# Patient Record
Sex: FEMALE | Race: WHITE | Employment: FULL TIME | URBAN - METROPOLITAN AREA
[De-identification: names, ages, dates, MRNs, and addresses within clinical notes are randomized per-mention and may not be internally consistent; named-entity substitution may affect disease eponyms.]

---

## 2017-01-13 ENCOUNTER — TELEPHONE (OUTPATIENT)
Dept: FAMILY MEDICINE CLINIC | Age: 52
End: 2017-01-13

## 2017-01-18 ENCOUNTER — TELEPHONE (OUTPATIENT)
Dept: FAMILY MEDICINE CLINIC | Age: 52
End: 2017-01-18

## 2017-01-18 DIAGNOSIS — R92.8 ABNORMAL MAMMOGRAM OF LEFT BREAST: Primary | ICD-10-CM

## 2017-02-10 DIAGNOSIS — E11.9 TYPE 2 DIABETES MELLITUS WITHOUT COMPLICATION, WITHOUT LONG-TERM CURRENT USE OF INSULIN (HCC): ICD-10-CM

## 2017-02-10 RX ORDER — LISINOPRIL 5 MG/1
TABLET ORAL
Qty: 90 TABLET | Refills: 0 | Status: SHIPPED | OUTPATIENT
Start: 2017-02-10 | End: 2017-03-13 | Stop reason: SDUPTHER

## 2017-02-13 ENCOUNTER — HOSPITAL ENCOUNTER (OUTPATIENT)
Dept: WOMENS IMAGING | Age: 52
Discharge: HOME | End: 2017-02-13
Attending: FAMILY MEDICINE | Admitting: FAMILY MEDICINE

## 2017-02-13 DIAGNOSIS — R92.8 ABNORMAL MAMMOGRAM OF LEFT BREAST: ICD-10-CM

## 2017-02-13 DIAGNOSIS — R92.8 OTHER ABNORMAL AND INCONCLUSIVE FINDINGS ON DIAGNOSTIC IMAGING OF BREAST: ICD-10-CM

## 2017-02-13 DIAGNOSIS — R92.8 ABNORMAL MAMMOGRAM: ICD-10-CM

## 2017-02-20 ENCOUNTER — OFFICE VISIT (OUTPATIENT)
Dept: FAMILY MEDICINE CLINIC | Age: 52
End: 2017-02-20

## 2017-02-20 VITALS
WEIGHT: 168 LBS | HEART RATE: 64 BPM | RESPIRATION RATE: 12 BRPM | HEIGHT: 65 IN | DIASTOLIC BLOOD PRESSURE: 64 MMHG | BODY MASS INDEX: 27.99 KG/M2 | SYSTOLIC BLOOD PRESSURE: 102 MMHG

## 2017-02-20 DIAGNOSIS — Z23 NEED FOR PNEUMOCOCCAL VACCINATION: ICD-10-CM

## 2017-02-20 DIAGNOSIS — M70.61 TROCHANTERIC BURSITIS OF RIGHT HIP: Primary | ICD-10-CM

## 2017-02-20 DIAGNOSIS — R25.2 CRAMP OF BOTH LOWER EXTREMITIES: ICD-10-CM

## 2017-02-20 DIAGNOSIS — E11.9 TYPE 2 DIABETES MELLITUS WITHOUT COMPLICATION, WITHOUT LONG-TERM CURRENT USE OF INSULIN (HCC): ICD-10-CM

## 2017-02-20 PROCEDURE — 90471 IMMUNIZATION ADMIN: CPT | Performed by: FAMILY MEDICINE

## 2017-02-20 PROCEDURE — 90732 PPSV23 VACC 2 YRS+ SUBQ/IM: CPT | Performed by: FAMILY MEDICINE

## 2017-02-20 PROCEDURE — 99214 OFFICE O/P EST MOD 30 MIN: CPT | Performed by: FAMILY MEDICINE

## 2017-02-20 RX ORDER — METFORMIN HYDROCHLORIDE 500 MG/1
1000 TABLET, FILM COATED, EXTENDED RELEASE ORAL
Qty: 60 TABLET | Refills: 2 | Status: SHIPPED | OUTPATIENT
Start: 2017-02-20 | End: 2017-03-14

## 2017-02-23 ENCOUNTER — TELEPHONE (OUTPATIENT)
Dept: FAMILY MEDICINE CLINIC | Age: 52
End: 2017-02-23

## 2017-02-23 DIAGNOSIS — Z20.828 EXPOSURE TO THE FLU: Primary | ICD-10-CM

## 2017-02-23 RX ORDER — OSELTAMIVIR PHOSPHATE 75 MG/1
CAPSULE ORAL
Qty: 7 CAPSULE | Refills: 0 | Status: SHIPPED | OUTPATIENT
Start: 2017-02-23 | End: 2017-03-08 | Stop reason: ALTCHOICE

## 2017-03-01 ENCOUNTER — HOSPITAL ENCOUNTER (OUTPATIENT)
Dept: OTHER | Age: 52
Discharge: HOME | End: 2017-03-31
Attending: FAMILY MEDICINE | Admitting: FAMILY MEDICINE

## 2017-03-01 ASSESSMENT — ACTIVITIES OF DAILY LIVING (ADL): EFFECT OF PAIN ON DAILY ACTIVITIES: LIMITS WB ACTIVITIES

## 2017-03-01 ASSESSMENT — PAIN DESCRIPTION - PAIN TYPE: TYPE: ACUTE PAIN

## 2017-03-01 ASSESSMENT — PAIN DESCRIPTION - PROGRESSION: CLINICAL_PROGRESSION: GRADUALLY WORSENING

## 2017-03-01 ASSESSMENT — PAIN SCALES - GENERAL: PAINLEVEL_OUTOF10: 5

## 2017-03-01 ASSESSMENT — PAIN DESCRIPTION - LOCATION: LOCATION: HIP;BACK

## 2017-03-01 ASSESSMENT — PAIN DESCRIPTION - ORIENTATION: ORIENTATION: RIGHT

## 2017-03-01 ASSESSMENT — PAIN DESCRIPTION - ONSET: ONSET: ON-GOING

## 2017-03-01 ASSESSMENT — PAIN DESCRIPTION - FREQUENCY: FREQUENCY: CONTINUOUS

## 2017-03-01 ASSESSMENT — PAIN DESCRIPTION - DESCRIPTORS: DESCRIPTORS: ACHING;SORE;CONSTANT

## 2017-03-06 ENCOUNTER — HOSPITAL ENCOUNTER (OUTPATIENT)
Dept: PHYSICAL THERAPY | Age: 52
Discharge: HOME | End: 2017-03-07
Admitting: FAMILY MEDICINE

## 2017-03-08 ENCOUNTER — OFFICE VISIT (OUTPATIENT)
Dept: FAMILY MEDICINE CLINIC | Age: 52
End: 2017-03-08

## 2017-03-08 VITALS
OXYGEN SATURATION: 98 % | WEIGHT: 164 LBS | HEIGHT: 65 IN | HEART RATE: 104 BPM | DIASTOLIC BLOOD PRESSURE: 68 MMHG | TEMPERATURE: 98 F | SYSTOLIC BLOOD PRESSURE: 112 MMHG | BODY MASS INDEX: 27.32 KG/M2

## 2017-03-08 DIAGNOSIS — E11.9 TYPE 2 DIABETES MELLITUS WITHOUT COMPLICATION, WITHOUT LONG-TERM CURRENT USE OF INSULIN (HCC): ICD-10-CM

## 2017-03-08 DIAGNOSIS — Z80.3 FH: BREAST CANCER: ICD-10-CM

## 2017-03-08 DIAGNOSIS — J01.80 ACUTE NON-RECURRENT SINUSITIS OF OTHER SINUS: Primary | ICD-10-CM

## 2017-03-08 PROCEDURE — 99213 OFFICE O/P EST LOW 20 MIN: CPT | Performed by: INTERNAL MEDICINE

## 2017-03-08 RX ORDER — FLUTICASONE PROPIONATE 50 MCG
1 SPRAY, SUSPENSION (ML) NASAL DAILY
Qty: 1 BOTTLE | Refills: 0 | Status: SHIPPED | OUTPATIENT
Start: 2017-03-08 | End: 2017-03-13 | Stop reason: ALTCHOICE

## 2017-03-13 ENCOUNTER — HOSPITAL ENCOUNTER (OUTPATIENT)
Dept: PHYSICAL THERAPY | Age: 52
Discharge: HOME | End: 2017-03-14
Admitting: FAMILY MEDICINE

## 2017-03-13 ENCOUNTER — OFFICE VISIT (OUTPATIENT)
Dept: FAMILY MEDICINE CLINIC | Age: 52
End: 2017-03-13

## 2017-03-13 VITALS
BODY MASS INDEX: 27.66 KG/M2 | HEIGHT: 65 IN | RESPIRATION RATE: 12 BRPM | SYSTOLIC BLOOD PRESSURE: 110 MMHG | WEIGHT: 166 LBS | HEART RATE: 96 BPM | DIASTOLIC BLOOD PRESSURE: 60 MMHG

## 2017-03-13 DIAGNOSIS — E11.9 TYPE 2 DIABETES MELLITUS WITHOUT COMPLICATION, WITHOUT LONG-TERM CURRENT USE OF INSULIN (HCC): Primary | ICD-10-CM

## 2017-03-13 DIAGNOSIS — E78.00 PURE HYPERCHOLESTEROLEMIA: ICD-10-CM

## 2017-03-13 LAB
ANION GAP SERPL CALCULATED.3IONS-SCNC: 15 MMOL/L (ref 3–16)
BUN BLDV-MCNC: 10 MG/DL (ref 7–20)
CALCIUM SERPL-MCNC: 9.4 MG/DL (ref 8.3–10.6)
CHLORIDE BLD-SCNC: 99 MMOL/L (ref 99–110)
CO2: 23 MMOL/L (ref 21–32)
CREAT SERPL-MCNC: 0.8 MG/DL (ref 0.6–1.1)
GFR AFRICAN AMERICAN: >60
GFR NON-AFRICAN AMERICAN: >60
GLUCOSE BLD-MCNC: 359 MG/DL (ref 70–99)
HBA1C MFR BLD: 7.9 %
POTASSIUM SERPL-SCNC: 4.6 MMOL/L (ref 3.5–5.1)
SODIUM BLD-SCNC: 137 MMOL/L (ref 136–145)

## 2017-03-13 PROCEDURE — 99213 OFFICE O/P EST LOW 20 MIN: CPT | Performed by: FAMILY MEDICINE

## 2017-03-13 PROCEDURE — 36415 COLL VENOUS BLD VENIPUNCTURE: CPT | Performed by: FAMILY MEDICINE

## 2017-03-13 PROCEDURE — 83036 HEMOGLOBIN GLYCOSYLATED A1C: CPT | Performed by: FAMILY MEDICINE

## 2017-03-13 RX ORDER — LISINOPRIL 5 MG/1
TABLET ORAL
Qty: 90 TABLET | Refills: 0 | Status: SHIPPED | OUTPATIENT
Start: 2017-03-13 | End: 2017-06-12 | Stop reason: SDUPTHER

## 2017-03-13 RX ORDER — ATORVASTATIN CALCIUM 20 MG/1
20 TABLET, FILM COATED ORAL DAILY
Qty: 90 TABLET | Refills: 0 | Status: SHIPPED | OUTPATIENT
Start: 2017-03-13 | End: 2017-06-04 | Stop reason: SDUPTHER

## 2017-03-14 ENCOUNTER — TELEPHONE (OUTPATIENT)
Dept: FAMILY MEDICINE CLINIC | Age: 52
End: 2017-03-14

## 2017-03-14 RX ORDER — METFORMIN HYDROCHLORIDE 500 MG/1
1000 TABLET, EXTENDED RELEASE ORAL
Qty: 180 TABLET | Refills: 0 | Status: SHIPPED | OUTPATIENT
Start: 2017-03-14 | End: 2017-06-12 | Stop reason: SDUPTHER

## 2017-03-14 RX ORDER — ALOGLIPTIN 25 MG/1
25 TABLET, FILM COATED ORAL DAILY
Qty: 90 TABLET | Refills: 0 | Status: SHIPPED | OUTPATIENT
Start: 2017-03-14 | End: 2017-06-12 | Stop reason: SDUPTHER

## 2017-03-15 ENCOUNTER — HOSPITAL ENCOUNTER (OUTPATIENT)
Dept: PHYSICAL THERAPY | Age: 52
Discharge: HOME | End: 2017-03-16
Admitting: FAMILY MEDICINE

## 2017-03-15 DIAGNOSIS — E11.9 TYPE 2 DIABETES MELLITUS WITHOUT COMPLICATION, WITHOUT LONG-TERM CURRENT USE OF INSULIN (HCC): Primary | ICD-10-CM

## 2017-03-15 RX ORDER — BLOOD-GLUCOSE METER
KIT MISCELLANEOUS
Qty: 1 KIT | Refills: 0 | Status: SHIPPED | OUTPATIENT
Start: 2017-03-15 | End: 2017-12-18 | Stop reason: SDUPTHER

## 2017-04-10 DIAGNOSIS — E11.9 TYPE 2 DIABETES MELLITUS WITHOUT COMPLICATION, WITHOUT LONG-TERM CURRENT USE OF INSULIN (HCC): ICD-10-CM

## 2017-04-11 RX ORDER — GLIPIZIDE 10 MG/1
TABLET ORAL
Qty: 360 TABLET | Refills: 0 | Status: SHIPPED | OUTPATIENT
Start: 2017-04-11 | End: 2017-06-12 | Stop reason: SDUPTHER

## 2017-05-01 ENCOUNTER — TELEPHONE (OUTPATIENT)
Dept: FAMILY MEDICINE CLINIC | Age: 52
End: 2017-05-01

## 2017-05-15 DIAGNOSIS — E78.00 PURE HYPERCHOLESTEROLEMIA: ICD-10-CM

## 2017-05-15 DIAGNOSIS — E11.9 TYPE 2 DIABETES MELLITUS WITHOUT COMPLICATION, WITHOUT LONG-TERM CURRENT USE OF INSULIN (HCC): Primary | ICD-10-CM

## 2017-05-16 LAB
A/G RATIO: 1.6 (ref 1.1–2.2)
ALBUMIN SERPL-MCNC: 3.9 G/DL (ref 3.4–5)
ALP BLD-CCNC: 79 U/L (ref 40–129)
ALT SERPL-CCNC: 14 U/L (ref 10–40)
ANION GAP SERPL CALCULATED.3IONS-SCNC: 14 MMOL/L (ref 3–16)
AST SERPL-CCNC: 13 U/L (ref 15–37)
BILIRUB SERPL-MCNC: 0.3 MG/DL (ref 0–1)
BUN BLDV-MCNC: 11 MG/DL (ref 7–20)
CALCIUM SERPL-MCNC: 8.9 MG/DL (ref 8.3–10.6)
CHLORIDE BLD-SCNC: 103 MMOL/L (ref 99–110)
CHOLESTEROL, TOTAL: 142 MG/DL (ref 0–199)
CO2: 22 MMOL/L (ref 21–32)
CREAT SERPL-MCNC: 0.6 MG/DL (ref 0.6–1.1)
GFR AFRICAN AMERICAN: >60
GFR NON-AFRICAN AMERICAN: >60
GLOBULIN: 2.5 G/DL
GLUCOSE BLD-MCNC: 205 MG/DL (ref 70–99)
HDLC SERPL-MCNC: 42 MG/DL (ref 40–60)
LDL CHOLESTEROL CALCULATED: 77 MG/DL
POTASSIUM SERPL-SCNC: 4.7 MMOL/L (ref 3.5–5.1)
SODIUM BLD-SCNC: 139 MMOL/L (ref 136–145)
TOTAL PROTEIN: 6.4 G/DL (ref 6.4–8.2)
TRIGL SERPL-MCNC: 117 MG/DL (ref 0–150)
VLDLC SERPL CALC-MCNC: 23 MG/DL

## 2017-06-04 DIAGNOSIS — E78.00 PURE HYPERCHOLESTEROLEMIA: ICD-10-CM

## 2017-06-05 RX ORDER — ATORVASTATIN CALCIUM 20 MG/1
TABLET, FILM COATED ORAL
Qty: 30 TABLET | Refills: 0 | Status: SHIPPED | OUTPATIENT
Start: 2017-06-05 | End: 2017-07-03 | Stop reason: SDUPTHER

## 2017-06-12 ENCOUNTER — OFFICE VISIT (OUTPATIENT)
Dept: FAMILY MEDICINE CLINIC | Age: 52
End: 2017-06-12

## 2017-06-12 VITALS
SYSTOLIC BLOOD PRESSURE: 106 MMHG | RESPIRATION RATE: 20 BRPM | WEIGHT: 164 LBS | HEART RATE: 80 BPM | BODY MASS INDEX: 28 KG/M2 | HEIGHT: 64 IN | DIASTOLIC BLOOD PRESSURE: 86 MMHG

## 2017-06-12 DIAGNOSIS — E11.9 TYPE 2 DIABETES MELLITUS WITHOUT COMPLICATION, WITHOUT LONG-TERM CURRENT USE OF INSULIN (HCC): Primary | ICD-10-CM

## 2017-06-12 DIAGNOSIS — E78.00 PURE HYPERCHOLESTEROLEMIA: ICD-10-CM

## 2017-06-12 DIAGNOSIS — Z12.11 COLON CANCER SCREENING: ICD-10-CM

## 2017-06-12 DIAGNOSIS — Z11.59 NEED FOR HEPATITIS C SCREENING TEST: ICD-10-CM

## 2017-06-12 LAB
HBA1C MFR BLD: 7.5 %
HEPATITIS C ANTIBODY INTERPRETATION: NORMAL

## 2017-06-12 PROCEDURE — 83036 HEMOGLOBIN GLYCOSYLATED A1C: CPT | Performed by: FAMILY MEDICINE

## 2017-06-12 PROCEDURE — 99214 OFFICE O/P EST MOD 30 MIN: CPT | Performed by: FAMILY MEDICINE

## 2017-06-12 RX ORDER — GLIPIZIDE 10 MG/1
TABLET ORAL
Qty: 360 TABLET | Refills: 0 | Status: SHIPPED | OUTPATIENT
Start: 2017-06-12 | End: 2017-09-27 | Stop reason: SDUPTHER

## 2017-06-12 RX ORDER — ALOGLIPTIN 25 MG/1
25 TABLET, FILM COATED ORAL DAILY
Qty: 90 TABLET | Refills: 0 | Status: SHIPPED | OUTPATIENT
Start: 2017-06-12 | End: 2017-09-01 | Stop reason: SDUPTHER

## 2017-06-12 RX ORDER — METFORMIN HYDROCHLORIDE 500 MG/1
1000 TABLET, EXTENDED RELEASE ORAL
Qty: 270 TABLET | Refills: 0 | Status: SHIPPED | OUTPATIENT
Start: 2017-06-12 | End: 2017-08-28 | Stop reason: SDUPTHER

## 2017-06-12 RX ORDER — LISINOPRIL 5 MG/1
TABLET ORAL
Qty: 90 TABLET | Refills: 0 | Status: SHIPPED | OUTPATIENT
Start: 2017-06-12 | End: 2017-11-08 | Stop reason: SDUPTHER

## 2017-06-13 ENCOUNTER — NURSE ONLY (OUTPATIENT)
Dept: FAMILY MEDICINE CLINIC | Age: 52
End: 2017-06-13

## 2017-06-13 ENCOUNTER — TELEPHONE (OUTPATIENT)
Dept: FAMILY MEDICINE CLINIC | Age: 52
End: 2017-06-13

## 2017-06-13 DIAGNOSIS — Z12.11 COLON CANCER SCREENING: ICD-10-CM

## 2017-06-13 LAB
CONTROL: NORMAL
HEMOCCULT STL QL: NEGATIVE

## 2017-06-13 PROCEDURE — 82274 ASSAY TEST FOR BLOOD FECAL: CPT | Performed by: FAMILY MEDICINE

## 2017-07-03 DIAGNOSIS — E78.00 PURE HYPERCHOLESTEROLEMIA: ICD-10-CM

## 2017-07-03 RX ORDER — ATORVASTATIN CALCIUM 20 MG/1
TABLET, FILM COATED ORAL
Qty: 90 TABLET | Refills: 0 | Status: SHIPPED | OUTPATIENT
Start: 2017-07-03 | End: 2017-08-28 | Stop reason: SDUPTHER

## 2017-07-05 ENCOUNTER — HOSPITAL ENCOUNTER (OUTPATIENT)
Dept: WOMENS IMAGING | Age: 52
Discharge: HOME | End: 2017-07-05
Attending: FAMILY MEDICINE | Admitting: FAMILY MEDICINE

## 2017-07-05 DIAGNOSIS — Z12.31 VISIT FOR SCREENING MAMMOGRAM: ICD-10-CM

## 2017-07-06 ENCOUNTER — TELEPHONE (OUTPATIENT)
Dept: FAMILY MEDICINE CLINIC | Age: 52
End: 2017-07-06

## 2017-07-06 DIAGNOSIS — R92.8 ABNORMAL MAMMOGRAM OF LEFT BREAST: Primary | ICD-10-CM

## 2017-07-10 ENCOUNTER — OFFICE VISIT (OUTPATIENT)
Dept: FAMILY MEDICINE CLINIC | Age: 52
End: 2017-07-10

## 2017-07-10 ENCOUNTER — TELEPHONE (OUTPATIENT)
Dept: FAMILY MEDICINE CLINIC | Age: 52
End: 2017-07-10

## 2017-07-10 VITALS
BODY MASS INDEX: 27.82 KG/M2 | DIASTOLIC BLOOD PRESSURE: 70 MMHG | HEIGHT: 65 IN | HEART RATE: 64 BPM | SYSTOLIC BLOOD PRESSURE: 116 MMHG | RESPIRATION RATE: 12 BRPM | WEIGHT: 167 LBS

## 2017-07-10 DIAGNOSIS — N20.0 KIDNEY STONE: Primary | ICD-10-CM

## 2017-07-10 PROCEDURE — 99214 OFFICE O/P EST MOD 30 MIN: CPT | Performed by: FAMILY MEDICINE

## 2017-07-10 RX ORDER — MECLIZINE HYDROCHLORIDE 25 MG/1
25 TABLET ORAL 3 TIMES DAILY PRN
Qty: 30 TABLET | Refills: 0 | Status: SHIPPED | OUTPATIENT
Start: 2017-07-10 | End: 2017-12-18 | Stop reason: SDUPTHER

## 2017-07-10 RX ORDER — MECLIZINE HYDROCHLORIDE 25 MG/1
25 TABLET ORAL 3 TIMES DAILY PRN
COMMUNITY
End: 2017-07-10 | Stop reason: SDUPTHER

## 2017-07-11 ENCOUNTER — HOSPITAL ENCOUNTER (OUTPATIENT)
Dept: ULTRASOUND IMAGING | Age: 52
Discharge: HOME | End: 2017-07-11
Attending: FAMILY MEDICINE | Admitting: FAMILY MEDICINE

## 2017-07-11 DIAGNOSIS — R92.8 ABNORMAL MAMMOGRAM OF LEFT BREAST: ICD-10-CM

## 2017-07-11 DIAGNOSIS — R92.8 OTHER ABNORMAL AND INCONCLUSIVE FINDINGS ON DIAGNOSTIC IMAGING OF BREAST: ICD-10-CM

## 2017-07-17 ENCOUNTER — OFFICE VISIT (OUTPATIENT)
Dept: FAMILY MEDICINE CLINIC | Age: 52
End: 2017-07-17

## 2017-07-17 ENCOUNTER — TELEPHONE (OUTPATIENT)
Dept: FAMILY MEDICINE CLINIC | Age: 52
End: 2017-07-17

## 2017-07-17 VITALS
BODY MASS INDEX: 27.82 KG/M2 | HEART RATE: 106 BPM | RESPIRATION RATE: 12 BRPM | HEIGHT: 65 IN | OXYGEN SATURATION: 97 % | DIASTOLIC BLOOD PRESSURE: 72 MMHG | WEIGHT: 167 LBS | SYSTOLIC BLOOD PRESSURE: 108 MMHG

## 2017-07-17 DIAGNOSIS — D64.9 ANEMIA, UNSPECIFIED TYPE: ICD-10-CM

## 2017-07-17 DIAGNOSIS — E11.9 TYPE 2 DIABETES MELLITUS WITHOUT COMPLICATION, WITHOUT LONG-TERM CURRENT USE OF INSULIN (HCC): ICD-10-CM

## 2017-07-17 DIAGNOSIS — M79.604 PAIN OF RIGHT LOWER EXTREMITY: Primary | ICD-10-CM

## 2017-07-17 LAB
CREATININE URINE: 34.4 MG/DL (ref 28–259)
MICROALBUMIN UR-MCNC: <1.2 MG/DL
MICROALBUMIN/CREAT UR-RTO: NORMAL MG/G (ref 0–30)

## 2017-07-17 PROCEDURE — 99214 OFFICE O/P EST MOD 30 MIN: CPT | Performed by: INTERNAL MEDICINE

## 2017-07-18 ENCOUNTER — HOSPITAL ENCOUNTER (OUTPATIENT)
Dept: VASCULAR LAB | Age: 52
Discharge: HOME | End: 2017-07-18
Attending: INTERNAL MEDICINE | Admitting: INTERNAL MEDICINE

## 2017-07-18 ENCOUNTER — TELEPHONE (OUTPATIENT)
Dept: FAMILY MEDICINE CLINIC | Age: 52
End: 2017-07-18

## 2017-07-18 DIAGNOSIS — D64.9 ANEMIA, UNSPECIFIED TYPE: ICD-10-CM

## 2017-07-18 PROBLEM — D50.9 IRON DEFICIENCY ANEMIA: Status: ACTIVE | Noted: 2017-07-18

## 2017-07-18 LAB
BASOPHILS ABSOLUTE: 0.1 K/UL (ref 0–0.2)
BASOPHILS RELATIVE PERCENT: 0.8 %
EOSINOPHILS ABSOLUTE: 0.1 K/UL (ref 0–0.6)
EOSINOPHILS RELATIVE PERCENT: 1.8 %
FERRITIN: 6.3 NG/ML (ref 15–150)
HCT VFR BLD CALC: 31.9 % (ref 36–48)
HEMOGLOBIN: 10.3 G/DL (ref 12–16)
IMMATURE RETIC FRACT: 0.51 (ref 0.21–0.37)
IRON SATURATION: 8 % (ref 15–50)
IRON: 31 UG/DL (ref 37–145)
LYMPHOCYTES ABSOLUTE: 0.9 K/UL (ref 1–5.1)
LYMPHOCYTES RELATIVE PERCENT: 14.5 %
MCH RBC QN AUTO: 24 PG (ref 26–34)
MCHC RBC AUTO-ENTMCNC: 32.2 G/DL (ref 31–36)
MCV RBC AUTO: 74.6 FL (ref 80–100)
MONOCYTES ABSOLUTE: 0.4 K/UL (ref 0–1.3)
MONOCYTES RELATIVE PERCENT: 5.8 %
NEUTROPHILS ABSOLUTE: 5 K/UL (ref 1.7–7.7)
NEUTROPHILS RELATIVE PERCENT: 77.1 %
PDW BLD-RTO: 17.3 % (ref 12.4–15.4)
PLATELET # BLD: 226 K/UL (ref 135–450)
PMV BLD AUTO: 9.5 FL (ref 5–10.5)
RBC # BLD: 4.27 M/UL (ref 4–5.2)
RETICULOCYTE ABSOLUTE COUNT: 0.08 M/UL (ref 0.02–0.1)
RETICULOCYTE COUNT PCT: 1.78 % (ref 0.5–2.18)
TOTAL IRON BINDING CAPACITY: 392 UG/DL (ref 260–445)
WBC # BLD: 6.5 K/UL (ref 4–11)

## 2017-07-19 ENCOUNTER — TELEPHONE (OUTPATIENT)
Dept: FAMILY MEDICINE CLINIC | Age: 52
End: 2017-07-19

## 2017-07-19 DIAGNOSIS — Z20.818 PERTUSSIS EXPOSURE: Primary | ICD-10-CM

## 2017-07-19 RX ORDER — LANOLIN ALCOHOL/MO/W.PET/CERES
325 CREAM (GRAM) TOPICAL 2 TIMES DAILY
Qty: 60 TABLET | Refills: 2 | Status: SHIPPED | OUTPATIENT
Start: 2017-07-19 | End: 2017-10-17 | Stop reason: SDUPTHER

## 2017-07-19 RX ORDER — AZITHROMYCIN 250 MG/1
TABLET, FILM COATED ORAL
Qty: 1 PACKET | Refills: 0 | Status: SHIPPED | OUTPATIENT
Start: 2017-07-19 | End: 2017-07-29

## 2017-07-25 ENCOUNTER — TELEPHONE (OUTPATIENT)
Dept: FAMILY MEDICINE CLINIC | Age: 52
End: 2017-07-25

## 2017-08-28 DIAGNOSIS — E78.00 PURE HYPERCHOLESTEROLEMIA: ICD-10-CM

## 2017-08-28 DIAGNOSIS — E11.9 TYPE 2 DIABETES MELLITUS WITHOUT COMPLICATION, WITHOUT LONG-TERM CURRENT USE OF INSULIN (HCC): ICD-10-CM

## 2017-08-28 RX ORDER — METFORMIN HYDROCHLORIDE 500 MG/1
TABLET, EXTENDED RELEASE ORAL
Qty: 90 TABLET | Refills: 0 | Status: SHIPPED | OUTPATIENT
Start: 2017-08-28 | End: 2017-09-27 | Stop reason: SDUPTHER

## 2017-08-28 RX ORDER — ATORVASTATIN CALCIUM 20 MG/1
TABLET, FILM COATED ORAL
Qty: 90 TABLET | Refills: 0 | Status: SHIPPED | OUTPATIENT
Start: 2017-08-28 | End: 2017-09-06 | Stop reason: SDUPTHER

## 2017-09-01 DIAGNOSIS — E11.9 TYPE 2 DIABETES MELLITUS WITHOUT COMPLICATION, WITHOUT LONG-TERM CURRENT USE OF INSULIN (HCC): ICD-10-CM

## 2017-09-01 RX ORDER — ALOGLIPTIN 25 MG/1
TABLET, FILM COATED ORAL
Qty: 90 TABLET | Refills: 0 | Status: SHIPPED | OUTPATIENT
Start: 2017-09-01 | End: 2017-11-25 | Stop reason: SDUPTHER

## 2017-09-06 DIAGNOSIS — E78.00 PURE HYPERCHOLESTEROLEMIA: ICD-10-CM

## 2017-09-06 RX ORDER — ATORVASTATIN CALCIUM 20 MG/1
TABLET, FILM COATED ORAL
Qty: 90 TABLET | Refills: 0 | Status: SHIPPED | OUTPATIENT
Start: 2017-09-06 | End: 2018-03-16 | Stop reason: SDUPTHER

## 2017-09-11 ENCOUNTER — OFFICE VISIT (OUTPATIENT)
Dept: FAMILY MEDICINE CLINIC | Age: 52
End: 2017-09-11

## 2017-09-11 VITALS
HEART RATE: 62 BPM | SYSTOLIC BLOOD PRESSURE: 100 MMHG | DIASTOLIC BLOOD PRESSURE: 72 MMHG | BODY MASS INDEX: 26.66 KG/M2 | WEIGHT: 160 LBS | HEIGHT: 65 IN | RESPIRATION RATE: 12 BRPM

## 2017-09-11 DIAGNOSIS — E11.9 TYPE 2 DIABETES MELLITUS WITHOUT COMPLICATION, WITHOUT LONG-TERM CURRENT USE OF INSULIN (HCC): Primary | ICD-10-CM

## 2017-09-11 DIAGNOSIS — D50.9 IRON DEFICIENCY ANEMIA, UNSPECIFIED IRON DEFICIENCY ANEMIA TYPE: ICD-10-CM

## 2017-09-11 DIAGNOSIS — E78.00 PURE HYPERCHOLESTEROLEMIA: ICD-10-CM

## 2017-09-11 LAB
BASOPHILS ABSOLUTE: 0 K/UL (ref 0–0.2)
BASOPHILS RELATIVE PERCENT: 0.4 %
EOSINOPHILS ABSOLUTE: 0.1 K/UL (ref 0–0.6)
EOSINOPHILS RELATIVE PERCENT: 1.4 %
HBA1C MFR BLD: 6 %
HCT VFR BLD CALC: 38.3 % (ref 36–48)
HEMOGLOBIN: 12.3 G/DL (ref 12–16)
LYMPHOCYTES ABSOLUTE: 1 K/UL (ref 1–5.1)
LYMPHOCYTES RELATIVE PERCENT: 14.7 %
MCH RBC QN AUTO: 26.5 PG (ref 26–34)
MCHC RBC AUTO-ENTMCNC: 32.2 G/DL (ref 31–36)
MCV RBC AUTO: 82.3 FL (ref 80–100)
MONOCYTES ABSOLUTE: 0.4 K/UL (ref 0–1.3)
MONOCYTES RELATIVE PERCENT: 5.6 %
NEUTROPHILS ABSOLUTE: 5.3 K/UL (ref 1.7–7.7)
NEUTROPHILS RELATIVE PERCENT: 77.9 %
PDW BLD-RTO: 21.5 % (ref 12.4–15.4)
PLATELET # BLD: 227 K/UL (ref 135–450)
PMV BLD AUTO: 10.6 FL (ref 5–10.5)
RBC # BLD: 4.66 M/UL (ref 4–5.2)
WBC # BLD: 6.8 K/UL (ref 4–11)

## 2017-09-11 PROCEDURE — 83036 HEMOGLOBIN GLYCOSYLATED A1C: CPT | Performed by: FAMILY MEDICINE

## 2017-09-11 PROCEDURE — 36415 COLL VENOUS BLD VENIPUNCTURE: CPT | Performed by: FAMILY MEDICINE

## 2017-09-11 PROCEDURE — 99214 OFFICE O/P EST MOD 30 MIN: CPT | Performed by: FAMILY MEDICINE

## 2017-09-25 ENCOUNTER — OFFICE VISIT (OUTPATIENT)
Dept: FAMILY MEDICINE CLINIC | Age: 52
End: 2017-09-25

## 2017-09-25 ENCOUNTER — TELEPHONE (OUTPATIENT)
Dept: FAMILY MEDICINE CLINIC | Age: 52
End: 2017-09-25

## 2017-09-25 VITALS
DIASTOLIC BLOOD PRESSURE: 62 MMHG | RESPIRATION RATE: 12 BRPM | TEMPERATURE: 98 F | WEIGHT: 157 LBS | OXYGEN SATURATION: 97 % | BODY MASS INDEX: 26.16 KG/M2 | HEART RATE: 91 BPM | HEIGHT: 65 IN | SYSTOLIC BLOOD PRESSURE: 96 MMHG

## 2017-09-25 DIAGNOSIS — B34.9 VIRAL SYNDROME: Primary | ICD-10-CM

## 2017-09-25 DIAGNOSIS — E11.9 TYPE 2 DIABETES MELLITUS WITHOUT COMPLICATION, WITHOUT LONG-TERM CURRENT USE OF INSULIN (HCC): ICD-10-CM

## 2017-09-25 DIAGNOSIS — R42 VERTIGO: ICD-10-CM

## 2017-09-25 PROCEDURE — 99213 OFFICE O/P EST LOW 20 MIN: CPT | Performed by: INTERNAL MEDICINE

## 2017-09-25 RX ORDER — PROMETHAZINE HYDROCHLORIDE 25 MG/1
TABLET ORAL
Qty: 15 TABLET | Refills: 0 | Status: SHIPPED | OUTPATIENT
Start: 2017-09-25 | End: 2017-12-11 | Stop reason: ALTCHOICE

## 2017-09-26 ENCOUNTER — TELEPHONE (OUTPATIENT)
Dept: FAMILY MEDICINE CLINIC | Age: 52
End: 2017-09-26

## 2017-09-27 DIAGNOSIS — E78.00 PURE HYPERCHOLESTEROLEMIA: ICD-10-CM

## 2017-09-27 DIAGNOSIS — E11.9 TYPE 2 DIABETES MELLITUS WITHOUT COMPLICATION, WITHOUT LONG-TERM CURRENT USE OF INSULIN (HCC): ICD-10-CM

## 2017-09-27 RX ORDER — ATORVASTATIN CALCIUM 20 MG/1
TABLET, FILM COATED ORAL
Qty: 90 TABLET | Refills: 0 | Status: SHIPPED | OUTPATIENT
Start: 2017-09-27 | End: 2017-12-18 | Stop reason: SDUPTHER

## 2017-09-27 RX ORDER — METFORMIN HYDROCHLORIDE 500 MG/1
TABLET, EXTENDED RELEASE ORAL
Qty: 90 TABLET | Refills: 0 | Status: SHIPPED | OUTPATIENT
Start: 2017-09-27 | End: 2017-10-28 | Stop reason: SDUPTHER

## 2017-09-27 RX ORDER — GLIPIZIDE 10 MG/1
TABLET ORAL
Qty: 120 TABLET | Refills: 0 | Status: SHIPPED | OUTPATIENT
Start: 2017-09-27 | End: 2017-10-28 | Stop reason: SDUPTHER

## 2017-09-28 ENCOUNTER — OFFICE VISIT (OUTPATIENT)
Dept: FAMILY MEDICINE CLINIC | Age: 52
End: 2017-09-28

## 2017-09-28 VITALS
HEIGHT: 65 IN | SYSTOLIC BLOOD PRESSURE: 100 MMHG | OXYGEN SATURATION: 98 % | DIASTOLIC BLOOD PRESSURE: 68 MMHG | RESPIRATION RATE: 12 BRPM | BODY MASS INDEX: 26.33 KG/M2 | WEIGHT: 158 LBS | TEMPERATURE: 97.6 F | HEART RATE: 74 BPM

## 2017-09-28 DIAGNOSIS — R11.0 NAUSEA: ICD-10-CM

## 2017-09-28 DIAGNOSIS — N26.1 ATROPHY OF RIGHT KIDNEY: ICD-10-CM

## 2017-09-28 DIAGNOSIS — E61.1 IRON DEFICIENCY: ICD-10-CM

## 2017-09-28 DIAGNOSIS — R50.9 FEVER, UNSPECIFIED FEVER CAUSE: Primary | ICD-10-CM

## 2017-09-28 PROBLEM — K57.90 DIVERTICULOSIS: Status: ACTIVE | Noted: 2017-09-28

## 2017-09-28 LAB
ALBUMIN SERPL-MCNC: 4.1 G/DL (ref 3.4–5)
ALP BLD-CCNC: 77 U/L (ref 40–129)
ALT SERPL-CCNC: 20 U/L (ref 10–40)
ANION GAP SERPL CALCULATED.3IONS-SCNC: 15 MMOL/L (ref 3–16)
AST SERPL-CCNC: 17 U/L (ref 15–37)
BASOPHILS ABSOLUTE: 0 K/UL (ref 0–0.2)
BASOPHILS RELATIVE PERCENT: 0.7 %
BILIRUB SERPL-MCNC: 0.3 MG/DL (ref 0–1)
BILIRUBIN DIRECT: <0.2 MG/DL (ref 0–0.3)
BILIRUBIN, INDIRECT: NORMAL MG/DL (ref 0–1)
BILIRUBIN, POC: NORMAL
BLOOD URINE, POC: NORMAL
BUN BLDV-MCNC: 7 MG/DL (ref 7–20)
CALCIUM SERPL-MCNC: 9.2 MG/DL (ref 8.3–10.6)
CHLORIDE BLD-SCNC: 101 MMOL/L (ref 99–110)
CLARITY, POC: CLEAR
CO2: 23 MMOL/L (ref 21–32)
COLOR, POC: YELLOW
CREAT SERPL-MCNC: 0.6 MG/DL (ref 0.6–1.1)
EOSINOPHILS ABSOLUTE: 0.1 K/UL (ref 0–0.6)
EOSINOPHILS RELATIVE PERCENT: 2.2 %
GFR AFRICAN AMERICAN: >60
GFR NON-AFRICAN AMERICAN: >60
GLUCOSE BLD-MCNC: 131 MG/DL (ref 70–99)
GLUCOSE URINE, POC: NORMAL
HCT VFR BLD CALC: 38.9 % (ref 36–48)
HEMOGLOBIN: 12.9 G/DL (ref 12–16)
IRON SATURATION: 27 % (ref 15–50)
IRON: 97 UG/DL (ref 37–145)
KETONES, POC: NORMAL
LEUKOCYTE EST, POC: NORMAL
LYMPHOCYTES ABSOLUTE: 1.1 K/UL (ref 1–5.1)
LYMPHOCYTES RELATIVE PERCENT: 18.9 %
MCH RBC QN AUTO: 27.6 PG (ref 26–34)
MCHC RBC AUTO-ENTMCNC: 33.1 G/DL (ref 31–36)
MCV RBC AUTO: 83.4 FL (ref 80–100)
MONOCYTES ABSOLUTE: 0.2 K/UL (ref 0–1.3)
MONOCYTES RELATIVE PERCENT: 4.2 %
NEUTROPHILS ABSOLUTE: 4.2 K/UL (ref 1.7–7.7)
NEUTROPHILS RELATIVE PERCENT: 74 %
NITRITE, POC: NORMAL
PDW BLD-RTO: 20.7 % (ref 12.4–15.4)
PH, POC: 5.5
PLATELET # BLD: 217 K/UL (ref 135–450)
PMV BLD AUTO: 10.4 FL (ref 5–10.5)
POTASSIUM SERPL-SCNC: 4 MMOL/L (ref 3.5–5.1)
PROTEIN, POC: NORMAL
RBC # BLD: 4.66 M/UL (ref 4–5.2)
SODIUM BLD-SCNC: 139 MMOL/L (ref 136–145)
SPECIFIC GRAVITY, POC: 1.01
TOTAL IRON BINDING CAPACITY: 364 UG/DL (ref 260–445)
TOTAL PROTEIN: 6.4 G/DL (ref 6.4–8.2)
UROBILINOGEN, POC: 0.2
WBC # BLD: 5.7 K/UL (ref 4–11)

## 2017-09-28 PROCEDURE — 81002 URINALYSIS NONAUTO W/O SCOPE: CPT | Performed by: INTERNAL MEDICINE

## 2017-09-28 PROCEDURE — 99214 OFFICE O/P EST MOD 30 MIN: CPT | Performed by: INTERNAL MEDICINE

## 2017-09-28 PROCEDURE — 36415 COLL VENOUS BLD VENIPUNCTURE: CPT | Performed by: INTERNAL MEDICINE

## 2017-09-28 RX ORDER — DOXYCYCLINE HYCLATE 100 MG
100 TABLET ORAL 2 TIMES DAILY
Qty: 20 TABLET | Refills: 0 | Status: SHIPPED | OUTPATIENT
Start: 2017-09-28 | End: 2017-10-08

## 2017-09-30 LAB
LYME, EIA: 0.8 LIV (ref 0–1.2)
WEST NILE VIRUS, IGG: 0.05 IV
WEST NILE VIRUS, IGM: 0.3 IV

## 2017-10-01 DIAGNOSIS — E11.9 TYPE 2 DIABETES MELLITUS WITHOUT COMPLICATION, WITHOUT LONG-TERM CURRENT USE OF INSULIN (HCC): ICD-10-CM

## 2017-10-02 ENCOUNTER — TELEPHONE (OUTPATIENT)
Dept: FAMILY MEDICINE CLINIC | Age: 52
End: 2017-10-02

## 2017-10-02 DIAGNOSIS — S91.209D NAIL AVULSION OF TOE, SUBSEQUENT ENCOUNTER: Primary | ICD-10-CM

## 2017-10-02 LAB
ROCKY MOUNTAIN SPOTTED FEVER AB IGM,: ABNORMAL
ROCKY MOUNTAIN SPOTTED FEVER ANTIBODY IGG: ABNORMAL

## 2017-10-02 RX ORDER — METFORMIN HYDROCHLORIDE 500 MG/1
TABLET, EXTENDED RELEASE ORAL
Qty: 90 TABLET | Refills: 0 | OUTPATIENT
Start: 2017-10-02

## 2017-10-02 RX ORDER — GLIPIZIDE 10 MG/1
TABLET ORAL
Qty: 120 TABLET | Refills: 0 | OUTPATIENT
Start: 2017-10-02

## 2017-10-02 NOTE — TELEPHONE ENCOUNTER
msg left to call with update. Borderline RMSF serology which is treated with the medication she is taking.

## 2017-10-17 ENCOUNTER — TELEPHONE (OUTPATIENT)
Dept: FAMILY MEDICINE CLINIC | Age: 52
End: 2017-10-17

## 2017-10-17 RX ORDER — LANOLIN ALCOHOL/MO/W.PET/CERES
325 CREAM (GRAM) TOPICAL 2 TIMES DAILY
Qty: 180 TABLET | Refills: 1 | Status: SHIPPED | OUTPATIENT
Start: 2017-10-17 | End: 2017-12-18 | Stop reason: SDUPTHER

## 2017-10-28 DIAGNOSIS — E11.9 TYPE 2 DIABETES MELLITUS WITHOUT COMPLICATION, WITHOUT LONG-TERM CURRENT USE OF INSULIN (HCC): ICD-10-CM

## 2017-10-30 RX ORDER — METFORMIN HYDROCHLORIDE 500 MG/1
TABLET, EXTENDED RELEASE ORAL
Qty: 90 TABLET | Refills: 0 | Status: SHIPPED | OUTPATIENT
Start: 2017-10-30 | End: 2017-11-25 | Stop reason: SDUPTHER

## 2017-10-30 RX ORDER — GLIPIZIDE 10 MG/1
TABLET ORAL
Qty: 120 TABLET | Refills: 0 | Status: SHIPPED | OUTPATIENT
Start: 2017-10-30 | End: 2017-11-25 | Stop reason: SDUPTHER

## 2017-11-25 DIAGNOSIS — E11.9 TYPE 2 DIABETES MELLITUS WITHOUT COMPLICATION, WITHOUT LONG-TERM CURRENT USE OF INSULIN (HCC): ICD-10-CM

## 2017-11-27 RX ORDER — GLIPIZIDE 10 MG/1
TABLET ORAL
Qty: 120 TABLET | Refills: 0 | Status: SHIPPED | OUTPATIENT
Start: 2017-11-27 | End: 2017-12-11 | Stop reason: ALTCHOICE

## 2017-11-27 RX ORDER — ALOGLIPTIN 25 MG/1
TABLET, FILM COATED ORAL
Qty: 30 TABLET | Refills: 0 | Status: SHIPPED | OUTPATIENT
Start: 2017-11-27 | End: 2017-12-18 | Stop reason: ALTCHOICE

## 2017-11-27 RX ORDER — METFORMIN HYDROCHLORIDE 500 MG/1
TABLET, EXTENDED RELEASE ORAL
Qty: 90 TABLET | Refills: 0 | Status: SHIPPED | OUTPATIENT
Start: 2017-11-27 | End: 2017-12-18 | Stop reason: SDUPTHER

## 2017-11-29 DIAGNOSIS — E11.9 TYPE 2 DIABETES MELLITUS WITHOUT COMPLICATION, WITHOUT LONG-TERM CURRENT USE OF INSULIN (HCC): ICD-10-CM

## 2017-11-30 RX ORDER — METFORMIN HYDROCHLORIDE 500 MG/1
TABLET, EXTENDED RELEASE ORAL
Qty: 90 TABLET | Refills: 0 | OUTPATIENT
Start: 2017-11-30

## 2017-11-30 RX ORDER — ALOGLIPTIN 25 MG/1
TABLET, FILM COATED ORAL
Qty: 30 TABLET | Refills: 0 | OUTPATIENT
Start: 2017-11-30

## 2017-11-30 RX ORDER — GLIPIZIDE 10 MG/1
TABLET ORAL
Qty: 120 TABLET | Refills: 0 | OUTPATIENT
Start: 2017-11-30

## 2017-12-11 ENCOUNTER — OFFICE VISIT (OUTPATIENT)
Dept: FAMILY MEDICINE CLINIC | Age: 52
End: 2017-12-11

## 2017-12-11 VITALS
OXYGEN SATURATION: 98 % | SYSTOLIC BLOOD PRESSURE: 118 MMHG | BODY MASS INDEX: 25.83 KG/M2 | HEART RATE: 80 BPM | WEIGHT: 155 LBS | DIASTOLIC BLOOD PRESSURE: 64 MMHG | HEIGHT: 65 IN | RESPIRATION RATE: 12 BRPM

## 2017-12-11 DIAGNOSIS — E78.00 PURE HYPERCHOLESTEROLEMIA: ICD-10-CM

## 2017-12-11 DIAGNOSIS — D50.9 IRON DEFICIENCY ANEMIA, UNSPECIFIED IRON DEFICIENCY ANEMIA TYPE: ICD-10-CM

## 2017-12-11 DIAGNOSIS — E11.9 TYPE 2 DIABETES MELLITUS WITHOUT COMPLICATION, WITHOUT LONG-TERM CURRENT USE OF INSULIN (HCC): Primary | ICD-10-CM

## 2017-12-11 LAB — HBA1C MFR BLD: 6.4 %

## 2017-12-11 PROCEDURE — G8427 DOCREV CUR MEDS BY ELIG CLIN: HCPCS | Performed by: FAMILY MEDICINE

## 2017-12-11 PROCEDURE — 3014F SCREEN MAMMO DOC REV: CPT | Performed by: FAMILY MEDICINE

## 2017-12-11 PROCEDURE — G8417 CALC BMI ABV UP PARAM F/U: HCPCS | Performed by: FAMILY MEDICINE

## 2017-12-11 PROCEDURE — 99214 OFFICE O/P EST MOD 30 MIN: CPT | Performed by: FAMILY MEDICINE

## 2017-12-11 PROCEDURE — G8484 FLU IMMUNIZE NO ADMIN: HCPCS | Performed by: FAMILY MEDICINE

## 2017-12-11 PROCEDURE — 3017F COLORECTAL CA SCREEN DOC REV: CPT | Performed by: FAMILY MEDICINE

## 2017-12-11 PROCEDURE — 3044F HG A1C LEVEL LT 7.0%: CPT | Performed by: FAMILY MEDICINE

## 2017-12-11 PROCEDURE — 1036F TOBACCO NON-USER: CPT | Performed by: FAMILY MEDICINE

## 2017-12-11 PROCEDURE — 83036 HEMOGLOBIN GLYCOSYLATED A1C: CPT | Performed by: FAMILY MEDICINE

## 2017-12-11 NOTE — PROGRESS NOTES
6/13/17. Lab Results   Component Value Date    WBC 5.7 09/28/2017    HGB 12.9 09/28/2017    HCT 38.9 09/28/2017    MCV 83.4 09/28/2017     09/28/2017     Lab Results   Component Value Date    IRON 97 09/28/2017    TIBC 364 09/28/2017    FERRITIN 6.3 (L) 07/18/2017       Vitals:    12/11/17 0905   BP: 118/64   Pulse: 80   Resp: 12   SpO2: 98%   Weight: 155 lb (70.3 kg)   Height: 5' 4.5\" (1.638 m)       Outpatient Prescriptions Marked as Taking for the 12/11/17 encounter (Office Visit) with Kena Beltran MD   Medication Sig Dispense Refill    metFORMIN (GLUCOPHAGE-XR) 500 MG extended release tablet TAKE 2 TABLETS BY MOUTH WITH BREAKFAST AND 1 TABLET AT BEDTIME 90 tablet 0    alogliptin (NESINA) 25 MG TABS tablet TAKE 1 TABLET BY MOUTH ONE TIME A DAY  30 tablet 0    glipiZIDE (GLUCOTROL) 10 MG tablet TAKE 2 TABLETS BY MOUTH TWO TIMES A DAY WITH MEALS 120 tablet 0    lisinopril (PRINIVIL;ZESTRIL) 5 MG tablet TAKE 1 TABLET BY MOUTH ONE TIME A DAY  90 tablet 0    ferrous sulfate (FE TABS) 325 (65 Fe) MG EC tablet Take 1 tablet by mouth 2 times daily 180 tablet 1    atorvastatin (LIPITOR) 20 MG tablet TAKE 1 TABLET BY MOUTH ONE TIME A DAY  90 tablet 0    meclizine (ANTIVERT) 25 MG tablet Take 1 tablet by mouth 3 times daily as needed for Nausea 30 tablet 0    Blood Glucose Monitoring Suppl (ONE TOUCH ULTRA MINI) W/DEVICE KIT Use to check sugars 1 kit 0    glucose blood VI test strips (ASCENSIA AUTODISC VI;ONE TOUCH ULTRA TEST VI) strip 1 each by In Vitro route daily As needed. 100 each 3    ONE TOUCH LANCETS MISC 1 each by Does not apply route daily 100 each 3    aspirin 81 MG chewable tablet Take 81 mg by mouth daily      Evening Primrose Oil 500 MG CAPS Take by mouth           Past Medical History:   Diagnosis Date    Atrophic kidney 9/28/2017    sstone    Atrophic scarred right kidney again noted.  Some nephrolithiasis is noted without  hydronephrosis.       Diabetes ([address]     Family history of early CAD    Ania Reddish FH: breast cancer 3/8/2017    23 yo mother reported    HLD (hyperlipidemia)     Iron deficiency anemia 2017       Past Surgical History:   Procedure Laterality Date     SECTION      SKIN BIOPSY         Social History   Substance Use Topics    Smoking status: Never Smoker    Smokeless tobacco: Never Used    Alcohol use 0.0 oz/week      Comment: rarely       Family History   Problem Relation Age of Onset   Ania Reddish Cancer Mother      voicebox and larynx, and lung cancer    Diabetes Father     Heart Disease Father     COPD Sister     Heart Disease Sister 50     MI,     Seizures Brother            Review of Systems  See hpi    Objective:   Physical Exam     Constitutional:   · Reviewed vitals above  · Well Nourished, well developed, no distress       Neck:  · Symmetric and without masses  · No thyromegaly  Resp:  · Normal effort  · Clear to auscultation bilaterally without rhonchi, wheezing or crackles  Cardiovascular:  · On auscultation, normal S1 and S2 without murmurs, rubs or gallops  · No bruits of bilateral carotids and no JVD  Gastrointestinal:  · Nontender, nondistended, and no masses  · No hepatosplenomegaly  Musculoskeletal:  · All extremities without clubbing, cyanosis or edema  Skin:  · No rashes on inspection  Psych:  · Normal mood and affect  · Normal insight and judgement        Assessment:      See below       Plan:     1. Type 2 diabetes mellitus without complication, without long-term current use of insulin (HCC)  Poc UmL4v=1.4. Controlled. .  Encouraged patient to keep up the good work. Patient has new insurance now. We'll discontinue glipizide and see if Januvia is covered on this new insurance. She couldn't afford the Januvia on the old insurance. Patient to let me know ASAP if it's not covered so we can switch back. Continue metforming 1000mg XR qam and 500mg qhs and nesina. Reviewed recent BMP and is within normal limits.      UTD on eye exam, 2/12/17: WNLs  Pt to continue aspirin, statin and ACEI        2. Pure hypercholesterolemia  Reviewed last FLP and LFTs. Well controlled. Continue lipitor. 3. Iron deficiency anemia, unspecified iron deficiency anemia type  Most recent CBC was WNLs. Unclear why she was anemic in the first place. Patient has a recommend colonoscopy for further eval, but she still declining.   She understands potential morbidity/mortality of this decision (she did have a negative fit test earlier in the year)        HM:  Pt declining flu shot and HIV screen

## 2017-12-14 ENCOUNTER — TELEPHONE (OUTPATIENT)
Dept: FAMILY MEDICINE CLINIC | Age: 52
End: 2017-12-14

## 2017-12-15 NOTE — TELEPHONE ENCOUNTER
Main People,    You scanned something into media section yesterday that said Karissa Marley is approved until next year    Please call pharmacy and make sure pt can get it and it is affordable    Then inform pt

## 2017-12-15 NOTE — TELEPHONE ENCOUNTER
On the fax form states that Alogliptin is covered. But when I went to put in the requested it is not a preferred drug. Received fax today stating the PA for Griselda Stanton has been approved. I will scan it into patient's chart.

## 2017-12-18 DIAGNOSIS — E11.9 TYPE 2 DIABETES MELLITUS WITHOUT COMPLICATION, WITHOUT LONG-TERM CURRENT USE OF INSULIN (HCC): ICD-10-CM

## 2017-12-18 DIAGNOSIS — E78.00 PURE HYPERCHOLESTEROLEMIA: ICD-10-CM

## 2017-12-18 RX ORDER — ALOGLIPTIN 25 MG/1
25 TABLET, FILM COATED ORAL DAILY
Qty: 30 TABLET | Refills: 0 | OUTPATIENT
Start: 2017-12-18

## 2017-12-18 RX ORDER — BLOOD-GLUCOSE METER
KIT MISCELLANEOUS
Qty: 1 KIT | Refills: 0 | Status: SHIPPED | OUTPATIENT
Start: 2017-12-18

## 2017-12-18 RX ORDER — METFORMIN HYDROCHLORIDE 500 MG/1
TABLET, EXTENDED RELEASE ORAL
Qty: 270 TABLET | Refills: 0 | Status: SHIPPED | OUTPATIENT
Start: 2017-12-18 | End: 2018-03-16 | Stop reason: SDUPTHER

## 2017-12-18 RX ORDER — LISINOPRIL 5 MG/1
5 TABLET ORAL DAILY
Qty: 90 TABLET | Refills: 0 | Status: SHIPPED | OUTPATIENT
Start: 2017-12-18 | End: 2018-03-16 | Stop reason: SDUPTHER

## 2017-12-18 RX ORDER — MECLIZINE HYDROCHLORIDE 25 MG/1
25 TABLET ORAL 3 TIMES DAILY PRN
Qty: 30 TABLET | Refills: 0 | Status: SHIPPED | OUTPATIENT
Start: 2017-12-18 | End: 2018-10-01 | Stop reason: SDUPTHER

## 2017-12-18 RX ORDER — LANOLIN ALCOHOL/MO/W.PET/CERES
325 CREAM (GRAM) TOPICAL 2 TIMES DAILY
Qty: 180 TABLET | Refills: 1 | Status: SHIPPED | OUTPATIENT
Start: 2017-12-18 | End: 2018-07-15 | Stop reason: SDUPTHER

## 2017-12-18 RX ORDER — ATORVASTATIN CALCIUM 20 MG/1
20 TABLET, FILM COATED ORAL DAILY
Qty: 90 TABLET | Refills: 0 | Status: SHIPPED | OUTPATIENT
Start: 2017-12-18 | End: 2018-01-03 | Stop reason: SDUPTHER

## 2017-12-18 NOTE — TELEPHONE ENCOUNTER
I am unable to correct Nesina and Metformin SIGS. There are two directions on  Refill requested.     MC, no future appts    On 11/8/17 pt received 90 qty for lisinopril and metformin    Last refill for Januvia was on 12/11/17

## 2017-12-26 DIAGNOSIS — E11.9 TYPE 2 DIABETES MELLITUS WITHOUT COMPLICATION, WITHOUT LONG-TERM CURRENT USE OF INSULIN (HCC): ICD-10-CM

## 2017-12-26 RX ORDER — GLIPIZIDE 10 MG/1
TABLET ORAL
Qty: 120 TABLET | Refills: 0 | OUTPATIENT
Start: 2017-12-26

## 2017-12-26 RX ORDER — ALOGLIPTIN 25 MG/1
TABLET, FILM COATED ORAL
Qty: 30 TABLET | Refills: 0 | OUTPATIENT
Start: 2017-12-26

## 2017-12-28 ENCOUNTER — TELEPHONE (OUTPATIENT)
Dept: FAMILY MEDICINE CLINIC | Age: 52
End: 2017-12-28

## 2017-12-28 RX ORDER — OXYMETAZOLINE HYDROCHLORIDE 0.05 G/100ML
SPRAY NASAL
Qty: 1 BOTTLE | Refills: 0 | Status: SHIPPED | OUTPATIENT
Start: 2017-12-28 | End: 2018-03-16 | Stop reason: ALTCHOICE

## 2017-12-28 RX ORDER — BENZONATATE 100 MG/1
100 CAPSULE ORAL 3 TIMES DAILY PRN
Qty: 30 CAPSULE | Refills: 0 | Status: SHIPPED | OUTPATIENT
Start: 2017-12-28 | End: 2018-01-03

## 2017-12-28 NOTE — TELEPHONE ENCOUNTER
Pt calling in with cough and congestion for several days. Thera re no available appts for today at our office or [facility].  Pt would like to know if she could be worked in or if something can be called in to her Yajaira Blankenship

## 2017-12-30 DIAGNOSIS — E11.9 TYPE 2 DIABETES MELLITUS WITHOUT COMPLICATION, WITHOUT LONG-TERM CURRENT USE OF INSULIN (HCC): ICD-10-CM

## 2018-01-02 RX ORDER — ALOGLIPTIN 25 MG/1
TABLET, FILM COATED ORAL
Qty: 30 TABLET | Refills: 0 | OUTPATIENT
Start: 2018-01-02

## 2018-01-02 RX ORDER — GLIPIZIDE 10 MG/1
TABLET ORAL
Qty: 120 TABLET | Refills: 0 | OUTPATIENT
Start: 2018-01-02

## 2018-01-02 NOTE — TELEPHONE ENCOUNTER
Medications is not on current list but, last OV note  states to contiue Nesina and Glipizide.  No future appts

## 2018-01-03 ENCOUNTER — OFFICE VISIT (OUTPATIENT)
Dept: FAMILY MEDICINE CLINIC | Age: 53
End: 2018-01-03

## 2018-01-03 VITALS
DIASTOLIC BLOOD PRESSURE: 68 MMHG | RESPIRATION RATE: 16 BRPM | HEIGHT: 65 IN | SYSTOLIC BLOOD PRESSURE: 110 MMHG | OXYGEN SATURATION: 97 % | BODY MASS INDEX: 24.99 KG/M2 | WEIGHT: 150 LBS | HEART RATE: 96 BPM | TEMPERATURE: 98.1 F

## 2018-01-03 DIAGNOSIS — E11.9 TYPE 2 DIABETES MELLITUS WITHOUT COMPLICATION, WITHOUT LONG-TERM CURRENT USE OF INSULIN (HCC): ICD-10-CM

## 2018-01-03 DIAGNOSIS — Z12.11 SCREENING FOR COLON CANCER: ICD-10-CM

## 2018-01-03 DIAGNOSIS — J06.9 VIRAL UPPER RESPIRATORY TRACT INFECTION: Primary | ICD-10-CM

## 2018-01-03 PROCEDURE — 1036F TOBACCO NON-USER: CPT | Performed by: INTERNAL MEDICINE

## 2018-01-03 PROCEDURE — G8427 DOCREV CUR MEDS BY ELIG CLIN: HCPCS | Performed by: INTERNAL MEDICINE

## 2018-01-03 PROCEDURE — 3046F HEMOGLOBIN A1C LEVEL >9.0%: CPT | Performed by: INTERNAL MEDICINE

## 2018-01-03 PROCEDURE — 3017F COLORECTAL CA SCREEN DOC REV: CPT | Performed by: INTERNAL MEDICINE

## 2018-01-03 PROCEDURE — 99213 OFFICE O/P EST LOW 20 MIN: CPT | Performed by: INTERNAL MEDICINE

## 2018-01-03 PROCEDURE — 3014F SCREEN MAMMO DOC REV: CPT | Performed by: INTERNAL MEDICINE

## 2018-01-03 PROCEDURE — G8484 FLU IMMUNIZE NO ADMIN: HCPCS | Performed by: INTERNAL MEDICINE

## 2018-01-03 PROCEDURE — G8417 CALC BMI ABV UP PARAM F/U: HCPCS | Performed by: INTERNAL MEDICINE

## 2018-01-03 RX ORDER — AZITHROMYCIN 250 MG/1
TABLET, FILM COATED ORAL
Qty: 1 PACKET | Refills: 0 | Status: SHIPPED | OUTPATIENT
Start: 2018-01-03 | End: 2018-01-13

## 2018-01-03 RX ORDER — ONDANSETRON 4 MG/1
4 TABLET, FILM COATED ORAL EVERY 8 HOURS PRN
Qty: 15 TABLET | Refills: 0 | Status: SHIPPED | OUTPATIENT
Start: 2018-01-03 | End: 2018-11-23 | Stop reason: ALTCHOICE

## 2018-01-03 NOTE — PATIENT INSTRUCTIONS
good.  · Breathe moist air from a hot shower or from a sink filled with hot water to help clear a stuffy nose. · Before you use cough and cold medicines, check the label. These medicines may not be safe for young children or for people with certain health problems. · If the skin around your nose and lips becomes sore, put some petroleum jelly on the area. · To ease coughing:  ¨ Drink fluids to soothe a scratchy throat. ¨ Suck on cough drops or plain hard candy. ¨ Take an over-the-counter cough medicine that contains dextromethorphan to help you get some sleep. Read and follow all instructions on the label. ¨ Raise your head at night with an extra pillow. This may help you rest if coughing keeps you awake. · Take any prescribed medicine exactly as directed. Call your doctor if you think you are having a problem with your medicine. To avoid spreading the flu  · Wash your hands regularly, and keep your hands away from your face. · Stay home from school, work, and other public places until you are feeling better and your fever has been gone for at least 24 hours. The fever needs to have gone away on its own without the help of medicine. · Ask people living with you to talk to their doctors about preventing the flu. They may get antiviral medicine to keep from getting the flu from you. · To prevent the flu in the future, get a flu vaccine every fall. Encourage people living with you to get the vaccine. · Cover your mouth when you cough or sneeze. When should you call for help? Call 911 anytime you think you may need emergency care. For example, call if:  ? · You have severe trouble breathing. ?Call your doctor now or seek immediate medical care if:  ? · You have new or worse trouble breathing. ? · You seem to be getting much sicker. ? · You feel very sleepy or confused. ? · You have a new or higher fever. ? · You get a new rash. ? Watch closely for changes in your health, and be sure to contact your doctor if:  ? · You begin to get better and then get worse. ? · You are not getting better after 1 week. Where can you learn more? Go to [email or web address] and sign in to your [employer] account. Enter U126 in the [facility] box to learn more about \"Influenza (Flu): Care Instructions. \"     If you do not have an account, please click on the \"Sign Up Now\" link. Current as of: May 12, 2017  Content Version: 11.4  © 7179-7751 [employer]wise, Incorporated. Care instructions adapted under license by [facility] ([facility]). If you have questions about a medical condition or this instruction, always ask your healthcare professional. [address] any warranty or liability for your use of this information.

## 2018-01-03 NOTE — PROGRESS NOTES
(GLUCOPHAGE-XR) 500 MG extended release tablet TAKE 2 TABLETS BY MOUTH WITH BREAKFAST AND 1 TABLET AT BEDTIME 270 tablet 0    SITagliptin (JANUVIA) 100 MG tablet Take 1 tablet by mouth daily 90 tablet 0    atorvastatin (LIPITOR) 20 MG tablet TAKE 1 TABLET BY MOUTH ONE TIME A DAY  90 tablet 0    aspirin 81 MG chewable tablet Take 81 mg by mouth daily      Evening Primrose Oil 500 MG CAPS Take by mouth               Past Medical History:   Diagnosis Date    Atrophic kidney 2017    sstone    Atrophic scarred right kidney again noted.  Some nephrolithiasis is noted without  hydronephrosis.  Diabetes ([address]     Family history of early CAD     FH: breast cancer 3/8/2017    25 yo mother reported    HLD (hyperlipidemia)     Iron deficiency anemia 2017       Past Surgical History:   Procedure Laterality Date     SECTION      SKIN BIOPSY           Social History     Social History    Marital status:      Spouse name: N/A    Number of children: N/A    Years of education: N/A     Occupational History    Not on file.      Social History Main Topics    Smoking status: Never Smoker    Smokeless tobacco: Never Used    Alcohol use 0.0 oz/week      Comment: rarely    Drug use: No    Sexual activity: Yes     Partners: Male      Comment: , 3 children      Other Topics Concern    Not on file     Social History Narrative    No narrative on file       Family History   Problem Relation Age of Onset    Cancer Mother      voicebox and larynx, and lung cancer    Diabetes Father     Heart Disease Father     COPD Sister     Heart Disease Sister 50     MI,     Seizures Brother            Review of Systems      Objective     /68   Pulse 96   Temp 98.1 °F (36.7 °C)   Resp 16   Ht 5' 4.5\" (1.638 m)   Wt 150 lb (68 kg)   SpO2 97% Comment: RA  BMI 25.35 kg/m²         Wt Readings from Last 3 Encounters:   18 150 lb (68 kg)   17 155 lb (70.3 kg)   17 158

## 2018-01-03 NOTE — LETTER
[address] [id number]  Phone: [phone]  Fax: [phone]    Kandace Posadas MD        January 3, 2018     Patient: Kiki Hill   YOB: 1965   Date of Visit: 1/3/2018       To Whom It May Concern:    Ms Dayla Dandy was seen today with illness . This began 12.25.17.   She may return to work 1/8/18        Sincerely,        Kandace Posadas MD

## 2018-01-12 DIAGNOSIS — E11.9 TYPE 2 DIABETES MELLITUS WITHOUT COMPLICATION, WITHOUT LONG-TERM CURRENT USE OF INSULIN (HCC): ICD-10-CM

## 2018-01-15 RX ORDER — ALOGLIPTIN 25 MG/1
TABLET, FILM COATED ORAL
Qty: 30 TABLET | Refills: 0 | OUTPATIENT
Start: 2018-01-15

## 2018-01-23 ENCOUNTER — OFFICE VISIT (OUTPATIENT)
Dept: FAMILY MEDICINE CLINIC | Age: 53
End: 2018-01-23

## 2018-01-23 VITALS
HEIGHT: 65 IN | RESPIRATION RATE: 12 BRPM | SYSTOLIC BLOOD PRESSURE: 106 MMHG | BODY MASS INDEX: 24.49 KG/M2 | WEIGHT: 147 LBS | DIASTOLIC BLOOD PRESSURE: 70 MMHG | HEART RATE: 66 BPM

## 2018-01-23 DIAGNOSIS — L20.84 INTRINSIC ECZEMA: Primary | ICD-10-CM

## 2018-01-23 PROCEDURE — 1036F TOBACCO NON-USER: CPT | Performed by: FAMILY MEDICINE

## 2018-01-23 PROCEDURE — 3014F SCREEN MAMMO DOC REV: CPT | Performed by: FAMILY MEDICINE

## 2018-01-23 PROCEDURE — 3017F COLORECTAL CA SCREEN DOC REV: CPT | Performed by: FAMILY MEDICINE

## 2018-01-23 PROCEDURE — G8484 FLU IMMUNIZE NO ADMIN: HCPCS | Performed by: FAMILY MEDICINE

## 2018-01-23 PROCEDURE — 99213 OFFICE O/P EST LOW 20 MIN: CPT | Performed by: FAMILY MEDICINE

## 2018-01-23 PROCEDURE — G8420 CALC BMI NORM PARAMETERS: HCPCS | Performed by: FAMILY MEDICINE

## 2018-01-23 PROCEDURE — G8427 DOCREV CUR MEDS BY ELIG CLIN: HCPCS | Performed by: FAMILY MEDICINE

## 2018-01-23 RX ORDER — TRIAMCINOLONE ACETONIDE 5 MG/G
OINTMENT TOPICAL
Qty: 30 G | Refills: 1 | Status: SHIPPED | OUTPATIENT
Start: 2018-01-23 | End: 2018-02-22 | Stop reason: SDUPTHER

## 2018-01-25 DIAGNOSIS — E11.9 TYPE 2 DIABETES MELLITUS WITHOUT COMPLICATION, WITHOUT LONG-TERM CURRENT USE OF INSULIN (HCC): ICD-10-CM

## 2018-01-25 RX ORDER — GLIPIZIDE 10 MG/1
TABLET ORAL
Qty: 120 TABLET | Refills: 0 | OUTPATIENT
Start: 2018-01-25

## 2018-02-22 DIAGNOSIS — L20.84 INTRINSIC ECZEMA: ICD-10-CM

## 2018-02-22 DIAGNOSIS — E11.9 TYPE 2 DIABETES MELLITUS WITHOUT COMPLICATION, WITHOUT LONG-TERM CURRENT USE OF INSULIN (HCC): ICD-10-CM

## 2018-02-22 RX ORDER — TRIAMCINOLONE ACETONIDE 5 MG/G
OINTMENT TOPICAL
Qty: 30 G | Refills: 1 | Status: SHIPPED | OUTPATIENT
Start: 2018-02-22 | End: 2018-03-01

## 2018-02-22 NOTE — TELEPHONE ENCOUNTER
Patient wants a refill of her steroid cream for her eczema, she is out and she is itching really bad, MC,  No future appointments

## 2018-02-26 DIAGNOSIS — E11.9 TYPE 2 DIABETES MELLITUS WITHOUT COMPLICATION, WITHOUT LONG-TERM CURRENT USE OF INSULIN (HCC): ICD-10-CM

## 2018-03-16 ENCOUNTER — OFFICE VISIT (OUTPATIENT)
Dept: FAMILY MEDICINE CLINIC | Age: 53
End: 2018-03-16

## 2018-03-16 ENCOUNTER — HOSPITAL ENCOUNTER (OUTPATIENT)
Dept: OTHER | Age: 53
Discharge: HOME | End: 2018-03-16
Attending: FAMILY MEDICINE | Admitting: FAMILY MEDICINE

## 2018-03-16 ENCOUNTER — TELEPHONE (OUTPATIENT)
Dept: FAMILY MEDICINE CLINIC | Age: 53
End: 2018-03-16

## 2018-03-16 VITALS
DIASTOLIC BLOOD PRESSURE: 88 MMHG | WEIGHT: 150 LBS | RESPIRATION RATE: 12 BRPM | SYSTOLIC BLOOD PRESSURE: 122 MMHG | BODY MASS INDEX: 24.99 KG/M2 | HEIGHT: 65 IN | HEART RATE: 81 BPM | OXYGEN SATURATION: 98 %

## 2018-03-16 DIAGNOSIS — M79.671 CHRONIC FOOT PAIN, RIGHT: Primary | ICD-10-CM

## 2018-03-16 DIAGNOSIS — G89.29 CHRONIC FOOT PAIN, RIGHT: Primary | ICD-10-CM

## 2018-03-16 DIAGNOSIS — G89.29 CHRONIC FOOT PAIN, RIGHT: ICD-10-CM

## 2018-03-16 DIAGNOSIS — E11.9 TYPE 2 DIABETES MELLITUS WITHOUT COMPLICATION, WITHOUT LONG-TERM CURRENT USE OF INSULIN (HCC): ICD-10-CM

## 2018-03-16 DIAGNOSIS — M79.671 CHRONIC FOOT PAIN, RIGHT: ICD-10-CM

## 2018-03-16 DIAGNOSIS — E78.00 PURE HYPERCHOLESTEROLEMIA: ICD-10-CM

## 2018-03-16 PROCEDURE — G8427 DOCREV CUR MEDS BY ELIG CLIN: HCPCS | Performed by: FAMILY MEDICINE

## 2018-03-16 PROCEDURE — 1036F TOBACCO NON-USER: CPT | Performed by: FAMILY MEDICINE

## 2018-03-16 PROCEDURE — G8484 FLU IMMUNIZE NO ADMIN: HCPCS | Performed by: FAMILY MEDICINE

## 2018-03-16 PROCEDURE — G8417 CALC BMI ABV UP PARAM F/U: HCPCS | Performed by: FAMILY MEDICINE

## 2018-03-16 PROCEDURE — 3017F COLORECTAL CA SCREEN DOC REV: CPT | Performed by: FAMILY MEDICINE

## 2018-03-16 PROCEDURE — 3046F HEMOGLOBIN A1C LEVEL >9.0%: CPT | Performed by: FAMILY MEDICINE

## 2018-03-16 PROCEDURE — 99213 OFFICE O/P EST LOW 20 MIN: CPT | Performed by: FAMILY MEDICINE

## 2018-03-16 PROCEDURE — 3014F SCREEN MAMMO DOC REV: CPT | Performed by: FAMILY MEDICINE

## 2018-03-16 RX ORDER — LANOLIN ALCOHOL/MO/W.PET/CERES
325 CREAM (GRAM) TOPICAL 2 TIMES DAILY
Qty: 180 TABLET | Refills: 1 | Status: CANCELLED | OUTPATIENT
Start: 2018-03-16

## 2018-03-16 RX ORDER — METFORMIN HYDROCHLORIDE 500 MG/1
TABLET, EXTENDED RELEASE ORAL
Qty: 270 TABLET | Refills: 0 | Status: SHIPPED | OUTPATIENT
Start: 2018-03-16 | End: 2018-07-02 | Stop reason: SDUPTHER

## 2018-03-16 RX ORDER — ATORVASTATIN CALCIUM 20 MG/1
TABLET, FILM COATED ORAL
Qty: 90 TABLET | Refills: 0 | Status: SHIPPED | OUTPATIENT
Start: 2018-03-16 | End: 2018-07-02 | Stop reason: SDUPTHER

## 2018-03-16 RX ORDER — LISINOPRIL 5 MG/1
5 TABLET ORAL DAILY
Qty: 90 TABLET | Refills: 0 | Status: SHIPPED | OUTPATIENT
Start: 2018-03-16 | End: 2018-07-02 | Stop reason: SDUPTHER

## 2018-03-16 RX ORDER — MECLIZINE HYDROCHLORIDE 25 MG/1
25 TABLET ORAL 3 TIMES DAILY PRN
Qty: 30 TABLET | Refills: 0 | Status: CANCELLED | OUTPATIENT
Start: 2018-03-16

## 2018-03-16 ASSESSMENT — PATIENT HEALTH QUESTIONNAIRE - PHQ9
SUM OF ALL RESPONSES TO PHQ9 QUESTIONS 1 & 2: 0
1. LITTLE INTEREST OR PLEASURE IN DOING THINGS: 0
2. FEELING DOWN, DEPRESSED OR HOPELESS: 0
SUM OF ALL RESPONSES TO PHQ QUESTIONS 1-9: 0

## 2018-03-16 NOTE — PROGRESS NOTES
81 mg by mouth daily      Evening Primrose Oil 500 MG CAPS Take by mouth         Past Medical History:   Diagnosis Date    Atrophic kidney 2017    sstone    Atrophic scarred right kidney again noted.  Some nephrolithiasis is noted without  hydronephrosis.  Diabetes ([address]     Family history of early CAD    [facility] FH: breast cancer 3/8/2017    23 yo mother reported    HLD (hyperlipidemia)     Iron deficiency anemia 2017       Past Surgical History:   Procedure Laterality Date     SECTION      SKIN BIOPSY         Social History   Substance Use Topics    Smoking status: Never Smoker    Smokeless tobacco: Never Used    Alcohol use 0.0 oz/week      Comment: rarely       Family History   Problem Relation Age of Onset   [facility] Cancer Mother      voicebox and larynx, and lung cancer    Diabetes Father     Heart Disease Father     COPD Sister     Heart Disease Sister 50     MI,     Seizures Brother              Review of Systems  See hpi    Objective:   Physical Exam   Musculoskeletal:        Feet:    · No swelling or skin changes of bilateral feet  · + Pain with palpation of third, fourth and fifth metatarsals, of right foot, areas marked and red  · No pain with palpation of rest of right foot, or entire left foot. · Normal range of motion of bilateral feet. · Normal strength and sensation of bilateral feet. · Nl gait  · No LE edema    Constitutional:   · Reviewed vitals above  · Well Nourished, well developed, no distress       Cardiovascular:  · +2 distal pulses  Skin:  · No rashes on inspection  · No areas of increased heat or induration on palpation  Psych:  · Normal mood and affect  · Normal insight and judgement    Assessment:      See below      Plan:      1. Chronic foot pain, right  Exam concerning for stress fractures. We'll start evaluation with x-ray of the foot. We'll follow-up on results and act accordingly.   If x-rays are negative, we'll send to orthopedic for further

## 2018-03-16 NOTE — TELEPHONE ENCOUNTER
Informed patient for some mild arthritis at the base of her first toe, but no abnormalities in the bones that she's having pain in. Please tell her to schedule with Dr. Wendy Aguilera, foot doctor, for further evaluation. Give her number. I placed referral.    Please document call and then close encounter.   thanks

## 2018-03-19 ENCOUNTER — OFFICE VISIT (OUTPATIENT)
Dept: FAMILY MEDICINE CLINIC | Age: 53
End: 2018-03-19

## 2018-03-19 VITALS
WEIGHT: 150 LBS | SYSTOLIC BLOOD PRESSURE: 104 MMHG | RESPIRATION RATE: 12 BRPM | HEIGHT: 65 IN | OXYGEN SATURATION: 98 % | DIASTOLIC BLOOD PRESSURE: 64 MMHG | HEART RATE: 78 BPM | BODY MASS INDEX: 24.99 KG/M2

## 2018-03-19 DIAGNOSIS — E11.9 TYPE 2 DIABETES MELLITUS WITHOUT COMPLICATION, WITHOUT LONG-TERM CURRENT USE OF INSULIN (HCC): Primary | ICD-10-CM

## 2018-03-19 DIAGNOSIS — D50.9 IRON DEFICIENCY ANEMIA, UNSPECIFIED IRON DEFICIENCY ANEMIA TYPE: ICD-10-CM

## 2018-03-19 DIAGNOSIS — E78.00 PURE HYPERCHOLESTEROLEMIA: ICD-10-CM

## 2018-03-19 LAB
A/G RATIO: 2.1 (ref 1.1–2.2)
ALBUMIN SERPL-MCNC: 4.5 G/DL (ref 3.4–5)
ALP BLD-CCNC: 87 U/L (ref 40–129)
ALT SERPL-CCNC: 23 U/L (ref 10–40)
ANION GAP SERPL CALCULATED.3IONS-SCNC: 16 MMOL/L (ref 3–16)
AST SERPL-CCNC: 16 U/L (ref 15–37)
BASOPHILS ABSOLUTE: 0.1 K/UL (ref 0–0.2)
BASOPHILS RELATIVE PERCENT: 1.3 %
BILIRUB SERPL-MCNC: 0.4 MG/DL (ref 0–1)
BUN BLDV-MCNC: 11 MG/DL (ref 7–20)
CALCIUM SERPL-MCNC: 9.4 MG/DL (ref 8.3–10.6)
CHLORIDE BLD-SCNC: 100 MMOL/L (ref 99–110)
CHOLESTEROL, TOTAL: 152 MG/DL (ref 0–199)
CO2: 27 MMOL/L (ref 21–32)
CREAT SERPL-MCNC: <0.5 MG/DL (ref 0.6–1.1)
EOSINOPHILS ABSOLUTE: 0.1 K/UL (ref 0–0.6)
EOSINOPHILS RELATIVE PERCENT: 1.4 %
GFR AFRICAN AMERICAN: >60
GFR NON-AFRICAN AMERICAN: >60
GLOBULIN: 2.1 G/DL
GLUCOSE BLD-MCNC: 162 MG/DL (ref 70–99)
HBA1C MFR BLD: 7.2 %
HCT VFR BLD CALC: 39.4 % (ref 36–48)
HDLC SERPL-MCNC: 43 MG/DL (ref 40–60)
HEMOGLOBIN: 13.5 G/DL (ref 12–16)
LDL CHOLESTEROL CALCULATED: 80 MG/DL
LYMPHOCYTES ABSOLUTE: 1 K/UL (ref 1–5.1)
LYMPHOCYTES RELATIVE PERCENT: 15.2 %
MCH RBC QN AUTO: 29.7 PG (ref 26–34)
MCHC RBC AUTO-ENTMCNC: 34.1 G/DL (ref 31–36)
MCV RBC AUTO: 87.1 FL (ref 80–100)
MONOCYTES ABSOLUTE: 0.4 K/UL (ref 0–1.3)
MONOCYTES RELATIVE PERCENT: 5.1 %
NEUTROPHILS ABSOLUTE: 5.3 K/UL (ref 1.7–7.7)
NEUTROPHILS RELATIVE PERCENT: 77 %
PDW BLD-RTO: 14.3 % (ref 12.4–15.4)
PLATELET # BLD: 235 K/UL (ref 135–450)
PMV BLD AUTO: 10.7 FL (ref 5–10.5)
POTASSIUM SERPL-SCNC: 4.5 MMOL/L (ref 3.5–5.1)
RBC # BLD: 4.53 M/UL (ref 4–5.2)
SODIUM BLD-SCNC: 143 MMOL/L (ref 136–145)
TOTAL PROTEIN: 6.6 G/DL (ref 6.4–8.2)
TRIGL SERPL-MCNC: 146 MG/DL (ref 0–150)
VLDLC SERPL CALC-MCNC: 29 MG/DL
WBC # BLD: 6.8 K/UL (ref 4–11)

## 2018-03-19 PROCEDURE — G8484 FLU IMMUNIZE NO ADMIN: HCPCS | Performed by: FAMILY MEDICINE

## 2018-03-19 PROCEDURE — 36415 COLL VENOUS BLD VENIPUNCTURE: CPT | Performed by: FAMILY MEDICINE

## 2018-03-19 PROCEDURE — 83036 HEMOGLOBIN GLYCOSYLATED A1C: CPT | Performed by: FAMILY MEDICINE

## 2018-03-19 PROCEDURE — 99214 OFFICE O/P EST MOD 30 MIN: CPT | Performed by: FAMILY MEDICINE

## 2018-03-19 PROCEDURE — 3017F COLORECTAL CA SCREEN DOC REV: CPT | Performed by: FAMILY MEDICINE

## 2018-03-19 PROCEDURE — 3014F SCREEN MAMMO DOC REV: CPT | Performed by: FAMILY MEDICINE

## 2018-03-19 PROCEDURE — G8427 DOCREV CUR MEDS BY ELIG CLIN: HCPCS | Performed by: FAMILY MEDICINE

## 2018-03-19 PROCEDURE — G8420 CALC BMI NORM PARAMETERS: HCPCS | Performed by: FAMILY MEDICINE

## 2018-03-19 PROCEDURE — 3045F PR MOST RECENT HEMOGLOBIN A1C LEVEL 7.0-9.0%: CPT | Performed by: FAMILY MEDICINE

## 2018-03-19 PROCEDURE — 1036F TOBACCO NON-USER: CPT | Performed by: FAMILY MEDICINE

## 2018-03-19 RX ORDER — GLIPIZIDE 5 MG/1
5 TABLET ORAL DAILY
Qty: 90 TABLET | Refills: 0 | Status: SHIPPED | OUTPATIENT
Start: 2018-03-19 | End: 2018-06-12 | Stop reason: SDUPTHER

## 2018-03-19 NOTE — PROGRESS NOTES
Subjective:      Patient ID: Bob Hirsch is a 47 y.o. female. HPI     CC:  DM2, HLD, anemia    Treatment Adherence:   Medication compliance:  Compliant most of the time  Diet compliance:  She is still drinking one can of soda daily. Admits to eating more carbohydrates than usual.   Weight trend: down 5 lbs in last 3 months  Current exercise: at work  Barriers:none    Diabetes Mellitus Type 2:   At last appt, [facility] and [place] was started in place of glipizide. Patient does notice some increased thirst since last visit. pt denies polyuria, blurry vision, foot ulcerations, neuropathy, polyphagia, nausea, vomiting and diarrhea. Home blood sugar records: patient does not test  Any episodes of hypoglycemia? no  Eye exam current (within one year): scheduled for 3/26/18  Tobacco history: She  reports that she has never smoked. She has never used smokeless tobacco.   Daily Aspirin? Yes      Hyperlipidemia:  No new myalgias or GI upset on atorvastatin (Lipitor). Medication compliance: compliant all of the time. Patient is  following a low fat, low cholesterol diet. She is not exercising regularly, but is active at work. Lab Results   Component Value Date    LABA1C 7.2 03/19/2018    LABA1C 6.4 12/11/2017    LABA1C 6.0 09/11/2017     Lab Results   Component Value Date    LABMICR <1.20 07/17/2017    CREATININE 0.6 09/28/2017     Lab Results   Component Value Date    ALT 20 09/28/2017    AST 17 09/28/2017     Lab Results   Component Value Date    CHOL 142 05/15/2017    TRIG 117 05/15/2017    HDL 42 05/15/2017    LDLCALC 77 05/15/2017          Anemia  Patient was started on iron by dr Nir Schafer in July. (325mg BID). Last CBC was WNLs except RDW was still elevated. Pt only having periods every 4-5 months. She does have very heavy bleeding when she has them. Her last period was may 2017. Pt denies BRBPR or melena. No abd pain. No heart burn. Pt had a neg FIT test on 6/13/17.     Lab Results   Component Value Date    WBC 5.7 09/28/2017    HGB 12.9 09/28/2017    HCT 38.9 09/28/2017    MCV 83.4 09/28/2017     09/28/2017     Lab Results   Component Value Date    IRON 97 09/28/2017    TIBC 364 09/28/2017    FERRITIN 6.3 (L) 07/18/2017       Vitals:    03/19/18 1003   BP: 104/64   Pulse: 78   Resp: 12   SpO2: 98%   Weight: 150 lb (68 kg)   Height: 5' 5\" (1.651 m)       Outpatient Prescriptions Marked as Taking for the 3/19/18 encounter (Office Visit) with Noel Pompa MD   Medication Sig Dispense Refill    atorvastatin (LIPITOR) 20 MG tablet TAKE 1 TABLET BY MOUTH ONE TIME A DAY 90 tablet 0    lisinopril (PRINIVIL;ZESTRIL) 5 MG tablet Take 1 tablet by mouth daily 90 tablet 0    metFORMIN (GLUCOPHAGE-XR) 500 MG extended release tablet TAKE 2 TABLETS BY MOUTH WITH BREAKFAST AND 1 TABLET AT BEDTIME 270 tablet 0    SITagliptin (JANUVIA) 100 MG tablet Take 1 tablet by mouth daily 90 tablet 0    ondansetron (ZOFRAN) 4 MG tablet Take 1 tablet by mouth every 8 hours as needed for Nausea or Vomiting 15 tablet 0    Blood Glucose Monitoring Suppl (ONE TOUCH ULTRA MINI) w/Device KIT Use to check sugars 1 kit 0    glucose blood VI test strips (ASCENSIA AUTODISC VI;ONE TOUCH ULTRA TEST VI) strip 1 each by In Vitro route daily As needed. 100 each 3    ONE TOUCH LANCETS MISC 1 each by Does not apply route daily 100 each 3    meclizine (ANTIVERT) 25 MG tablet Take 1 tablet by mouth 3 times daily as needed for Nausea 30 tablet 0    ferrous sulfate (FE TABS) 325 (65 Fe) MG EC tablet Take 1 tablet by mouth 2 times daily 180 tablet 1    aspirin 81 MG chewable tablet Take 81 mg by mouth daily      Evening Primrose Oil 500 MG CAPS Take by mouth           Past Medical History:   Diagnosis Date    Atrophic kidney 9/28/2017    sstone    Atrophic scarred right kidney again noted.  Some nephrolithiasis is noted without  hydronephrosis.       Diabetes ([address]     Family history of early CAD     FH: breast cancer

## 2018-03-26 DIAGNOSIS — E11.9 TYPE 2 DIABETES MELLITUS WITHOUT COMPLICATION, WITHOUT LONG-TERM CURRENT USE OF INSULIN (HCC): ICD-10-CM

## 2018-04-04 ENCOUNTER — OFFICE VISIT (OUTPATIENT)
Dept: ORTHOPEDIC SURGERY | Age: 53
End: 2018-04-04

## 2018-04-04 VITALS
BODY MASS INDEX: 24.99 KG/M2 | WEIGHT: 150 LBS | HEART RATE: 81 BPM | SYSTOLIC BLOOD PRESSURE: 109 MMHG | RESPIRATION RATE: 16 BRPM | DIASTOLIC BLOOD PRESSURE: 62 MMHG | HEIGHT: 65 IN

## 2018-04-04 DIAGNOSIS — M79.671 RIGHT FOOT PAIN: Primary | ICD-10-CM

## 2018-04-04 PROCEDURE — 3014F SCREEN MAMMO DOC REV: CPT | Performed by: ORTHOPAEDIC SURGERY

## 2018-04-04 PROCEDURE — 3017F COLORECTAL CA SCREEN DOC REV: CPT | Performed by: ORTHOPAEDIC SURGERY

## 2018-04-04 PROCEDURE — 99243 OFF/OP CNSLTJ NEW/EST LOW 30: CPT | Performed by: ORTHOPAEDIC SURGERY

## 2018-04-04 PROCEDURE — G8420 CALC BMI NORM PARAMETERS: HCPCS | Performed by: ORTHOPAEDIC SURGERY

## 2018-04-04 PROCEDURE — G8427 DOCREV CUR MEDS BY ELIG CLIN: HCPCS | Performed by: ORTHOPAEDIC SURGERY

## 2018-04-04 RX ORDER — NAPROXEN 500 MG/1
500 TABLET ORAL 2 TIMES DAILY WITH MEALS
Qty: 60 TABLET | Refills: 0 | Status: SHIPPED | OUTPATIENT
Start: 2018-04-04 | End: 2018-08-16 | Stop reason: SDUPTHER

## 2018-04-30 PROBLEM — M67.88 RIGHT PERONEAL TENDINOSIS: Status: ACTIVE | Noted: 2018-04-30

## 2018-05-01 ENCOUNTER — OFFICE VISIT (OUTPATIENT)
Dept: FAMILY MEDICINE CLINIC | Age: 53
End: 2018-05-01

## 2018-05-01 VITALS
HEIGHT: 65 IN | WEIGHT: 148 LBS | SYSTOLIC BLOOD PRESSURE: 113 MMHG | BODY MASS INDEX: 24.66 KG/M2 | DIASTOLIC BLOOD PRESSURE: 77 MMHG | HEART RATE: 84 BPM | RESPIRATION RATE: 16 BRPM | OXYGEN SATURATION: 97 %

## 2018-05-01 DIAGNOSIS — E11.9 TYPE 2 DIABETES MELLITUS WITHOUT COMPLICATION, WITHOUT LONG-TERM CURRENT USE OF INSULIN (HCC): ICD-10-CM

## 2018-05-01 DIAGNOSIS — J01.90 ACUTE SINUSITIS, RECURRENCE NOT SPECIFIED, UNSPECIFIED LOCATION: Primary | ICD-10-CM

## 2018-05-01 PROCEDURE — G8420 CALC BMI NORM PARAMETERS: HCPCS | Performed by: INTERNAL MEDICINE

## 2018-05-01 PROCEDURE — G8427 DOCREV CUR MEDS BY ELIG CLIN: HCPCS | Performed by: INTERNAL MEDICINE

## 2018-05-01 PROCEDURE — 3017F COLORECTAL CA SCREEN DOC REV: CPT | Performed by: INTERNAL MEDICINE

## 2018-05-01 PROCEDURE — 3045F PR MOST RECENT HEMOGLOBIN A1C LEVEL 7.0-9.0%: CPT | Performed by: INTERNAL MEDICINE

## 2018-05-01 PROCEDURE — 99213 OFFICE O/P EST LOW 20 MIN: CPT | Performed by: INTERNAL MEDICINE

## 2018-05-01 PROCEDURE — 1036F TOBACCO NON-USER: CPT | Performed by: INTERNAL MEDICINE

## 2018-05-01 PROCEDURE — 2022F DILAT RTA XM EVC RTNOPTHY: CPT | Performed by: INTERNAL MEDICINE

## 2018-05-01 RX ORDER — OXYMETAZOLINE HYDROCHLORIDE 0.05 G/100ML
SPRAY NASAL
Qty: 1 BOTTLE | Refills: 0 | Status: SHIPPED | OUTPATIENT
Start: 2018-05-01 | End: 2018-07-02 | Stop reason: ALTCHOICE

## 2018-05-01 RX ORDER — FLUTICASONE PROPIONATE 50 MCG
SPRAY, SUSPENSION (ML) NASAL
Qty: 1 BOTTLE | Refills: 0 | Status: SHIPPED | OUTPATIENT
Start: 2018-05-01 | End: 2018-07-02 | Stop reason: ALTCHOICE

## 2018-05-22 DIAGNOSIS — E11.9 TYPE 2 DIABETES MELLITUS WITHOUT COMPLICATION, WITHOUT LONG-TERM CURRENT USE OF INSULIN (HCC): ICD-10-CM

## 2018-05-23 RX ORDER — METFORMIN HYDROCHLORIDE 500 MG/1
TABLET, EXTENDED RELEASE ORAL
Qty: 90 TABLET | Refills: 0 | Status: SHIPPED | OUTPATIENT
Start: 2018-05-23 | End: 2018-07-02 | Stop reason: SDUPTHER

## 2018-06-12 DIAGNOSIS — E11.9 TYPE 2 DIABETES MELLITUS WITHOUT COMPLICATION, WITHOUT LONG-TERM CURRENT USE OF INSULIN (HCC): ICD-10-CM

## 2018-06-13 RX ORDER — GLIPIZIDE 5 MG/1
TABLET ORAL
Qty: 30 TABLET | Refills: 0 | Status: SHIPPED | OUTPATIENT
Start: 2018-06-13 | End: 2018-07-14 | Stop reason: SDUPTHER

## 2018-07-02 ENCOUNTER — OFFICE VISIT (OUTPATIENT)
Dept: FAMILY MEDICINE CLINIC | Age: 53
End: 2018-07-02

## 2018-07-02 VITALS
RESPIRATION RATE: 12 BRPM | SYSTOLIC BLOOD PRESSURE: 116 MMHG | HEART RATE: 86 BPM | OXYGEN SATURATION: 98 % | BODY MASS INDEX: 25.16 KG/M2 | WEIGHT: 151 LBS | DIASTOLIC BLOOD PRESSURE: 76 MMHG | HEIGHT: 65 IN

## 2018-07-02 DIAGNOSIS — E78.00 PURE HYPERCHOLESTEROLEMIA: ICD-10-CM

## 2018-07-02 DIAGNOSIS — Z12.39 BREAST CANCER SCREENING: ICD-10-CM

## 2018-07-02 DIAGNOSIS — E11.9 TYPE 2 DIABETES MELLITUS WITHOUT COMPLICATION, WITHOUT LONG-TERM CURRENT USE OF INSULIN (HCC): Primary | ICD-10-CM

## 2018-07-02 DIAGNOSIS — Z12.11 COLON CANCER SCREENING: ICD-10-CM

## 2018-07-02 LAB — HBA1C MFR BLD: 6 %

## 2018-07-02 PROCEDURE — 83036 HEMOGLOBIN GLYCOSYLATED A1C: CPT | Performed by: FAMILY MEDICINE

## 2018-07-02 PROCEDURE — 99213 OFFICE O/P EST LOW 20 MIN: CPT | Performed by: FAMILY MEDICINE

## 2018-07-02 RX ORDER — ATORVASTATIN CALCIUM 20 MG/1
TABLET, FILM COATED ORAL
Qty: 90 TABLET | Refills: 0 | Status: SHIPPED | OUTPATIENT
Start: 2018-07-02 | End: 2018-08-16 | Stop reason: SDUPTHER

## 2018-07-02 RX ORDER — METFORMIN HYDROCHLORIDE 500 MG/1
TABLET, EXTENDED RELEASE ORAL
Qty: 270 TABLET | Refills: 0 | Status: SHIPPED | OUTPATIENT
Start: 2018-07-02 | End: 2018-08-16 | Stop reason: SDUPTHER

## 2018-07-02 RX ORDER — LISINOPRIL 5 MG/1
5 TABLET ORAL DAILY
Qty: 90 TABLET | Refills: 0 | Status: SHIPPED | OUTPATIENT
Start: 2018-07-02 | End: 2018-08-16 | Stop reason: SDUPTHER

## 2018-07-02 NOTE — PROGRESS NOTES
outpatient prescriptions have been marked as taking for the 18 encounter (Appointment) with Esther Nuñez MD.         Past Medical History:   Diagnosis Date    Atrophic kidney 2017    sstone    Atrophic scarred right kidney again noted.  Some nephrolithiasis is noted without  hydronephrosis.  Diabetes ([address]     Family history of early CAD    [facility] FH: breast cancer 3/8/2017    23 yo mother reported    HLD (hyperlipidemia)     Iron deficiency anemia 2017    Right peroneal tendinosis 2018       Past Surgical History:   Procedure Laterality Date     SECTION      SKIN BIOPSY         Social History   Substance Use Topics    Smoking status: Never Smoker    Smokeless tobacco: Never Used    Alcohol use 0.0 oz/week      Comment: rarely       Family History   Problem Relation Age of Onset   [facility] Cancer Mother         voicebox and larynx, and lung cancer    Diabetes Father     Heart Disease Father     COPD Sister     Heart Disease Sister 50        MI,     Seizures Brother            Review of Systems  See hpi    Objective:   Physical Exam     Constitutional:   · Reviewed vitals above  · Well Nourished, well developed, no distress       Neck:  · Symmetric and without masses  · No thyromegaly  Resp:  · Normal effort  · Clear to auscultation bilaterally without rhonchi, wheezing or crackles  Cardiovascular:  · On auscultation, normal S1 and S2 without murmurs, rubs or gallops  · No bruits of bilateral carotids and no JVD  Gastrointestinal:  · Nontender, nondistended, and no masses  · No hepatosplenomegaly  Musculoskeletal:  · All extremities without clubbing, cyanosis or edema  Skin:  · No rashes on inspection  Psych:  · Normal mood and affect  · Normal insight and judgement    FOOT EXAM:    +1 distal tibialis anterior and dorsalis pedis pulses bilaterally  Normal sensation to monofilament testing bilaterally  No calluses  No ulcers  Non mycotic toe nails.       Assessment: See below       Plan:     1. Type 2 diabetes mellitus without complication, without long-term current use of insulin (HCC)  Poc YlU2o=1.0 and is much improved. Patient to continue glipizide 5mg daily, Januvia 100 mg, metformin XR 1000 mg in the morning, and 500 mg q.h.s.  patient to let me know if she received any blood sugars under 80, as would discontinue glipizide if this occurred. Reviewed recent BMP and is within normal limits. Reviewed DM eye exam: 4/2/2018: no dm retinopathy    Pt to continue aspirin, statin and ACEI        2. Pure hypercholesterolemia  Reviewed most recent fasting lipid panel and LFTs, both within normal limits, continue Lipitor 20 mg. HM:  Fit test ordered    Mammogram scheduled for later this month. Shingrix prescription given to get at pharmacy.

## 2018-07-06 ENCOUNTER — TELEPHONE (OUTPATIENT)
Dept: FAMILY MEDICINE CLINIC | Age: 53
End: 2018-07-06

## 2018-07-06 ENCOUNTER — NURSE ONLY (OUTPATIENT)
Dept: FAMILY MEDICINE CLINIC | Age: 53
End: 2018-07-06

## 2018-07-06 DIAGNOSIS — R19.5 FECAL OCCULT BLOOD TEST POSITIVE: Primary | ICD-10-CM

## 2018-07-06 DIAGNOSIS — Z12.11 COLON CANCER SCREENING: ICD-10-CM

## 2018-07-06 LAB
CONTROL: POSITIVE
HEMOCCULT STL QL: POSITIVE

## 2018-07-06 PROCEDURE — 82274 ASSAY TEST FOR BLOOD FECAL: CPT | Performed by: FAMILY MEDICINE

## 2018-07-06 NOTE — PROGRESS NOTES
Pt spouse dropped off poct fit. The result is positive, pt informed. Also I will call her back on Monday after the referral for Gastroenterology has been place.

## 2018-07-09 NOTE — TELEPHONE ENCOUNTER
Done.  Give patient information to schedule. Please document call and then close encounter.   thanks

## 2018-07-10 ENCOUNTER — TELEPHONE (OUTPATIENT)
Dept: FAMILY MEDICINE CLINIC | Age: 53
End: 2018-07-10

## 2018-07-10 NOTE — TELEPHONE ENCOUNTER
Was referred to GI specialist. States the office she was referred to is unable to get her in until Sept 18. Wanting to know if there should be more priority? Wanting to know if you can refer her to another female GI doc that may be able to get her in sooner. Please advise.  Please call Leann back at [phone]- leave message if no answser

## 2018-07-14 DIAGNOSIS — E11.9 TYPE 2 DIABETES MELLITUS WITHOUT COMPLICATION, WITHOUT LONG-TERM CURRENT USE OF INSULIN (HCC): ICD-10-CM

## 2018-07-15 DIAGNOSIS — D50.9 IRON DEFICIENCY ANEMIA, UNSPECIFIED IRON DEFICIENCY ANEMIA TYPE: Primary | ICD-10-CM

## 2018-07-16 RX ORDER — GLIPIZIDE 5 MG/1
TABLET ORAL
Qty: 90 TABLET | Refills: 0 | Status: SHIPPED | OUTPATIENT
Start: 2018-07-16 | End: 2018-08-16 | Stop reason: SDUPTHER

## 2018-07-16 RX ORDER — LANOLIN ALCOHOL/MO/W.PET/CERES
CREAM (GRAM) TOPICAL
Qty: 60 TABLET | Refills: 0 | Status: SHIPPED | OUTPATIENT
Start: 2018-07-16 | End: 2018-11-23 | Stop reason: ALTCHOICE

## 2018-07-20 ENCOUNTER — HOSPITAL ENCOUNTER (OUTPATIENT)
Dept: WOMENS IMAGING | Age: 53
Discharge: HOME | End: 2018-07-20
Attending: FAMILY MEDICINE | Admitting: FAMILY MEDICINE

## 2018-07-20 DIAGNOSIS — Z12.39 BREAST CANCER SCREENING: ICD-10-CM

## 2018-07-20 RX ORDER — LANOLIN ALCOHOL/MO/W.PET/CERES
CREAM (GRAM) TOPICAL
Qty: 60 TABLET | Refills: 0 | OUTPATIENT
Start: 2018-07-20

## 2018-07-26 DIAGNOSIS — E11.9 TYPE 2 DIABETES MELLITUS WITHOUT COMPLICATION, WITHOUT LONG-TERM CURRENT USE OF INSULIN (HCC): ICD-10-CM

## 2018-07-26 NOTE — TELEPHONE ENCOUNTER
Received fax from [facility] requesting for Sitagliptin 100 mg. Last refill was 7/2/18 qty 90. Fax states 30 tabs left on pt's rx but only 90ds covered. They are asking for a new Rx.

## 2018-08-16 DIAGNOSIS — M79.671 RIGHT FOOT PAIN: ICD-10-CM

## 2018-08-16 DIAGNOSIS — E78.00 PURE HYPERCHOLESTEROLEMIA: ICD-10-CM

## 2018-08-16 DIAGNOSIS — E11.9 TYPE 2 DIABETES MELLITUS WITHOUT COMPLICATION, WITHOUT LONG-TERM CURRENT USE OF INSULIN (HCC): ICD-10-CM

## 2018-08-16 RX ORDER — NAPROXEN 500 MG/1
TABLET ORAL
Qty: 60 TABLET | Refills: 0 | Status: SHIPPED | OUTPATIENT
Start: 2018-08-16 | End: 2018-09-22 | Stop reason: SDUPTHER

## 2018-08-16 RX ORDER — GLIPIZIDE 5 MG/1
TABLET ORAL
Qty: 90 TABLET | Refills: 0 | Status: SHIPPED | OUTPATIENT
Start: 2018-08-16 | End: 2019-01-02

## 2018-08-16 RX ORDER — LISINOPRIL 5 MG/1
TABLET ORAL
Qty: 90 TABLET | Refills: 0 | Status: SHIPPED | OUTPATIENT
Start: 2018-08-16 | End: 2019-01-02 | Stop reason: SDUPTHER

## 2018-08-16 RX ORDER — METFORMIN HYDROCHLORIDE 500 MG/1
TABLET, EXTENDED RELEASE ORAL
Qty: 270 TABLET | Refills: 0 | Status: SHIPPED | OUTPATIENT
Start: 2018-08-16 | End: 2019-01-02 | Stop reason: SDUPTHER

## 2018-08-16 RX ORDER — ATORVASTATIN CALCIUM 20 MG/1
TABLET, FILM COATED ORAL
Qty: 90 TABLET | Refills: 0 | Status: SHIPPED | OUTPATIENT
Start: 2018-08-16 | End: 2019-01-02 | Stop reason: SDUPTHER

## 2018-08-20 DIAGNOSIS — E11.9 TYPE 2 DIABETES MELLITUS WITHOUT COMPLICATION, WITHOUT LONG-TERM CURRENT USE OF INSULIN (HCC): ICD-10-CM

## 2018-08-31 ENCOUNTER — TELEPHONE (OUTPATIENT)
Dept: FAMILY MEDICINE CLINIC | Age: 53
End: 2018-08-31

## 2018-08-31 NOTE — TELEPHONE ENCOUNTER
She is up to date with her tetanus shot    Hep B would only be needed if she things she was contaminated by body fluids of another person. Is there a reason she is concerned? ?

## 2018-09-22 DIAGNOSIS — M79.671 RIGHT FOOT PAIN: ICD-10-CM

## 2018-09-22 RX ORDER — NAPROXEN 500 MG/1
TABLET ORAL
Qty: 60 TABLET | Refills: 0 | Status: SHIPPED | OUTPATIENT
Start: 2018-09-22 | End: 2019-06-20 | Stop reason: SDUPTHER

## 2018-10-01 ENCOUNTER — OFFICE VISIT (OUTPATIENT)
Dept: FAMILY MEDICINE CLINIC | Age: 53
End: 2018-10-01
Payer: COMMERCIAL

## 2018-10-01 VITALS
HEIGHT: 65 IN | SYSTOLIC BLOOD PRESSURE: 114 MMHG | WEIGHT: 149 LBS | RESPIRATION RATE: 12 BRPM | HEART RATE: 82 BPM | DIASTOLIC BLOOD PRESSURE: 80 MMHG | BODY MASS INDEX: 24.83 KG/M2

## 2018-10-01 DIAGNOSIS — Z11.59 NEED FOR HEPATITIS B SCREENING TEST: ICD-10-CM

## 2018-10-01 DIAGNOSIS — E11.9 TYPE 2 DIABETES MELLITUS WITHOUT COMPLICATION, WITHOUT LONG-TERM CURRENT USE OF INSULIN (HCC): Primary | ICD-10-CM

## 2018-10-01 DIAGNOSIS — E78.00 PURE HYPERCHOLESTEROLEMIA: ICD-10-CM

## 2018-10-01 LAB
ANION GAP SERPL CALCULATED.3IONS-SCNC: 13 MMOL/L (ref 3–16)
BUN BLDV-MCNC: 17 MG/DL (ref 7–20)
CALCIUM SERPL-MCNC: 9.6 MG/DL (ref 8.3–10.6)
CHLORIDE BLD-SCNC: 108 MMOL/L (ref 99–110)
CO2: 25 MMOL/L (ref 21–32)
CREAT SERPL-MCNC: 0.6 MG/DL (ref 0.6–1.1)
GFR AFRICAN AMERICAN: >60
GFR NON-AFRICAN AMERICAN: >60
GLUCOSE BLD-MCNC: 87 MG/DL (ref 70–99)
POTASSIUM SERPL-SCNC: 4 MMOL/L (ref 3.5–5.1)
REASON FOR REJECTION: NORMAL
REJECTED TEST: NORMAL
SODIUM BLD-SCNC: 146 MMOL/L (ref 136–145)

## 2018-10-01 PROCEDURE — 90746 HEPB VACCINE 3 DOSE ADULT IM: CPT | Performed by: FAMILY MEDICINE

## 2018-10-01 PROCEDURE — 99213 OFFICE O/P EST LOW 20 MIN: CPT | Performed by: FAMILY MEDICINE

## 2018-10-01 PROCEDURE — 90471 IMMUNIZATION ADMIN: CPT | Performed by: FAMILY MEDICINE

## 2018-10-01 RX ORDER — LANOLIN ALCOHOL/MO/W.PET/CERES
CREAM (GRAM) TOPICAL
Qty: 60 TABLET | Refills: 0 | Status: CANCELLED | OUTPATIENT
Start: 2018-10-01

## 2018-10-01 RX ORDER — MECLIZINE HYDROCHLORIDE 25 MG/1
25 TABLET ORAL 3 TIMES DAILY PRN
Qty: 30 TABLET | Refills: 0 | Status: SHIPPED | OUTPATIENT
Start: 2018-10-01 | End: 2021-08-11 | Stop reason: SDUPTHER

## 2018-10-01 RX ORDER — ONDANSETRON 4 MG/1
4 TABLET, FILM COATED ORAL EVERY 8 HOURS PRN
Qty: 15 TABLET | Refills: 0 | Status: CANCELLED | OUTPATIENT
Start: 2018-10-01

## 2018-10-01 RX ORDER — ONDANSETRON 4 MG/1
4 TABLET, ORALLY DISINTEGRATING ORAL EVERY 8 HOURS PRN
Qty: 30 TABLET | Refills: 0 | Status: SHIPPED | OUTPATIENT
Start: 2018-10-01 | End: 2018-11-23 | Stop reason: ALTCHOICE

## 2018-10-02 ENCOUNTER — TELEPHONE (OUTPATIENT)
Dept: FAMILY MEDICINE CLINIC | Age: 53
End: 2018-10-02

## 2018-10-02 LAB
ESTIMATED AVERAGE GLUCOSE: 122.6 MG/DL
HBA1C MFR BLD: 5.9 %

## 2018-11-23 ENCOUNTER — APPOINTMENT (OUTPATIENT)
Dept: GENERAL RADIOLOGY | Age: 53
End: 2018-11-23
Payer: COMMERCIAL

## 2018-11-23 ENCOUNTER — HOSPITAL ENCOUNTER (EMERGENCY)
Age: 53
Discharge: HOME | End: 2018-11-23
Attending: EMERGENCY MEDICINE
Payer: COMMERCIAL

## 2018-11-23 VITALS
HEIGHT: 65 IN | RESPIRATION RATE: 20 BRPM | SYSTOLIC BLOOD PRESSURE: 102 MMHG | HEART RATE: 70 BPM | OXYGEN SATURATION: 95 % | WEIGHT: 143.3 LBS | DIASTOLIC BLOOD PRESSURE: 81 MMHG | BODY MASS INDEX: 23.88 KG/M2 | TEMPERATURE: 98.1 F

## 2018-11-23 DIAGNOSIS — R07.89 ATYPICAL CHEST PAIN: Primary | ICD-10-CM

## 2018-11-23 DIAGNOSIS — M79.18 MUSCULOSKELETAL PAIN: ICD-10-CM

## 2018-11-23 DIAGNOSIS — E83.42 HYPOMAGNESEMIA: ICD-10-CM

## 2018-11-23 LAB
A/G RATIO: 1.5 (ref 1.1–2.2)
ALBUMIN SERPL-MCNC: 4.2 G/DL (ref 3.4–5)
ALP BLD-CCNC: 75 U/L (ref 40–129)
ALT SERPL-CCNC: 13 U/L (ref 10–40)
ANION GAP SERPL CALCULATED.3IONS-SCNC: 15 MMOL/L (ref 3–16)
AST SERPL-CCNC: 16 U/L (ref 15–37)
BASOPHILS ABSOLUTE: 0 K/UL (ref 0–0.2)
BASOPHILS RELATIVE PERCENT: 0.7 %
BILIRUB SERPL-MCNC: 0.3 MG/DL (ref 0–1)
BUN BLDV-MCNC: 19 MG/DL (ref 7–20)
CALCIUM SERPL-MCNC: 9.3 MG/DL (ref 8.3–10.6)
CHLORIDE BLD-SCNC: 102 MMOL/L (ref 99–110)
CO2: 26 MMOL/L (ref 21–32)
CREAT SERPL-MCNC: 0.6 MG/DL (ref 0.6–1.1)
EOSINOPHILS ABSOLUTE: 0.1 K/UL (ref 0–0.6)
EOSINOPHILS RELATIVE PERCENT: 2 %
GFR AFRICAN AMERICAN: >60
GFR NON-AFRICAN AMERICAN: >60
GLOBULIN: 2.8 G/DL
GLUCOSE BLD-MCNC: 121 MG/DL (ref 70–99)
HCT VFR BLD CALC: 36.8 % (ref 36–48)
HEMOGLOBIN: 12 G/DL (ref 12–16)
LYMPHOCYTES ABSOLUTE: 1 K/UL (ref 1–5.1)
LYMPHOCYTES RELATIVE PERCENT: 15.2 %
MAGNESIUM: 1.4 MG/DL (ref 1.8–2.4)
MCH RBC QN AUTO: 29 PG (ref 26–34)
MCHC RBC AUTO-ENTMCNC: 32.7 G/DL (ref 31–36)
MCV RBC AUTO: 88.6 FL (ref 80–100)
MONOCYTES ABSOLUTE: 0.5 K/UL (ref 0–1.3)
MONOCYTES RELATIVE PERCENT: 7.9 %
NEUTROPHILS ABSOLUTE: 4.7 K/UL (ref 1.7–7.7)
NEUTROPHILS RELATIVE PERCENT: 74.2 %
PDW BLD-RTO: 13.3 % (ref 12.4–15.4)
PLATELET # BLD: 224 K/UL (ref 135–450)
PMV BLD AUTO: 10.6 FL (ref 5–10.5)
POTASSIUM REFLEX MAGNESIUM: 3.5 MMOL/L (ref 3.5–5.1)
RBC # BLD: 4.16 M/UL (ref 4–5.2)
SODIUM BLD-SCNC: 143 MMOL/L (ref 136–145)
TOTAL PROTEIN: 7 G/DL (ref 6.4–8.2)
TROPONIN: <0.01 NG/ML
WBC # BLD: 6.3 K/UL (ref 4–11)

## 2018-11-23 PROCEDURE — 93010 ELECTROCARDIOGRAM REPORT: CPT | Performed by: INTERNAL MEDICINE

## 2018-11-23 PROCEDURE — 83735 ASSAY OF MAGNESIUM: CPT

## 2018-11-23 PROCEDURE — 85025 COMPLETE CBC W/AUTO DIFF WBC: CPT

## 2018-11-23 PROCEDURE — 71046 X-RAY EXAM CHEST 2 VIEWS: CPT

## 2018-11-23 PROCEDURE — 84484 ASSAY OF TROPONIN QUANT: CPT

## 2018-11-23 PROCEDURE — 80053 COMPREHEN METABOLIC PANEL: CPT

## 2018-11-23 PROCEDURE — 93005 ELECTROCARDIOGRAM TRACING: CPT | Performed by: EMERGENCY MEDICINE

## 2018-11-23 PROCEDURE — 99285 EMERGENCY DEPT VISIT HI MDM: CPT

## 2018-11-23 PROCEDURE — 36415 COLL VENOUS BLD VENIPUNCTURE: CPT

## 2018-11-23 RX ORDER — LANOLIN ALCOHOL/MO/W.PET/CERES
400 CREAM (GRAM) TOPICAL DAILY
Qty: 7 TABLET | Refills: 0 | Status: SHIPPED | OUTPATIENT
Start: 2018-11-23 | End: 2019-07-16

## 2018-11-23 ASSESSMENT — PAIN DESCRIPTION - ORIENTATION: ORIENTATION: LEFT

## 2018-11-23 ASSESSMENT — HEART SCORE: ECG: 0

## 2018-11-23 ASSESSMENT — PAIN SCALES - GENERAL
PAINLEVEL_OUTOF10: 0
PAINLEVEL_OUTOF10: 6

## 2018-11-23 ASSESSMENT — PAIN DESCRIPTION - LOCATION: LOCATION: CHEST;ARM

## 2018-11-23 NOTE — ED PROVIDER NOTES
status:      Spouse name: N/A    Number of children: N/A    Years of education: N/A     Social History Main Topics    Smoking status: Never Smoker    Smokeless tobacco: Never Used    Alcohol use 0.0 oz/week      Comment: rarely    Drug use: No    Sexual activity: Yes     Partners: Male      Comment: , 3 children      Other Topics Concern    None     Social History Narrative    None       SCREENINGS    Yennifer Coma Scale  Eye Opening: Spontaneous  Best Verbal Response: Oriented  Best Motor Response: Obeys commands  [place] Coma Scale Score: 15 Heart Score for chest pain patients  History: Slightly Suspicious  ECG: Normal  Patient Age: > 39 and < 65 years  *Risk factors for Atherosclerotic disease: Hypercholesterolemia  Risk Factors: 1 or 2 risk factors  Troponin: < 1X normal limit  Heart Score Total: 2      PHYSICAL EXAM    (up to 7 for level 4, 8 or more for level 5)     ED Triage Vitals [11/23/18 0051]   BP Temp Temp Source Pulse Resp SpO2 Height Weight   121/87 97.5 °F (36.4 °C) Oral 74 16 100 % 5' 5\" (1.651 m) 143 lb 4.8 oz (65 kg)       Physical Exam   Constitutional: She appears well-developed and well-nourished. HENT:   Head: Normocephalic and atraumatic. Eyes: Right eye exhibits no discharge. Left eye exhibits no discharge. No scleral icterus. Neck: Neck supple. No JVD present. Cardiovascular: Normal rate, regular rhythm, normal heart sounds and intact distal pulses. Exam reveals no gallop and no friction rub. No murmur heard. Pulmonary/Chest: Effort normal and breath sounds normal. No respiratory distress. She has no wheezes. She has no rales. She exhibits no tenderness. Abdominal: Soft. Bowel sounds are normal. She exhibits no distension. There is no tenderness. Musculoskeletal: Normal range of motion. Tenderness to palpation in the left paraspinal muscles in the cervical region.   She also has tenderness to palpation of the proximal lateral aspect of the left care physician is recommended for reevaluation    REASSESSMENT          CRITICAL CARE TIME   None      CONSULTS:  None    PROCEDURES:  Unless otherwise noted below, none     Procedures    FINAL IMPRESSION      1. Atypical chest pain    2. Musculoskeletal pain    3.  Hypomagnesemia          DISPOSITION/PLAN   DISPOSITION        PATIENT REFERREDTO:  Alden Meyer MD  [address] [place] [address]  [phone]    In 2 days  If symptoms worsen return to the emergency room      DISCHARGEMEDICATIONS:  New Prescriptions    MAGNESIUM OXIDE (MAG-OX) 400 (240 MG) MG TABLET    Take 1 tablet by mouth daily for 7 days          (Please note that portions of this note were completed with a voice recognition program.  Efforts were made to edit the dictations but occasionally words are mis-transcribed.)    Gisela Lam MD (electronically signed)  Attending Emergency Physician        Gisela Lam MD  11/23/18 1850 [facility] Center[facility]if[facility] Stewart MD  11/23/18 1060

## 2018-11-23 NOTE — ED NOTES
Patient was discharged home. Gave patient discharged instructions she states understanding. IV was already removed d/t patient was discharged, Dr. Maylin Doran wanted to give patient oral Magnesium. Called pharmacy talked with Beau Mendieta, he states we only have IV Magnesium. Dr. Maylin Doran aware and gave patient Rx Magnesium.  Geovanna Mclain RN  11/23/18 8264

## 2018-11-27 ENCOUNTER — OFFICE VISIT (OUTPATIENT)
Dept: FAMILY MEDICINE CLINIC | Age: 53
End: 2018-11-27
Payer: COMMERCIAL

## 2018-11-27 VITALS
HEIGHT: 65 IN | DIASTOLIC BLOOD PRESSURE: 80 MMHG | HEART RATE: 86 BPM | SYSTOLIC BLOOD PRESSURE: 126 MMHG | BODY MASS INDEX: 24.16 KG/M2 | WEIGHT: 145 LBS

## 2018-11-27 DIAGNOSIS — R07.9 CHEST PAIN, UNSPECIFIED TYPE: Primary | ICD-10-CM

## 2018-11-27 DIAGNOSIS — R79.0 LOW MAGNESIUM LEVEL: ICD-10-CM

## 2018-11-27 DIAGNOSIS — R10.31 RLQ ABDOMINAL PAIN: ICD-10-CM

## 2018-11-27 LAB
BASOPHILS ABSOLUTE: 0 K/UL (ref 0–0.2)
BASOPHILS RELATIVE PERCENT: 0.4 %
BILIRUBIN, POC: NEGATIVE
BLOOD URINE, POC: ABNORMAL
CLARITY, POC: ABNORMAL
COLOR, POC: ABNORMAL
EOSINOPHILS ABSOLUTE: 0.2 K/UL (ref 0–0.6)
EOSINOPHILS RELATIVE PERCENT: 3.8 %
GLUCOSE URINE, POC: NEGATIVE
HCT VFR BLD CALC: 40.7 % (ref 36–48)
HEMOGLOBIN: 13.2 G/DL (ref 12–16)
KETONES, POC: NEGATIVE
LEUKOCYTE EST, POC: ABNORMAL
LYMPHOCYTES ABSOLUTE: 0.8 K/UL (ref 1–5.1)
LYMPHOCYTES RELATIVE PERCENT: 12.2 %
MAGNESIUM: 1.9 MG/DL (ref 1.8–2.4)
MCH RBC QN AUTO: 30.2 PG (ref 26–34)
MCHC RBC AUTO-ENTMCNC: 32.4 G/DL (ref 31–36)
MCV RBC AUTO: 93.4 FL (ref 80–100)
MONOCYTES ABSOLUTE: 0.3 K/UL (ref 0–1.3)
MONOCYTES RELATIVE PERCENT: 4.8 %
NEUTROPHILS ABSOLUTE: 4.9 K/UL (ref 1.7–7.7)
NEUTROPHILS RELATIVE PERCENT: 78.8 %
NITRITE, POC: POSITIVE
PDW BLD-RTO: 14.5 % (ref 12.4–15.4)
PH, POC: 6.5
PLATELET # BLD: 247 K/UL (ref 135–450)
PMV BLD AUTO: 10.1 FL (ref 5–10.5)
PROTEIN, POC: NEGATIVE
RBC # BLD: 4.36 M/UL (ref 4–5.2)
SPECIFIC GRAVITY, POC: 1.01
UROBILINOGEN, POC: 0.2
WBC # BLD: 6.2 K/UL (ref 4–11)

## 2018-11-27 PROCEDURE — 99214 OFFICE O/P EST MOD 30 MIN: CPT | Performed by: FAMILY MEDICINE

## 2018-11-27 PROCEDURE — 81002 URINALYSIS NONAUTO W/O SCOPE: CPT | Performed by: FAMILY MEDICINE

## 2018-11-27 RX ORDER — CIPROFLOXACIN 500 MG/1
500 TABLET, FILM COATED ORAL 2 TIMES DAILY
Qty: 10 TABLET | Refills: 0 | Status: SHIPPED | OUTPATIENT
Start: 2018-11-27 | End: 2018-12-02

## 2018-11-27 NOTE — PROGRESS NOTES
History   Substance Use Topics    Smoking status: Never Smoker    Smokeless tobacco: Never Used    Alcohol use 0.0 oz/week      Comment: rarely       Family History   Problem Relation Age of Onset   [facility] Cancer Mother         voicebox and larynx, and lung cancer    Diabetes Father     Heart Disease Father     COPD Sister     Heart Disease Sister 50        MI,     Seizures Brother            Review of Systems  Constitutional:  Negative for activity or appetite change, fever or fatigue  HENT:  Negative for congestion,sinus pressure, or rhinorrhea  Eyes:  Negative for eye pain or visual changes  Resp:  Negative for SOB, chest tightness, cough  Cardiovascular: Negative for palpitations, LU, orthopnea, PND, LE edema  Gastrointestinal: Negative for melena, BRBPR, N/V/D  :  Negative for dysuria, flank pain   Musculoskeletal:  Negative for back pain or myalgias  Neuro:  Negative for dizziness or lightheadedness  Psych: negative for depression or anxiety      Objective:   Constitutional:   · Reviewed vitals above  · Well Nourished, well developed, no distress       HENT:  · Normal external nose without lesions  · Bilateral TMs translucent with normal light reflex and bony landmarks  · Normal oropharynx without erythema or exudate  · Normal nasal mucosa without swelling or erythema  Neck:  · Symmetric and without masses  · No thyromegaly  Resp:  · Normal effort  · Clear to auscultation bilaterally without rhonchi, wheezing or crackles  Cardiovascular:  · On auscultation, normal S1 and S2 without murmurs, rubs or gallops  · No bruits of bilateral carotids and no JVD  Gastrointestinal:  · +RLQ abd pain w/o rebound or guarding  ·  nondistended, and no masses  · No hepatosplenomegaly  · No CVA tenderness bilaterally  Musculoskeletal:  · All extremities without clubbing, cyanosis or edema  Skin:  · No rashes on inspection  Psych:  · Normal mood and affect  · Normal insight and judgement    Assessment / Plan:     1.

## 2018-11-28 ENCOUNTER — PATIENT MESSAGE (OUTPATIENT)
Dept: FAMILY MEDICINE CLINIC | Age: 53
End: 2018-11-28

## 2018-11-28 LAB
C. TRACHOMATIS DNA ,URINE: NEGATIVE
N. GONORRHOEAE DNA, URINE: NEGATIVE

## 2018-11-29 ENCOUNTER — OFFICE VISIT (OUTPATIENT)
Dept: PSYCHOLOGY | Age: 53
End: 2018-11-29
Payer: COMMERCIAL

## 2018-11-29 DIAGNOSIS — F43.22 ADJUSTMENT DISORDER WITH ANXIETY: Primary | ICD-10-CM

## 2018-11-29 PROCEDURE — 90847 FAMILY PSYTX W/PT 50 MIN: CPT | Performed by: PSYCHOLOGIST

## 2018-11-29 ASSESSMENT — PATIENT HEALTH QUESTIONNAIRE - PHQ9
2. FEELING DOWN, DEPRESSED OR HOPELESS: 0
SUM OF ALL RESPONSES TO PHQ9 QUESTIONS 1 & 2: 0
SUM OF ALL RESPONSES TO PHQ QUESTIONS 1-9: 0
1. LITTLE INTEREST OR PLEASURE IN DOING THINGS: 0
SUM OF ALL RESPONSES TO PHQ QUESTIONS 1-9: 0

## 2018-11-30 LAB
ORGANISM: ABNORMAL
ORGANISM: ABNORMAL
URINE CULTURE, ROUTINE: ABNORMAL

## 2018-12-03 ENCOUNTER — NURSE ONLY (OUTPATIENT)
Dept: FAMILY MEDICINE CLINIC | Age: 53
End: 2018-12-03
Payer: COMMERCIAL

## 2018-12-03 DIAGNOSIS — Z23 NEED FOR HEPATITIS B VACCINATION: Primary | ICD-10-CM

## 2018-12-03 PROCEDURE — 90471 IMMUNIZATION ADMIN: CPT | Performed by: FAMILY MEDICINE

## 2018-12-03 PROCEDURE — 90746 HEPB VACCINE 3 DOSE ADULT IM: CPT | Performed by: FAMILY MEDICINE

## 2018-12-07 LAB
EKG ATRIAL RATE: 78 BPM
EKG DIAGNOSIS: NORMAL
EKG P AXIS: 17 DEGREES
EKG P-R INTERVAL: 136 MS
EKG Q-T INTERVAL: 384 MS
EKG QRS DURATION: 76 MS
EKG QTC CALCULATION (BAZETT): 437 MS
EKG R AXIS: -1 DEGREES
EKG T AXIS: 19 DEGREES
EKG VENTRICULAR RATE: 78 BPM

## 2018-12-10 ENCOUNTER — TELEPHONE (OUTPATIENT)
Dept: FAMILY MEDICINE CLINIC | Age: 53
End: 2018-12-10

## 2018-12-14 ENCOUNTER — HOSPITAL ENCOUNTER (OUTPATIENT)
Dept: NON INVASIVE DIAGNOSTICS | Age: 53
Discharge: HOME | End: 2018-12-14
Payer: COMMERCIAL

## 2018-12-14 DIAGNOSIS — R07.9 CHEST PAIN, UNSPECIFIED TYPE: ICD-10-CM

## 2018-12-14 PROCEDURE — 93350 STRESS TTE ONLY: CPT

## 2018-12-14 PROCEDURE — 93017 CV STRESS TEST TRACING ONLY: CPT

## 2018-12-19 ENCOUNTER — OFFICE VISIT (OUTPATIENT)
Dept: PSYCHOLOGY | Age: 53
End: 2018-12-19
Payer: COMMERCIAL

## 2018-12-19 DIAGNOSIS — F43.22 ADJUSTMENT DISORDER WITH ANXIETY: Primary | ICD-10-CM

## 2018-12-19 PROCEDURE — 90847 FAMILY PSYTX W/PT 50 MIN: CPT | Performed by: PSYCHOLOGIST

## 2018-12-19 ASSESSMENT — PATIENT HEALTH QUESTIONNAIRE - PHQ9
1. LITTLE INTEREST OR PLEASURE IN DOING THINGS: 1
SUM OF ALL RESPONSES TO PHQ9 QUESTIONS 1 & 2: 1
SUM OF ALL RESPONSES TO PHQ QUESTIONS 1-9: 1
2. FEELING DOWN, DEPRESSED OR HOPELESS: 0
SUM OF ALL RESPONSES TO PHQ QUESTIONS 1-9: 1

## 2018-12-19 ASSESSMENT — ANXIETY QUESTIONNAIRES
5. BEING SO RESTLESS THAT IT IS HARD TO SIT STILL: 0-NOT AT ALL SURE
6. BECOMING EASILY ANNOYED OR IRRITABLE: 1-SEVERAL DAYS
1. FEELING NERVOUS, ANXIOUS, OR ON EDGE: 1-SEVERAL DAYS
4. TROUBLE RELAXING: 1-SEVERAL DAYS
GAD7 TOTAL SCORE: 4
2. NOT BEING ABLE TO STOP OR CONTROL WORRYING: 0-NOT AT ALL SURE
3. WORRYING TOO MUCH ABOUT DIFFERENT THINGS: 0-NOT AT ALL SURE
7. FEELING AFRAID AS IF SOMETHING AWFUL MIGHT HAPPEN: 1-SEVERAL DAYS

## 2019-01-02 ENCOUNTER — OFFICE VISIT (OUTPATIENT)
Dept: FAMILY MEDICINE CLINIC | Age: 54
End: 2019-01-02
Payer: COMMERCIAL

## 2019-01-02 VITALS
SYSTOLIC BLOOD PRESSURE: 106 MMHG | HEART RATE: 92 BPM | WEIGHT: 141 LBS | BODY MASS INDEX: 23.49 KG/M2 | HEIGHT: 65 IN | DIASTOLIC BLOOD PRESSURE: 70 MMHG

## 2019-01-02 DIAGNOSIS — E78.00 PURE HYPERCHOLESTEROLEMIA: ICD-10-CM

## 2019-01-02 DIAGNOSIS — E11.9 TYPE 2 DIABETES MELLITUS WITHOUT COMPLICATION, WITHOUT LONG-TERM CURRENT USE OF INSULIN (HCC): Primary | ICD-10-CM

## 2019-01-02 DIAGNOSIS — R23.2 HOT FLASHES: ICD-10-CM

## 2019-01-02 LAB
CHOLESTEROL, TOTAL: 144 MG/DL (ref 0–199)
FOLLICLE STIMULATING HORMONE: 74.5 MIU/ML
HBA1C MFR BLD: 5.8 %
HDLC SERPL-MCNC: 43 MG/DL (ref 40–60)
LDL CHOLESTEROL CALCULATED: 85 MG/DL
TRIGL SERPL-MCNC: 81 MG/DL (ref 0–150)
VLDLC SERPL CALC-MCNC: 16 MG/DL

## 2019-01-02 PROCEDURE — 99214 OFFICE O/P EST MOD 30 MIN: CPT | Performed by: FAMILY MEDICINE

## 2019-01-02 PROCEDURE — 83036 HEMOGLOBIN GLYCOSYLATED A1C: CPT | Performed by: FAMILY MEDICINE

## 2019-01-02 RX ORDER — GLIPIZIDE 5 MG/1
TABLET ORAL
Qty: 90 TABLET | Refills: 0 | Status: CANCELLED | OUTPATIENT
Start: 2019-01-02

## 2019-01-02 RX ORDER — ATORVASTATIN CALCIUM 20 MG/1
TABLET, FILM COATED ORAL
Qty: 90 TABLET | Refills: 0 | Status: SHIPPED | OUTPATIENT
Start: 2019-01-02 | End: 2019-04-14 | Stop reason: SDUPTHER

## 2019-01-02 RX ORDER — LISINOPRIL 5 MG/1
TABLET ORAL
Qty: 90 TABLET | Refills: 0 | Status: SHIPPED | OUTPATIENT
Start: 2019-01-02 | End: 2019-04-14 | Stop reason: SDUPTHER

## 2019-01-02 RX ORDER — METFORMIN HYDROCHLORIDE 500 MG/1
TABLET, EXTENDED RELEASE ORAL
Qty: 270 TABLET | Refills: 0 | Status: SHIPPED | OUTPATIENT
Start: 2019-01-02 | End: 2019-04-14 | Stop reason: SDUPTHER

## 2019-01-13 DIAGNOSIS — E11.9 TYPE 2 DIABETES MELLITUS WITHOUT COMPLICATION, WITHOUT LONG-TERM CURRENT USE OF INSULIN (HCC): ICD-10-CM

## 2019-01-14 RX ORDER — GLIPIZIDE 5 MG/1
TABLET ORAL
Qty: 90 TABLET | Refills: 0 | OUTPATIENT
Start: 2019-01-14

## 2019-01-17 DIAGNOSIS — E11.9 TYPE 2 DIABETES MELLITUS WITHOUT COMPLICATION, WITHOUT LONG-TERM CURRENT USE OF INSULIN (HCC): ICD-10-CM

## 2019-01-17 RX ORDER — GLIPIZIDE 5 MG/1
TABLET ORAL
Qty: 90 TABLET | Refills: 0 | OUTPATIENT
Start: 2019-01-17

## 2019-01-17 RX ORDER — SITAGLIPTIN 100 MG/1
TABLET, FILM COATED ORAL
Qty: 90 TABLET | Refills: 0 | Status: SHIPPED | OUTPATIENT
Start: 2019-01-17 | End: 2019-04-03

## 2019-01-18 ENCOUNTER — TELEPHONE (OUTPATIENT)
Dept: FAMILY MEDICINE CLINIC | Age: 54
End: 2019-01-18

## 2019-01-18 DIAGNOSIS — E11.9 TYPE 2 DIABETES MELLITUS WITHOUT COMPLICATION, WITHOUT LONG-TERM CURRENT USE OF INSULIN (HCC): ICD-10-CM

## 2019-01-18 RX ORDER — GLIPIZIDE 5 MG/1
2.5 TABLET ORAL EVERY MORNING
COMMUNITY
End: 2019-02-25 | Stop reason: SDUPTHER

## 2019-01-18 RX ORDER — GLIPIZIDE 5 MG/1
TABLET ORAL
Qty: 90 TABLET | Refills: 0 | Status: SHIPPED | OUTPATIENT
Start: 2019-01-18 | End: 2019-04-15

## 2019-02-01 ENCOUNTER — OFFICE VISIT (OUTPATIENT)
Dept: FAMILY MEDICINE CLINIC | Age: 54
End: 2019-02-01
Payer: COMMERCIAL

## 2019-02-01 VITALS
DIASTOLIC BLOOD PRESSURE: 80 MMHG | SYSTOLIC BLOOD PRESSURE: 119 MMHG | TEMPERATURE: 97.7 F | HEIGHT: 65 IN | HEART RATE: 88 BPM | BODY MASS INDEX: 24.49 KG/M2 | WEIGHT: 147 LBS

## 2019-02-01 DIAGNOSIS — R35.0 FREQUENCY OF URINATION: ICD-10-CM

## 2019-02-01 DIAGNOSIS — N30.00 ACUTE CYSTITIS WITHOUT HEMATURIA: Primary | ICD-10-CM

## 2019-02-01 LAB
BILIRUBIN, POC: NEGATIVE
BLOOD URINE, POC: NEGATIVE
CLARITY, POC: ABNORMAL
COLOR, POC: ABNORMAL
GLUCOSE URINE, POC: NEGATIVE
KETONES, POC: NEGATIVE
LEUKOCYTE EST, POC: ABNORMAL
NITRITE, POC: POSITIVE
PH, POC: 6.5
PROTEIN, POC: NEGATIVE
SPECIFIC GRAVITY, POC: 1.01
UROBILINOGEN, POC: 0.2

## 2019-02-01 PROCEDURE — 99213 OFFICE O/P EST LOW 20 MIN: CPT | Performed by: FAMILY MEDICINE

## 2019-02-01 PROCEDURE — 81002 URINALYSIS NONAUTO W/O SCOPE: CPT | Performed by: FAMILY MEDICINE

## 2019-02-01 RX ORDER — NITROFURANTOIN 25; 75 MG/1; MG/1
100 CAPSULE ORAL 2 TIMES DAILY
Qty: 14 CAPSULE | Refills: 0 | Status: SHIPPED | OUTPATIENT
Start: 2019-02-01 | End: 2019-02-08

## 2019-02-03 LAB
ORGANISM: ABNORMAL
URINE CULTURE, ROUTINE: ABNORMAL
URINE CULTURE, ROUTINE: ABNORMAL

## 2019-02-25 ENCOUNTER — OFFICE VISIT (OUTPATIENT)
Dept: FAMILY MEDICINE CLINIC | Age: 54
End: 2019-02-25
Payer: COMMERCIAL

## 2019-02-25 VITALS
SYSTOLIC BLOOD PRESSURE: 114 MMHG | HEIGHT: 65 IN | DIASTOLIC BLOOD PRESSURE: 75 MMHG | HEART RATE: 93 BPM | TEMPERATURE: 98.1 F | WEIGHT: 143 LBS | BODY MASS INDEX: 23.82 KG/M2

## 2019-02-25 DIAGNOSIS — K52.9 AGE (ACUTE GASTROENTERITIS): Primary | ICD-10-CM

## 2019-02-25 DIAGNOSIS — R82.90 ABNORMAL URINE ODOR: ICD-10-CM

## 2019-02-25 LAB
BILIRUBIN, POC: NEGATIVE
BLOOD URINE, POC: ABNORMAL
CLARITY, POC: ABNORMAL
COLOR, POC: ABNORMAL
GLUCOSE URINE, POC: NEGATIVE
KETONES, POC: NEGATIVE
LEUKOCYTE EST, POC: ABNORMAL
NITRITE, POC: NEGATIVE
PH, POC: 6
PROTEIN, POC: 30
SPECIFIC GRAVITY, POC: >=1.03
UROBILINOGEN, POC: 1

## 2019-02-25 PROCEDURE — 99214 OFFICE O/P EST MOD 30 MIN: CPT | Performed by: FAMILY MEDICINE

## 2019-02-25 PROCEDURE — 81002 URINALYSIS NONAUTO W/O SCOPE: CPT | Performed by: FAMILY MEDICINE

## 2019-02-27 ENCOUNTER — TELEPHONE (OUTPATIENT)
Dept: FAMILY MEDICINE CLINIC | Age: 54
End: 2019-02-27

## 2019-02-28 ENCOUNTER — TELEPHONE (OUTPATIENT)
Dept: FAMILY MEDICINE CLINIC | Age: 54
End: 2019-02-28

## 2019-02-28 DIAGNOSIS — N39.0 RECURRENT UTI: Primary | ICD-10-CM

## 2019-02-28 LAB
ORGANISM: ABNORMAL
URINE CULTURE, ROUTINE: ABNORMAL
URINE CULTURE, ROUTINE: ABNORMAL

## 2019-02-28 RX ORDER — CIPROFLOXACIN 500 MG/1
500 TABLET, FILM COATED ORAL 2 TIMES DAILY
Qty: 14 TABLET | Refills: 0 | Status: SHIPPED | OUTPATIENT
Start: 2019-02-28 | End: 2019-03-07

## 2019-03-07 ENCOUNTER — TELEPHONE (OUTPATIENT)
Dept: FAMILY MEDICINE CLINIC | Age: 54
End: 2019-03-07

## 2019-03-17 DIAGNOSIS — E11.9 TYPE 2 DIABETES MELLITUS WITHOUT COMPLICATION, WITHOUT LONG-TERM CURRENT USE OF INSULIN (HCC): ICD-10-CM

## 2019-03-18 RX ORDER — SITAGLIPTIN 100 MG/1
TABLET, FILM COATED ORAL
Qty: 90 TABLET | Refills: 0 | Status: SHIPPED | OUTPATIENT
Start: 2019-03-18 | End: 2019-06-15 | Stop reason: SDUPTHER

## 2019-04-03 ENCOUNTER — OFFICE VISIT (OUTPATIENT)
Dept: FAMILY MEDICINE CLINIC | Age: 54
End: 2019-04-03
Payer: COMMERCIAL

## 2019-04-03 VITALS
WEIGHT: 146 LBS | HEIGHT: 65 IN | BODY MASS INDEX: 24.32 KG/M2 | HEART RATE: 91 BPM | SYSTOLIC BLOOD PRESSURE: 108 MMHG | DIASTOLIC BLOOD PRESSURE: 79 MMHG

## 2019-04-03 DIAGNOSIS — E78.00 PURE HYPERCHOLESTEROLEMIA: ICD-10-CM

## 2019-04-03 DIAGNOSIS — E11.9 TYPE 2 DIABETES MELLITUS WITHOUT COMPLICATION, WITHOUT LONG-TERM CURRENT USE OF INSULIN (HCC): Primary | ICD-10-CM

## 2019-04-03 LAB
ANION GAP SERPL CALCULATED.3IONS-SCNC: 14 MMOL/L (ref 3–16)
BUN BLDV-MCNC: 19 MG/DL (ref 7–20)
CALCIUM SERPL-MCNC: 10.1 MG/DL (ref 8.3–10.6)
CHLORIDE BLD-SCNC: 103 MMOL/L (ref 99–110)
CO2: 27 MMOL/L (ref 21–32)
CREAT SERPL-MCNC: 0.6 MG/DL (ref 0.6–1.1)
CREATININE URINE: 196.3 MG/DL (ref 28–259)
GFR AFRICAN AMERICAN: >60
GFR NON-AFRICAN AMERICAN: >60
GLUCOSE BLD-MCNC: 88 MG/DL (ref 70–99)
HBA1C MFR BLD: 5.8 %
MICROALBUMIN UR-MCNC: <1.2 MG/DL
MICROALBUMIN/CREAT UR-RTO: NORMAL MG/G (ref 0–30)
POTASSIUM SERPL-SCNC: 4.2 MMOL/L (ref 3.5–5.1)
SODIUM BLD-SCNC: 144 MMOL/L (ref 136–145)

## 2019-04-03 PROCEDURE — 99213 OFFICE O/P EST LOW 20 MIN: CPT | Performed by: FAMILY MEDICINE

## 2019-04-03 PROCEDURE — 83036 HEMOGLOBIN GLYCOSYLATED A1C: CPT | Performed by: FAMILY MEDICINE

## 2019-04-03 ASSESSMENT — PATIENT HEALTH QUESTIONNAIRE - PHQ9
SUM OF ALL RESPONSES TO PHQ QUESTIONS 1-9: 0
SUM OF ALL RESPONSES TO PHQ9 QUESTIONS 1 & 2: 0
2. FEELING DOWN, DEPRESSED OR HOPELESS: 0
SUM OF ALL RESPONSES TO PHQ QUESTIONS 1-9: 0
1. LITTLE INTEREST OR PLEASURE IN DOING THINGS: 0

## 2019-04-03 NOTE — PROGRESS NOTES
Subjective:      Patient ID: Richa Perkins is a 49 y.o. female. HPI     CC:  DM2, HLD, hot flashes    Treatment Adherence:   Medication compliance:  Compliant most of the time  Diet compliance:  Complaint most of the time  Weight trend: up a couple lbs. Current exercise: at work  Barriers:none    Diabetes Mellitus Type 2:    Last visit, glipizide 5 mg daily was stopped as her A1c was 5.8. Patient called a couple weeks later stating her fasting sugars in the morning were up to 170. Glipizide 2.5 mg was restarted. Patient continued  metformin XL, 1000mg in the morning, and 500mg at night and Januvia 100 mg daily. pt denies polyuria, polydipsia, blurry vision, foot ulcerations, neuropathy, polyphagia, nausea, vomiting and diarrhea. Home blood sugar records: patient does not test  Any episodes of hypoglycemia? no  Eye exam current (within one year): 4/2/2018: no dm retinopathy, repeat scheduled 4/11/19  Tobacco history: She  reports that she has never smoked. She has never used smokeless tobacco.   Daily Aspirin? Yes      Hyperlipidemia:  No new myalgias or GI upset on atorvastatin (Lipitor). Medication compliance: compliant all of the time. Patient is  following a low fat, low cholesterol diet. She is not exercising regularly, but is active at work. She denies chest pain, shortness of breath, dyspnea on exertion, palpitations, lightheadedness, lower extremity edema, paroxysmal nocturnal dyspnea, and orthopnea.     Lab Results   Component Value Date    LABA1C 5.8 01/02/2019    LABA1C 5.9 10/01/2018    LABA1C 6.0 07/02/2018     Lab Results   Component Value Date    LABMICR <1.20 07/17/2017    CREATININE 0.6 11/23/2018     Lab Results   Component Value Date    ALT 13 11/23/2018    AST 16 11/23/2018     Lab Results   Component Value Date    CHOL 144 01/02/2019    TRIG 81 01/02/2019    HDL 43 01/02/2019    LDLCALC 85 01/02/2019            Vitals:    04/03/19 0854   BP: 108/79   Pulse: 91   Weight: 146 lb (66.2 kg)   Height: 5' 5\" (1.651 m)       Outpatient Medications Marked as Taking for the 4/3/19 encounter (Office Visit) with Ruth Thakur MD   Medication Sig Dispense Refill    JANUVIA 100 MG tablet TAKE 1 TABLET BY MOUTH ONE TIME A DAY  90 tablet 0    glipiZIDE (GLUCOTROL) 5 MG tablet TAKE 1 TABLET BY MOUTH ONE TIME A DAY  90 tablet 0    atorvastatin (LIPITOR) 20 MG tablet TAKE 1 TABLET BY MOUTH ONE TIME A DAY 90 tablet 0    metFORMIN (GLUCOPHAGE-XR) 500 MG extended release tablet TAKE 2 TABLETS BY MOUTH with breakfast and take1 tablet at bedtime daily 270 tablet 0    lisinopril (PRINIVIL;ZESTRIL) 5 MG tablet TAKE 1 TABLET BY MOUTH ONE TIME A DAY 90 tablet 0    Cranberry 1000 MG CAPS Take 1 tablet by mouth daily      meclizine (ANTIVERT) 25 MG tablet Take 1 tablet by mouth 3 times daily as needed for Nausea 30 tablet 0    naproxen (NAPROSYN) 500 MG tablet TAKE 1 TABLET BY MOUTH TWO TIMES A DAY WITH MEALS 60 tablet 0    Blood Glucose Monitoring Suppl (ONE TOUCH ULTRA MINI) w/Device KIT Use to check sugars 1 kit 0    glucose blood VI test strips (ASCENSIA AUTODISC VI;ONE TOUCH ULTRA TEST VI) strip 1 each by In Vitro route daily As needed. 100 each 3    ONE TOUCH LANCETS MISC 1 each by Does not apply route daily 100 each 3    aspirin 81 MG chewable tablet Take 81 mg by mouth daily      Evening Primrose Oil 500 MG CAPS Take by mouth             Past Medical History:   Diagnosis Date    Atrophic kidney 2017    sstone    Atrophic scarred right kidney again noted.  Some nephrolithiasis is noted without  hydronephrosis.       Diabetes ([address]     Family history of early CAD     FH: breast cancer 3/8/2017    25 yo mother reported    HLD (hyperlipidemia)     Iron deficiency anemia 2017    Right peroneal tendinosis 2018       Past Surgical History:   Procedure Laterality Date     SECTION      SKIN BIOPSY         Social History     Tobacco Use    Smoking status: Never Smoker    Smokeless tobacco: Never Used   Substance Use Topics    Alcohol use: Yes     Alcohol/week: 0.0 oz     Comment: rarely       Family History   Problem Relation Age of Onset   [de-identified] Cancer Mother         voicebox and larynx, and lung cancer    Diabetes Father     Heart Disease Father     COPD Sister     Heart Disease Sister 50        MI,     Seizures Brother            Review of Systems  See hpi    Objective:   Physical Exam     Constitutional:   · Reviewed vitals above  · Well Nourished, well developed, no distress       Neck:  · Symmetric and without masses  · No thyromegaly  Resp:  · Normal effort  · Clear to auscultation bilaterally without rhonchi, wheezing or crackles  Cardiovascular:  · On auscultation, normal S1 and S2 without murmurs, rubs or gallops  · No bruits of bilateral carotids and no JVD  Gastrointestinal:  · Nontender, nondistended, and no masses  · No hepatosplenomegaly  Musculoskeletal:  · All extremities without clubbing, cyanosis or edema  Skin:  · No rashes on inspection  Psych:  · Normal mood and affect  · Normal insight and judgement        Assessment:      See below       Plan:     1. Type 2 diabetes mellitus without complication, without long-term current use of insulin ([facility])  Poc VvB2s=4.8  and stable     A1c still very low despite lower dose of glipizide. Asked patient once again to stop glipizide completely. Asked patient to record blood sugars fasting, 2 hours after lunch, and 2 hours after dinner, and report these to me over the next 2 weeks. Pt to continue Januvia 100 mg and metformin XR 1000 mg in the morning, and 500 mg q.h.s. Checking BMP and Urine microalbumin. DM eye exam: 2018: no dm retinopathy:  Patient has repeat exam later this month. Pt to continue aspirin, statin and ACEI        2. Pure hypercholesterolemia  Reviewed most recent fasting lipid panel and LFTs. Both within normal limits. Continue Lipitor 20 mg.     HM:    Shingrix Encouraged to get at pharmacy:  But patient states she likely will NOT    F/u in 3 months

## 2019-04-14 DIAGNOSIS — E11.9 TYPE 2 DIABETES MELLITUS WITHOUT COMPLICATION, WITHOUT LONG-TERM CURRENT USE OF INSULIN (HCC): ICD-10-CM

## 2019-04-14 DIAGNOSIS — E78.00 PURE HYPERCHOLESTEROLEMIA: ICD-10-CM

## 2019-04-15 RX ORDER — METFORMIN HYDROCHLORIDE 500 MG/1
TABLET, EXTENDED RELEASE ORAL
Qty: 270 TABLET | Refills: 0 | Status: SHIPPED | OUTPATIENT
Start: 2019-04-15 | End: 2019-06-27 | Stop reason: SDUPTHER

## 2019-04-15 RX ORDER — LISINOPRIL 5 MG/1
TABLET ORAL
Qty: 90 TABLET | Refills: 0 | Status: SHIPPED | OUTPATIENT
Start: 2019-04-15 | End: 2019-06-27 | Stop reason: SDUPTHER

## 2019-04-15 RX ORDER — ATORVASTATIN CALCIUM 20 MG/1
TABLET, FILM COATED ORAL
Qty: 90 TABLET | Refills: 0 | Status: SHIPPED | OUTPATIENT
Start: 2019-04-15 | End: 2019-06-27 | Stop reason: SDUPTHER

## 2019-04-15 RX ORDER — GLIPIZIDE 5 MG/1
TABLET ORAL
Qty: 90 TABLET | Refills: 0 | OUTPATIENT
Start: 2019-04-15

## 2019-04-17 ENCOUNTER — TELEPHONE (OUTPATIENT)
Dept: FAMILY MEDICINE CLINIC | Age: 54
End: 2019-04-17

## 2019-06-15 DIAGNOSIS — E11.9 TYPE 2 DIABETES MELLITUS WITHOUT COMPLICATION, WITHOUT LONG-TERM CURRENT USE OF INSULIN (HCC): ICD-10-CM

## 2019-06-17 RX ORDER — SITAGLIPTIN 100 MG/1
TABLET, FILM COATED ORAL
Qty: 90 TABLET | Refills: 0 | Status: SHIPPED | OUTPATIENT
Start: 2019-06-17 | End: 2019-07-16 | Stop reason: SDUPTHER

## 2019-06-20 ENCOUNTER — OFFICE VISIT (OUTPATIENT)
Dept: FAMILY MEDICINE CLINIC | Age: 54
End: 2019-06-20
Payer: COMMERCIAL

## 2019-06-20 VITALS
RESPIRATION RATE: 12 BRPM | OXYGEN SATURATION: 96 % | HEART RATE: 90 BPM | SYSTOLIC BLOOD PRESSURE: 92 MMHG | DIASTOLIC BLOOD PRESSURE: 67 MMHG | HEIGHT: 65 IN | BODY MASS INDEX: 24.66 KG/M2 | WEIGHT: 148 LBS

## 2019-06-20 DIAGNOSIS — M25.531 RIGHT WRIST PAIN: Primary | ICD-10-CM

## 2019-06-20 PROCEDURE — 99213 OFFICE O/P EST LOW 20 MIN: CPT | Performed by: INTERNAL MEDICINE

## 2019-06-20 RX ORDER — NAPROXEN 500 MG/1
TABLET ORAL
Qty: 60 TABLET | Refills: 0 | Status: SHIPPED | OUTPATIENT
Start: 2019-06-20 | End: 2019-07-16 | Stop reason: SDUPTHER

## 2019-06-20 NOTE — LETTER
[address], [facility] [address]  Phone: [phone]  Fax: [phone]    Lucy Denver, MD        June 20, 2019     Patient: Marybel Weathers   YOB: 1965   Date of Visit: 6/20/2019       To Whom It May Concern:     Nayeli Aragon was seen today with injury. She should be on light duty June 21st. No lifting greater than 15 pounds.       Sincerely,        Lucy Denver, MD

## 2019-06-20 NOTE — LETTER
[address], [place] [address]  Phone: [phone]  Fax: [phone]    Alethea Maldonado MD        June 20, 2019     Patient: Greg Reason   YOB: 1965   Date of Visit: 6/20/2019       To Whom it May Concern:    Joanne Nielsen was seen in my clinic on 6/20/2019. She needs to be excused from work today 6/20/2019 and tomorrow 6/21/2019. If you have any questions or concerns, please don't hesitate to call.     Sincerely,         Alethea Maldonado MD

## 2019-06-20 NOTE — PROGRESS NOTES
HPI: Sonia Murray presents for right hand and wrist discomfort. Chronic health issues include tendinitis, iron deficiency anemia, diabetes, atrophic right kidney. Physical labor. Is a . Quite a bit of lifting and cleaning. States she has had problems with tendinitis. No new activities. This morning woke up with swollen right wrist and ecchymoses. Using topical medication. Excedrin. No numbness or tingling. Occasionally has some discomfort in the web between thumb and second finger. Well-controlled diabetes. Family history of coronary disease with normal cardiac testing in the past.    Social history . No tobacco rare alcohol. . Review of systems: Contusion, gastroenteritis cholecystitis, diabetes, adjustment disorders      Constitutional, ent, CV, respiratory, GI, , joint, skin, allergic and psychiatric ROS reviewed and negative except for above    Allergies   Allergen Reactions    Pcn [Penicillins] Other (See Comments)    Benadryl [Diphenhydramine] Rash    Benzonatate Rash    Morphine Rash    Sulfa Antibiotics Hives and Rash       Outpatient Medications Marked as Taking for the 6/20/19 encounter (Office Visit) with Yaneth Castro MD   Medication Sig Dispense Refill    JANUVIA 100 MG tablet TAKE 1 TABLET BY MOUTH ONE TIME A DAY  90 tablet 0    atorvastatin (LIPITOR) 20 MG tablet TAKE 1 TABLET BY MOUTH ONE TIME A DAY  90 tablet 0    lisinopril (PRINIVIL;ZESTRIL) 5 MG tablet TAKE 1 TABLET BY MOUTH ONE TIME A DAY  90 tablet 0    metFORMIN (GLUCOPHAGE-XR) 500 MG extended release tablet TAKE 2 TABLETS BY MOUTH WITH BREAKFAST AND 1 TABLET AT BEDTIME DAILY.  270 tablet 0    Cranberry 1000 MG CAPS Take 1 tablet by mouth daily      meclizine (ANTIVERT) 25 MG tablet Take 1 tablet by mouth 3 times daily as needed for Nausea 30 tablet 0    Blood Glucose Monitoring Suppl (ONE TOUCH ULTRA MINI) w/Device KIT Use to check sugars 1 kit 0    glucose blood VI Date/Time    CALCIUM 10.1 04/03/2019 0930    ALKPHOS 75 11/23/2018 0110    AST 16 11/23/2018 0110    ALT 13 11/23/2018 0110    BILITOT 0.3 11/23/2018 0110            Lab Results   Component Value Date    WBC 6.2 11/27/2018    HGB 13.2 11/27/2018    HCT 40.7 11/27/2018    MCV 93.4 11/27/2018     11/27/2018     Lab Results   Component Value Date    LABA1C 5.8 04/03/2019     Lab Results   Component Value Date    .6 10/01/2018     Lab Results   Component Value Date    LABA1C 5.8 04/03/2019     No components found for: CHLPL  Lab Results   Component Value Date    TRIG 81 01/02/2019    TRIG 146 03/19/2018    TRIG 117 05/15/2017     Lab Results   Component Value Date    HDL 43 01/02/2019    HDL 43 03/19/2018    HDL 42 05/15/2017     Lab Results   Component Value Date    LDLCALC 85 01/02/2019    LDLCALC 80 03/19/2018    LDLCALC 77 05/15/2017     Lab Results   Component Value Date    LABVLDL 16 01/02/2019    LABVLDL 29 03/19/2018    LABVLDL 23 05/15/2017       Old labs and records reviewed or requested  Discussed past lab and studies with patient        Diagnosis Orders   1. Right wrist pain           Contusion, tendinitis, pain. Atrophic kidney. Will use topical medication ice brace. Sparing use of her Naprosyn. Work note for today. Light duty tomorrow. Has a follow-up with PCP next week we will recheck again at that time          Diagnosis and treatment discussed.   Possible side effects of medication reviewed  Patients questions answered  Follow up understood  Pt aware if they are not contacted about any test results , this does not mean they are normal.  They should call

## 2019-06-27 ENCOUNTER — OFFICE VISIT (OUTPATIENT)
Dept: FAMILY MEDICINE CLINIC | Age: 54
End: 2019-06-27
Payer: COMMERCIAL

## 2019-06-27 VITALS
SYSTOLIC BLOOD PRESSURE: 99 MMHG | WEIGHT: 142 LBS | HEIGHT: 65 IN | BODY MASS INDEX: 23.66 KG/M2 | DIASTOLIC BLOOD PRESSURE: 66 MMHG | HEART RATE: 70 BPM

## 2019-06-27 DIAGNOSIS — E11.9 TYPE 2 DIABETES MELLITUS WITHOUT COMPLICATION, WITHOUT LONG-TERM CURRENT USE OF INSULIN (HCC): Primary | ICD-10-CM

## 2019-06-27 DIAGNOSIS — E78.00 PURE HYPERCHOLESTEROLEMIA: ICD-10-CM

## 2019-06-27 LAB — HBA1C MFR BLD: 7.1 %

## 2019-06-27 PROCEDURE — 99213 OFFICE O/P EST LOW 20 MIN: CPT | Performed by: NURSE PRACTITIONER

## 2019-06-27 PROCEDURE — 83036 HEMOGLOBIN GLYCOSYLATED A1C: CPT | Performed by: NURSE PRACTITIONER

## 2019-06-27 RX ORDER — METFORMIN HYDROCHLORIDE 500 MG/1
TABLET, EXTENDED RELEASE ORAL
Qty: 270 TABLET | Refills: 0 | Status: SHIPPED | OUTPATIENT
Start: 2019-06-27 | End: 2019-09-27 | Stop reason: SDUPTHER

## 2019-06-27 RX ORDER — ATORVASTATIN CALCIUM 20 MG/1
TABLET, FILM COATED ORAL
Qty: 90 TABLET | Refills: 0 | Status: SHIPPED | OUTPATIENT
Start: 2019-06-27 | End: 2019-09-27 | Stop reason: SDUPTHER

## 2019-06-27 RX ORDER — LISINOPRIL 5 MG/1
TABLET ORAL
Qty: 90 TABLET | Refills: 0 | Status: SHIPPED | OUTPATIENT
Start: 2019-06-27 | End: 2019-09-27 | Stop reason: SDUPTHER

## 2019-06-27 RX ORDER — GLIPIZIDE 5 MG/1
5 TABLET ORAL DAILY
Qty: 60 TABLET | Refills: 3 | Status: SHIPPED | OUTPATIENT
Start: 2019-06-27 | End: 2019-09-27 | Stop reason: SDUPTHER

## 2019-06-27 NOTE — PROGRESS NOTES
PROGRESS NOTE     [facility] ([facility]) Physicians  [address] [address]  [phone] office  [phone] fax    Date of Service:  6/27/2019    Subjective:      Patient ID: Eboni Vu is a 49 y.o. female      CC: Follow up diabetes    HPI  Diabetes Mellitus Type 2: Current symptoms/problems include polyuria and polydipsia. Patient is concerned that her A1C will be higher today. She has been under stress including buying a new home and work stress. She has noticed her morning fasting blood sugar readings have been elevated. She admits that she has not been watching her diet as closely as she should. Medication compliance:  compliant all of the time  Diabetic diet compliance:  compliant most of the time,  Weight trend: decreasing  Current exercise: yard work, and chasing 3year old grandchild  Barriers: none    Home blood sugar records: fasting range: 160-215  Any episodes of hypoglycemia? no  Eye exam current (within one year): yes   reports that she has never smoked. She has never used smokeless tobacco.   Daily Aspirin? Yes    Patient continues on Januvia 100 mg and metformin 1000 mg in the morning and 500 mg QHS. Glipizide was completely stopped at last visit as her A1C was 5.8.          Lab Results   Component Value Date    LABA1C 7.1 06/27/2019    LABA1C 5.8 04/03/2019    LABA1C 5.8 01/02/2019     Lab Results   Component Value Date    LABMICR <1.20 04/03/2019    CREATININE 0.6 04/03/2019     Lab Results   Component Value Date    ALT 13 11/23/2018    AST 16 11/23/2018     Lab Results   Component Value Date    CHOL 144 01/02/2019    TRIG 81 01/02/2019    HDL 43 01/02/2019    LDLCALC 85 01/02/2019            Lab Results   Component Value Date    CHOL 144 01/02/2019    TRIG 81 01/02/2019    HDL 43 01/02/2019    LDLCALC 85 01/02/2019     Lab Results   Component Value Date    ALT 13 11/23/2018    AST 16 11/23/2018              Vitals:    06/27/19 1042   BP: 99/66 negative    Sensory exam:  Monofilament sensation: normal  (minimum of 5 random plantar locations tested, avoiding callused areas - > 1 area with absence of sensation is + for neuropathy)    Plus at least one of the following:  Pulses: normal                Assessment / Plan:     1. Type 2 diabetes mellitus without complication, without long-term current use of insulin ([facility])  A1C today is 7.1. Will restart Glipizide at 5 mg daily. Patient to check blood sugar three times daily and notify office if blood sugars are less than 100. She is disappointed that her A1C has worsened. She is committed to eating better and improving her numbers. Follow up in 3 months. Continues on ACE, statin and aspirin.     -  DIABETES FOOT EXAM  - POCT glycosylated hemoglobin (Hb A1C)  - glipiZIDE (GLUCOTROL) 5 MG tablet; Take 1 tablet by mouth daily  Dispense: 60 tablet; Refill: 3  - lisinopril (PRINIVIL;ZESTRIL) 5 MG tablet; Take 5 mg daily  Dispense: 90 tablet; Refill: 0  - metFORMIN (GLUCOPHAGE-XR) 500 MG extended release tablet; TAKE 2 TABLETS BY MOUTH WITH BREAKFAST AND 1 TABLET AT BEDTIME DAILY. Dispense: 270 tablet; Refill: 0    2. Pure hypercholesterolemia  Med refilled. - atorvastatin (LIPITOR) 20 MG tablet; Take 20 mg daily. Dispense: 90 tablet;  Refill: 0

## 2019-07-16 ENCOUNTER — OFFICE VISIT (OUTPATIENT)
Dept: FAMILY MEDICINE CLINIC | Age: 54
End: 2019-07-16
Payer: COMMERCIAL

## 2019-07-16 VITALS
DIASTOLIC BLOOD PRESSURE: 82 MMHG | HEART RATE: 86 BPM | TEMPERATURE: 97.8 F | WEIGHT: 148.8 LBS | BODY MASS INDEX: 24.76 KG/M2 | SYSTOLIC BLOOD PRESSURE: 111 MMHG

## 2019-07-16 DIAGNOSIS — M67.88 RIGHT PERONEAL TENDINOSIS: Primary | ICD-10-CM

## 2019-07-16 DIAGNOSIS — J02.9 ACUTE VIRAL PHARYNGITIS: ICD-10-CM

## 2019-07-16 DIAGNOSIS — E11.9 TYPE 2 DIABETES MELLITUS WITHOUT COMPLICATION, WITHOUT LONG-TERM CURRENT USE OF INSULIN (HCC): ICD-10-CM

## 2019-07-16 DIAGNOSIS — N30.00 ACUTE CYSTITIS WITHOUT HEMATURIA: Primary | ICD-10-CM

## 2019-07-16 LAB
BILIRUBIN, POC: NEGATIVE
BLOOD URINE, POC: NEGATIVE
CLARITY, POC: ABNORMAL
COLOR, POC: ABNORMAL
GLUCOSE URINE, POC: NEGATIVE
KETONES, POC: NEGATIVE
LEUKOCYTE EST, POC: ABNORMAL
NITRITE, POC: NEGATIVE
PH, POC: 6.5
PROTEIN, POC: NEGATIVE
SPECIFIC GRAVITY, POC: 1.02
STREPTOCOCCUS A RNA: NEGATIVE
UROBILINOGEN, POC: 0.2

## 2019-07-16 PROCEDURE — 87651 STREP A DNA AMP PROBE: CPT | Performed by: NURSE PRACTITIONER

## 2019-07-16 PROCEDURE — 81002 URINALYSIS NONAUTO W/O SCOPE: CPT | Performed by: NURSE PRACTITIONER

## 2019-07-16 PROCEDURE — 99213 OFFICE O/P EST LOW 20 MIN: CPT | Performed by: NURSE PRACTITIONER

## 2019-07-16 RX ORDER — SITAGLIPTIN 100 MG/1
TABLET, FILM COATED ORAL
Qty: 90 TABLET | Refills: 0 | Status: SHIPPED | OUTPATIENT
Start: 2019-07-16 | End: 2019-11-25 | Stop reason: SDUPTHER

## 2019-07-16 RX ORDER — NAPROXEN 500 MG/1
TABLET ORAL
Qty: 60 TABLET | Refills: 0 | Status: SHIPPED | OUTPATIENT
Start: 2019-07-16 | End: 2021-10-05 | Stop reason: CLARIF

## 2019-07-16 RX ORDER — CIPROFLOXACIN 500 MG/1
500 TABLET, FILM COATED ORAL 2 TIMES DAILY
Qty: 14 TABLET | Refills: 0 | Status: SHIPPED | OUTPATIENT
Start: 2019-07-16 | End: 2019-09-04 | Stop reason: SDUPTHER

## 2019-07-16 NOTE — PROGRESS NOTES
MOUTH WITH BREAKFAST AND 1 TABLET AT BEDTIME DAILY. 270 tablet 0    naproxen (NAPROSYN) 500 MG tablet TAKE 1 TABLET BY MOUTH TWO TIMES A DAY WITH MEALS 60 tablet 0    JANUVIA 100 MG tablet TAKE 1 TABLET BY MOUTH ONE TIME A DAY  90 tablet 0    Cranberry 1000 MG CAPS Take 1 tablet by mouth daily      meclizine (ANTIVERT) 25 MG tablet Take 1 tablet by mouth 3 times daily as needed for Nausea 30 tablet 0    Blood Glucose Monitoring Suppl (ONE TOUCH ULTRA MINI) w/Device KIT Use to check sugars 1 kit 0    glucose blood VI test strips (ASCENSIA AUTODISC VI;ONE TOUCH ULTRA TEST VI) strip 1 each by In Vitro route daily As needed. 100 each 3    aspirin 81 MG chewable tablet Take 81 mg by mouth daily      Evening Primrose Oil 500 MG CAPS Take by mouth         Past Medical History:   Diagnosis Date    Atrophic kidney 2017    sstone    Atrophic scarred right kidney again noted.  Some nephrolithiasis is noted without  hydronephrosis.  Diabetes ([address]     Family history of early CAD     FH: breast cancer 3/8/2017    23 yo mother reported    HLD (hyperlipidemia)     Iron deficiency anemia 2017    Right peroneal tendinosis 2018       Past Surgical History:   Procedure Laterality Date     SECTION      SKIN BIOPSY         Social History     Tobacco Use    Smoking status: Never Smoker    Smokeless tobacco: Never Used   Substance Use Topics    Alcohol use: Yes     Alcohol/week: 0.0 standard drinks     Comment: rarely       Family History   Problem Relation Age of Onset   [facility] Cancer Mother         voicebox and larynx, and lung cancer    Diabetes Father     Heart Disease Father     COPD Sister     Heart Disease Sister 50        MI,     Seizures Brother            Review of Systems  Constitutional:  Negative for activity or appetite change.  + fever and fatigue  HENT:  Negative for congestion, sinus pressure, or rhinorrhea. + sore throat and headache  Eyes:  Negative for eye pain or visual changes  Resp:  Negative for SOB, chest tightness, cough  Cardiovascular: Negative for CP, palpitations, LU, orthopnea, PND, LE edema  Gastrointestinal: Negative for abd pain, melena, BRBPR, N/V/D  Endocrine:  Negative for polydipsia and polyuria  :  Negative for dysuria. + flank pain and urinary frequency  Musculoskeletal:  Negative for back pain or myalgias  Neuro:  Negative for dizziness or lightheadedness  Psych: negative for depression or anxiety      Objective:   Constitutional:   · Reviewed vitals above  · Well Nourished, well developed, no distress       HENT:  · Normal external nose without lesions  · Left TM translucent with normal light reflex and bony landmarks  · Right TM not visualized related to cerumen impaction  · Normal oropharynx without erythema or exudate  · Normal nasal mucosa without swelling or erythema  · + cervical lymphadenopathy  Neck:  · Symmetric and without masses  · No thyromegaly  Resp:  · Normal effort  · Clear to auscultation bilaterally without rhonchi, wheezing or crackles  Cardiovascular:  · On auscultation, normal S1 and S2 without murmurs, rubs or gallops  · No bruits of bilateral carotids and no JVD  Gastrointestinal:  · Nondistended, and no masses  · + suprapubic tenderness  · + right CVA tenderness  · No hepatosplenomegaly  Musculoskeletal:  · Normal Gait  · All extremities without clubbing, cyanosis or edema  Skin:  · No rashes on inspection  · No areas of increased heat or induration on palpation  Psych:  · Normal mood and affect  · Normal insight and judgement    Assessment / Plan:     1. Acute cystitis without hematuria  Urinalysis is negative except for trace leukocytes. Will await culture. Given patient's history of cystitis requiring hospitalization will initiate antibiotic therapy. Explained to patient that when the culture comes back she may be asked to stop or switch antibiotics. She states understanding. Notify office if symptoms do not improve.    -

## 2019-07-18 ENCOUNTER — TELEPHONE (OUTPATIENT)
Dept: FAMILY MEDICINE CLINIC | Age: 54
End: 2019-07-18

## 2019-07-18 DIAGNOSIS — N30.00 ACUTE CYSTITIS WITHOUT HEMATURIA: Primary | ICD-10-CM

## 2019-07-18 LAB
ORGANISM: ABNORMAL
THROAT CULTURE: NORMAL
URINE CULTURE, ROUTINE: ABNORMAL
URINE CULTURE, ROUTINE: ABNORMAL

## 2019-07-18 RX ORDER — NITROFURANTOIN 25; 75 MG/1; MG/1
100 CAPSULE ORAL 2 TIMES DAILY
Qty: 10 CAPSULE | Refills: 0 | Status: SHIPPED | OUTPATIENT
Start: 2019-07-18 | End: 2019-07-23

## 2019-08-12 RX ORDER — NAPROXEN 500 MG/1
TABLET ORAL
Qty: 60 TABLET | Refills: 0 | OUTPATIENT
Start: 2019-08-12

## 2019-09-04 ENCOUNTER — OFFICE VISIT (OUTPATIENT)
Dept: FAMILY MEDICINE CLINIC | Age: 54
End: 2019-09-04
Payer: COMMERCIAL

## 2019-09-04 VITALS
BODY MASS INDEX: 25.49 KG/M2 | OXYGEN SATURATION: 98 % | TEMPERATURE: 98.1 F | WEIGHT: 153 LBS | RESPIRATION RATE: 12 BRPM | DIASTOLIC BLOOD PRESSURE: 68 MMHG | HEART RATE: 98 BPM | HEIGHT: 65 IN | SYSTOLIC BLOOD PRESSURE: 101 MMHG

## 2019-09-04 DIAGNOSIS — R42 VERTIGO: ICD-10-CM

## 2019-09-04 DIAGNOSIS — R30.0 DYSURIA: Primary | ICD-10-CM

## 2019-09-04 DIAGNOSIS — H65.02 ACUTE SEROUS OTITIS MEDIA OF LEFT EAR, RECURRENCE NOT SPECIFIED: ICD-10-CM

## 2019-09-04 DIAGNOSIS — J01.00 ACUTE MAXILLARY SINUSITIS, RECURRENCE NOT SPECIFIED: ICD-10-CM

## 2019-09-04 DIAGNOSIS — N30.00 ACUTE CYSTITIS WITHOUT HEMATURIA: ICD-10-CM

## 2019-09-04 DIAGNOSIS — E11.9 TYPE 2 DIABETES MELLITUS WITHOUT COMPLICATION, WITHOUT LONG-TERM CURRENT USE OF INSULIN (HCC): ICD-10-CM

## 2019-09-04 DIAGNOSIS — N26.1 ATROPHIC KIDNEY: ICD-10-CM

## 2019-09-04 LAB
BILIRUBIN, POC: NORMAL
BLOOD URINE, POC: NORMAL
CLARITY, POC: CLEAR
COLOR, POC: YELLOW
GLUCOSE URINE, POC: NORMAL
KETONES, POC: NORMAL
LEUKOCYTE EST, POC: NORMAL
NITRITE, POC: NORMAL
PH, POC: 6
PROTEIN, POC: NORMAL
SPECIFIC GRAVITY, POC: 1.03
UROBILINOGEN, POC: 0.2

## 2019-09-04 PROCEDURE — 99213 OFFICE O/P EST LOW 20 MIN: CPT | Performed by: INTERNAL MEDICINE

## 2019-09-04 PROCEDURE — 81002 URINALYSIS NONAUTO W/O SCOPE: CPT | Performed by: INTERNAL MEDICINE

## 2019-09-04 RX ORDER — CIPROFLOXACIN 500 MG/1
TABLET, FILM COATED ORAL
Qty: 6 TABLET | Refills: 0 | Status: SHIPPED | OUTPATIENT
Start: 2019-09-04 | End: 2019-09-27

## 2019-09-04 NOTE — LETTER
[address] [address]  Phone: [phone]  Fax: [phone]    Edilberto Sin MD        September 4, 2019     Patient: Makayla Reynoso   YOB: 1965   Date of Visit: 9/4/2019       To Whom it May Concern:    Marialuisa Baker was seen in my clinic on 9/4/2019. She may return to work on 9/9/2019. If you have any questions or concerns, please don't hesitate to call.     Sincerely,         Edilberto Sin MD

## 2019-09-04 NOTE — PROGRESS NOTES
HPI: Earline Adkins presents for illness      Chronic health issues include tendinitis, iron deficiency anemia, diabetes, atrophic right kidney.       3 days of headache, postnasal drip and dizziness. Emesis yesterday. + fever. No chest tightness or cough. drinking and urinating. + Claritin, Excedrin. No diarrhea. Has history of recurrent vertigo. Has not used her meclizine that she has at home. Also has Zofran at home but does not use this as well. She is missed work. Had feverish episode the last couple of days. No one else is ill. Also notes she is having frequent urination and burning. History of atrophic kidney and recurrent urinary tract infections. Positive facial pain bad taste in mouth postnasal drip. Ear pain. No low sugars despite decreased p.o. intake.      Diabetes. A1c of 7.1 last check. Recheck coming up within the month. Compliant with her metformin and Januvia. Lipids eye daily.     Family history of coronary disease with normal cardiac testing in the past.     Social history . No tobacco rare alcohol. .     Review of systems: Contusion, gastroenteritis cholecystitis, diabetes, adjustment disorders recurrent UTIs. Atrophic kidney. Tendinitis     Constitutional, ent, CV, respiratory, GI, , joint, skin, allergic and psychiatric ROS reviewed and negative except for above    Allergies   Allergen Reactions    Pcn [Penicillins] Other (See Comments)    Benadryl [Diphenhydramine] Rash    Benzonatate Rash    Morphine Rash    Sulfa Antibiotics Hives and Rash       Outpatient Medications Marked as Taking for the 9/4/19 encounter (Office Visit) with Stu Bocanegra MD   Medication Sig Dispense Refill    JANUVIA 100 MG tablet TAKE 1 TABLET BY MOUTH ONE TIME A DAY  90 tablet 0    glipiZIDE (GLUCOTROL) 5 MG tablet Take 1 tablet by mouth daily 60 tablet 3    atorvastatin (LIPITOR) 20 MG tablet Take 20 mg daily.  90 tablet 0    lisinopril (PRINIVIL;ZESTRIL) 5 MG

## 2019-09-06 LAB — URINE CULTURE, ROUTINE: NORMAL

## 2019-09-18 ENCOUNTER — HOSPITAL ENCOUNTER (OUTPATIENT)
Dept: WOMENS IMAGING | Age: 54
Discharge: HOME | End: 2019-09-18
Payer: COMMERCIAL

## 2019-09-18 DIAGNOSIS — Z12.31 VISIT FOR SCREENING MAMMOGRAM: ICD-10-CM

## 2019-09-18 PROCEDURE — 77063 BREAST TOMOSYNTHESIS BI: CPT

## 2019-09-26 NOTE — PROGRESS NOTES
Subjective:      Patient ID: Renee Ayon is a 49 y.o. female. HPI     CC:  DM2, HLD    Treatment Adherence:   Medication compliance:  Compliant most of the time  Diet compliance:  Complaint most of the time  Weight trend: down 3 lbs in last month  Current exercise: at work  Barriers:none    Diabetes Mellitus Type 2:    Glipizide 5mg daily was restarted by my NP at pt's last diabetic visit on 19. Patient continued  metformin XL, 1000mg in the morning, and 500mg at night and Januvia 100 mg daily. She states, however; after she runs out of Januvia, she will not be able to continue it as the price has gone up to 400 a month, and she has a $3000 deductible. pt denies polyuria, polydipsia, blurry vision, foot ulcerations, neuropathy, polyphagia, nausea, vomiting and diarrhea. Home blood sugar records: fastin-120  Any episodes of hypoglycemia? no  Eye exam current (within one year): 2018: no dm retinopathy, repeat scheduled 19  Tobacco history: She  reports that she has never smoked. She has never used smokeless tobacco.   Daily Aspirin? Yes      Hyperlipidemia:  No new myalgias or GI upset on atorvastatin (Lipitor). Medication compliance: compliant all of the time. Patient is  following a low fat, low cholesterol diet. She is not exercising regularly, but is active at work. She denies chest pain, shortness of breath, dyspnea on exertion, palpitations, lightheadedness, lower extremity edema, paroxysmal nocturnal dyspnea, and orthopnea.     Lab Results   Component Value Date    LABA1C 6.1 2019    LABA1C 7.1 2019    LABA1C 5.8 2019     Lab Results   Component Value Date    LABMICR <1.20 2019    CREATININE 0.6 2019     Lab Results   Component Value Date    ALT 13 2018    AST 16 2018     Lab Results   Component Value Date    CHOL 144 2019    TRIG 81 2019    HDL 43 2019    LDLCALC 85 2019            Vitals:    19

## 2019-09-27 ENCOUNTER — OFFICE VISIT (OUTPATIENT)
Dept: FAMILY MEDICINE CLINIC | Age: 54
End: 2019-09-27
Payer: COMMERCIAL

## 2019-09-27 VITALS
HEIGHT: 65 IN | HEART RATE: 80 BPM | BODY MASS INDEX: 24.99 KG/M2 | SYSTOLIC BLOOD PRESSURE: 108 MMHG | WEIGHT: 150 LBS | DIASTOLIC BLOOD PRESSURE: 73 MMHG

## 2019-09-27 DIAGNOSIS — E11.9 TYPE 2 DIABETES MELLITUS WITHOUT COMPLICATION, WITHOUT LONG-TERM CURRENT USE OF INSULIN (HCC): Primary | ICD-10-CM

## 2019-09-27 DIAGNOSIS — Z23 NEED FOR HEPATITIS B VACCINATION: ICD-10-CM

## 2019-09-27 DIAGNOSIS — E78.00 PURE HYPERCHOLESTEROLEMIA: ICD-10-CM

## 2019-09-27 DIAGNOSIS — Z23 NEEDS FLU SHOT: ICD-10-CM

## 2019-09-27 LAB
ALBUMIN SERPL-MCNC: 4.8 G/DL (ref 3.4–5)
ALP BLD-CCNC: 84 U/L (ref 40–129)
ALT SERPL-CCNC: 18 U/L (ref 10–40)
ANION GAP SERPL CALCULATED.3IONS-SCNC: 14 MMOL/L (ref 3–16)
AST SERPL-CCNC: 18 U/L (ref 15–37)
BILIRUB SERPL-MCNC: 0.5 MG/DL (ref 0–1)
BILIRUBIN DIRECT: <0.2 MG/DL (ref 0–0.3)
BILIRUBIN, INDIRECT: NORMAL MG/DL (ref 0–1)
BUN BLDV-MCNC: 21 MG/DL (ref 7–20)
CALCIUM SERPL-MCNC: 9.8 MG/DL (ref 8.3–10.6)
CHLORIDE BLD-SCNC: 101 MMOL/L (ref 99–110)
CO2: 24 MMOL/L (ref 21–32)
CREAT SERPL-MCNC: 0.8 MG/DL (ref 0.6–1.1)
GFR AFRICAN AMERICAN: >60
GFR NON-AFRICAN AMERICAN: >60
GLUCOSE BLD-MCNC: 96 MG/DL (ref 70–99)
HBA1C MFR BLD: 6.1 %
POTASSIUM SERPL-SCNC: 4.7 MMOL/L (ref 3.5–5.1)
SODIUM BLD-SCNC: 139 MMOL/L (ref 136–145)
TOTAL PROTEIN: 7.1 G/DL (ref 6.4–8.2)

## 2019-09-27 PROCEDURE — 90746 HEPB VACCINE 3 DOSE ADULT IM: CPT | Performed by: FAMILY MEDICINE

## 2019-09-27 PROCEDURE — 90471 IMMUNIZATION ADMIN: CPT | Performed by: FAMILY MEDICINE

## 2019-09-27 PROCEDURE — 99213 OFFICE O/P EST LOW 20 MIN: CPT | Performed by: FAMILY MEDICINE

## 2019-09-27 PROCEDURE — 90686 IIV4 VACC NO PRSV 0.5 ML IM: CPT | Performed by: FAMILY MEDICINE

## 2019-09-27 PROCEDURE — 90472 IMMUNIZATION ADMIN EACH ADD: CPT | Performed by: FAMILY MEDICINE

## 2019-09-27 PROCEDURE — 83036 HEMOGLOBIN GLYCOSYLATED A1C: CPT | Performed by: FAMILY MEDICINE

## 2019-09-27 RX ORDER — ATORVASTATIN CALCIUM 20 MG/1
TABLET, FILM COATED ORAL
Qty: 90 TABLET | Refills: 0 | Status: SHIPPED | OUTPATIENT
Start: 2019-09-27 | End: 2019-11-25 | Stop reason: SDUPTHER

## 2019-09-27 RX ORDER — GLIPIZIDE 5 MG/1
5 TABLET ORAL DAILY
Qty: 90 TABLET | Refills: 0 | Status: SHIPPED | OUTPATIENT
Start: 2019-09-27 | End: 2019-11-25 | Stop reason: SDUPTHER

## 2019-09-27 RX ORDER — LISINOPRIL 5 MG/1
TABLET ORAL
Qty: 90 TABLET | Refills: 0 | Status: SHIPPED | OUTPATIENT
Start: 2019-09-27 | End: 2019-11-25 | Stop reason: SDUPTHER

## 2019-09-27 RX ORDER — METFORMIN HYDROCHLORIDE 500 MG/1
TABLET, EXTENDED RELEASE ORAL
Qty: 270 TABLET | Refills: 0 | Status: SHIPPED | OUTPATIENT
Start: 2019-09-27 | End: 2019-11-25 | Stop reason: SDUPTHER

## 2019-11-25 ENCOUNTER — OFFICE VISIT (OUTPATIENT)
Dept: FAMILY MEDICINE CLINIC | Age: 54
End: 2019-11-25
Payer: COMMERCIAL

## 2019-11-25 VITALS
HEIGHT: 65 IN | BODY MASS INDEX: 25.16 KG/M2 | HEART RATE: 101 BPM | WEIGHT: 151 LBS | SYSTOLIC BLOOD PRESSURE: 108 MMHG | DIASTOLIC BLOOD PRESSURE: 80 MMHG

## 2019-11-25 DIAGNOSIS — E11.9 TYPE 2 DIABETES MELLITUS WITHOUT COMPLICATION, WITHOUT LONG-TERM CURRENT USE OF INSULIN (HCC): Primary | ICD-10-CM

## 2019-11-25 DIAGNOSIS — E78.00 PURE HYPERCHOLESTEROLEMIA: ICD-10-CM

## 2019-11-25 LAB — HBA1C MFR BLD: 6.3 %

## 2019-11-25 PROCEDURE — 83036 HEMOGLOBIN GLYCOSYLATED A1C: CPT | Performed by: FAMILY MEDICINE

## 2019-11-25 PROCEDURE — 99213 OFFICE O/P EST LOW 20 MIN: CPT | Performed by: FAMILY MEDICINE

## 2019-11-25 RX ORDER — METFORMIN HYDROCHLORIDE 500 MG/1
TABLET, EXTENDED RELEASE ORAL
Qty: 270 TABLET | Refills: 0 | Status: SHIPPED | OUTPATIENT
Start: 2019-11-25 | End: 2019-11-25 | Stop reason: SDUPTHER

## 2019-11-25 RX ORDER — LISINOPRIL 5 MG/1
TABLET ORAL
Qty: 90 TABLET | Refills: 0 | Status: SHIPPED | OUTPATIENT
Start: 2019-11-25 | End: 2020-01-06 | Stop reason: SDUPTHER

## 2019-11-25 RX ORDER — GLIPIZIDE 5 MG/1
5 TABLET ORAL DAILY
Qty: 90 TABLET | Refills: 0 | Status: SHIPPED | OUTPATIENT
Start: 2019-11-25 | End: 2019-11-25 | Stop reason: SDUPTHER

## 2019-11-25 RX ORDER — METFORMIN HYDROCHLORIDE 500 MG/1
TABLET, EXTENDED RELEASE ORAL
Qty: 270 TABLET | Refills: 0 | Status: SHIPPED | OUTPATIENT
Start: 2019-11-25 | End: 2020-01-06 | Stop reason: SDUPTHER

## 2019-11-25 RX ORDER — GLIPIZIDE 5 MG/1
5 TABLET ORAL DAILY
Qty: 90 TABLET | Refills: 0 | Status: SHIPPED | OUTPATIENT
Start: 2019-11-25 | End: 2020-01-06 | Stop reason: SDUPTHER

## 2019-11-25 RX ORDER — ATORVASTATIN CALCIUM 20 MG/1
TABLET, FILM COATED ORAL
Qty: 90 TABLET | Refills: 0 | Status: SHIPPED | OUTPATIENT
Start: 2019-11-25 | End: 2020-01-06 | Stop reason: SDUPTHER

## 2019-11-25 RX ORDER — ATORVASTATIN CALCIUM 20 MG/1
TABLET, FILM COATED ORAL
Qty: 90 TABLET | Refills: 0 | Status: SHIPPED | OUTPATIENT
Start: 2019-11-25 | End: 2019-11-25 | Stop reason: SDUPTHER

## 2019-11-25 RX ORDER — LISINOPRIL 5 MG/1
TABLET ORAL
Qty: 90 TABLET | Refills: 0 | Status: SHIPPED | OUTPATIENT
Start: 2019-11-25 | End: 2019-11-25 | Stop reason: SDUPTHER

## 2019-11-29 ENCOUNTER — NURSE ONLY (OUTPATIENT)
Dept: FAMILY MEDICINE CLINIC | Age: 54
End: 2019-11-29
Payer: COMMERCIAL

## 2019-11-29 DIAGNOSIS — E78.00 PURE HYPERCHOLESTEROLEMIA: ICD-10-CM

## 2019-11-29 LAB
CHOLESTEROL, TOTAL: 134 MG/DL (ref 0–199)
HDLC SERPL-MCNC: 43 MG/DL (ref 40–60)
LDL CHOLESTEROL CALCULATED: 70 MG/DL
TRIGL SERPL-MCNC: 104 MG/DL (ref 0–150)
VLDLC SERPL CALC-MCNC: 21 MG/DL

## 2019-11-29 PROCEDURE — 36415 COLL VENOUS BLD VENIPUNCTURE: CPT | Performed by: FAMILY MEDICINE

## 2019-12-02 ENCOUNTER — HOSPITAL ENCOUNTER (OUTPATIENT)
Dept: GENERAL RADIOLOGY | Age: 54
Discharge: HOME | End: 2019-12-02
Payer: COMMERCIAL

## 2019-12-02 ENCOUNTER — HOSPITAL ENCOUNTER (OUTPATIENT)
Age: 54
Discharge: HOME | End: 2019-12-02
Payer: COMMERCIAL

## 2019-12-02 ENCOUNTER — OFFICE VISIT (OUTPATIENT)
Dept: FAMILY MEDICINE CLINIC | Age: 54
End: 2019-12-02
Payer: COMMERCIAL

## 2019-12-02 VITALS
HEART RATE: 84 BPM | SYSTOLIC BLOOD PRESSURE: 112 MMHG | WEIGHT: 151 LBS | BODY MASS INDEX: 25.16 KG/M2 | DIASTOLIC BLOOD PRESSURE: 76 MMHG | HEIGHT: 65 IN

## 2019-12-02 DIAGNOSIS — S99.921A INJURY OF TOE ON RIGHT FOOT, INITIAL ENCOUNTER: ICD-10-CM

## 2019-12-02 DIAGNOSIS — S99.921A INJURY OF TOE ON RIGHT FOOT, INITIAL ENCOUNTER: Primary | ICD-10-CM

## 2019-12-02 PROCEDURE — 99213 OFFICE O/P EST LOW 20 MIN: CPT | Performed by: FAMILY MEDICINE

## 2019-12-02 PROCEDURE — 73660 X-RAY EXAM OF TOE(S): CPT

## 2019-12-16 ENCOUNTER — OFFICE VISIT (OUTPATIENT)
Dept: FAMILY MEDICINE CLINIC | Age: 54
End: 2019-12-16
Payer: COMMERCIAL

## 2019-12-16 VITALS
DIASTOLIC BLOOD PRESSURE: 72 MMHG | SYSTOLIC BLOOD PRESSURE: 102 MMHG | BODY MASS INDEX: 25.99 KG/M2 | HEART RATE: 92 BPM | HEIGHT: 65 IN | TEMPERATURE: 97.6 F | WEIGHT: 156 LBS

## 2019-12-16 DIAGNOSIS — B96.89 ACUTE BACTERIAL SINUSITIS: Primary | ICD-10-CM

## 2019-12-16 DIAGNOSIS — J01.90 ACUTE BACTERIAL SINUSITIS: Primary | ICD-10-CM

## 2019-12-16 PROCEDURE — 99213 OFFICE O/P EST LOW 20 MIN: CPT | Performed by: NURSE PRACTITIONER

## 2019-12-16 RX ORDER — AZITHROMYCIN 250 MG/1
250 TABLET, FILM COATED ORAL SEE ADMIN INSTRUCTIONS
Qty: 6 TABLET | Refills: 0 | Status: SHIPPED | OUTPATIENT
Start: 2019-12-16 | End: 2019-12-21

## 2020-01-06 ENCOUNTER — TELEPHONE (OUTPATIENT)
Dept: FAMILY MEDICINE CLINIC | Age: 55
End: 2020-01-06

## 2020-01-06 RX ORDER — LISINOPRIL 5 MG/1
TABLET ORAL
Qty: 90 TABLET | Refills: 0 | Status: SHIPPED | OUTPATIENT
Start: 2020-01-06 | End: 2020-04-06

## 2020-01-06 RX ORDER — GLIPIZIDE 5 MG/1
5 TABLET ORAL DAILY
Qty: 90 TABLET | Refills: 0 | Status: SHIPPED | OUTPATIENT
Start: 2020-01-06 | End: 2020-04-06

## 2020-01-06 RX ORDER — ATORVASTATIN CALCIUM 20 MG/1
TABLET, FILM COATED ORAL
Qty: 90 TABLET | Refills: 0 | Status: SHIPPED | OUTPATIENT
Start: 2020-01-06 | End: 2020-04-06

## 2020-01-06 RX ORDER — METFORMIN HYDROCHLORIDE 500 MG/1
TABLET, EXTENDED RELEASE ORAL
Qty: 270 TABLET | Refills: 0 | Status: SHIPPED | OUTPATIENT
Start: 2020-01-06 | End: 2020-04-21

## 2020-01-06 NOTE — TELEPHONE ENCOUNTER
Pt calling in wanting to have her scripts refilled. --glipiZIDE (GLUCOTROL) 5 MG tablet     --atorvastatin (LIPITOR) 20 MG tablet     --lisinopril (PRINIVIL;ZESTRIL) 5 MG tablet     --metFORMIN (GLUCOPHAGE-XR) 500 MG extended release tablet     --metFORMIN (GLUCOPHAGE-XR) 500 MG extended release tablet       Send all scripts to [facility] in [id number] Rm Sosa has been updated in chart.

## 2020-01-07 ENCOUNTER — OFFICE VISIT (OUTPATIENT)
Dept: FAMILY MEDICINE CLINIC | Age: 55
End: 2020-01-07
Payer: COMMERCIAL

## 2020-01-07 VITALS
WEIGHT: 158 LBS | SYSTOLIC BLOOD PRESSURE: 111 MMHG | TEMPERATURE: 97.6 F | BODY MASS INDEX: 26.33 KG/M2 | HEIGHT: 65 IN | HEART RATE: 70 BPM | DIASTOLIC BLOOD PRESSURE: 75 MMHG

## 2020-01-07 LAB
BILIRUBIN, POC: NEGATIVE
BLOOD URINE, POC: NEGATIVE
CLARITY, POC: ABNORMAL
COLOR, POC: ABNORMAL
GLUCOSE URINE, POC: NEGATIVE
INFLUENZA VIRUS A RNA: NEGATIVE
INFLUENZA VIRUS B RNA: NEGATIVE
KETONES, POC: NEGATIVE
LEUKOCYTE EST, POC: ABNORMAL
NITRITE, POC: NEGATIVE
PH, POC: 6
PROTEIN, POC: NEGATIVE
SPECIFIC GRAVITY, POC: 1.02
UROBILINOGEN, POC: 0.2

## 2020-01-07 PROCEDURE — 87502 INFLUENZA DNA AMP PROBE: CPT | Performed by: NURSE PRACTITIONER

## 2020-01-07 PROCEDURE — 81002 URINALYSIS NONAUTO W/O SCOPE: CPT | Performed by: NURSE PRACTITIONER

## 2020-01-07 PROCEDURE — 99213 OFFICE O/P EST LOW 20 MIN: CPT | Performed by: NURSE PRACTITIONER

## 2020-01-07 RX ORDER — CIPROFLOXACIN 500 MG/1
500 TABLET, FILM COATED ORAL 2 TIMES DAILY
Qty: 6 TABLET | Refills: 0 | Status: SHIPPED | OUTPATIENT
Start: 2020-01-07 | End: 2020-01-10

## 2020-01-07 NOTE — PROGRESS NOTES
PROGRESS NOTE     [facility] ([facility]  [address] [address]  [phone] office  [phone] fax    Date of Service:  1/7/2020    Subjective:      Patient ID: Eboni Simmons is a 49 y.o. female      CC: Fever, headache, flank pain and vomiting    HPI   Symptoms:  Patient complains of a 4 day(s) history of frequency, right flank pain and fever-  102-103. She denies hematuria, urinary incontinence and vaginal symptoms. Symptoms have been worsening with time. Sexually active: Yes - with . Relevant medical history: recurrent UTI. Treatment to date: increased fluids, which has been not very effective. She has been seen by urology. She was on a prophylactic antibiotic up until about 2 months ago. Unsure of which one as I do not see it in the chart. Urology took her off of it and said to contact them if she has another UTI. Patient also had one episode of vomiting yesterday after having loose bowel movements over the weekend. Her  and grandson had a 24 hour GI virus over the past few days. Vitals:    01/07/20 0948   BP: 111/75   Pulse: 70   Temp: 97.6 °F (36.4 °C)   TempSrc: Oral   Weight: 158 lb (71.7 kg)   Height: 5' 5\" (1.651 m)       Outpatient Medications Marked as Taking for the 1/7/20 encounter (Office Visit) with BETSY Haney CNP   Medication Sig Dispense Refill    ciprofloxacin (CIPRO) 500 MG tablet Take 1 tablet by mouth 2 times daily for 3 days 6 tablet 0    glipiZIDE (GLUCOTROL) 5 MG tablet Take 1 tablet by mouth daily 90 tablet 0    atorvastatin (LIPITOR) 20 MG tablet Take 20 mg daily. 90 tablet 0    lisinopril (PRINIVIL;ZESTRIL) 5 MG tablet Take 5 mg daily 90 tablet 0    metFORMIN (GLUCOPHAGE-XR) 500 MG extended release tablet TAKE 2 TABLETS BY MOUTH WITH BREAKFAST AND 1 TABLET AT BEDTIME DAILY.  270 tablet 0    SITagliptin (JANUVIA) 100 MG tablet TAKE 1 TABLET BY MOUTH ONE TIME A DAY 90 tablet 0    naproxen (NAPROSYN) 500 MG tablet TAKE 1 TABLET BY MOUTH TWO TIMES A DAY WITH MEALS 60 tablet 0    Cranberry 1000 MG CAPS Take 1 tablet by mouth daily      meclizine (ANTIVERT) 25 MG tablet Take 1 tablet by mouth 3 times daily as needed for Nausea 30 tablet 0    Blood Glucose Monitoring Suppl (ONE TOUCH ULTRA MINI) w/Device KIT Use to check sugars 1 kit 0    glucose blood VI test strips (ASCENSIA AUTODISC VI;ONE TOUCH ULTRA TEST VI) strip 1 each by In Vitro route daily As needed. 100 each 3    ONE TOUCH LANCETS MISC 1 each by Does not apply route daily 100 each 3    aspirin 81 MG chewable tablet Take 81 mg by mouth daily      Evening Primrose Oil 500 MG CAPS Take by mouth         Past Medical History:   Diagnosis Date    Atrophic kidney 2017    sstone    Atrophic scarred right kidney again noted.  Some nephrolithiasis is noted without  hydronephrosis.  Diabetes ([address]     Family history of early CAD     FH: breast cancer 3/8/2017    23 yo mother reported    HLD (hyperlipidemia)     Iron deficiency anemia 2017    Right peroneal tendinosis 2018       Past Surgical History:   Procedure Laterality Date     SECTION      SKIN BIOPSY         Social History     Tobacco Use    Smoking status: Never Smoker    Smokeless tobacco: Never Used   Substance Use Topics    Alcohol use: Yes     Alcohol/week: 0.0 standard drinks     Comment: rarely       Family History   Problem Relation Age of Onset   [facility] Mother         voicebox and larynx, and lung cancer    Diabetes Father     Heart Disease Father     COPD Sister     Heart Disease Sister 50        MI,     Seizures Brother            Review of Systems  Constitutional:  Negative for activity or appetite change.  + fever and fatigue  HENT:  Negative for congestion,sinus pressure, or rhinorrhea  Eyes:  Negative for eye pain or visual changes  Resp:  Negative for SOB, chest tightness, cough  Cardiovascular: Negative for CP, palpitations, LU, orthopnea, PND, LE edema  Gastrointestinal: Negative for abd pain, melena, BRBPR, N/V/D. + right flank pain  Endocrine:  Negative for polydipsia and polyuria  :  Negative for dysuria, flank pain or urinary frequency  Musculoskeletal:  Negative for back pain or myalgias  Neuro:  Negative for dizziness or lightheadedness  Psych: negative for depression or anxiety      Objective:   Constitutional:   · Reviewed vitals above  · Well Nourished, well developed, no distress       HENT:  · Normal external nose without lesions  · Bilateral TMs translucent with normal light reflex and bony landmarks  · Normal oropharynx without erythema or exudate  · Normal nasal mucosa without swelling or erythema  Neck:  · Symmetric and without masses  · No thyromegaly  Resp:  · Normal effort  · Clear to auscultation bilaterally without rhonchi, wheezing or crackles  Cardiovascular:  · On auscultation, normal S1 and S2 without murmurs, rubs or gallops  · No bruits of bilateral carotids and no JVD  Gastrointestinal:  · Nontender, nondistended, and no masses  · No hepatosplenomegaly  · + right CVA tenderness  Musculoskeletal:  · Normal Gait  · All extremities without clubbing, cyanosis or edema  Skin:  · No rashes on inspection  · No areas of increased heat or induration on palpation  Psych:  · Normal mood and affect  · Normal insight and judgement    Assessment / Plan:     1. Flank pain  Patient has history of UTIs. Given her fevers and flank pain will start antibiotic. She is allergic to sulfa. Although cipro is not a first line drug of choice it has worked well for her in the past. Patient advised to hold glipizide while taking Cipro. Will await culture to ensure susceptibility. Notify office if symptoms worsen. - POCT Urinalysis no Micro  - URINE CULTURE  - ciprofloxacin (CIPRO) 500 MG tablet; Take 1 tablet by mouth 2 times daily for 3 days  Dispense: 6 tablet; Refill: 0    2.  Fever, unspecified fever cause  Possible that fever and fatigue are related to UTI but she also has headaches and would like to be tested for the flu. Rapid was negative. - POCT Influenza A/B DNA- negative      3. Acute gastroenteritis  No nausea or vomiting today. Likely resolving. Encouraged increased po intake.

## 2020-01-07 NOTE — LETTER
[address], [address] [address]  Phone: [phone]  Fax: [phone]    BETSY Davila CNP        January 7, 2020     Patient: Mitch Barr   YOB: 1965   Date of Visit: 1/7/2020       To Whom It May Concern: It is my medical opinion that Christin Castillo may return to work on 1/9. If you have any questions or concerns, please don't hesitate to call.     Sincerely,        BETSY Davila CNP

## 2020-01-08 LAB — URINE CULTURE, ROUTINE: NORMAL

## 2020-01-09 ENCOUNTER — TELEPHONE (OUTPATIENT)
Dept: FAMILY MEDICINE CLINIC | Age: 55
End: 2020-01-09

## 2020-01-10 ENCOUNTER — TELEPHONE (OUTPATIENT)
Dept: FAMILY MEDICINE CLINIC | Age: 55
End: 2020-01-10

## 2020-01-10 NOTE — TELEPHONE ENCOUNTER
Patient is feeling a little better, she could not talk long because they were doing her ultrasound that [facility] ordered yesterday

## 2020-02-11 ENCOUNTER — OFFICE VISIT (OUTPATIENT)
Dept: FAMILY MEDICINE CLINIC | Age: 55
End: 2020-02-11
Payer: COMMERCIAL

## 2020-02-11 VITALS
TEMPERATURE: 97.1 F | WEIGHT: 154 LBS | BODY MASS INDEX: 25.66 KG/M2 | HEART RATE: 91 BPM | HEIGHT: 65 IN | SYSTOLIC BLOOD PRESSURE: 102 MMHG | DIASTOLIC BLOOD PRESSURE: 69 MMHG

## 2020-02-11 PROCEDURE — 99213 OFFICE O/P EST LOW 20 MIN: CPT | Performed by: NURSE PRACTITIONER

## 2020-02-11 NOTE — PROGRESS NOTES
 Blood Glucose Monitoring Suppl (ONE TOUCH ULTRA MINI) w/Device KIT Use to check sugars 1 kit 0    glucose blood VI test strips (ASCENSIA AUTODISC VI;ONE TOUCH ULTRA TEST VI) strip 1 each by In Vitro route daily As needed. 100 each 3    ONE TOUCH LANCETS MISC 1 each by Does not apply route daily 100 each 3    aspirin 81 MG chewable tablet Take 81 mg by mouth daily      Evening Primrose Oil 500 MG CAPS Take by mouth         Past Medical History:   Diagnosis Date    Atrophic kidney 2017    sstone    Atrophic scarred right kidney again noted.  Some nephrolithiasis is noted without  hydronephrosis.  Diabetes ([facility] [address]     Family history of early CAD     FH: breast cancer 3/8/2017    23 yo mother reported    HLD (hyperlipidemia)     Iron deficiency anemia 2017    Right peroneal tendinosis 2018       Past Surgical History:   Procedure Laterality Date     SECTION      SKIN BIOPSY         Social History     Tobacco Use    Smoking status: Never Smoker    Smokeless tobacco: Never Used   Substance Use Topics    Alcohol use:  Yes     Alcohol/week: 0.0 standard drinks     Comment: rarely       Family History   Problem Relation Age of Onset   [facility] Cancer Mother         voicebox and larynx, and lung cancer    Diabetes Father     Heart Disease Father     COPD Sister     Heart Disease Sister 50        MI,     Seizures Brother            Review of Systems  Constitutional:  Negative for activity or appetite change, fever or fatigue  HENT:  Negative for congestion,sinus pressure, or rhinorrhea  Eyes:  Negative for eye pain or visual changes  Resp:  Negative for SOB, chest tightness, cough  Cardiovascular: Negative for CP, palpitations, LU, orthopnea, PND, LE edema  Gastrointestinal: Negative for abd pain, melena, BRBPR. + N/V/D  Endocrine:  Negative for polydipsia and polyuria  :  Negative for dysuria, flank pain or urinary frequency  Musculoskeletal:  Negative for back pain or

## 2020-02-23 NOTE — PROGRESS NOTES
Subjective:      Patient ID: Princess Patiño is a 49 y.o. female. HPI     CC:  DM2, HLD    Treatment Adherence:   Medication compliance:  Compliant most of the time  Diet compliance:  Eating candy, and drinking 2 cans of regular soda daily!!  Weight trend: gained 5 lbs recently  Current exercise: at work  Barriers:none    Diabetes Mellitus Type 2:        pt denies polyuria, polydipsia, blurry vision, foot ulcerations, neuropathy, polyphagia, nausea, vomiting and diarrhea. Home blood sugar records: fastin-120  Any episodes of hypoglycemia? no  Eye exam current (within one year): 2019: no dm retinopathy  Tobacco history: She  reports that she has never smoked. She has never used smokeless tobacco.   Daily Aspirin? Yes      Hyperlipidemia:  No new myalgias or GI upset on atorvastatin (Lipitor). Medication compliance: compliant all of the time. Patient is  following a low fat, low cholesterol diet. She is not exercising regularly, but is active at work. She denies chest pain, shortness of breath, dyspnea on exertion, palpitations, lightheadedness, lower extremity edema, paroxysmal nocturnal dyspnea, and orthopnea.     Lab Results   Component Value Date    LABA1C 7.0 2020    LABA1C 6.3 2019    LABA1C 6.1 2019     Lab Results   Component Value Date    LABMICR <1.20 2019    CREATININE 0.8 2019     Lab Results   Component Value Date    ALT 18 2019    AST 18 2019     Lab Results   Component Value Date    CHOL 134 2019    TRIG 104 2019    HDL 43 2019    LDLCALC 70 2019            Vitals:    20 1559   BP: 109/76   Pulse: 88   Weight: 159 lb (72.1 kg)   Height: 5' 5\" (1.651 m)       Outpatient Medications Marked as Taking for the 20 encounter (Office Visit) with Darryl Brito MD   Medication Sig Dispense Refill    glipiZIDE (GLUCOTROL) 5 MG tablet Take 1 tablet by mouth daily 90 tablet 0    atorvastatin (LIPITOR) 20 MG tablet Take 20 mg daily. 90 tablet 0    lisinopril (PRINIVIL;ZESTRIL) 5 MG tablet Take 5 mg daily 90 tablet 0    metFORMIN (GLUCOPHAGE-XR) 500 MG extended release tablet TAKE 2 TABLETS BY MOUTH WITH BREAKFAST AND 1 TABLET AT BEDTIME DAILY. 270 tablet 0    SITagliptin (JANUVIA) 100 MG tablet TAKE 1 TABLET BY MOUTH ONE TIME A DAY 90 tablet 0    naproxen (NAPROSYN) 500 MG tablet TAKE 1 TABLET BY MOUTH TWO TIMES A DAY WITH MEALS 60 tablet 0    Cranberry 1000 MG CAPS Take 1 tablet by mouth daily      meclizine (ANTIVERT) 25 MG tablet Take 1 tablet by mouth 3 times daily as needed for Nausea 30 tablet 0    Blood Glucose Monitoring Suppl (ONE TOUCH ULTRA MINI) w/Device KIT Use to check sugars 1 kit 0    glucose blood VI test strips (ASCENSIA AUTODISC VI;ONE TOUCH ULTRA TEST VI) strip 1 each by In Vitro route daily As needed. 100 each 3    ONE TOUCH LANCETS MISC 1 each by Does not apply route daily 100 each 3    aspirin 81 MG chewable tablet Take 81 mg by mouth daily      Evening Primrose Oil 500 MG CAPS Take by mouth             Past Medical History:   Diagnosis Date    Atrophic kidney 2017    sstone    Atrophic scarred right kidney again noted.  Some nephrolithiasis is noted without  hydronephrosis.  Diabetes ([address]     Family history of early CAD     FH: breast cancer 3/8/2017    23 yo mother reported    HLD (hyperlipidemia)     Iron deficiency anemia 2017    Right peroneal tendinosis 2018       Past Surgical History:   Procedure Laterality Date     SECTION      SKIN BIOPSY         Social History     Tobacco Use    Smoking status: Never Smoker    Smokeless tobacco: Never Used   Substance Use Topics    Alcohol use:  Yes     Alcohol/week: 0.0 standard drinks     Comment: rarely       Family History   Problem Relation Age of Onset    Cancer Mother         voicebox and larynx, and lung cancer    Diabetes Father     Heart Disease Father     COPD Sister     Heart Disease Sister 50        MI,     Seizures Brother            Review of Systems  See hpi    Objective:   Physical Exam     Constitutional:   · Reviewed vitals above  · Well Nourished, well developed, no distress       Neck:  · Symmetric and without masses  · No thyromegaly  Resp:  · Normal effort  · Clear to auscultation bilaterally without rhonchi, wheezing or crackles  Cardiovascular:  · On auscultation, normal S1 and S2 without murmurs, rubs or gallops  · No bruits of bilateral carotids and no JVD  Gastrointestinal:  · Nontender, nondistended, and no masses  · No hepatosplenomegaly  Musculoskeletal:  · All extremities without clubbing, cyanosis or edema  Skin:  · No rashes on inspection  Psych:  · Normal mood and affect  · Normal insight and judgement        Assessment:      See below       Plan:     1. Type 2 diabetes mellitus without complication, without long-term current use of insulin (HCC)  Poc QlN4f=0.0  and barely above goal.  Worsening    Patient aware that she gained 5 pounds over the last 3 months. She is eating candy and drinking regular soda daily. Stressed the importance of working on diabetic diet, and losing weight. She is going to work on this over the next 3 months. For this reason we will keep medication the same for now. Pt to continue Januvia 100 mg and metformin XR 1000 mg in the morning, and 500 mg q.h.s. , and glipizide 5mg. Checking BMP. Pt to continue aspirin, statin and ACEI        2. Pure hypercholesterolemia  Reviewed most recent fasting lipid panel and LFTs in both within normal limits. And is within normal limits. .  Continue Lipitor 20 mg.     HM:    Shingrix  Encouraged to get at pharmacy:  But patient states she likely will NOT    Flu vaccine UTD    Colonoscopy done 18: next due     Mammogram completed 19 and was WNLS    Pap smear normal with negative HPV on 3/24/17

## 2020-02-24 ENCOUNTER — OFFICE VISIT (OUTPATIENT)
Dept: FAMILY MEDICINE CLINIC | Age: 55
End: 2020-02-24
Payer: COMMERCIAL

## 2020-02-24 VITALS
BODY MASS INDEX: 26.49 KG/M2 | HEART RATE: 88 BPM | WEIGHT: 159 LBS | SYSTOLIC BLOOD PRESSURE: 109 MMHG | HEIGHT: 65 IN | DIASTOLIC BLOOD PRESSURE: 76 MMHG

## 2020-02-24 LAB
ANION GAP SERPL CALCULATED.3IONS-SCNC: 11 MMOL/L (ref 3–16)
BUN BLDV-MCNC: 12 MG/DL (ref 7–20)
CALCIUM SERPL-MCNC: 9.9 MG/DL (ref 8.3–10.6)
CHLORIDE BLD-SCNC: 105 MMOL/L (ref 99–110)
CO2: 28 MMOL/L (ref 21–32)
CREAT SERPL-MCNC: 0.6 MG/DL (ref 0.6–1.1)
GFR AFRICAN AMERICAN: >60
GFR NON-AFRICAN AMERICAN: >60
GLUCOSE BLD-MCNC: 140 MG/DL (ref 70–99)
HBA1C MFR BLD: 7 %
POTASSIUM SERPL-SCNC: 4.4 MMOL/L (ref 3.5–5.1)
SODIUM BLD-SCNC: 144 MMOL/L (ref 136–145)

## 2020-02-24 PROCEDURE — 3051F HG A1C>EQUAL 7.0%<8.0%: CPT | Performed by: FAMILY MEDICINE

## 2020-02-24 PROCEDURE — 83036 HEMOGLOBIN GLYCOSYLATED A1C: CPT | Performed by: FAMILY MEDICINE

## 2020-02-24 PROCEDURE — 99213 OFFICE O/P EST LOW 20 MIN: CPT | Performed by: FAMILY MEDICINE

## 2020-03-09 ENCOUNTER — TELEPHONE (OUTPATIENT)
Dept: FAMILY MEDICINE CLINIC | Age: 55
End: 2020-03-09

## 2020-03-09 NOTE — TELEPHONE ENCOUNTER
Wanting to know if have any samples of januvia. Please advise.  Please call Leann back at [phone]

## 2020-03-17 ENCOUNTER — TELEPHONE (OUTPATIENT)
Dept: FAMILY MEDICINE CLINIC | Age: 55
End: 2020-03-17

## 2020-03-17 NOTE — TELEPHONE ENCOUNTER
Called patient back to discuss. She states she was at the eye doctor and had an episode of emesis after they dilated her eyes. She does have a zofran prescription and took a pill when she got home. She was supposed to go back to work tomorrow but feels she needs to stay home one more day. Updated letter for return to work on 3/19. Staci- she is going to call back with HR fax number for you to send the letter to. Please make sure you send the updated letter.  Thank you!!

## 2020-03-17 NOTE — TELEPHONE ENCOUNTER
PT called in stating that she called in earlier to get a note excusing her for work due to a sinus infection. PT says today she had an eye doctor appointment and while there she had loose bowels and vomited. Yesterday she says that she had a mild fever of 100.2. PT would like to know if she should be seen.      Please call back: [phone]

## 2020-03-18 RX ORDER — ONDANSETRON 4 MG/1
4 TABLET, ORALLY DISINTEGRATING ORAL 3 TIMES DAILY PRN
Qty: 21 TABLET | Refills: 0 | Status: SHIPPED | OUTPATIENT
Start: 2020-03-18 | End: 2021-01-13

## 2020-03-18 NOTE — TELEPHONE ENCOUNTER
Wanting to know if you will call in refill for zofran? Thought she had some left, but does not. Please call into [facility] in [facility].  Please call Veronica Murphy back at [phone]

## 2020-03-27 ENCOUNTER — TELEPHONE (OUTPATIENT)
Dept: FAMILY MEDICINE CLINIC | Age: 55
End: 2020-03-27

## 2020-04-06 RX ORDER — ATORVASTATIN CALCIUM 20 MG/1
TABLET, FILM COATED ORAL
Qty: 90 TABLET | Refills: 0 | Status: SHIPPED | OUTPATIENT
Start: 2020-04-06 | End: 2020-06-05 | Stop reason: SDUPTHER

## 2020-04-06 RX ORDER — LISINOPRIL 5 MG/1
TABLET ORAL
Qty: 90 TABLET | Refills: 0 | Status: SHIPPED | OUTPATIENT
Start: 2020-04-06 | End: 2020-06-05 | Stop reason: SDUPTHER

## 2020-04-06 RX ORDER — GLIPIZIDE 5 MG/1
5 TABLET ORAL DAILY
Qty: 90 TABLET | Refills: 0 | Status: SHIPPED | OUTPATIENT
Start: 2020-04-06 | End: 2020-06-05 | Stop reason: SDUPTHER

## 2020-04-20 NOTE — TELEPHONE ENCOUNTER
[facility] calling with refill request for metformin. Please advise.  Please call into [facility] [phone]

## 2020-04-21 ENCOUNTER — VIRTUAL VISIT (OUTPATIENT)
Dept: FAMILY MEDICINE CLINIC | Age: 55
End: 2020-04-21
Payer: COMMERCIAL

## 2020-04-21 PROCEDURE — 99213 OFFICE O/P EST LOW 20 MIN: CPT | Performed by: FAMILY MEDICINE

## 2020-04-21 RX ORDER — METFORMIN HYDROCHLORIDE 500 MG/1
TABLET, EXTENDED RELEASE ORAL
Qty: 270 TABLET | Refills: 0 | Status: SHIPPED | OUTPATIENT
Start: 2020-04-21 | End: 2020-06-05 | Stop reason: SDUPTHER

## 2020-04-21 RX ORDER — CIPROFLOXACIN 500 MG/1
500 TABLET, FILM COATED ORAL 2 TIMES DAILY
Qty: 14 TABLET | Refills: 0 | Status: SHIPPED | OUTPATIENT
Start: 2020-04-21 | End: 2020-04-28

## 2020-04-21 NOTE — PROGRESS NOTES
TELEHEALTH EVALUATION -- Audio/Visual (During - public health emergency)    Carol Moody is a 48 y.o. female being evaluated by a Virtual Visit (video visit) encounter to address concerns as mentioned above. A caregiver was present when appropriate. Due to this being a TeleHealth encounter (During - public health emergency), evaluation of the following organ systems was limited: Vitals/Constitutional/EENT/Resp/CV/GI//MS/Neuro/Skin/Heme-Lymph-Imm. Pursuant to the emergency declaration under the [address] [place] [address], [address] [place] [address] authority and the [employer] and [employer] Act, this Virtual Visit was conducted with patient's (and/or legal guardian's) consent, to reduce the patient's risk of exposure to COVID-19 and provide necessary medical care. The patient (and/or legal guardian) has also been advised to contact this office for worsening conditions or problems, and seek emergency medical treatment and/or call 911 if deemed necessary. Services were provided through a video synchronous discussion virtually to substitute for in-person clinic visit. Patient and provider were located at their individual homes. --Adina Cotton MD on 2020 at 2:46 PM    An electronic signature was used to authenticate this note. No chief complaint on file.     Family History   Problem Relation Age of Onset   [place] Cancer Mother         voicebox and larynx, and lung cancer    Diabetes Father     Heart Disease Father     COPD Sister     Heart Disease Sister 50        MI,     Seizures Brother      Social History     Socioeconomic History    Marital status:      Spouse name: Not on file    Number of children: Not on file    Years of education: Not on file    Highest education level: Not on file   Occupational History    Not on file   Social Needs    Financial resource strain: Not on file    Food insecurity     Worry: Not on file     Inability: Not on file    Transportation needs     Medical: Not on file     Non-medical: Not on file   Tobacco Use    Smoking status: Never Smoker    Smokeless tobacco: Never Used   Substance and Sexual Activity    Alcohol use:  Yes     Alcohol/week: 0.0 standard drinks     Comment: rarely    Drug use: No    Sexual activity: Yes     Partners: Male     Comment: , 3 children    Lifestyle    Physical activity     Days per week: Not on file     Minutes per session: Not on file    Stress: Not on file   Relationships    Social connections     Talks on phone: Not on file     Gets together: Not on file     Attends Sabianism service: Not on file     Active member of club or organization: Not on file     Attends meetings of clubs or organizations: Not on file     Relationship status: Not on file    Intimate partner violence     Fear of current or ex partner: Not on file     Emotionally abused: Not on file     Physically abused: Not on file     Forced sexual activity: Not on file   Other Topics Concern    Not on file   Social History Narrative    Not on file       Current Outpatient Medications:     ciprofloxacin (CIPRO) 500 MG tablet, Take 1 tablet by mouth 2 times daily for 7 days, Disp: 14 tablet, Rfl: 0    metFORMIN (GLUCOPHAGE-XR) 500 MG extended release tablet, TAKE 2 TABLETS BY MOUTH WITH BREAKFAST AND 1 TABLET AT BEDTIME, Disp: 270 tablet, Rfl: 0    glipiZIDE (GLUCOTROL) 5 MG tablet, TAKE 1 TABLET BY MOUTH DAILY, Disp: 90 tablet, Rfl: 0    atorvastatin (LIPITOR) 20 MG tablet, TAKE 1 TABLET BY MOUTH DAILY, Disp: 90 tablet, Rfl: 0    lisinopril (PRINIVIL;ZESTRIL) 5 MG tablet, TAKE 1 TABLET BY MOUTH DAILY, Disp: 90 tablet, Rfl: 0    ondansetron (ZOFRAN-ODT) 4 MG disintegrating tablet, Take 1 tablet by mouth 3 times daily as needed for Nausea or Vomiting, Disp: 21 tablet, Rfl: 0    SITagliptin (JANUVIA) 100 MG tablet, TAKE 1 TABLET BY MOUTH ONE TIME A DAY, Disp: 90 tablet, Rfl: 0   naproxen (NAPROSYN) 500 MG tablet, TAKE 1 TABLET BY MOUTH TWO TIMES A DAY WITH MEALS, Disp: 60 tablet, Rfl: 0    Cranberry 1000 MG CAPS, Take 1 tablet by mouth daily, Disp: , Rfl:     meclizine (ANTIVERT) 25 MG tablet, Take 1 tablet by mouth 3 times daily as needed for Nausea, Disp: 30 tablet, Rfl: 0    Blood Glucose Monitoring Suppl (ONE TOUCH ULTRA MINI) w/Device KIT, Use to check sugars, Disp: 1 kit, Rfl: 0    glucose blood VI test strips (ASCENSIA AUTODISC VI;ONE TOUCH ULTRA TEST VI) strip, 1 each by In Vitro route daily As needed. , Disp: 100 each, Rfl: 3    ONE TOUCH LANCETS MISC, 1 each by Does not apply route daily, Disp: 100 each, Rfl: 3    aspirin 81 MG chewable tablet, Take 81 mg by mouth daily, Disp: , Rfl:     Evening Primrose Oil 500 MG CAPS, Take by mouth, Disp: , Rfl:   Allergies   Allergen Reactions    Pcn [Penicillins] Other (See Comments)    Benadryl [Diphenhydramine] Rash    Benzonatate Rash    Morphine Rash    Sulfa Antibiotics Hives and Rash       Patient Active Problem List   Diagnosis    Diabetes ([facility] [address]    Family history of early CAD    HLD (hyperlipidemia)    FH: breast cancer    Iron deficiency anemia    Diverticulosis    Atrophic kidney    Right peroneal tendinosis       HPI / ROS: Carol Moody presents for evaluation and management of :    # acute for 2 days diarrhea took nausea med and diarrhea med no fever. Pain R LQ. Pain continues. Off and on. Pain is brief stabbing pain. Not persistent. 1-2 seconds 6/10 jolt pain. . Can be 10 minutes in between and feels better with pushing on it or heating pad. Menopausal x 3 years. Hit and miss. Sometimes affects sleep. Making urine more frequently. No hematuria. No burning dysuria. + urgency wakes her up from sleep. Wt Readings from Last 3 Encounters:   02/24/20 159 lb (72.1 kg)   02/11/20 154 lb (69.9 kg)   01/07/20 158 lb (71.7 kg)       PE : virtual visit     Diagnosis Orders   1.  Acute cystitis without hematuria      call for fever or sustained pain; cipro x 1 week stop glipizide call for INB or worse     No orders of the defined types were placed in this encounter.

## 2020-06-04 NOTE — PROGRESS NOTES
5mg.      Checking BMP. Pt to continue aspirin, statin and ACEI        2. Pure hypercholesterolemia  Reviewed most recent fasting lipid panel and within normal limits. Checking LFTs. Continue Lipitor 20 mg.         HM:    Shingrix  Encouraged to get at pharmacy:  But patient states she likely will NOT    Colonoscopy done 8/29/18: next due 2023    Mammogram completed 9/18/19 and was WNLS    Pap smear normal with negative HPV on 3/24/17

## 2020-06-05 ENCOUNTER — OFFICE VISIT (OUTPATIENT)
Dept: FAMILY MEDICINE CLINIC | Age: 55
End: 2020-06-05
Payer: COMMERCIAL

## 2020-06-05 VITALS
HEART RATE: 84 BPM | DIASTOLIC BLOOD PRESSURE: 80 MMHG | WEIGHT: 157.6 LBS | BODY MASS INDEX: 26.23 KG/M2 | TEMPERATURE: 98.1 F | SYSTOLIC BLOOD PRESSURE: 116 MMHG

## 2020-06-05 LAB
ALBUMIN SERPL-MCNC: 4.4 G/DL (ref 3.4–5)
ALP BLD-CCNC: 86 U/L (ref 40–129)
ALT SERPL-CCNC: 19 U/L (ref 10–40)
AST SERPL-CCNC: 16 U/L (ref 15–37)
BILIRUB SERPL-MCNC: 0.3 MG/DL (ref 0–1)
BILIRUBIN DIRECT: <0.2 MG/DL (ref 0–0.3)
BILIRUBIN, INDIRECT: NORMAL MG/DL (ref 0–1)
HBA1C MFR BLD: 6.8 %
TOTAL PROTEIN: 6.4 G/DL (ref 6.4–8.2)

## 2020-06-05 PROCEDURE — 99213 OFFICE O/P EST LOW 20 MIN: CPT | Performed by: FAMILY MEDICINE

## 2020-06-05 PROCEDURE — 83036 HEMOGLOBIN GLYCOSYLATED A1C: CPT | Performed by: FAMILY MEDICINE

## 2020-06-05 RX ORDER — ATORVASTATIN CALCIUM 20 MG/1
TABLET, FILM COATED ORAL
Qty: 90 TABLET | Refills: 0 | Status: SHIPPED | OUTPATIENT
Start: 2020-06-05 | End: 2020-09-17 | Stop reason: SDUPTHER

## 2020-06-05 RX ORDER — ATORVASTATIN CALCIUM 20 MG/1
TABLET, FILM COATED ORAL
Qty: 90 TABLET | Refills: 0 | Status: SHIPPED | OUTPATIENT
Start: 2020-06-05 | End: 2020-07-22

## 2020-06-05 RX ORDER — EMPAGLIFLOZIN 10 MG/1
1 TABLET, FILM COATED ORAL DAILY
Qty: 30 TABLET | Refills: 0 | Status: SHIPPED | OUTPATIENT
Start: 2020-06-05 | End: 2020-07-01

## 2020-06-05 RX ORDER — LISINOPRIL 5 MG/1
TABLET ORAL
Qty: 90 TABLET | Refills: 0 | Status: SHIPPED | OUTPATIENT
Start: 2020-06-05 | End: 2020-09-17 | Stop reason: SDUPTHER

## 2020-06-05 RX ORDER — METFORMIN HYDROCHLORIDE 500 MG/1
TABLET, EXTENDED RELEASE ORAL
Qty: 270 TABLET | Refills: 0 | Status: SHIPPED | OUTPATIENT
Start: 2020-06-05 | End: 2020-09-17 | Stop reason: SDUPTHER

## 2020-06-05 RX ORDER — METFORMIN HYDROCHLORIDE 500 MG/1
TABLET, EXTENDED RELEASE ORAL
Qty: 270 TABLET | Refills: 0 | Status: SHIPPED | OUTPATIENT
Start: 2020-06-05 | End: 2020-07-22

## 2020-06-05 RX ORDER — LISINOPRIL 5 MG/1
TABLET ORAL
Qty: 90 TABLET | Refills: 0 | Status: SHIPPED | OUTPATIENT
Start: 2020-06-05 | End: 2020-07-22

## 2020-06-05 RX ORDER — GLIPIZIDE 5 MG/1
5 TABLET ORAL DAILY
Qty: 90 TABLET | Refills: 0 | Status: SHIPPED | OUTPATIENT
Start: 2020-06-05 | End: 2020-08-31

## 2020-06-05 RX ORDER — GLIPIZIDE 5 MG/1
5 TABLET ORAL DAILY
Qty: 90 TABLET | Refills: 0 | Status: SHIPPED | OUTPATIENT
Start: 2020-06-05 | End: 2020-07-22

## 2020-06-05 ASSESSMENT — PATIENT HEALTH QUESTIONNAIRE - PHQ9
SUM OF ALL RESPONSES TO PHQ QUESTIONS 1-9: 0
SUM OF ALL RESPONSES TO PHQ QUESTIONS 1-9: 0
2. FEELING DOWN, DEPRESSED OR HOPELESS: 0
1. LITTLE INTEREST OR PLEASURE IN DOING THINGS: 0
SUM OF ALL RESPONSES TO PHQ9 QUESTIONS 1 & 2: 0

## 2020-06-22 ENCOUNTER — TELEPHONE (OUTPATIENT)
Dept: FAMILY MEDICINE CLINIC | Age: 55
End: 2020-06-22

## 2020-06-23 ENCOUNTER — NURSE ONLY (OUTPATIENT)
Dept: FAMILY MEDICINE CLINIC | Age: 55
End: 2020-06-23

## 2020-06-23 VITALS — TEMPERATURE: 97.3 F | BODY MASS INDEX: 25.59 KG/M2 | WEIGHT: 153.8 LBS

## 2020-06-23 LAB
BILIRUBIN URINE: NEGATIVE
BLOOD, URINE: NEGATIVE
CLARITY: CLEAR
COLOR: YELLOW
EPITHELIAL CELLS, UA: 2 /HPF (ref 0–5)
GLUCOSE URINE: >=1000 MG/DL
HYALINE CASTS: 1 /LPF (ref 0–8)
KETONES, URINE: NEGATIVE MG/DL
LEUKOCYTE ESTERASE, URINE: NEGATIVE
MICROSCOPIC EXAMINATION: ABNORMAL
NITRITE, URINE: NEGATIVE
PH UA: 6 (ref 5–8)
PROTEIN UA: NEGATIVE MG/DL
RBC UA: 2 /HPF (ref 0–4)
SPECIFIC GRAVITY UA: >1.03 (ref 1–1.03)
URINE TYPE: ABNORMAL
UROBILINOGEN, URINE: 0.2 E.U./DL
WBC UA: 1 /HPF (ref 0–5)

## 2020-06-24 ENCOUNTER — TELEPHONE (OUTPATIENT)
Dept: FAMILY MEDICINE CLINIC | Age: 55
End: 2020-06-24

## 2020-06-24 LAB — URINE CULTURE, ROUTINE: NORMAL

## 2020-06-24 NOTE — TELEPHONE ENCOUNTER
Calling for urine results. Notified her of message on u/a: \" No bacteria or infection seen in urine. extrememly high sugar in the urine which can be caused by your jardiance.  Culture pending.  \"

## 2020-06-25 ENCOUNTER — OFFICE VISIT (OUTPATIENT)
Dept: FAMILY MEDICINE CLINIC | Age: 55
End: 2020-06-25
Payer: COMMERCIAL

## 2020-06-25 ENCOUNTER — TELEPHONE (OUTPATIENT)
Dept: FAMILY MEDICINE CLINIC | Age: 55
End: 2020-06-25

## 2020-06-25 VITALS
HEIGHT: 65 IN | HEART RATE: 88 BPM | BODY MASS INDEX: 25.83 KG/M2 | WEIGHT: 155 LBS | TEMPERATURE: 97.9 F | SYSTOLIC BLOOD PRESSURE: 108 MMHG | DIASTOLIC BLOOD PRESSURE: 75 MMHG

## 2020-06-25 PROCEDURE — 99213 OFFICE O/P EST LOW 20 MIN: CPT | Performed by: NURSE PRACTITIONER

## 2020-06-25 NOTE — TELEPHONE ENCOUNTER
Please call patient and let her know her urine did not show a UTI. Yes UTI is a possible side effect of Jardiance. If she is having pain please let her know I have openings for a vv or in office visit this afternoon. There can be other causes of her back pain. Please document call and then close encounter.   thanks

## 2020-06-25 NOTE — LETTER
[address],[place] [address]  Phone: [phone]  Fax: [phone]    BETSY Garibay CNP        June 25, 2020     Patient: Lilia Dewitt   YOB: 1965   Date of Visit: 6/25/2020       To Whom It May Concern: It is my medical opinion that Jimmy Bobo may return to work on 6/27. If you have any questions or concerns, please don't hesitate to call.     Sincerely,        BETSY Garibay CNP

## 2020-06-26 RX ORDER — CIPROFLOXACIN 500 MG/1
500 TABLET, FILM COATED ORAL 2 TIMES DAILY
Qty: 20 TABLET | Refills: 0 | Status: SHIPPED | OUTPATIENT
Start: 2020-06-26 | End: 2020-07-06

## 2020-07-01 RX ORDER — EMPAGLIFLOZIN 10 MG/1
1 TABLET, FILM COATED ORAL DAILY
Qty: 30 TABLET | Refills: 0 | Status: SHIPPED | OUTPATIENT
Start: 2020-07-01 | End: 2020-07-27 | Stop reason: SDUPTHER

## 2020-07-02 ENCOUNTER — TELEPHONE (OUTPATIENT)
Dept: FAMILY MEDICINE CLINIC | Age: 55
End: 2020-07-02

## 2020-07-02 NOTE — TELEPHONE ENCOUNTER
Being treated for urinary issues with strong antibiotic. Not feeling any better. Still in pain, and has been running temp around 100 since last Wednesday. Unsure why still has temp. Please advise.  Please call back at [phone]

## 2020-07-02 NOTE — TELEPHONE ENCOUNTER
Please call patient and advise her she should go to the ER for evaluation. Dr. Merrill Carrillo recommended this when she talked to her last.     Please document call and then close encounter.   thanks

## 2020-07-06 ENCOUNTER — TELEPHONE (OUTPATIENT)
Dept: FAMILY MEDICINE CLINIC | Age: 55
End: 2020-07-06

## 2020-07-06 ENCOUNTER — OFFICE VISIT (OUTPATIENT)
Dept: PRIMARY CARE CLINIC | Age: 55
End: 2020-07-06

## 2020-07-06 NOTE — TELEPHONE ENCOUNTER
Pt with fever intermittently for last 4 days and N/V. No other symptoms currently. She is assuming it is a UTI, though not having any urinary symptoms or flank pain. Patient instructed to get COVID-19 test today at [facility].  She is to call to let us know how she is doing tomorrow.   She understands if things get worse prior to Wednesday, she will need to get evaluated in the emergency room, but hopefully can bring her into the office Wednesday afternoon if more testing is needed at that time, as this is when we will have her own \"sick clinic\" up and running    HAYDEN: Leaving this in as an FYI, and reminder to check on patient

## 2020-07-06 NOTE — TELEPHONE ENCOUNTER
PT's  [facility] called in stating that PT has been dealing with a UTI that hasn't cleared up. They would like for PT to come in this afternoon if possible.      Please call back: [phone]

## 2020-07-07 NOTE — TELEPHONE ENCOUNTER
Call placed to patient. She is feeling better but still had a fever of 100 this morning. She denies any urinary symptoms. She did get tested for COVID yesterday and is awaiting the results. Advised her to call back tomorrow if symptoms worsen. She is taking tylenol and staying hydrated with water and gatorade.

## 2020-07-08 LAB
SARS-COV-2: NOT DETECTED
SOURCE: NORMAL

## 2020-07-09 ENCOUNTER — TELEPHONE (OUTPATIENT)
Dept: FAMILY MEDICINE CLINIC | Age: 55
End: 2020-07-09

## 2020-07-09 ENCOUNTER — NURSE ONLY (OUTPATIENT)
Dept: FAMILY MEDICINE CLINIC | Age: 55
End: 2020-07-09
Payer: COMMERCIAL

## 2020-07-09 LAB
BILIRUBIN, POC: NEGATIVE
BLOOD URINE, POC: NEGATIVE
CLARITY, POC: ABNORMAL
COLOR, POC: ABNORMAL
GLUCOSE URINE, POC: >=1000
KETONES, POC: NEGATIVE
LEUKOCYTE EST, POC: ABNORMAL
NITRITE, POC: NEGATIVE
PH, POC: 5.5
PROTEIN, POC: NEGATIVE
SPECIFIC GRAVITY, POC: 1.01
UROBILINOGEN, POC: 0.2

## 2020-07-09 PROCEDURE — 81002 URINALYSIS NONAUTO W/O SCOPE: CPT | Performed by: NURSE PRACTITIONER

## 2020-07-09 NOTE — TELEPHONE ENCOUNTER
Had covid testing few days ago, calling for results. Will also need copy of results faxed to employer at [phone]: German Garcia    Still running a fever, especially when does not have med. She has burning when she urinates. Please advise.  Please call Leann back at [phone]

## 2020-07-09 NOTE — TELEPHONE ENCOUNTER
Call placed to patient to discuss. Patient just completed Cipro course on Saturday. She states she continues to have low-grade fevers and burning when she urinates. She denies cough, congestion, headache, nausea, vomiting, abdominal pain and body aches. She had a recent COVID test which came back not detected. Her last urine culture was on 6/23 which showed less than 10,000 growth of normal lali. She denies any vaginal discharge and has no concerns for STDs. Would like patient to come to office to give another urine sample. She states she is out right now and can stop by the office soon. MA schedule patient for 1130 nurse visit.

## 2020-07-10 ENCOUNTER — TELEPHONE (OUTPATIENT)
Dept: FAMILY MEDICINE CLINIC | Age: 55
End: 2020-07-10

## 2020-07-10 LAB — URINE CULTURE, ROUTINE: NORMAL

## 2020-07-10 NOTE — TELEPHONE ENCOUNTER
Was my message discussed with patient:    \"Inform patient that urine culture is not yet back.     Find out if patient is still having any symptoms. Let her know 30% of patients with COVID will test negative even if they have it. For this reason when someone has symptoms of COVID like fevers, headaches, and body aches, they still need to stay home for at least 10 days, the last 3 days have to be fever free, and their symptoms have to be resolved, before they can return to work.   The earliest she should go back to work is next Monday.     Please find out if her fevers, headaches, and body aches are all resolved in route back\"

## 2020-07-10 NOTE — TELEPHONE ENCOUNTER
Inform patient that urine culture is not yet back. Find out if patient is still having any symptoms. Let her know 30% of patients with COVID will test negative even if they have it. For this reason when someone has symptoms of COVID like fevers, headaches, and body aches, they still need to stay home for at least 10 days, the last 3 days have to be fever free, and their symptoms have to be resolved, before they can return to work. The earliest she should go back to work is next Monday.     Please find out if her fevers, headaches, and body aches are all resolved in route back

## 2020-07-10 NOTE — TELEPHONE ENCOUNTER
PT following up to see if her Covid results were faxed over to her job.   PT also would like a call back regarding her recent urine lab

## 2020-07-13 ENCOUNTER — TELEPHONE (OUTPATIENT)
Dept: FAMILY MEDICINE CLINIC | Age: 55
End: 2020-07-13

## 2020-07-13 NOTE — TELEPHONE ENCOUNTER
She shouldn't return to work yet then. Tell pt to get another COVID19 test at [address], in case the first one was falsely negative  Please document call and then close encounter.   thanks

## 2020-07-13 NOTE — TELEPHONE ENCOUNTER
Pt's  Alejo Echavarria called in stating that PT received a note to return to work once she's been fever free for 72 hrs. He states that PT still had a fever yesterday of 99.8 and PT has a headache that she's been taking tylenol for.      Best call back number: [phone]

## 2020-07-14 NOTE — TELEPHONE ENCOUNTER
Called patient back to discuss. Told her it is possible her symptoms could be related to lyme disease however as recommended by Dr. Montserrat George we would like her to get another COVID test prior to bringing her into the office for blood work. Gave number to call and schedule an appt at drive thru clinic. She is agreeable with plan. She states she does not have a fever today. She denies joint pain, shortness of breath, chest pain and fatigue.

## 2020-07-15 ENCOUNTER — OFFICE VISIT (OUTPATIENT)
Dept: PRIMARY CARE CLINIC | Age: 55
End: 2020-07-15
Payer: COMMERCIAL

## 2020-07-15 PROCEDURE — 99211 OFF/OP EST MAY X REQ PHY/QHP: CPT | Performed by: NURSE PRACTITIONER

## 2020-07-15 NOTE — PROGRESS NOTES
Estrella Salgado received a viral test for COVID-19. They were educated on isolation and quarantine as appropriate. For any symptoms, they were directed to seek care from their PCP, given contact information to establish with a doctor, directed to an urgent care or the emergency room.

## 2020-07-20 LAB
SARS-COV-2: NOT DETECTED
SOURCE: NORMAL

## 2020-07-22 ENCOUNTER — TELEPHONE (OUTPATIENT)
Dept: FAMILY MEDICINE CLINIC | Age: 55
End: 2020-07-22

## 2020-07-22 ENCOUNTER — OFFICE VISIT (OUTPATIENT)
Dept: FAMILY MEDICINE CLINIC | Age: 55
End: 2020-07-22
Payer: COMMERCIAL

## 2020-07-22 VITALS
HEART RATE: 112 BPM | BODY MASS INDEX: 25.79 KG/M2 | OXYGEN SATURATION: 97 % | WEIGHT: 155 LBS | TEMPERATURE: 98 F | DIASTOLIC BLOOD PRESSURE: 81 MMHG | SYSTOLIC BLOOD PRESSURE: 115 MMHG

## 2020-07-22 LAB
BASOPHILS ABSOLUTE: 0 K/UL (ref 0–0.2)
BASOPHILS RELATIVE PERCENT: 0.7 %
EOSINOPHILS ABSOLUTE: 0.1 K/UL (ref 0–0.6)
EOSINOPHILS RELATIVE PERCENT: 1.5 %
HCT VFR BLD CALC: 40.7 % (ref 36–48)
HEMOGLOBIN: 13.6 G/DL (ref 12–16)
LYMPHOCYTES ABSOLUTE: 1.3 K/UL (ref 1–5.1)
LYMPHOCYTES RELATIVE PERCENT: 18.6 %
MCH RBC QN AUTO: 29.1 PG (ref 26–34)
MCHC RBC AUTO-ENTMCNC: 33.5 G/DL (ref 31–36)
MCV RBC AUTO: 86.8 FL (ref 80–100)
MONOCYTES ABSOLUTE: 0.4 K/UL (ref 0–1.3)
MONOCYTES RELATIVE PERCENT: 6.1 %
NEUTROPHILS ABSOLUTE: 5 K/UL (ref 1.7–7.7)
NEUTROPHILS RELATIVE PERCENT: 73.1 %
PDW BLD-RTO: 14.8 % (ref 12.4–15.4)
PLATELET # BLD: 262 K/UL (ref 135–450)
PMV BLD AUTO: 10.1 FL (ref 5–10.5)
RBC # BLD: 4.68 M/UL (ref 4–5.2)
WBC # BLD: 6.8 K/UL (ref 4–11)

## 2020-07-22 PROCEDURE — 99214 OFFICE O/P EST MOD 30 MIN: CPT | Performed by: FAMILY MEDICINE

## 2020-07-22 NOTE — TELEPHONE ENCOUNTER
PT is wanting to know if she is able to be seen in office, pt has had 2 covid tests that came back negative. Experiencing: Fever and loose bowels PT still not feeling well.

## 2020-07-22 NOTE — PATIENT INSTRUCTIONS
[address] [place] [address] Infectious Disease- Florencia Pizano MD   [address] [facility] [address] [facility], [place] [address]   [phone]

## 2020-07-22 NOTE — PROGRESS NOTES
PROGRESS NOTE     Mary Murrell MD  [facility] [address] [place] [address]  [phone] office  [phone] fax    Date of Service:  7/22/2020    Subjective:      Patient ID: . Olga Craig is a 48 y.o. female      CC: febrile illness    HPI    52-year-old white female presenting with over a month of intermittent symptoms of illness. She initially called on 6/22/20 complaining of dysuria, 1 of my partners ordered urine culture and UA, both which were negative. She then saw my nurse practitioner on 6/25/2020, with a complaint of right-sided flank pain x5 days that was dull and intermittent and worse at night, with decreased appetite. She had no fever or dysuria at this time. Cipro was prescribed for 10 days. KUB was ordered to look for kidney stones and was negative. Flank pain since resolved. On 7/2/2020, she called complaining of increased temperature of 100 F for an entire week even though she was on cipro still for possible UTI at the time. She was told to go to the emergency room, which she did not do. On 7/6/2020, patient called complaining of continued intermittent fever for the last 4 days, nausea and vomiting. She was asked to go get a COVID-19 test which came back negative. On 7/9/2020, even though she had completed her Cipro, she still complained of low-grade fevers and dysuria. Again UA and urine culture were ordered both of which were negative. On 7/13/2020, again she complained of increased temperature of 99 8, with headaches. Repeat COVID test was done on 7/15/2020, again which was negative. Patient still having intermittent fever (tmax=99.8F on temporal thermometer) on and off for  3weeks as described above: longest time w/o increase temp is 2 days. Last true fever about 3 weeks ago. Pt also has had intermittent HA on and off the last months    No dysuria since 7/9/20. No trouble tasting or smelling.   Never had rhinitis, nasal congestion, cough, sob, chest tightness/pain. pt ha new symptoms of 4-5 loose bowels yesterday. No melena, no BRBPR.  +abdominal cramping. No diarrhea yet today. Still elevated temp today and mild HA. Vitals:    20 1636   BP: 115/81   Pulse: 112   Temp: 98 °F (36.7 °C)   SpO2: 97%   Weight: 155 lb (70.3 kg)       Outpatient Medications Marked as Taking for the 20 encounter (Office Visit) with Marion Schwab MD   Medication Sig Dispense Refill    JARDIANCE 10 MG tablet TAKE 1 TABLET BY MOUTH DAILY 30 tablet 0    metFORMIN (GLUCOPHAGE-XR) 500 MG extended release tablet TAKE 2 TABLETS BY MOUTH WITH BREAKFAST AND 1 TABLET AT BEDTIME 270 tablet 0    glipiZIDE (GLUCOTROL) 5 MG tablet Take 1 tablet by mouth daily 90 tablet 0    atorvastatin (LIPITOR) 20 MG tablet TAKE 1 TABLET BY MOUTH DAILY 90 tablet 0    lisinopril (PRINIVIL;ZESTRIL) 5 MG tablet TAKE 1 TABLET BY MOUTH DAILY 90 tablet 0    aspirin 81 MG chewable tablet Take 81 mg by mouth daily         Past Medical History:   Diagnosis Date    Atrophic kidney 2017    sstone    Atrophic scarred right kidney again noted.  Some nephrolithiasis is noted without  hydronephrosis.  Diabetes ([address]     Family history of early CAD     FH: breast cancer 3/8/2017    25 yo mother reported    HLD (hyperlipidemia)     Iron deficiency anemia 2017    Right peroneal tendinosis 2018       Past Surgical History:   Procedure Laterality Date     SECTION      SKIN BIOPSY         Social History     Tobacco Use    Smoking status: Never Smoker    Smokeless tobacco: Never Used   Substance Use Topics    Alcohol use:  Yes     Alcohol/week: 0.0 standard drinks     Comment: rarely       Family History   Problem Relation Age of Onset   [place] Cancer Mother         voicebox and larynx, and lung cancer    Diabetes Father     Heart Disease Father     COPD Sister     Heart Disease Sister 50        MI,     Seizures Brother Review of Systems  See hpi    Objective:   Constitutional:   · Reviewed vitals above  · Well Nourished, well developed, no distress       HENT:  · Normal external nose without lesions  · Bilateral TMs translucent with normal light reflex and bony landmarks  · Normal oropharynx without erythema or exudate  · Normal nasal mucosa without swelling or erythema  Neck:  · Symmetric and without masses  · No thyromegaly  Resp:  · Normal effort  · Clear to auscultation bilaterally without rhonchi, wheezing or crackles  Cardiovascular:  · On auscultation, normal S1 and S2 without murmurs, rubs or gallops  · No bruits of bilateral carotids and no JVD  Gastrointestinal:  · Nontender, nondistended, and no masses  · No hepatosplenomegaly  Musculoskeletal:  · Normal Gait  · All extremities without clubbing, cyanosis or edema  Skin:  · No rashes on inspection  · No areas of increased heat or induration on palpation  Psych:  · Normal mood and affect  · Normal insight and judgement    Assessment / Plan:     1. Febrile illness  No fever here today. Patient's increased temps have all been documented on a forehead thermometer. Sometimes these are not the most accurate, but she states that reads normal temperatures on her . Unclear what is making her feel so ill for the last 4 weeks. She initially had symptoms of UTI even though urine culture and UA were both negative. She now has some new diarrhea and nausea. Throughout the 3 to 4 weeks, she had intermittent increased temperatures, fatigue, headache and nausea. No respiratory symptoms the entire time. She has tested negative twice for COVID-19.     Starting evaluation by checking the following blood work:  - CBC Auto Differential  - Comprehensive Metabolic Panel  - Amylase  - Lipase  - C-REACTIVE PROTEIN  - SEDIMENTATION RATE  - HIV Screen  - JEANNE Reflex to Antibody Cascade  - EBV titers    Patient to keep a log of temperature, and referring to infectious disease doctor. Patient to bring home thermometer with her to this appointment so we can ensure it is accurate.     - Charline Mott MD, Infectious Disease, [facility]

## 2020-07-22 NOTE — TELEPHONE ENCOUNTER
Ok to schedule at 3:40 today for [facility]    Make sure she knows not to enter building, but just call when available, and we will call back to let her in separate entrance when we are ready for her    Please document call and then close encounter.   thanks

## 2020-07-23 ENCOUNTER — TELEPHONE (OUTPATIENT)
Dept: FAMILY MEDICINE CLINIC | Age: 55
End: 2020-07-23

## 2020-07-23 LAB
A/G RATIO: 2.1 (ref 1.1–2.2)
ALBUMIN SERPL-MCNC: 4.9 G/DL (ref 3.4–5)
ALP BLD-CCNC: 127 U/L (ref 40–129)
ALT SERPL-CCNC: 43 U/L (ref 10–40)
AMYLASE: 97 U/L (ref 25–115)
ANION GAP SERPL CALCULATED.3IONS-SCNC: 12 MMOL/L (ref 3–16)
ANTI-NUCLEAR ANTIBODY (ANA): NEGATIVE
AST SERPL-CCNC: 22 U/L (ref 15–37)
BILIRUB SERPL-MCNC: 0.3 MG/DL (ref 0–1)
BUN BLDV-MCNC: 20 MG/DL (ref 7–20)
C-REACTIVE PROTEIN: 2.7 MG/L (ref 0–5.1)
CALCIUM SERPL-MCNC: 10.2 MG/DL (ref 8.3–10.6)
CHLORIDE BLD-SCNC: 103 MMOL/L (ref 99–110)
CO2: 25 MMOL/L (ref 21–32)
CREAT SERPL-MCNC: 0.7 MG/DL (ref 0.6–1.1)
GFR AFRICAN AMERICAN: >60
GFR NON-AFRICAN AMERICAN: >60
GLOBULIN: 2.3 G/DL
GLUCOSE BLD-MCNC: 147 MG/DL (ref 70–99)
HIV AG/AB: NORMAL
HIV ANTIGEN: NORMAL
HIV-1 ANTIBODY: NORMAL
HIV-2 AB: NORMAL
LIPASE: 57 U/L (ref 13–60)
POTASSIUM SERPL-SCNC: 5 MMOL/L (ref 3.5–5.1)
SEDIMENTATION RATE, ERYTHROCYTE: 14 MM/HR (ref 0–30)
SODIUM BLD-SCNC: 140 MMOL/L (ref 136–145)
TOTAL PROTEIN: 7.2 G/DL (ref 6.4–8.2)

## 2020-07-23 NOTE — LETTER
[address] [place] [address]  Phone: [phone]  Fax: [phone]    Bear Sanchez MD        July 23, 2020     Patient: Bernardo Crawford   YOB: 1965   Date of Visit: 07/22/2020       To Whom it May Concern:    Haroldo Jack was seen in my clinic on 7/22/2020. She may return to work on 8/2/2020. Please excuse her 6/25/2020 to 8/1/2020. If you have any questions or concerns, please don't hesitate to call.     Sincerely,         Bear Sanchez MD

## 2020-07-23 NOTE — TELEPHONE ENCOUNTER
Ok to provide work excuse letter and copy of covid results. Please call patient and send to her as requested. Please document call and then close encounter.   thanks

## 2020-07-23 NOTE — TELEPHONE ENCOUNTER
Patient would like a work excuse and a copy of her covid testing for her work, she is seeing infectious disease on Monday

## 2020-07-25 LAB
EPSTEIN BARR VIRUS NUCLEAR AB IGG: <3 U/ML (ref 0–21.9)
EPSTEIN-BARR EARLY ANTIGEN ANTIBODY: <5 U/ML (ref 0–10.9)
EPSTEIN-BARR VCA IGG: 86.9 U/ML (ref 0–21.9)
EPSTEIN-BARR VCA IGM: <10 U/ML (ref 0–43.9)

## 2020-07-27 ENCOUNTER — VIRTUAL VISIT (OUTPATIENT)
Dept: INFECTIOUS DISEASES | Age: 55
End: 2020-07-27
Payer: COMMERCIAL

## 2020-07-27 PROCEDURE — 99423 OL DIG E/M SVC 21+ MIN: CPT | Performed by: INTERNAL MEDICINE

## 2020-07-27 RX ORDER — EMPAGLIFLOZIN 10 MG/1
1 TABLET, FILM COATED ORAL DAILY
Qty: 30 TABLET | Refills: 1 | Status: SHIPPED | OUTPATIENT
Start: 2020-07-27 | End: 2020-08-28

## 2020-07-27 NOTE — PROGRESS NOTES
Infectious Diseases Consult Note    Reason for Consult:   Fever   Requesting Physician:   Dr Angely Rand  Primary Care Physician:  Kong Loaiza MD  History Obtained From:   Patient, EPIC    CHIEF COMPLAINT:      Chief Complaint   Patient presents with    New Patient     fevers        HISTORY OF PRESENT ILLNESS:      Video Visit (obtained consent, pt at home, doxy. me - see disclaimer at bottom)    49 yo woman with hx DM, elevated lipids, R kidney atrophic    Pt c/o fever, intermittent   Pt reports onset sx in , had  complaint. At this time, UA/ urine cult neg. Rx ciprofloxacin  Pt has had ongoing and more constant / daily fever, 99 - 101 per pt. Assoc with fatigue, some days worse than other (unable to get out of bed on Sat ), unable to go to work. Assoc with HA, improved with tylenol. Less appetite though eating and no wt loss  Has had some nausea, no abd pain  No Resp sx at all    No sick contact, no travel, no other exposure     Saw PCP  --  Reviewed her note -   COVID testing - , 7/15 neg  Labs  - CBC normal, ESR 14, CRP 2.7 (both normal), EBV IgM neg, JEANNE neg, HIV NR. ALT 43 (mildly elevated)      Past Medical History:    Past Medical History:   Diagnosis Date    Atrophic kidney 2017    sstone    Atrophic scarred right kidney again noted.  Some nephrolithiasis is noted without  hydronephrosis.       Diabetes ([address]     Family history of early CAD     FH: breast cancer 3/8/2017    25 yo mother reported    HLD (hyperlipidemia)     Iron deficiency anemia 2017    Right peroneal tendinosis 2018       Past Surgical History:    Past Surgical History:   Procedure Laterality Date     SECTION      SKIN BIOPSY         Current Medications:    Current Outpatient Medications   Medication Sig Dispense Refill    JARDIANCE 10 MG tablet TAKE 1 TABLET BY MOUTH DAILY 30 tablet 0    metFORMIN (GLUCOPHAGE-XR) 500 MG extended release tablet TAKE 2 TABLETS BY MOUTH WITH BREAKFAST AND 1 TABLET AT BEDTIME 270 tablet 0    glipiZIDE (GLUCOTROL) 5 MG tablet Take 1 tablet by mouth daily 90 tablet 0    atorvastatin (LIPITOR) 20 MG tablet TAKE 1 TABLET BY MOUTH DAILY 90 tablet 0    lisinopril (PRINIVIL;ZESTRIL) 5 MG tablet TAKE 1 TABLET BY MOUTH DAILY 90 tablet 0    ondansetron (ZOFRAN-ODT) 4 MG disintegrating tablet Take 1 tablet by mouth 3 times daily as needed for Nausea or Vomiting 21 tablet 0    Cranberry 1000 MG CAPS Take 1 tablet by mouth daily      meclizine (ANTIVERT) 25 MG tablet Take 1 tablet by mouth 3 times daily as needed for Nausea 30 tablet 0    aspirin 81 MG chewable tablet Take 81 mg by mouth daily      naproxen (NAPROSYN) 500 MG tablet TAKE 1 TABLET BY MOUTH TWO TIMES A DAY WITH MEALS (Patient not taking: Reported on 7/27/2020) 60 tablet 0    Blood Glucose Monitoring Suppl (ONE TOUCH ULTRA MINI) w/Device KIT Use to check sugars 1 kit 0    glucose blood VI test strips (ASCENSIA AUTODISC VI;ONE TOUCH ULTRA TEST VI) strip 1 each by In Vitro route daily As needed. 100 each 3    ONE TOUCH LANCETS MISC 1 each by Does not apply route daily 100 each 3    Evening Primrose Oil 500 MG CAPS Take by mouth       No current facility-administered medications for this visit. Allergies:  Pcn [penicillins]; Benadryl [diphenhydramine]; Benzonatate; Morphine; and Sulfa antibiotics    Social History:    TOBACCO:   None    ETOH:    Rare    DRUGS:    None    MARITAL STATUS:        OCCUPATION:             Family History:   No immunodeficiency     REVIEW OF SYSTEMS:    + fever - no chills / sweats. No weight loss. No visual change, eye pain, eye discharge. No oral lesion, sore throat, dysphagia. Denies cough / sputum. Denies chest pain, palpitations. Denies n / v / abd pain. No diarrhea. Denies dysuria or change in urinary function. Denies joint swelling or pain. No myalgia, arthralgia.   Denies skin changes, itching  Denies new / worse depression, psychiatric symptoms  Denies focal weakness, sensory change or other neurologic symptom  No symptoms endocrinopathy. No symptoms hematologic disease. PHYSICAL EXAM:    Vitals:  See intake vitals including weight    Video visit - no PE  General appearance, HEENT, resp status, skin, neuro - no abnormalities within limits of video visit         DATA:    See EPIC    IMPRESSION    Fever - assoc fatigue, HA. No resp sx  W/u unremarkable - normal CBC, ESR / CRP, JEANNE, EBV IgM  ALT 43 - mild elevation    Discussed FUO etiologies - infectious (SBE, resp - including mTB, viral), rheum (SLE, other), onc (lyphoma / leukemia, met cancer). Normal labs. Other - meds, other illnesses (menopause, hyperthyroid )    RECOMMENDATIONS:      - Reassurance - educated pt, reviewed prior lab results, discussed poss etiologies    - Obtain chest / abd / pelvic CT    - If persistent elevated fever / sx, repeat CBC, CMP, ESR / CRP and obtain BC, serologies     Isabel Cotton is a 48 y.o. female evaluated by a Virtual Visit (video visit) encounter to address concerns as mentioned above. A caregiver was present when appropriate. Due to this being a TeleHealth encounter (During QAOWB-03 public health emergency), evaluation of the following organ systems was limited: Vitals/Constitutional/EENT/Resp/CV/GI//MS/Neuro/Skin/Heme-Lymph-Imm. Pursuant to the emergency declaration under the [facility], [address] [place] [address] authority and the [employer] Act, this Virtual Visit was conducted with patient's (and/or legal guardian's) consent, to reduce the patient's risk of exposure to COVID-19 and provide necessary medical care. The patient (and/or legal guardian) has also been advised to contact this office for worsening conditions or problems, and seek emergency medical treatment and/or call 911 if deemed necessary.      Patient identification was verified at the start of the visit: Yes    Total time spent for this encounter: > 60 min    Services were provided through a video synchronous discussion virtually to substitute for in-person clinic visit. Patient was located at individual homes. --Preston Spears MD on 7/27/2020 at 12:07 PM  An electronic signature was used to authenticate this note.       Preston Spears MD

## 2020-07-31 ENCOUNTER — HOSPITAL ENCOUNTER (OUTPATIENT)
Dept: CT IMAGING | Age: 55
Discharge: HOME | End: 2020-07-31
Payer: COMMERCIAL

## 2020-07-31 ENCOUNTER — TELEPHONE (OUTPATIENT)
Dept: INFECTIOUS DISEASES | Age: 55
End: 2020-07-31

## 2020-07-31 PROCEDURE — 71260 CT THORAX DX C+: CPT

## 2020-07-31 PROCEDURE — 6360000004 HC RX CONTRAST MEDICATION: Performed by: INTERNAL MEDICINE

## 2020-07-31 RX ADMIN — IOPAMIDOL 75 ML: 755 INJECTION, SOLUTION INTRAVENOUS at 08:45

## 2020-07-31 RX ADMIN — IOHEXOL 50 ML: 240 INJECTION, SOLUTION INTRATHECAL; INTRAVASCULAR; INTRAVENOUS; ORAL at 08:45

## 2020-07-31 NOTE — TELEPHONE ENCOUNTER
Called pt -    Reviewed results CT   Chest / Abd / Pelvis CT -   1. Negative CT of the chest.    2. Diverticulosis. 3. Indeterminate left inferior renal lesion, likely a hyperdense cyst.    Ultrasound recommended to confirm     Pt report she 'is tired, HA, low grade fever'  Better than before. Increase in activity  Going to work on Mon.

## 2020-08-03 ENCOUNTER — TELEPHONE (OUTPATIENT)
Dept: FAMILY MEDICINE CLINIC | Age: 55
End: 2020-08-03

## 2020-08-03 NOTE — TELEPHONE ENCOUNTER
Received some results, and was advised to get US. Has lump on kidney. Wanting to speak with Dr Edelmira Potter to discuss. States she is having side pain and vomiting.      Please call Leann back at [phone]

## 2020-08-03 NOTE — TELEPHONE ENCOUNTER
Order kidney US and gave pt number to schedule    Pt states she had another fever and some intermittent pain in side, with vomiting. Asked pt to call ID doctor to report the fever and see if he has any further info. She states she will.

## 2020-08-04 ENCOUNTER — HOSPITAL ENCOUNTER (OUTPATIENT)
Dept: ULTRASOUND IMAGING | Age: 55
Discharge: HOME | End: 2020-08-04
Payer: COMMERCIAL

## 2020-08-04 PROCEDURE — 76770 US EXAM ABDO BACK WALL COMP: CPT

## 2020-08-05 ENCOUNTER — TELEPHONE (OUTPATIENT)
Dept: FAMILY MEDICINE CLINIC | Age: 55
End: 2020-08-05

## 2020-08-05 NOTE — TELEPHONE ENCOUNTER
I want pt to get clearance from the ID doctor    She should ask them for note    Please document call and then close encounter.   thanks

## 2020-08-05 NOTE — TELEPHONE ENCOUNTER
Has been off work due to illness. Had fever. Wanting to know if can get a note to return to work on Monday. Please advise.  [phone]

## 2020-08-06 ENCOUNTER — TELEPHONE (OUTPATIENT)
Dept: INFECTIOUS DISEASES | Age: 55
End: 2020-08-06

## 2020-08-10 ENCOUNTER — TELEPHONE (OUTPATIENT)
Dept: INFECTIOUS DISEASES | Age: 55
End: 2020-08-10

## 2020-08-11 ENCOUNTER — VIRTUAL VISIT (OUTPATIENT)
Dept: FAMILY MEDICINE CLINIC | Age: 55
End: 2020-08-11
Payer: COMMERCIAL

## 2020-08-11 ENCOUNTER — NURSE ONLY (OUTPATIENT)
Dept: FAMILY MEDICINE CLINIC | Age: 55
End: 2020-08-11

## 2020-08-11 VITALS — TEMPERATURE: 98 F

## 2020-08-11 LAB
A/G RATIO: 1.8 (ref 1.1–2.2)
ALBUMIN SERPL-MCNC: 4.5 G/DL (ref 3.4–5)
ALP BLD-CCNC: 99 U/L (ref 40–129)
ALT SERPL-CCNC: 16 U/L (ref 10–40)
ANION GAP SERPL CALCULATED.3IONS-SCNC: 13 MMOL/L (ref 3–16)
AST SERPL-CCNC: 16 U/L (ref 15–37)
BASOPHILS ABSOLUTE: 0 K/UL (ref 0–0.2)
BASOPHILS RELATIVE PERCENT: 0.7 %
BILIRUB SERPL-MCNC: 0.5 MG/DL (ref 0–1)
BUN BLDV-MCNC: 15 MG/DL (ref 7–20)
C-REACTIVE PROTEIN: 2.3 MG/L (ref 0–5.1)
CALCIUM SERPL-MCNC: 9.9 MG/DL (ref 8.3–10.6)
CHLORIDE BLD-SCNC: 103 MMOL/L (ref 99–110)
CO2: 25 MMOL/L (ref 21–32)
CREAT SERPL-MCNC: 0.7 MG/DL (ref 0.6–1.1)
EOSINOPHILS ABSOLUTE: 0.1 K/UL (ref 0–0.6)
EOSINOPHILS RELATIVE PERCENT: 1.2 %
GFR AFRICAN AMERICAN: >60
GFR NON-AFRICAN AMERICAN: >60
GLOBULIN: 2.5 G/DL
GLUCOSE BLD-MCNC: 160 MG/DL (ref 70–99)
HCT VFR BLD CALC: 43 % (ref 36–48)
HEMOGLOBIN: 14 G/DL (ref 12–16)
LYMPHOCYTES ABSOLUTE: 1.1 K/UL (ref 1–5.1)
LYMPHOCYTES RELATIVE PERCENT: 16.7 %
MCH RBC QN AUTO: 28.2 PG (ref 26–34)
MCHC RBC AUTO-ENTMCNC: 32.5 G/DL (ref 31–36)
MCV RBC AUTO: 86.7 FL (ref 80–100)
MONOCYTES ABSOLUTE: 0.4 K/UL (ref 0–1.3)
MONOCYTES RELATIVE PERCENT: 5.7 %
NEUTROPHILS ABSOLUTE: 4.9 K/UL (ref 1.7–7.7)
NEUTROPHILS RELATIVE PERCENT: 75.7 %
PDW BLD-RTO: 14.7 % (ref 12.4–15.4)
PLATELET # BLD: 259 K/UL (ref 135–450)
PMV BLD AUTO: 10.3 FL (ref 5–10.5)
POTASSIUM SERPL-SCNC: 5.2 MMOL/L (ref 3.5–5.1)
RBC # BLD: 4.96 M/UL (ref 4–5.2)
SEDIMENTATION RATE, ERYTHROCYTE: 13 MM/HR (ref 0–30)
SODIUM BLD-SCNC: 141 MMOL/L (ref 136–145)
TOTAL PROTEIN: 7 G/DL (ref 6.4–8.2)
WBC # BLD: 6.5 K/UL (ref 4–11)

## 2020-08-11 PROCEDURE — 99214 OFFICE O/P EST MOD 30 MIN: CPT | Performed by: NURSE PRACTITIONER

## 2020-08-11 PROCEDURE — 36415 COLL VENOUS BLD VENIPUNCTURE: CPT | Performed by: NURSE PRACTITIONER

## 2020-08-11 NOTE — PROGRESS NOTES
2020    TELEHEALTH EVALUATION -- Audio/Visual (During SZSYW-39 public health emergency)    HPI:    Olga Craig (:  1965) has requested an audio/video evaluation for the following concern(s):    Vomiting, diarrhea and RLQ abdominal pain    Patient complains today of continued nausea, vomiting, diarrhea, low grade fever and RLQ abdominal pain. Patient originally was treated for UTI on  and continued to have symptoms through all of July. She had an extensive workup including multiple urine cultures which all came back negative, 2 COVID tests that came back negative, and was then referred to ID. Lab work all came back normal. CT of abdomen showed nothing acute. Per review of his note, he was only going to repeat the lab work if symptoms returned. Patient states her symptoms worsened 2 days ago. She has only been able to eat broth and toast. She did call Dr. Brandt Brennan office yesterday but has not received a call back. Patient does have recent antibiotic use. She was treated with Cipro in . She does also work in a nursing home. Review of Systems    Prior to Visit Medications    Medication Sig Taking?  Authorizing Provider   empagliflozin (JARDIANCE) 10 MG tablet Take 1 tablet by mouth daily Yes lBas Faustin MD   metFORMIN (GLUCOPHAGE-XR) 500 MG extended release tablet TAKE 2 TABLETS BY MOUTH WITH BREAKFAST AND 1 TABLET AT BEDTIME Yes Blas Faustin MD   glipiZIDE (GLUCOTROL) 5 MG tablet Take 1 tablet by mouth daily Yes Blas Faustin MD   atorvastatin (LIPITOR) 20 MG tablet TAKE 1 TABLET BY MOUTH DAILY Yes Blas Faustin MD   lisinopril (PRINIVIL;ZESTRIL) 5 MG tablet TAKE 1 TABLET BY MOUTH DAILY Yes Blas Faustin MD   ondansetron (ZOFRAN-ODT) 4 MG disintegrating tablet Take 1 tablet by mouth 3 times daily as needed for Nausea or Vomiting Yes Blas Faustin MD   Cranberry 1000 MG CAPS Take 1 tablet by mouth daily Yes Historical Provider, MD meclizine (ANTIVERT) 25 MG tablet Take 1 tablet by mouth 3 times daily as needed for Nausea Yes Arnold Ford MD   Blood Glucose Monitoring Suppl (ONE TOUCH ULTRA MINI) w/Device KIT Use to check sugars Yes Arnold Ford MD   glucose blood VI test strips (ASCENSIA AUTODISC VI;ONE TOUCH ULTRA TEST VI) strip 1 each by In Vitro route daily As needed. Yes Arnold Ford MD   ONE TOUCH LANCETS MISC 1 each by Does not apply route daily Yes Arnold Ford MD   aspirin 81 MG chewable tablet Take 81 mg by mouth daily Yes Historical Provider, MD   naproxen (NAPROSYN) 500 MG tablet TAKE 1 TABLET BY MOUTH TWO TIMES A DAY WITH MEALS  Patient not taking: Reported on 7/27/2020  Adalgisa Ricketts MD   Evening Primrose Oil 500 MG CAPS Take by mouth  Historical Provider, MD       Social History     Tobacco Use    Smoking status: Never Smoker    Smokeless tobacco: Never Used   Substance Use Topics    Alcohol use: Yes     Alcohol/week: 0.0 standard drinks     Comment: rarely    Drug use: No            PHYSICAL EXAMINATION:  [ INSTRUCTIONS:  \"[x]\" Indicates a positive item  \"[]\" Indicates a negative item  -- DELETE ALL ITEMS NOT EXAMINED]  Vital Signs: (As obtained by patient/caregiver or practitioner observation)    Blood pressure-  Heart rate-    Respiratory rate-    Temperature-  Pulse oximetry-     Constitutional: [x] Appears well-developed and well-nourished [x] No apparent distress      [] Abnormal-   Mental status  [x] Alert and awake  [x] Oriented to person/place/time [x]Able to follow commands      Eyes:  EOM    [x]  Normal  [] Abnormal-  Sclera  [x]  Normal  [] Abnormal -         Discharge [x]  None visible  [] Abnormal -    HENT:   [x] Normocephalic, atraumatic.   [] Abnormal   [x] Mouth/Throat: Mucous membranes are moist.     External Ears [x] Normal  [] Abnormal-     Neck: [x] No visualized mass     Pulmonary/Chest: [x] Respiratory effort normal.  [x] No visualized signs of difficulty breathing or respiratory distress        [] Abnormal-      Neurological:        [x] No Facial Asymmetry (Cranial nerve 7 motor function) (limited exam to video visit)          [x] No gaze palsy        [] Abnormal-         Skin:        [x] No significant exanthematous lesions or discoloration noted on facial skin         [] Abnormal-            Psychiatric:       [x] Normal Affect [x] No Hallucinations        [] Abnormal-     Other pertinent observable physical exam findings-     ASSESSMENT/PLAN:  1. Diarrhea of presumed infectious origin  Patient's main complaint is of vomiting and diarrhea. Given recent antibiotic use will rule out Cdiff and other infectious diarrhea. Patient able to come by office today to give sample. Encouraged increased po fluid intake. - O&P PANEL ([employer]) #1  - GI Bacterial Pathogens By PCR  - C DIFF TOXIN/ANTIGEN    2. Fever, unspecified fever cause  Labs were unremarkable when first checked but Dr. Dinorah Pimentel recommended rechecking if symptoms persist. Will repeat today and await results. - CBC Auto Differential  - Comprehensive Metabolic Panel  - SEDIMENTATION RATE  - C-REACTIVE PROTEIN    3. Right lower quadrant abdominal pain  This is not a new symptom. Patient states the pain comes and goes. Recent CT of abdomen/pelvis was unremarkable. Checking labs and stool samples. No follow-ups on file. Dannielle Dhillon is a 48 y.o. female being evaluated by a Virtual Visit (video visit) encounter to address concerns as mentioned above. A caregiver was present when appropriate. Due to this being a TeleHealth encounter (During UIIEY-05 public health emergency), evaluation of the following organ systems was limited: Vitals/Constitutional/EENT/Resp/CV/GI//MS/Neuro/Skin/Heme-Lymph-Imm.   Pursuant to the emergency declaration under the [facility], [facility] authority and the [facility] [employer] [facility] Act, this Virtual Visit was conducted with patient's (and/or legal guardian's) consent, to reduce the patient's risk of exposure to COVID-19 and provide necessary medical care. The patient (and/or legal guardian) has also been advised to contact this office for worsening conditions or problems, and seek emergency medical treatment and/or call 911 if deemed necessary. Patient identification was verified at the start of the visit: Yes    Total time spent on this encounter: Not billed by time    Services were provided through a video synchronous discussion virtually to substitute for in-person clinic visit. Patient and provider were located at their individual homes. --Roma Osgood, APRN - CNP on 8/11/2020 at 9:32 PM    An electronic signature was used to authenticate this note.

## 2020-08-12 LAB — GI BACTERIAL PATHOGENS BY PCR: NORMAL

## 2020-08-15 LAB — INTERPRETATION: NEGATIVE

## 2020-08-17 ENCOUNTER — NURSE TRIAGE (OUTPATIENT)
Dept: OTHER | Facility: CLINIC | Age: 55
End: 2020-08-17

## 2020-08-17 ENCOUNTER — OFFICE VISIT (OUTPATIENT)
Dept: FAMILY MEDICINE CLINIC | Age: 55
End: 2020-08-17
Payer: COMMERCIAL

## 2020-08-17 VITALS
BODY MASS INDEX: 26.33 KG/M2 | HEIGHT: 65 IN | TEMPERATURE: 98.2 F | HEART RATE: 99 BPM | SYSTOLIC BLOOD PRESSURE: 119 MMHG | DIASTOLIC BLOOD PRESSURE: 76 MMHG | WEIGHT: 158 LBS

## 2020-08-17 PROCEDURE — 99213 OFFICE O/P EST LOW 20 MIN: CPT | Performed by: FAMILY MEDICINE

## 2020-08-17 NOTE — PATIENT INSTRUCTIONS
[address] MD  [address], [address] [place], [place] [address]  Phone: [phone]    Call central scheduling for ultrasound of abdomen:   [phone]

## 2020-08-17 NOTE — TELEPHONE ENCOUNTER
Knot of her right side of her abdomen by her rib cage since Saturday. It is the size of a juli. The knot is soft to touch. More noticeable with standing. She is having pain with movement and picking up items. No pain when still. Currently being worked up for diarrhea, fever, nausea off and on for a few weeks. COVID 3 weeks  ago neg. Reason for Disposition   MODERATE OR MILD pain that comes and goes (cramps) lasts > 24 hours    Answer Assessment - Initial Assessment Questions  1. LOCATION: \"Where does it hurt? \"       See above    2. RADIATION: \"Does the pain shoot anywhere else? \" (e.g., chest, back)      To all of the right side and flank area     3. ONSET: \"When did the pain begin? \" (e.g., minutes, hours or days ago)       See above    4. SUDDEN: \"Gradual or sudden onset? \"      Sudden    5. PATTERN \"Does the pain come and go, or is it constant? \"     - If constant: \"Is it getting better, staying the same, or worsening? \"       (Note: Constant means the pain never goes away completely; most serious pain is constant and it progresses)      - If intermittent: \"How long does it last?\" \"Do you have pain now? \"      (Note: Intermittent means the pain goes away completely between bouts)      Comes and goes    6. SEVERITY: \"How bad is the pain? \"  (e.g., Scale 1-10; mild, moderate, or severe)    - MILD (1-3): doesn't interfere with normal activities, abdomen soft and not tender to touch     - MODERATE (4-7): interferes with normal activities or awakens from sleep, tender to touch     - SEVERE (8-10): excruciating pain, doubled over, unable to do any normal activities       0/10 abdominal pain. Flank pain 4/10    7. RECURRENT SYMPTOM: \"Have you ever had this type of abdominal pain before? \" If so, ask: \"When was the last time? \" and \"What happened that time? \"       Denies    8. CAUSE: \"What do you think is causing the abdominal pain? \"      Unsure    9. RELIEVING/AGGRAVATING FACTORS: \"What makes it better or worse? \" (e.g., movement, antacids, bowel movement)      See above    10. OTHER SYMPTOMS: \"Has there been any vomiting, diarrhea, constipation, or urine problems? \"        Denies    11. PREGNANCY: \"Is there any chance you are pregnant? \" \"When was your last menstrual period? \"        NA    Protocols used: ABDOMINAL PAIN [facility]    Patient called [facility]) to schedule appointment, with red flag complaint, transferred to RN access for triage. See above questions and answers. Caller talking full sentences without any distress on phone. Discussed disposition and patient agreeable. Discussed potential consequences for not following disposition recommendation. Aware to call back with any concerns or persistent, worsening, or new symptoms develop. Warm transfer to [facility] scheduling for appointment. Please do not respond to the triage nurse through this encounter. Any subsequent communication should be directly with the patient.

## 2020-08-17 NOTE — PROGRESS NOTES
PROGRESS NOTE     Theresa Brennan MD  [address]  [place] [address]  [phone] office  [phone] fax    Date of Service:  8/17/2020    Subjective:      Patient ID: . Dannielle Dhillon is a 48 y.o. female      CC: diarrhea,  Painful cyst on abdomen    HPI      Patient presenting with ongoing diarrhea. She was already evaluated by infectious disease in the past and they were unable to find any cause for fevers that she has been having for 1-2 months. She states her fevers have gone away, but she is still having chronic diarrhea. She has had negative COVID test x3, normal blood work (including mono test, CBC, CMP, ESR, CRP), normal CT of the abdomen. She also recently had normal ova and parasites test and GI bacterial pathogen by PCR test.  C. difficile was ordered, but for some reason was canceled. She complains of crampy abdominal pain, especially on the right side of her abdomen. Diarrhea alternates between loose and watery. No bright red blood per rectum or melena. Patient also has a new concern. She feels a painful cyst underneath the skin on her right upper abdomen. She denies any skin changes over it. States it only hurts when she presses on the cyst.  She has not noticed it before.           Vitals:    08/17/20 1544   BP: 119/76   Pulse: 99   Temp: 98.2 °F (36.8 °C)   TempSrc: Oral   Weight: 158 lb (71.7 kg)   Height: 5' 5\" (1.651 m)       Outpatient Medications Marked as Taking for the 8/17/20 encounter (Office Visit) with Clay Delatorre MD   Medication Sig Dispense Refill    empagliflozin (JARDIANCE) 10 MG tablet Take 1 tablet by mouth daily 30 tablet 1    metFORMIN (GLUCOPHAGE-XR) 500 MG extended release tablet TAKE 2 TABLETS BY MOUTH WITH BREAKFAST AND 1 TABLET AT BEDTIME 270 tablet 0    glipiZIDE (GLUCOTROL) 5 MG tablet Take 1 tablet by mouth daily 90 tablet 0    atorvastatin (LIPITOR) 20 MG tablet TAKE 1 TABLET BY MOUTH DAILY 90 appetite change, fever or fatigue  HENT:  Negative for congestion,sinus pressure, or rhinorrhea  Eyes:  Negative for eye pain or visual changes  Resp:  Negative for SOB, chest tightness, cough  Cardiovascular: Negative for CP, palpitations, LU, orthopnea, PND, LE edema  Gastrointestinal: Negative for melena, BRBPR, N/V  Endocrine:  Negative for polydipsia and polyuria  :  Negative for dysuria, flank pain or urinary frequency  Musculoskeletal:  Negative for back pain or myalgias  Neuro:  Negative for dizziness or lightheadedness  Psych: negative for depression or anxiety      Objective:   Constitutional:   · Reviewed vitals above  · Well Nourished, well developed, no distress       HENT:  · Normal external nose without lesions  · Normal nasal mucosa without swelling or erythema  Neck:  · Symmetric and without masses  · No thyromegaly  Resp:  · Normal effort  · Clear to auscultation bilaterally without rhonchi, wheezing or crackles  Cardiovascular:  · On auscultation, normal S1 and S2 without murmurs, rubs or gallops  · No bruits of bilateral carotids and no JVD  Gastrointestinal:  · Nontender, nondistended, and no masses  · No hepatosplenomegaly  Musculoskeletal:  · Normal Gait  · All extremities without clubbing, cyanosis or edema  Skin:  · Small mobile 1-2cm cystic lesion under skin in RUQ of abdomen  · No rashes on inspection  · No areas of increased heat or induration on palpation  Psych:  · Normal mood and affect  · Normal insight and judgement    Assessment / Plan:     1. Diarrhea of presumed infectious origin  Unclear etiology, but will go ahead and order C. difficile as this was canceled for some reason. If negative, patient to schedule with gastroenterology for possible EGD and colonoscopy, and any other further recommendation    - [facility] - Jackelyn Cruz MD, Gastroenterology, [facility]    2. Lipoma of torso  Most likely lipoma. Ordering ultrasound to further evaluate.   - US SOFT TISSUE LIMITED

## 2020-08-19 DIAGNOSIS — R19.7 DIARRHEA OF PRESUMED INFECTIOUS ORIGIN: ICD-10-CM

## 2020-08-19 LAB
REASON FOR REJECTION: NORMAL
REJECTED TEST: NORMAL

## 2020-08-20 ENCOUNTER — TELEPHONE (OUTPATIENT)
Dept: FAMILY MEDICINE CLINIC | Age: 55
End: 2020-08-20

## 2020-08-20 ENCOUNTER — HOSPITAL ENCOUNTER (OUTPATIENT)
Dept: ULTRASOUND IMAGING | Age: 55
Discharge: HOME | End: 2020-08-20
Payer: COMMERCIAL

## 2020-08-20 PROCEDURE — 76999 ECHO EXAMINATION PROCEDURE: CPT

## 2020-08-20 NOTE — TELEPHONE ENCOUNTER
Please call patient and let her know they rejected the stool specimen for Cdiff because it was formed. They will only check Cdiff if the stool is loose. I see Dr. Eli Aponte referred patient to GI. Make sure she has schedule an appointment. Please document call and then close encounter.   thanks

## 2020-08-27 ENCOUNTER — TELEPHONE (OUTPATIENT)
Dept: FAMILY MEDICINE CLINIC | Age: 55
End: 2020-08-27

## 2020-08-28 ENCOUNTER — TELEPHONE (OUTPATIENT)
Dept: FAMILY MEDICINE CLINIC | Age: 55
End: 2020-08-28

## 2020-08-28 ENCOUNTER — NURSE TRIAGE (OUTPATIENT)
Dept: OTHER | Facility: CLINIC | Age: 55
End: 2020-08-28

## 2020-08-28 LAB
IGA: 273 MG/DL (ref 70–400)
T4 FREE: 1.2 NG/DL (ref 0.9–1.8)
TSH SERPL DL<=0.05 MIU/L-ACNC: 1.96 UIU/ML (ref 0.27–4.2)

## 2020-08-28 NOTE — TELEPHONE ENCOUNTER
Received call from [facility]. Pt agrees with discharge disposition. Care advice given  Call soft transferred to [facility] to schedule appointment. Please do not reply to the triage nurse through this encounter. Any subsequent communication should be directly with the patient. Reason for Disposition   MODERATE pain (e.g. interferes with normal activities) and present > 3 days    Answer Assessment - Initial Assessment Questions  1. ONSET: \"When did the pain start? \"      3 days ago  2. LOCATION: \"Where is the pain located? \"      Shoulder to elbow  3. PAIN: \"How bad is the pain? \" (Scale 1-10; or mild, moderate, severe)    - MILD (1-3): doesn't interfere with normal activities    - MODERATE (4-7): interferes with normal activities (e.g., work or school) or awakens from sleep    - SEVERE (8-10): excruciating pain, unable to do any normal activities, unable to hold a cup of water      4 when not using it, 8 with movement  4. WORK OR EXERCISE: \"Has there been any recent work or exercise that involved this part of the body? \"     Playing with grandkids  5. CAUSE: \"What do you think is causing the arm pain? \"      Pulled muscle  6. OTHER SYMPTOMS: \"Do you have any other symptoms? \" (e.g., neck pain, swelling, rash, fever, numbness, weakness)     no    Protocols used: ARM PAIN-ADULT-OH    Pain decreases with tylenol and heating pad

## 2020-08-28 NOTE — TELEPHONE ENCOUNTER
Schedule diabetes visit next month    We will discuss other options then    Remove jardiance form med list    Please document call and then close encounter.   thanks

## 2020-08-28 NOTE — TELEPHONE ENCOUNTER
PT is calling in wanting to know if she can be seen today for right arm pain that is causing pain the her back on the right side as well. PT also wants to let PCP know that she has a knot in  Her stomach again. PT says she called earlier and spoke to [place].

## 2020-08-28 NOTE — TELEPHONE ENCOUNTER
We don't have any openings today    Ok to schedule with [facility] on Tuesday (don't use same day spot)    Please document call and then close encounter.   thanks

## 2020-08-28 NOTE — TELEPHONE ENCOUNTER
Please see other telephone encounter. We cannot see her today, but okay to schedule with [facility] on Tuesday. Do not use same day. Please document call and then close encounter.   thanks

## 2020-08-29 LAB — TISSUE TRANSGLUTAMINASE IGA: <0.5 U/ML (ref 0–14)

## 2020-08-31 RX ORDER — GLIPIZIDE 5 MG/1
5 TABLET ORAL DAILY
Qty: 90 TABLET | Refills: 0 | Status: SHIPPED | OUTPATIENT
Start: 2020-08-31 | End: 2020-09-17 | Stop reason: SDUPTHER

## 2020-09-01 ENCOUNTER — OFFICE VISIT (OUTPATIENT)
Dept: FAMILY MEDICINE CLINIC | Age: 55
End: 2020-09-01
Payer: COMMERCIAL

## 2020-09-01 VITALS
HEART RATE: 49 BPM | HEIGHT: 65 IN | DIASTOLIC BLOOD PRESSURE: 87 MMHG | WEIGHT: 159 LBS | TEMPERATURE: 98.2 F | SYSTOLIC BLOOD PRESSURE: 127 MMHG | BODY MASS INDEX: 26.49 KG/M2

## 2020-09-01 PROCEDURE — 99213 OFFICE O/P EST LOW 20 MIN: CPT | Performed by: NURSE PRACTITIONER

## 2020-09-01 NOTE — PROGRESS NOTES
rhinorrhea  Eyes:  Negative for eye pain or visual changes  Resp:  Negative for SOB, chest tightness, cough  Cardiovascular: Negative for CP, palpitations, LU, orthopnea, PND, LE edema  Gastrointestinal: Negative for abd pain, melena, BRBPR, N/V/D + tenderness to RUQ lesion  Endocrine:  Negative for polydipsia and polyuria  :  Negative for dysuria, flank pain or urinary frequency  Musculoskeletal:  Negative for back pain or myalgias. + left arm pain  Neuro:  Negative for dizziness or lightheadedness  Psych: negative for depression or anxiety      Objective:   Constitutional:   · Reviewed vitals above  · Well Nourished, well developed, no distress       HENT:  · Normal external nose without lesions  · Bilateral TMs translucent with normal light reflex and bony landmarks  · Normal oropharynx without erythema or exudate  · Normal nasal mucosa without swelling or erythema  Neck:  · Symmetric and without masses  · No thyromegaly  Resp:  · Normal effort  · Clear to auscultation bilaterally without rhonchi, wheezing or crackles  Cardiovascular:  · On auscultation, normal S1 and S2 without murmurs, rubs or gallops  · No bruits of bilateral carotids and no JVD  Gastrointestinal:  · Nontender, nondistended, and no masses  · No hepatosplenomegaly  Musculoskeletal:  · Normal Gait  · All extremities without clubbing, cyanosis or edema      NECK: --full ROM              --no spinal tenderness to palpation              --neg barton spurling test bilaterally    Right shoulder:  --no pain to palpation of clavicle or AC joint        --no pain to palpation of anterior and posterior shoulder         --full ROM without pain  --5/5 strength of supraspinatus, infraspinatus, and subscapularis without pain on resistance testing  --no biceps tendon tenderness.   --neg impingement test  --neg apprehensive test  --no shoulder laxity    Left shoulder:  --no pain to palpation of clavicle or AC joint        --no pain to palpation of anterior

## 2020-09-01 NOTE — LETTER
[address],[place] [address]  Phone: [phone]  Fax: [phone]    BETSY Vaughn CNP        September 1, 2020     Patient: Sergo Hudson   YOB: 1965   Date of Visit: 9/1/2020       To Whom It May Concern: It is my medical opinion that Criselda Billing may return to work on 9/14. If you have any questions or concerns, please don't hesitate to call.     Sincerely,        BETSY Vaughn CNP

## 2020-09-02 ENCOUNTER — TELEPHONE (OUTPATIENT)
Dept: SURGERY | Age: 55
End: 2020-09-02

## 2020-09-09 ENCOUNTER — OFFICE VISIT (OUTPATIENT)
Dept: SURGERY | Age: 55
End: 2020-09-09
Payer: COMMERCIAL

## 2020-09-09 VITALS
HEIGHT: 65 IN | SYSTOLIC BLOOD PRESSURE: 114 MMHG | DIASTOLIC BLOOD PRESSURE: 80 MMHG | BODY MASS INDEX: 26.49 KG/M2 | WEIGHT: 159 LBS

## 2020-09-09 PROCEDURE — 99243 OFF/OP CNSLTJ NEW/EST LOW 30: CPT | Performed by: SURGERY

## 2020-09-09 ASSESSMENT — ENCOUNTER SYMPTOMS
VOMITING: 1
RESPIRATORY NEGATIVE: 1
NAUSEA: 1
ABDOMINAL PAIN: 1

## 2020-09-09 NOTE — PROGRESS NOTES
Subjective:      Patient ID: Justus Mascorro is a 48 y.o. female. HPI  Chief Complaint: abdominal pain  Patient referred by Dr. Radha Bay for evaluation of abdominal pain. Patient reports symptoms of nausea, emesis, fever 100.1. C/o abdominal pain. Also diarrhea but improved 3 weeks ago. Location of symptoms is RUQ and R flank. Symptoms were first noted 3 months. Alleviated by heating pad. Symptoms aggravated by lifting, driving in car. No apparent association with meals. C/o small nodules as well in RUQ. Previous evaluation includes normal RUQ U/S, CT, and U/S soft tissues besides L renal cyst.  Has also seen Gi and awaiting c. Diff specimen. No previous EGD. Patient has a history of DM on metformin and glipizide. Will plan following treatment: HIDA scan. Past Medical History:   Diagnosis Date    Atrophic kidney 2017    sstone    Atrophic scarred right kidney again noted.  Some nephrolithiasis is noted without  hydronephrosis.       Diabetes ([address]     Family history of early CAD    [facility] FH: breast cancer 3/8/2017    25 yo mother reported    HLD (hyperlipidemia)     Iron deficiency anemia 2017    Right peroneal tendinosis 2018       Past Surgical History:   Procedure Laterality Date     SECTION      SKIN BIOPSY         Current Outpatient Medications   Medication Sig Dispense Refill    glipiZIDE (GLUCOTROL) 5 MG tablet TAKE 1 TABLET BY MOUTH DAILY 90 tablet 0    metFORMIN (GLUCOPHAGE-XR) 500 MG extended release tablet TAKE 2 TABLETS BY MOUTH WITH BREAKFAST AND 1 TABLET AT BEDTIME 270 tablet 0    atorvastatin (LIPITOR) 20 MG tablet TAKE 1 TABLET BY MOUTH DAILY 90 tablet 0    lisinopril (PRINIVIL;ZESTRIL) 5 MG tablet TAKE 1 TABLET BY MOUTH DAILY 90 tablet 0    ondansetron (ZOFRAN-ODT) 4 MG disintegrating tablet Take 1 tablet by mouth 3 times daily as needed for Nausea or Vomiting 21 tablet 0    naproxen (NAPROSYN) 500 MG tablet TAKE 1 TABLET BY MOUTH TWO TIMES A DAY WITH MEALS 60 tablet 0    Cranberry 1000 MG CAPS Take 1 tablet by mouth daily      meclizine (ANTIVERT) 25 MG tablet Take 1 tablet by mouth 3 times daily as needed for Nausea 30 tablet 0    Blood Glucose Monitoring Suppl (ONE TOUCH ULTRA MINI) w/Device KIT Use to check sugars 1 kit 0    glucose blood VI test strips (ASCENSIA AUTODISC VI;ONE TOUCH ULTRA TEST VI) strip 1 each by In Vitro route daily As needed. 100 each 3    ONE TOUCH LANCETS MISC 1 each by Does not apply route daily 100 each 3    aspirin 81 MG chewable tablet Take 81 mg by mouth daily      Evening Primrose Oil 500 MG CAPS Take by mouth       No current facility-administered medications for this visit. Prior to Admission medications    Medication Sig Start Date End Date Taking?  Authorizing Provider   glipiZIDE (GLUCOTROL) 5 MG tablet TAKE 1 TABLET BY MOUTH DAILY 8/31/20  Yes Marion Schwab MD   metFORMIN (GLUCOPHAGE-XR) 500 MG extended release tablet TAKE 2 TABLETS BY MOUTH WITH BREAKFAST AND 1 TABLET AT BEDTIME 6/5/20  Yes Marion Schwab MD   atorvastatin (LIPITOR) 20 MG tablet TAKE 1 TABLET BY MOUTH DAILY 6/5/20  Yes Marion Schwab MD   lisinopril (PRINIVIL;ZESTRIL) 5 MG tablet TAKE 1 TABLET BY MOUTH DAILY 6/5/20  Yes Marion Schwab MD   ondansetron (ZOFRAN-ODT) 4 MG disintegrating tablet Take 1 tablet by mouth 3 times daily as needed for Nausea or Vomiting 3/18/20  Yes Marion Shcwab MD   naproxen (NAPROSYN) 500 MG tablet TAKE 1 TABLET BY MOUTH TWO TIMES A DAY WITH MEALS 7/16/19  Yes Marion Schwab MD   Cranberry 1000 MG CAPS Take 1 tablet by mouth daily   Yes Historical Provider, MD   meclizine (ANTIVERT) 25 MG tablet Take 1 tablet by mouth 3 times daily as needed for Nausea 10/1/18  Yes Marion Schwab MD   Blood Glucose Monitoring Suppl (ONE TOUCH ULTRA MINI) w/Device KIT Use to check sugars 12/18/17  Yes Marion Schwab MD   glucose blood VI test strips (Bianchi Elder TOUCH ULTRA TEST VI) strip 1 each by In Vitro route daily As needed. 12/18/17  Yes Kandy Zambrano MD   ONE TOUCH LANCETS MISC 1 each by Does not apply route daily 12/18/17  Yes Kandy Zambrano MD   aspirin 81 MG chewable tablet Take 81 mg by mouth daily   Yes Historical Provider, MD   Evening Primrose Oil 500 MG CAPS Take by mouth   Yes Historical Provider, MD         Allergies   Allergen Reactions    Pcn [Penicillins] Other (See Comments)    Benadryl [Diphenhydramine] Rash    Benzonatate Rash    Ciprofloxacin Nausea And Vomiting    Morphine Rash    Sulfa Antibiotics Hives and Rash       Social History     Socioeconomic History    Marital status:      Spouse name: Not on file    Number of children: Not on file    Years of education: Not on file    Highest education level: Not on file   Occupational History    Not on file   Social Needs    Financial resource strain: Not on file    Food insecurity     Worry: Not on file     Inability: Not on file    Transportation needs     Medical: Not on file     Non-medical: Not on file   Tobacco Use    Smoking status: Never Smoker    Smokeless tobacco: Never Used   Substance and Sexual Activity    Alcohol use:  Yes     Alcohol/week: 0.0 standard drinks     Comment: rarely    Drug use: No    Sexual activity: Yes     Partners: Male     Comment: , 3 children    Lifestyle    Physical activity     Days per week: Not on file     Minutes per session: Not on file    Stress: Not on file   Relationships    Social connections     Talks on phone: Not on file     Gets together: Not on file     Attends Cheondoism service: Not on file     Active member of club or organization: Not on file     Attends meetings of clubs or organizations: Not on file     Relationship status: Not on file    Intimate partner violence     Fear of current or ex partner: Not on file     Emotionally abused: Not on file     Physically abused: Not on file     Forced sexual activity: Not on file   Other Topics Concern    Not on file   Social History Narrative    Not on file       Family History   Problem Relation Age of Onset    Cancer Mother         voicebox and larynx, and lung cancer    Diabetes Father     Heart Disease Father     COPD Sister     Heart Disease Sister 50        MI,     Seizures Brother        Review of Systems   Constitutional: Positive for appetite change and fever. Respiratory: Negative. Cardiovascular: Negative. Gastrointestinal: Positive for abdominal pain, nausea and vomiting. Skin: Negative. Hematological: Negative. All other systems reviewed and are negative. Objective:   Physical Exam  Vitals signs reviewed. Constitutional:       General: She is not in acute distress. Appearance: She is well-developed. She is not diaphoretic. HENT:      Head: Normocephalic and atraumatic. Right Ear: External ear normal.      Left Ear: External ear normal.      Nose: Nose normal.   Eyes:      General: No scleral icterus. Conjunctiva/sclera: Conjunctivae normal.   Neck:      Musculoskeletal: Normal range of motion and neck supple. Cardiovascular:      Rate and Rhythm: Normal rate and regular rhythm. Heart sounds: Normal heart sounds. Pulmonary:      Effort: Pulmonary effort is normal. No respiratory distress. Breath sounds: Normal breath sounds. No wheezing. Abdominal:      General: There is no distension. Palpations: Abdomen is soft. Tenderness: There is no abdominal tenderness. Comments: Pea size nodule RUQ   Musculoskeletal: Normal range of motion. Skin:     General: Skin is warm and dry. Findings: No erythema. Neurological:      Mental Status: She is alert and oriented to person, place, and time. Psychiatric:         Behavior: Behavior normal.         Thought Content: Thought content normal.         Judgment: Judgment normal.         Assessment:       Diagnosis Orders   1.  RUQ pain NM HEPATOBILIARY SCAN W EJECTION FRACTION           Plan:      Do not suspect small nodule (cyst vs nodule) causing constellation of symptoms  Symptoms are vague. Parts Sound MSK in nature due to heavy lifting at work. Encouraged stretching BID and NSAIDs PRN  Bloating, emesis, and GI symptoms unrelated however. Will eval GB for dyskinesia with HIDA scan. If negative and no improvement with stretching then will refer back to Dr. Rebel Ram for EGD  Will call with HIDA results. Encouraged food diary and association with any symptoms.           Beto Gonzalez MD

## 2020-09-14 ENCOUNTER — HOSPITAL ENCOUNTER (OUTPATIENT)
Dept: NUCLEAR MEDICINE | Age: 55
Discharge: HOME | End: 2020-09-14
Payer: COMMERCIAL

## 2020-09-14 PROCEDURE — 78227 HEPATOBIL SYST IMAGE W/DRUG: CPT

## 2020-09-14 PROCEDURE — 3430000000 HC RX DIAGNOSTIC RADIOPHARMACEUTICAL: Performed by: SURGERY

## 2020-09-14 PROCEDURE — A9503 TC99M MEDRONATE: HCPCS | Performed by: SURGERY

## 2020-09-14 RX ORDER — TC 99M MEDRONATE 20 MG/10ML
25 INJECTION, POWDER, LYOPHILIZED, FOR SOLUTION INTRAVENOUS
Status: COMPLETED | OUTPATIENT
Start: 2020-09-14 | End: 2020-09-14

## 2020-09-14 RX ADMIN — TC 99M MEDRONATE 25 MILLICURIE: 20 INJECTION, POWDER, LYOPHILIZED, FOR SOLUTION INTRAVENOUS at 07:29

## 2020-09-16 ENCOUNTER — TELEPHONE (OUTPATIENT)
Dept: FAMILY MEDICINE CLINIC | Age: 55
End: 2020-09-16

## 2020-09-16 ENCOUNTER — OFFICE VISIT (OUTPATIENT)
Dept: FAMILY MEDICINE CLINIC | Age: 55
End: 2020-09-16
Payer: COMMERCIAL

## 2020-09-16 VITALS
SYSTOLIC BLOOD PRESSURE: 130 MMHG | DIASTOLIC BLOOD PRESSURE: 86 MMHG | HEIGHT: 65 IN | BODY MASS INDEX: 26.99 KG/M2 | WEIGHT: 162 LBS | HEART RATE: 84 BPM

## 2020-09-16 LAB
CHOLESTEROL, TOTAL: 156 MG/DL (ref 0–199)
CREATININE URINE: 91.6 MG/DL (ref 28–259)
HBA1C MFR BLD: 7.6 %
HDLC SERPL-MCNC: 47 MG/DL (ref 40–60)
LDL CHOLESTEROL CALCULATED: 81 MG/DL
MICROALBUMIN UR-MCNC: <1.2 MG/DL
MICROALBUMIN/CREAT UR-RTO: NORMAL MG/G (ref 0–30)
TRIGL SERPL-MCNC: 139 MG/DL (ref 0–150)
VLDLC SERPL CALC-MCNC: 28 MG/DL

## 2020-09-16 PROCEDURE — 83036 HEMOGLOBIN GLYCOSYLATED A1C: CPT | Performed by: FAMILY MEDICINE

## 2020-09-16 PROCEDURE — 3051F HG A1C>EQUAL 7.0%<8.0%: CPT | Performed by: FAMILY MEDICINE

## 2020-09-16 PROCEDURE — 99214 OFFICE O/P EST MOD 30 MIN: CPT | Performed by: FAMILY MEDICINE

## 2020-09-16 PROCEDURE — 20610 DRAIN/INJ JOINT/BURSA W/O US: CPT | Performed by: FAMILY MEDICINE

## 2020-09-16 PROCEDURE — 90686 IIV4 VACC NO PRSV 0.5 ML IM: CPT | Performed by: FAMILY MEDICINE

## 2020-09-16 PROCEDURE — 90471 IMMUNIZATION ADMIN: CPT | Performed by: FAMILY MEDICINE

## 2020-09-16 RX ORDER — METHYLPREDNISOLONE ACETATE 80 MG/ML
80 INJECTION, SUSPENSION INTRA-ARTICULAR; INTRALESIONAL; INTRAMUSCULAR; SOFT TISSUE ONCE
Status: COMPLETED | OUTPATIENT
Start: 2020-09-16 | End: 2020-09-16

## 2020-09-16 RX ADMIN — METHYLPREDNISOLONE ACETATE 80 MG: 80 INJECTION, SUSPENSION INTRA-ARTICULAR; INTRALESIONAL; INTRAMUSCULAR; SOFT TISSUE at 10:53

## 2020-09-16 NOTE — PROGRESS NOTES
again noted.  Some nephrolithiasis is noted without  hydronephrosis.  Diabetes ([address]     Family history of early CAD     FH: breast cancer 3/8/2017    23 yo mother reported    HLD (hyperlipidemia)     Iron deficiency anemia 2017    Right peroneal tendinosis 2018       Past Surgical History:   Procedure Laterality Date     SECTION      SKIN BIOPSY         Social History     Tobacco Use    Smoking status: Never Smoker    Smokeless tobacco: Never Used   Substance Use Topics    Alcohol use: Yes     Alcohol/week: 0.0 standard drinks     Comment: rarely       Family History   Problem Relation Age of Onset   [facility] Cancer Mother         voicebox and larynx, and lung cancer    Diabetes Father     Heart Disease Father     COPD Sister     Heart Disease Sister 50        MI,     Seizures Brother            Review of Systems  See hpi    Objective:   Physical Exam     Constitutional:   · Reviewed vitals above  · Well Nourished, well developed, no distress       Neck:  · Symmetric and without masses  · No thyromegaly  Resp:  · Normal effort  · Clear to auscultation bilaterally without rhonchi, wheezing or crackles  Cardiovascular:  · On auscultation, normal S1 and S2 without murmurs, rubs or gallops  · No bruits of bilateral carotids and no JVD  Gastrointestinal:  · Nontender, nondistended, and no masses  · No hepatosplenomegaly  Musculoskeletal:  · All extremities without clubbing, cyanosis or edema  NECK: --full ROM              --no spinal tenderness to palpation              --neg barton spurling test bilaterally    Right shoulder:  --no pain to palpation of clavicle or AC joint        --no pain to palpation of anterior and posterior shoulder         --full ROM without pain  --5/5 strength of supraspinatus, infraspinatus, and subscapularis without pain on resistance testing  --no biceps tendon tenderness.   --neg impingement test  --neg apprehensive test  --no shoulder laxity    Left shoulder:  --no pain to palpation of clavicle or AC joint        --no pain to palpation of anterior and posterior shoulder         --decrease ROM due to pain when tried to lift left arm over head  --5/5 strength of supraspinatus, infraspinatus, and subscapularis with pain on resistance testing of supraspinatus  -+ biceps tendon tenderness. + impingement test  --neg apprehensive test  --no shoulder laxity    Skin:  · No rashes on inspection  Psych:  · Normal mood and affect  · Normal insight and judgement    FOOT EXAM:    +1 distal tibialis anterior and dorsalis pedis pulses bilaterally  Normal sensation to monofilament testing bilaterally  No calluses  No ulcers  Non mycotic toe nails. PROCEDURE NOTE:  Informed consent was obtained. Patient's left subacromial was cleaned with alcohol swabs. Gebauer's Ethyl Chloride spray was used to anesthetize the skin. Using sterile technique left subacromal injection was performed with 3ml lidocaine and 80mg depomedrol. Bandaid applied to injection sight. Hemostasis achieved. Patient tolerated procedure well. Assessment:      See below       Plan:     1. Type 2 diabetes mellitus without complication, without long-term current use of insulin ([facility])  Poc AvL0s=8.6 and is worsening. Partly due to stopping Jardiance since she had side effects. Januvia was not affordable. Will send over Onglyza to see if this is potentially cheaper. Encouraged patient to work on diabetic diet. .        Pt to continue metformin XR 1000 mg in the morning, and 500 mg q.h.s. , and glipizide 5mg. Reviewed recent kidney function and it was normal.  Checking urine microalbumin    Pt to continue aspirin, statin and ACEI        2. Pure hypercholesterolemia  Checking fast lipid panel. Will adjust Lipitor 20 mg if necessary. 3. Coracoid impingement of left shoulder  Steroid shot given as discussed above. Sending patient to physical therapy afterwards.   [facility] - Renee Favor to take 5 days off work while waiting for steroid shot to work. Patient does have some biceps tendon pain on left side compared to right. Possibly he has some tendinitis here as well. Does not feel like she has a torn bicep at this time. If this pain worsens patient let me know. HM:    Flu vaccine given today with VIS form    shingrix vaccine patient declined    Colonoscopy done 8/29/18: next due 2023    Mammogram completed 9/18/19 and was WNLS.   Reordering today and gave number to schedule    Pap smear normal with negative HPV on 3/24/17

## 2020-09-16 NOTE — PATIENT INSTRUCTIONS
Call central scheduling for mammogram:   [address] [facility]   [facility], [address] [place] [address]   Shad Allen [address]   Telephone: [phone]

## 2020-09-16 NOTE — TELEPHONE ENCOUNTER
Submitted PA for Onglyza 5MG tablets    [facility] [facility]  Key: [id number]   STATUS:denied    . Please notify patient, thank you.

## 2020-09-17 ENCOUNTER — OFFICE VISIT (OUTPATIENT)
Dept: ORTHOPEDIC SURGERY | Age: 55
End: 2020-09-17
Payer: COMMERCIAL

## 2020-09-17 VITALS — BODY MASS INDEX: 26.99 KG/M2 | RESPIRATION RATE: 16 BRPM | HEIGHT: 65 IN | TEMPERATURE: 99.5 F | WEIGHT: 162 LBS

## 2020-09-17 PROCEDURE — 99244 OFF/OP CNSLTJ NEW/EST MOD 40: CPT | Performed by: ORTHOPAEDIC SURGERY

## 2020-09-17 RX ORDER — METFORMIN HYDROCHLORIDE 500 MG/1
TABLET, EXTENDED RELEASE ORAL
Qty: 270 TABLET | Refills: 0 | Status: SHIPPED | OUTPATIENT
Start: 2020-09-17 | End: 2020-12-21 | Stop reason: SDUPTHER

## 2020-09-17 RX ORDER — LISINOPRIL 5 MG/1
TABLET ORAL
Qty: 90 TABLET | Refills: 0 | Status: SHIPPED | OUTPATIENT
Start: 2020-09-17 | End: 2020-12-21 | Stop reason: SDUPTHER

## 2020-09-17 RX ORDER — GLIPIZIDE 5 MG/1
5 TABLET ORAL DAILY
Qty: 90 TABLET | Refills: 0 | Status: SHIPPED | OUTPATIENT
Start: 2020-09-17 | End: 2020-11-20

## 2020-09-17 RX ORDER — ATORVASTATIN CALCIUM 20 MG/1
TABLET, FILM COATED ORAL
Qty: 90 TABLET | Refills: 0 | Status: SHIPPED | OUTPATIENT
Start: 2020-09-17 | End: 2020-12-21 | Stop reason: SDUPTHER

## 2020-09-17 NOTE — PROGRESS NOTES
shoulder is: 160 degrees abduction, 160 degrees forward flexion, 40 degrees of external rotation and internal rotation to L4. Active and passive range of motion of the opposite shoulder is full. Examination of the left shoulder reveals positive Neer and Phelan' impingement signs. There is mild subacromial crepitus with range of motion. Drop arm test does cause slight pain. Speed's test does cause slight pain over the biceps. There is moderate tenderness over the Bicipital groove. She  does have tenderness over the TRIS[facility] joint. Cross body adduction test is positive. She has moderate tenderness over the subacromial space. There is no evidence of scapular winging. Lift off sign is negative. There is no evidence of atrophy. External rotation strength is full. There are no skin lesions, cellulitis, or extreme edema in the upper extremities. Sensation is intact to axillary, median, radial, and ulnar nerves bilaterally. The patient has warm and well-perfused bilateral upper extremities with brisk capillary refill. Radial and ulnar pulses are palpable and 2+ bilaterally. X-rays: 4 views of the left shoulder obtained in the office today were extensively reviewed. There is a subtle hooking to the acromion. There is mild AC joint arthritis. Otherwise the x-rays were unremarkable. There is no evidence of fracture or dislocation. Impression: #1 left shoulder biceps strain #2 left shoulder rotator cuff tendinitis #3 left shoulder AC joint arthritis    Plan: At this time, the patient was encouraged to monitor her symptoms. She was encouraged to participate in the therapy program.  She already has a prescription for therapy. She was instructed to be very cautious with the anti-inflammatories as she does have a history of kidney dysfunction. She was instructed to modify her activities and avoid overhead lifting, pushing and pulling.   The patient will follow-up with me in approximately 3 weeks and we will reassess her then. If the patient continues to have significant pain without weakness, we will consider a repeat injection. If she does have significant weakness, we will consider an MRI scan.

## 2020-09-21 NOTE — TELEPHONE ENCOUNTER
Called pharm RE: new medication that was Rx. States it is requiring a PA. Please advise                 1:09 PM   You routed this conversation to [facility] Practice Staff   Raven Caraballo   to [place]         1:39 PM   Note      Please initiate PA for saxagliptin (ONGLYZA) 5 MG TABS tablet            September 21, 2020   [place]           8:06 AM   Note      Submitted PA for Onglyza 5MG tablets    [facility] [facility]  Key: [id number]   STATUS:denied     .Please notify patient, thank you.

## 2020-09-21 NOTE — TELEPHONE ENCOUNTER
Pt calling RE: denial for onglyza. Wanting to know if going to call something else in for her. Please advise.  Pt is reachable at [phone]

## 2020-09-28 ENCOUNTER — HOSPITAL ENCOUNTER (OUTPATIENT)
Dept: PHYSICAL THERAPY | Age: 55
Setting detail: THERAPIES SERIES
Discharge: HOME | End: 2020-09-28
Payer: COMMERCIAL

## 2020-09-28 PROCEDURE — 97162 PT EVAL MOD COMPLEX 30 MIN: CPT

## 2020-09-28 PROCEDURE — 97110 THERAPEUTIC EXERCISES: CPT

## 2020-09-28 ASSESSMENT — PAIN DESCRIPTION - LOCATION: LOCATION: ARM;SHOULDER

## 2020-09-28 ASSESSMENT — PAIN DESCRIPTION - PAIN TYPE: TYPE: ACUTE PAIN

## 2020-09-28 ASSESSMENT — PAIN DESCRIPTION - FREQUENCY: FREQUENCY: INTERMITTENT

## 2020-09-28 ASSESSMENT — PAIN DESCRIPTION - ONSET: ONSET: SUDDEN

## 2020-09-28 ASSESSMENT — PAIN DESCRIPTION - DESCRIPTORS: DESCRIPTORS: SHARP;SHOOTING

## 2020-09-28 ASSESSMENT — PAIN - FUNCTIONAL ASSESSMENT: PAIN_FUNCTIONAL_ASSESSMENT: PREVENTS OR INTERFERES SOME ACTIVE ACTIVITIES AND ADLS

## 2020-09-28 ASSESSMENT — PAIN SCALES - GENERAL: PAINLEVEL_OUTOF10: 6

## 2020-09-28 ASSESSMENT — PAIN DESCRIPTION - ORIENTATION: ORIENTATION: LEFT

## 2020-09-28 ASSESSMENT — PAIN DESCRIPTION - PROGRESSION: CLINICAL_PROGRESSION: GRADUALLY IMPROVING

## 2020-09-28 NOTE — PROGRESS NOTES
Physical Therapy  Initial Assessment  Date: 2020  Patient Name: Jah Pal  MRN: [id number]  : 1965     Treatment Diagnosis: Limited left shoulder ROM, weakness, pain, kyphotic posture    Restrictions  Restrictions/Precautions  Restrictions/Precautions: Fall Risk(No risk of fals)    Subjective   General  Chart Reviewed: Yes  Patient assessed for rehabilitation services?: Yes  Additional Pertinent Hx: PLOF: Independent  Family / Caregiver Present: No  Referring Practitioner: Dr. Eleazar Mello  Referral Date : 20  Diagnosis: Coracoid impingement on left shoulder  PT Visit Information  Onset Date: 20  PT Insurance Information: BCBS  Total # of Visits Approved: 12  Total # of Visits to Date: 1  Subjective  Subjective: Pt had no pain on shoulder until she picked up her 35# grandson on 20. She has left shoulder pain which radiates into upper arm. She has been advised to avoid use of the left arm and received an injection by  and she responded well. The ice helped to decrease edema and the heat helps with the shoulder blade. Her goal of therapy is  tohave no more pain and to be able to use her left arm. Pain Screening  Patient Currently in Pain: Yes  Pain Assessment  Pain Assessment: 0-10  Pain Level: 6  Patient's Stated Pain Goal: No pain  Pain Type: Acute pain  Pain Location: Arm; Shoulder  Pain Orientation: Left  Pain Radiating Towards: T o mid forearm  Pain Descriptors: Sharp; Shooting  Pain Frequency: Intermittent  Pain Onset: Sudden  Clinical Progression: Gradually improving  Functional Pain Assessment: Prevents or interferes some active activities and ADLs  Non-Pharmaceutical Pain Intervention(s): Cold applied; Heat applied; Rest  Vital Signs  Patient Currently in Pain: Yes         Social/Functional History  Social/Functional History  Lives With: Family  Type of Home: House  Home Layout: Two level  Home Access: Stairs to enter with rails  Entrance Stairs - Number of Steps: 1  Bathroom Shower/Tub: Tub/Shower unit  Active : No  Occupation: Other(comment)  Type of occupation: Off right now  Leisure & Hobbies: walking and palying with grandkids    Objective     Observation/Palpation  Posture: Fair(Thoracic kyphosis)  Palpation: Tenderess on posterior shoulder and on posterior tricep  Edema: None  Scar: None    AROM RUE (degrees)  RUE AROM : WFL  AROM LUE (degrees)  LUE AROM : Exceptions  L Shoulder Flexion 0-180: 0-110  L Shoulder Extension 0-45: 0-70  L Shoulder ABduction 0-180: 0-120  L Shoulder Int Rotation  0-70: WNL  L Shoulder Ext Rotation  0-90: WNL  L Elbow Flexion 0-145: WNL  L Elbow Extension 145-0: WNL    Strength RUE  Strength RUE: Exception  R Shoulder Internal Rotation: 4-/5  R Shoulder External Rotation: 4-/5  Strength LUE  Strength LUE: Exception  L Shoulder Flexion: 4+/5(with pain on resistance)  L Shoulder ABduction: 4+/5(with pain on resistance)  L Shoulder Internal Rotation: 4+/5  L Shoulder External Rotation: 4+/5(with pain on resistance)  L Elbow Flexion: 4+/5  L Elbow Extension: 4+/5        Sensation  Overall Sensation Status: WFL         Hand Dominance  Hand Dominance: Left    Assessment   Conditions Requiring Skilled Therapeutic Intervention  Body structures, Functions, Activity limitations: Decreased functional mobility ; Decreased ADL status; Decreased ROM; Decreased strength; Increased pain;Decreased posture  Assessment: PLOF: Independent  Treatment Diagnosis: Limited left shoulder ROM, weakness, pain, kyphotic posture  Prognosis: Good  Decision Making: Medium Complexity  REQUIRES PT FOLLOW UP: Yes         Plan   Plan  Times per week: 2  Plan weeks: 6  Current Treatment Recommendations: Strengthening, ROM, Modalities, Manual Therapy - Soft Tissue Mobilization, Home Exercise Program    G-Code       OutComes Score                 QuickDASH Total Score: 34 (09/28/20 0956)       QuickDASH Disability/Symptom Score : 52.27 % (09/28/20 0956) AM-PAC Score             Goals  Short term goals  Time Frame for Short term goals: 6 weeks  Short term goal 1: Pt will increase in mobility of left shoulder to Normal range for ADLS's  Short term goal 2: Pt will note no pain on left shoulder with ADL's  Short term goal 3: Pt wll increase to normal strength (5/5 on MMT) for ADL's  Patient Goals   Patient goals :  \"No more pain\"       Therapy Time   Individual Concurrent Group Co-treatment   Time In 0940         Time Out 1020         Minutes 40         Timed Code Treatment Minutes: [address] [facility], PT

## 2020-09-28 NOTE — PLAN OF CARE
Outpatient Physical Therapy  [] [facility]    Phone: [phone]   Fax: [phone]   [x] [facility]  Phone: [phone]              Fax: [phone]  [] [facility]   Phone: [phone]   Fax: [phone]     To: Referring Practitioner: Dr. Benoit Kathleen      Patient: Nicki Myers   : 1965   MRN: [id number]  Evaluation Date: 2020      Diagnosis Information:  · Diagnosis: Coracoid impingement on left shoulder   · Treatment Diagnosis: Limited left shoulder ROM, weakness, pain, kyphotic posture     Physical Therapy Certification/Re-Certification Form  Dear Dr. Benoit Kathleen,  The following patient has been evaluated for physical therapy services and for therapy to continue, Medicare requires monthly physician review of the treatment plan. Please review the attached evaluation and/or summary of the patient's plan of care, and verify that you agree therapy should continue by signing the attached document and sending it back to our office. Plan of Care/Treatment to date:  [x] Therapeutic Exercise    [x] Modalities:  [x] Therapeutic Activity     [] Ultrasound  [] Electrical Stimulation  [] Gait Training      [] Cervical Traction [] Lumbar Traction  [] Neuromuscular Re-education    [] Cold/hotpack [] Iontophoresis   [x] Instruction in HEP     Other:  [x] Manual Therapy      []             [] Aquatic Therapy      []           ? Frequency/Duration:  # Days per week: [] 1 day # Weeks: [] 1 week [] 5 weeks     [x] 2 days? [] 2 weeks [x] 6 weeks     [] 3 days   [] 3 weeks [] 7 weeks     [] 4 days   [] 4 weeks [] 8 weeks    Rehab Potential: [] Excellent [x] Good [] Fair  [] Poor       Electronically signed by:  Reji Zaldivar PT      If you have any questions or concerns, please don't hesitate to call.   Thank you for your referral.      Physician Signature:________________________________Date:__________________  By signing above, therapists plan is approved by physician

## 2020-09-28 NOTE — FLOWSHEET NOTE
Physical Therapy Daily Treatment Note  Date:  2020    Patient Name:  Trinidad Berry    :  1965  MRN: [id number]    Restrictions/Precautions: Restrictions/Precautions  Restrictions/Precautions: Fall Risk(No risk of fals)    Pertinent Medical History: Additional Pertinent Hx: PLOF: Independent    Medical/Treatment Diagnosis Information:  · Diagnosis: Coracoid impingement on left shoulder  · Treatment Diagnosis: Limited left shoulder ROM, weakness, pain, kyphotic posture    Insurance/Certification information:  PT Insurance Information: Laurent Corey  Physician Information:  Referring Practitioner: Dr. Slava Benitez of care signed (Y/N):  Sent to Dr. Donna Butcher on 20    Visit# / total visits:    Pain level:  3-6/10     G-Code (if applicable):      Date / Visit # G-Code Applied:  /   Niraj Adler on eval = 34/CK    Progress Note: []  Yes  [x]  No  Next due by: Visit #10      History of Injury: See below    Subjective:  Subjective  Subjective: Pt had no pain on shoulder until she picked up her 35# grandson on 20. She has left shoulder pain which radiates into upper arm. She has been advised to avoid use of the left arm and received an injection by  and she responded well. The ice helped to decrease edema and the heat helps with the shoulder blade. Her goal of therapy is  tohave no more pain and to be able to use her left arm. Objective: See eval   Observation:    Test measurements:        Exercises:  Exercise/Equipment Resistance/Repetitions Other comments   IR/ER of left shoulder [place] blue x 2x 10 Plum band for HEP   Rows [place] blue band, 2 x 10    Scap retraction  15X                                             HEP     Supine protraction/retraction   Add   Exercises as above  Review     Other Therapeutic Activities:  Patient was educated on diagnosis, plan of care and prognosis of their complaint. Also, frequency and duration of treatments was discussed.  Patient was informed of the attendance policy and issued a copy for their records. Home Exercise Program: Patient was given written instructions for home exercises as above. Patient performs them correctly and understands purpose. Manual Treatments:      Modalities:      Charges: Therapeutic Exercise:  [x] (95068) Provided verbal/tactile cueing for activities to restore or maintain strength, flexibility, endurance, ROM for improvements with self-care, mobility, lifting and ambulation. Neuromuscular Re-Education  [] (10100) Provided verbal/tactile cueing for activities to restore or maintain balance, coordination, kinesthetic sense, posture, motor skill, proprioception for self-care, mobility, lifting, and ambulation. Therapeutic Activities:    [] (34947) Provided verbal/tactile cueing to address functional limitations related to loss of mobility, strength, balance, and coordination. Gait Training:  [] (82058) Provided training and instruction to the patient for proper postural muscle recruitment and positioning with ambulation re-education     Home Exercise Program:    [x] (74882) Reviewed/Progressed HEP activities related to strengthening, flexibility, endurance, ROM for functional self-care, mobility, lifting and ambulation   [] (66433) Reviewed/Progressed HEP activities related to improving balance, coordination, kinesthetic sense, posture, motor skill, proprioception for self-care, mobility, lifting, and ambulation      Manual Treatments:  MFR / STM / Oscillations-Mobs:  G-I, II, III, IV / Manipulation / MLD  [] (03765) Provided manual therapy to mobilize  soft tissue/joints/fluid for the purpose of modulating pain, promoting relaxation, increasing ROM, reducing/eliminating soft tissue swelling/inflammation/restriction, improving soft tissue extensibility and allowing for proper ROM for normal function with self- care, mobility, lifting and ambulation.         Timed Code Treatment Minutes: 15   Total Treatment Minutes: 40     [] EVAL (LOW) 31742   [x] EVAL (MOD) 94309   [] EVAL (HIGH) 98222   [] RE-EVAL   [x] TE (44569) x   1    [] NMR (27322)   x    [] Manual (23696) x    [] Ultrasound (89700) x  [] TA (02509) x  [] Mech Traction (06053)  [] Ionto (06917)           [] ES (un) (88496):   [] Other:      Treatment/Activity Tolerance:  [x] Patient tolerated treatment well [] Patient limited by fatigue  [] Patient limited by pain  [] Patient limited by other medical complications  [] Other:     Prognosis: [x] Good [] Fair  [] Poor    Goals:    Short term goals  Time Frame for Short term goals: 6 weeks  Short term goal 1: Pt will increase in mobility of left shoulder to Normal range for ADLS's  Short term goal 2: Pt will note no pain on left shoulder with ADL's  Short term goal 3: Pt wll increase to normal strength (5/5 on MMT) for ADL's              Patient Requires Follow-up: [x] Yes  [] No    Plan:   [] Continue per plan of care [] Alter current plan (see comments)  [x] Plan of care initiated [] Hold pending MD visit [] Discharge    Plan for Next Session: See above, Add ex as above, Consider modalities and man therapy.     Electronically signed by:  Jeff Vidal, PT,

## 2020-09-30 ENCOUNTER — HOSPITAL ENCOUNTER (OUTPATIENT)
Dept: PHYSICAL THERAPY | Age: 55
Setting detail: THERAPIES SERIES
Discharge: HOME | End: 2020-09-30
Payer: COMMERCIAL

## 2020-09-30 NOTE — FLOWSHEET NOTE
Physical Therapy  Cancellation/No-show Note  Patient Name:  Bean Preciado  :  1965   Date:  2020  Cancelled visits to date: 1  No-shows to date: 0    For today's appointment patient:  [x]  Cancelled  []  Rescheduled appointment  []  No-show     Reason given by patient:  [x]  Patient ill  []  Conflicting appointment  []  No transportation    []  Conflict with work  []  No reason given  []  Other:     Comments:  Stomach illness    Electronically signed by:  Celestino Neves, PT

## 2020-10-06 ENCOUNTER — HOSPITAL ENCOUNTER (OUTPATIENT)
Dept: WOMENS IMAGING | Age: 55
Discharge: HOME | End: 2020-10-06
Payer: COMMERCIAL

## 2020-10-08 ENCOUNTER — HOSPITAL ENCOUNTER (OUTPATIENT)
Dept: WOMENS IMAGING | Age: 55
Discharge: HOME | End: 2020-10-08

## 2020-10-08 PROCEDURE — 77067 SCR MAMMO BI INCL CAD: CPT

## 2020-10-22 ENCOUNTER — OFFICE VISIT (OUTPATIENT)
Dept: PRIMARY CARE CLINIC | Age: 55
End: 2020-10-22
Payer: COMMERCIAL

## 2020-10-22 PROCEDURE — 99211 OFF/OP EST MAY X REQ PHY/QHP: CPT | Performed by: NURSE PRACTITIONER

## 2020-10-22 NOTE — PROGRESS NOTES
[facility] received a viral test for COVID-19. They were educated on isolation and quarantine as appropriate. For any symptoms, they were directed to seek care from their PCP, given contact information to establish with a doctor, directed to an urgent care or the emergency room.

## 2020-10-24 LAB — SARS-COV-2, NAA: DETECTED

## 2020-10-26 ENCOUNTER — TELEPHONE (OUTPATIENT)
Dept: FAMILY MEDICINE CLINIC | Age: 55
End: 2020-10-26

## 2020-10-26 NOTE — TELEPHONE ENCOUNTER
PT called in stating that when she checked her COVID test results through [address] it said it was negative but today she received a call saying hers were positive. PT would like to clarification.      Please call back: [phone]

## 2020-10-26 NOTE — TELEPHONE ENCOUNTER
Discussed with patient that her results are in fact positive, and she needs to isolate at home. Reviewed CDC guidelines. Recommended everyone living in the household quarantine for at least 14 days as well. At the start to develop symptoms, they should schedule video visit for further instruction. She expressed understanding.

## 2020-11-18 NOTE — PROGRESS NOTES
Assessment:   Summary: Pt came only once and then cancelled due to illness and then did not show for appt after that. Will be discharged. Progression Towards Functional goals:  [] Patient is progressing as expected towards functional goals listed. [] Progression is slowed due to complexities listed. [] Progression has been slowed due to co-morbidities. [x] Plan just implemented, too soon to assess goals progression  [] Other:    Goals:   Short term goals  Time Frame for Short term goals: 6 weeks  Short term goal 1: Pt will increase in mobility of left shoulder to Normal range for ADLS's  Short term goal 2: Pt will note no pain on left shoulder with ADL's  Short term goal 3: Pt wll increase to normal strength (5/5 on MMT) for ADL's              Rehab Potential: [] Excellent [x] Good [] Fair  [] Poor     Goal Status:  [] Achieved [] Partially Achieved  [x] Not Achieved     Current Frequency/Duration:  # Days per week: [x] 1 day # Weeks: [x] 1 week [] 4 weeks      [] 2 days   [] 2 weeks [] 5 weeks      [] 3 days   [] 3 weeks [] 6 weeks     Patient Status: [] Continue per initial plan of Care     [x] Patient now discharged     [] Additional visits requested, Please re-certify for additional visits:      Requested frequency/duration:  X/week for weeks    Electronically signed by:  Titus Diaz PT    If you have any questions or concerns, please don't hesitate to call.   Thank you for your referral.    Physician Signature:________________________________Date:__________________  By signing above, therapists plan is approved by physician

## 2020-11-20 RX ORDER — GLIPIZIDE 5 MG/1
5 TABLET ORAL DAILY
Qty: 90 TABLET | Refills: 0 | Status: SHIPPED | OUTPATIENT
Start: 2020-11-20 | End: 2021-01-08 | Stop reason: SDUPTHER

## 2020-11-25 ENCOUNTER — OFFICE VISIT (OUTPATIENT)
Dept: FAMILY MEDICINE CLINIC | Age: 55
End: 2020-11-25
Payer: COMMERCIAL

## 2020-11-25 VITALS
SYSTOLIC BLOOD PRESSURE: 121 MMHG | TEMPERATURE: 98 F | BODY MASS INDEX: 27.16 KG/M2 | HEART RATE: 90 BPM | WEIGHT: 163 LBS | DIASTOLIC BLOOD PRESSURE: 86 MMHG | HEIGHT: 65 IN

## 2020-11-25 PROCEDURE — 99213 OFFICE O/P EST LOW 20 MIN: CPT | Performed by: FAMILY MEDICINE

## 2020-11-25 RX ORDER — TRAMADOL HYDROCHLORIDE 50 MG/1
50 TABLET ORAL EVERY 8 HOURS PRN
Qty: 15 TABLET | Refills: 0 | Status: SHIPPED | OUTPATIENT
Start: 2020-11-25 | End: 2021-01-04 | Stop reason: SDUPTHER

## 2020-11-25 NOTE — PROGRESS NOTES
Subjective:      Patient ID: Tonia Deshpande is a 48 y.o. female. HPI     CC: left shoulder pain    HPI    Left shoulder pain  Patient complaining of 1 month of worsening left shoulder pain. She was initially evaluated by myself 2 months ago, and diagnosed with left supraspinatus tendinitis and was given subacromial steroid injection. Patient then saw Dr. Leticia Richard, who ordered physical therapy. Patient went to 3 sessions of physical therapy and then quit. She states she is doing home exercises. Patient states steroid injection lasted about a month, then left shoulder pain started hurting again. Pain is felt in left shoulder and radiates down left tricep. Pain is worse when she tries to lift her left arm over her head. She has not been lifting anything heavy with her left arm. She denies any neck pain, or radiation of pain down into her forearm or hand. No numbness or weakness in left upper extremity. Pain is also worsened when she lays down at night. She states that she excellently rolls onto her left side it causes intense pain.     Vitals:    11/25/20 1426   BP: 121/86   Pulse: 90   Temp: 98 °F (36.7 °C)   TempSrc: Infrared   Weight: 163 lb (73.9 kg)   Height: 5' 5\" (1.651 m)       Outpatient Medications Marked as Taking for the 11/25/20 encounter (Office Visit) with Lance Spangler MD   Medication Sig Dispense Refill    glipiZIDE (GLUCOTROL) 5 MG tablet TAKE 1 TABLET BY MOUTH DAILY 90 tablet 0    linagliptin (TRADJENTA) 5 MG tablet Take 1 tablet by mouth daily 90 tablet 0    atorvastatin (LIPITOR) 20 MG tablet TAKE 1 TABLET BY MOUTH DAILY 90 tablet 0    lisinopril (PRINIVIL;ZESTRIL) 5 MG tablet TAKE 1 TABLET BY MOUTH DAILY 90 tablet 0    metFORMIN (GLUCOPHAGE-XR) 500 MG extended release tablet TAKE 2 TABLETS BY MOUTH WITH BREAKFAST AND 1 TABLET AT BEDTIME 270 tablet 0    ondansetron (ZOFRAN-ODT) 4 MG disintegrating tablet Take 1 tablet by mouth 3 times daily as needed for Nausea or Vomiting 21 tablet 0    naproxen (NAPROSYN) 500 MG tablet TAKE 1 TABLET BY MOUTH TWO TIMES A DAY WITH MEALS 60 tablet 0    Cranberry 1000 MG CAPS Take 1 tablet by mouth daily      meclizine (ANTIVERT) 25 MG tablet Take 1 tablet by mouth 3 times daily as needed for Nausea 30 tablet 0    Blood Glucose Monitoring Suppl (ONE TOUCH ULTRA MINI) w/Device KIT Use to check sugars 1 kit 0    glucose blood VI test strips (ASCENSIA AUTODISC VI;ONE TOUCH ULTRA TEST VI) strip 1 each by In Vitro route daily As needed. 100 each 3    ONE TOUCH LANCETS MISC 1 each by Does not apply route daily 100 each 3    aspirin 81 MG chewable tablet Take 81 mg by mouth daily      Evening Primrose Oil 500 MG CAPS Take by mouth             Past Medical History:   Diagnosis Date    Atrophic kidney 2017    sstone    Atrophic scarred right kidney again noted.  Some nephrolithiasis is noted without  hydronephrosis.  Diabetes ([address]     Family history of early CAD     FH: breast cancer 3/8/2017    23 yo mother reported    HLD (hyperlipidemia)     Iron deficiency anemia 2017    Right peroneal tendinosis 2018       Past Surgical History:   Procedure Laterality Date     SECTION      SKIN BIOPSY         Social History     Tobacco Use    Smoking status: Never Smoker    Smokeless tobacco: Never Used   Substance Use Topics    Alcohol use:  Yes     Alcohol/week: 0.0 standard drinks     Comment: rarely       Family History   Problem Relation Age of Onset   [facility] Cancer Mother         voicebox and larynx, and lung cancer    Diabetes Father     Heart Disease Father     COPD Sister     Heart Disease Sister 50        MI,     Seizures Brother            Review of Systems  See hpi    Objective:   Physical Exam     Constitutional:   · Reviewed vitals above  · Well Nourished, well developed, no distress       ·   Musculoskeletal:  · All extremities without clubbing, cyanosis or edema  NECK: --full ROM              --no spinal tenderness to palpation              --neg barton spurling test bilaterally    Right shoulder:  --no pain to palpation of clavicle or AC joint        --no pain to palpation of anterior and posterior shoulder         --full ROM without pain  --5/5 strength of supraspinatus, infraspinatus, and subscapularis without pain on resistance testing  --no biceps tendon tenderness. --neg impingement test  --neg apprehensive test  --no shoulder laxity    Left shoulder:  --no pain to palpation of clavicle or AC joint        --no pain to palpation of anterior and posterior shoulder         --decrease ROM due to pain when tried to lift left arm over head  --5/5 strength of supraspinatus, infraspinatus, and subscapularis with pain on resistance testing of supraspinatus  - no biceps tendon tenderness. + impingement test  --neg apprehensive test  --no shoulder laxity    Skin:  · No rashes on inspection  Psych:  · Normal mood and affect  · Normal insight and judgement    Assessment:      See below       Plan:      Coracoid impingement of left shoulder  Not controlled. Too early to do repeat steroid injection. Explained to patient, the importance of completing a full course of physical therapy. Asked her to start ASAP. Discussed that we usually do a steroid injection prior to therapy so she can get through therapy without much pain. We can repeat steroid injection in a month. In the meantime we will prescribe as needed tramadol to help with pain. Reviewed side effects and expected course. Patient told to take the least amount possible to prevent addiction.

## 2020-12-14 NOTE — TELEPHONE ENCOUNTER
Pt overdue for diabetic appt.   Schedule in early January    Please schedule, then change to one month worth of meds and route back

## 2020-12-21 RX ORDER — ATORVASTATIN CALCIUM 20 MG/1
TABLET, FILM COATED ORAL
Qty: 30 TABLET | Refills: 0 | Status: SHIPPED | OUTPATIENT
Start: 2020-12-21 | End: 2021-01-08 | Stop reason: SDUPTHER

## 2020-12-21 RX ORDER — LISINOPRIL 5 MG/1
TABLET ORAL
Qty: 30 TABLET | Refills: 0 | Status: SHIPPED | OUTPATIENT
Start: 2020-12-21 | End: 2021-01-08 | Stop reason: SDUPTHER

## 2020-12-21 RX ORDER — METFORMIN HYDROCHLORIDE 500 MG/1
TABLET, EXTENDED RELEASE ORAL
Qty: 90 TABLET | Refills: 0 | Status: SHIPPED | OUTPATIENT
Start: 2020-12-21 | End: 2021-01-18

## 2020-12-31 DIAGNOSIS — M75.92 LEFT SUPRASPINATUS TENDONITIS: ICD-10-CM

## 2020-12-31 NOTE — TELEPHONE ENCOUNTER
PT's  called in requesting a refill for PT's Tramadol. He says PT only has one left. Please send to the [facility] in [place].

## 2021-01-04 ENCOUNTER — OFFICE VISIT (OUTPATIENT)
Dept: FAMILY MEDICINE CLINIC | Age: 56
End: 2021-01-04
Payer: COMMERCIAL

## 2021-01-04 VITALS
WEIGHT: 160 LBS | HEART RATE: 92 BPM | DIASTOLIC BLOOD PRESSURE: 83 MMHG | HEIGHT: 65 IN | SYSTOLIC BLOOD PRESSURE: 115 MMHG | TEMPERATURE: 99.5 F | BODY MASS INDEX: 26.66 KG/M2

## 2021-01-04 DIAGNOSIS — M75.42 CORACOID IMPINGEMENT OF LEFT SHOULDER: Primary | ICD-10-CM

## 2021-01-04 PROCEDURE — 20610 DRAIN/INJ JOINT/BURSA W/O US: CPT | Performed by: FAMILY MEDICINE

## 2021-01-04 RX ORDER — TRAMADOL HYDROCHLORIDE 50 MG/1
50 TABLET ORAL EVERY 8 HOURS PRN
Qty: 15 TABLET | Refills: 0 | Status: SHIPPED | OUTPATIENT
Start: 2021-01-04 | End: 2021-02-03

## 2021-01-04 RX ORDER — METHYLPREDNISOLONE ACETATE 40 MG/ML
40 INJECTION, SUSPENSION INTRA-ARTICULAR; INTRALESIONAL; INTRAMUSCULAR; SOFT TISSUE ONCE
Status: COMPLETED | OUTPATIENT
Start: 2021-01-04 | End: 2021-01-04

## 2021-01-04 RX ADMIN — METHYLPREDNISOLONE ACETATE 40 MG: 40 INJECTION, SUSPENSION INTRA-ARTICULAR; INTRALESIONAL; INTRAMUSCULAR; SOFT TISSUE at 14:02

## 2021-01-04 ASSESSMENT — PATIENT HEALTH QUESTIONNAIRE - PHQ9
2. FEELING DOWN, DEPRESSED OR HOPELESS: 0
SUM OF ALL RESPONSES TO PHQ QUESTIONS 1-9: 0
1. LITTLE INTEREST OR PLEASURE IN DOING THINGS: 0

## 2021-01-04 NOTE — PATIENT INSTRUCTIONS
[facility] Outpatient Physical Therapy  [facility], [address] [facility]   [address]   Telephone: [phone]

## 2021-01-04 NOTE — PROGRESS NOTES
Patient was evaluated for left supraspinatus tendinitis and left shoulder impingement 1 month ago, asking for steroid injection, but it was too soon to receive this she had just had one 2 months prior. She returns today just for steroid injection. She has been taking tramadol as needed in the meantime. PROCEDURE NOTE:  Informed consent was obtained. Patient's left posterior was cleaned with alcohol swabs. Gebauer's Ethyl Chloride spray was used to anesthetize the skin. Using sterile technique left subacromial injection was performed with 3ml lidocaine and 40mg depomedrol. Bandaid applied to injection sight. Hemostasis achieved. Patient tolerated procedure well. Discussed importance of following up with physical therapy this time. Refill tramadol as well. Patient tolerated injection well.

## 2021-01-08 ENCOUNTER — COMMUNITY OUTREACH (OUTPATIENT)
Dept: OTHER | Age: 56
End: 2021-01-08

## 2021-01-08 ENCOUNTER — OFFICE VISIT (OUTPATIENT)
Dept: FAMILY MEDICINE CLINIC | Age: 56
End: 2021-01-08

## 2021-01-08 VITALS
HEIGHT: 65 IN | DIASTOLIC BLOOD PRESSURE: 86 MMHG | SYSTOLIC BLOOD PRESSURE: 124 MMHG | BODY MASS INDEX: 26.49 KG/M2 | TEMPERATURE: 97.3 F | HEART RATE: 87 BPM | WEIGHT: 159 LBS

## 2021-01-08 DIAGNOSIS — M25.512 CHRONIC LEFT SHOULDER PAIN: ICD-10-CM

## 2021-01-08 DIAGNOSIS — E11.9 TYPE 2 DIABETES MELLITUS WITHOUT COMPLICATION, WITHOUT LONG-TERM CURRENT USE OF INSULIN (HCC): Primary | ICD-10-CM

## 2021-01-08 DIAGNOSIS — E78.00 PURE HYPERCHOLESTEROLEMIA: ICD-10-CM

## 2021-01-08 DIAGNOSIS — G89.29 CHRONIC LEFT SHOULDER PAIN: ICD-10-CM

## 2021-01-08 LAB
ANION GAP SERPL CALCULATED.3IONS-SCNC: 14 MMOL/L (ref 3–16)
BUN BLDV-MCNC: 11 MG/DL (ref 7–20)
CALCIUM SERPL-MCNC: 9.9 MG/DL (ref 8.3–10.6)
CHLORIDE BLD-SCNC: 101 MMOL/L (ref 99–110)
CO2: 26 MMOL/L (ref 21–32)
CREAT SERPL-MCNC: 0.6 MG/DL (ref 0.6–1.1)
GFR AFRICAN AMERICAN: >60
GFR NON-AFRICAN AMERICAN: >60
GLUCOSE BLD-MCNC: 202 MG/DL (ref 70–99)
HBA1C MFR BLD: 8.2 %
POTASSIUM SERPL-SCNC: 4.9 MMOL/L (ref 3.5–5.1)
SODIUM BLD-SCNC: 141 MMOL/L (ref 136–145)

## 2021-01-08 PROCEDURE — 99213 OFFICE O/P EST LOW 20 MIN: CPT | Performed by: FAMILY MEDICINE

## 2021-01-08 PROCEDURE — 83036 HEMOGLOBIN GLYCOSYLATED A1C: CPT | Performed by: FAMILY MEDICINE

## 2021-01-08 PROCEDURE — 3052F HG A1C>EQUAL 8.0%<EQUAL 9.0%: CPT | Performed by: FAMILY MEDICINE

## 2021-01-08 PROCEDURE — 36415 COLL VENOUS BLD VENIPUNCTURE: CPT | Performed by: FAMILY MEDICINE

## 2021-01-08 RX ORDER — GLIPIZIDE 10 MG/1
10 TABLET ORAL
Qty: 180 TABLET | Refills: 0 | Status: SHIPPED | OUTPATIENT
Start: 2021-01-08 | End: 2021-04-05

## 2021-01-08 RX ORDER — ATORVASTATIN CALCIUM 20 MG/1
TABLET, FILM COATED ORAL
Qty: 90 TABLET | Refills: 0 | Status: SHIPPED | OUTPATIENT
Start: 2021-01-08 | End: 2021-01-18

## 2021-01-08 RX ORDER — LISINOPRIL 5 MG/1
TABLET ORAL
Qty: 90 TABLET | Refills: 0 | Status: SHIPPED | OUTPATIENT
Start: 2021-01-08 | End: 2021-04-05

## 2021-01-08 NOTE — PROGRESS NOTES
Nicolas Restoration MD  [facility]) Physicians  [facility], [address] [address]  [phone] office  [phone] fax      Patient ID: Sincere He is a 48 y.o. female. Date:  2021        CC:  DM2, HLD    Treatment Adherence:   Medication compliance:  Compliant most of the time  Diet compliance:  Some of the time  Weight trend: stable  Current exercise: none currently  Barriers:none        Diabetes Mellitus Type 2:        pt denies polyuria, polydipsia, blurry vision, foot ulcerations, neuropathy, polyphagia, nausea, vomiting and diarrhea. Taking metformin 1000mg XR qam and 500mg XR qhs, glipizide 5mg daily, Januvia 100mg daily    Home blood sugar records: fastin  Any episodes of hypoglycemia? no  Eye exam current (within one year):3/17/20: no dm retinopathy  Tobacco history: She  reports that she has never smoked. She has never used smokeless tobacco.   Daily Aspirin? Yes      Hyperlipidemia:  No new myalgias or GI upset on atorvastatin (Lipitor). Medication compliance: compliant all of the time. Patient is  following a low fat, low cholesterol diet. She is not exercising regularly. She denies chest pain, shortness of breath, dyspnea on exertion, palpitations, lightheadedness, lower extremity edema, paroxysmal nocturnal dyspnea, and orthopnea.     Lab Results   Component Value Date    LABA1C 8.2 2021    LABA1C 7.6 2020    LABA1C 6.8 2020     Lab Results   Component Value Date    LABMICR <1.20 2020    CREATININE 0.7 2020     Lab Results   Component Value Date    ALT 16 2020    AST 16 2020     Lab Results   Component Value Date    CHOL 156 2020    TRIG 139 2020    HDL 47 2020    LDLCALC 81 2020              Vitals:    21 1026   BP: 124/86   Pulse: 87   Temp: 97.3 °F (36.3 °C)   TempSrc: Infrared   Weight: 159 lb (72.1 kg)   Height: 5' 5\" (1.651 m)  Iron deficiency anemia 2017    Right peroneal tendinosis 2018       Past Surgical History:   Procedure Laterality Date     SECTION      SKIN BIOPSY         Social History     Tobacco Use    Smoking status: Never Smoker    Smokeless tobacco: Never Used   Substance Use Topics    Alcohol use: Yes     Alcohol/week: 0.0 standard drinks     Comment: rarely       Family History   Problem Relation Age of Onset   [facility] Mother         voicebox and larynx, and lung cancer    Diabetes Father     Heart Disease Father     COPD Sister     Heart Disease Sister 50        MI,     Seizures Brother            Review of Systems  See hpi    Objective:   Physical Exam     Constitutional:   · Reviewed vitals above  · Well Nourished, well developed, no distress       Neck:  · Symmetric and without masses  · No thyromegaly  Resp:  · Normal effort  · Clear to auscultation bilaterally without rhonchi, wheezing or crackles  Cardiovascular:  · On auscultation, normal S1 and S2 without murmurs, rubs or gallops  · No bruits of bilateral carotids and no JVD  Gastrointestinal:  · Nontender, nondistended, and no masses  · No hepatosplenomegaly  Musculoskeletal:  · All extremities without clubbing, cyanosis or edema  Skin:  · No rashes on inspection  Psych:  · Normal mood and affect  · Normal insight and judgement        Assessment:      See below       Plan:     1. Type 2 diabetes mellitus without complication, without long-term current use of insulin (HCC)  Poc VhZ1g=8.2 and is worsening. Not controlled    Not currently have insurance. Able to get Januvia with financial assistance. Patient to continue Metformin XR 1000 mg in the morning 500 mg nightly. Will increase glipizide to 10 mg twice daily. Encouraged patient to work on diabetic diet. .      Checking BMP. Pt to continue aspirin, statin and ACEI        2. Pure hypercholesterolemia  Well-controlled continue Lipitor 20 mg. 3. Left shoulder pain  Subacromial injection given 4 days ago. It is helping some of the pain. Able to lay on left shoulder now. He has slightly better range of motion when lifting arm above head. Still having tenderness in bicep tendon region. She has more than 1 thing going on. She does understand that the subacromial injection would not help the biceps tendinitis. She is trying to start physical therapy soon, assuming [employer] can offer her financial aid.     HM:      shingrix vaccine patient declined    Colonoscopy done 8/29/18: next due 2023    Mammogram completed 10/08/20    Pap smear normal with negative HPV on 3/24/17

## 2021-01-08 NOTE — PROGRESS NOTES
[facility] was contacted by the patient's , Gris Salcido on 1/7/2021 and reports he and his wife were referred to [facility] via the PCP office. The patient and spouse report that the patient lost her health insurance at the end of September 2020. We discussed the current income and household circumstances and the patient does appear eligible for [facility]. The patient does not appear Medicaid or MHPP eligible based on income level,  and is now outside of the window for [employer] application.     SW agreed to review the patient account detail that would assist them in applying for [facility] financial assist.

## 2021-01-11 ENCOUNTER — COMMUNITY OUTREACH (OUTPATIENT)
Dept: OTHER | Age: 56
End: 2021-01-11

## 2021-01-11 NOTE — PROGRESS NOTES
MONIQUE contacted the patient on her cell phone on this date to follow up on previous discussion and she transferred the call to her spouse to discuss. TERRY identified the [facility]) date of service and account number that appears eligible for [facility] [address] [facility] assistance. TERRY explained the application process and income verifications that would need to be included with the application. TERRY mailed the application form with account number labeled. TERRY had also shared information with the patient and spouse regarding [address] for future. They report the patient has currently been able to get assistance through a pharmacy assistance program and identified no prescription need at this time.

## 2021-01-13 ENCOUNTER — PATIENT MESSAGE (OUTPATIENT)
Dept: FAMILY MEDICINE CLINIC | Age: 56
End: 2021-01-13

## 2021-01-13 DIAGNOSIS — R11.0 NAUSEA: Primary | ICD-10-CM

## 2021-01-13 RX ORDER — ONDANSETRON 4 MG/1
TABLET, ORALLY DISINTEGRATING ORAL
Qty: 21 TABLET | Refills: 0 | Status: SHIPPED | OUTPATIENT
Start: 2021-01-13 | End: 2021-01-14 | Stop reason: SDUPTHER

## 2021-01-13 NOTE — TELEPHONE ENCOUNTER
From: Marina Beasley  To: Mao Haro MD  Sent: 1/13/2021 3:14 PM EST  Subject: Prescription Question    I went to  [facility] and it's 115 dollars we do not have that much money to get it do you have any samples or can you call in in for [address] ?

## 2021-01-14 RX ORDER — ONDANSETRON 4 MG/1
TABLET, ORALLY DISINTEGRATING ORAL
Qty: 21 TABLET | Refills: 0 | Status: SHIPPED | OUTPATIENT
Start: 2021-01-14 | End: 2021-10-05 | Stop reason: CLARIF

## 2021-01-18 DIAGNOSIS — E11.9 TYPE 2 DIABETES MELLITUS WITHOUT COMPLICATION, WITHOUT LONG-TERM CURRENT USE OF INSULIN (HCC): ICD-10-CM

## 2021-01-18 DIAGNOSIS — E78.00 PURE HYPERCHOLESTEROLEMIA: ICD-10-CM

## 2021-01-18 RX ORDER — METFORMIN HYDROCHLORIDE 500 MG/1
TABLET, EXTENDED RELEASE ORAL
Qty: 90 TABLET | Refills: 0 | Status: SHIPPED | OUTPATIENT
Start: 2021-01-18 | End: 2021-05-16 | Stop reason: SDUPTHER

## 2021-01-18 RX ORDER — ATORVASTATIN CALCIUM 20 MG/1
TABLET, FILM COATED ORAL
Qty: 90 TABLET | Refills: 0 | Status: SHIPPED | OUTPATIENT
Start: 2021-01-18 | End: 2021-07-08 | Stop reason: SDUPTHER

## 2021-01-27 DIAGNOSIS — G89.29 CHRONIC RIGHT SHOULDER PAIN: Primary | ICD-10-CM

## 2021-01-27 DIAGNOSIS — M25.511 CHRONIC RIGHT SHOULDER PAIN: Primary | ICD-10-CM

## 2021-01-27 RX ORDER — TRAMADOL HYDROCHLORIDE 50 MG/1
50 TABLET ORAL 2 TIMES DAILY PRN
Qty: 20 TABLET | Refills: 0 | Status: SHIPPED | OUTPATIENT
Start: 2021-01-27 | End: 2021-03-15 | Stop reason: SDUPTHER

## 2021-03-05 RX ORDER — SPINOSAD 9 MG/ML
SUSPENSION TOPICAL
Qty: 1 BOTTLE | Refills: 1 | Status: SHIPPED | OUTPATIENT
Start: 2021-03-05

## 2021-03-10 ENCOUNTER — COMMUNITY OUTREACH (OUTPATIENT)
Dept: OTHER | Age: 56
End: 2021-03-10

## 2021-03-10 NOTE — PROGRESS NOTES
Patient's  contacted [facility] to inquire about the status of a financial assistance application he had submitted back in January on his wife's behalf. Deysi Cody, had mailed the application to the couple after conversation with Mr. Deborah Lewis in which she reviewed  the [facility] assistance application process. Mr. Deborah Lewis stated he has received no notification regarding the status of the application.  provided Mr. Deborah Lewis with the phone number for [facility]. Mr. Deborah Lewis plans to call and will contact  if in need of additional assistance.

## 2021-03-11 ENCOUNTER — OFFICE VISIT (OUTPATIENT)
Dept: FAMILY MEDICINE CLINIC | Age: 56
End: 2021-03-11

## 2021-03-11 VITALS
TEMPERATURE: 97.5 F | SYSTOLIC BLOOD PRESSURE: 142 MMHG | BODY MASS INDEX: 27.16 KG/M2 | OXYGEN SATURATION: 97 % | RESPIRATION RATE: 16 BRPM | WEIGHT: 163 LBS | HEIGHT: 65 IN | HEART RATE: 96 BPM | DIASTOLIC BLOOD PRESSURE: 92 MMHG

## 2021-03-11 DIAGNOSIS — R03.0 ELEVATED BLOOD PRESSURE READING: Primary | ICD-10-CM

## 2021-03-11 DIAGNOSIS — M75.22 BICEPS TENDINITIS OF LEFT SHOULDER: ICD-10-CM

## 2021-03-11 PROCEDURE — 99212 OFFICE O/P EST SF 10 MIN: CPT | Performed by: NURSE PRACTITIONER

## 2021-03-11 NOTE — PROGRESS NOTES
PROGRESS NOTE     René Mendosa [address] [facility] ([facility]) Physicians  KassieJuan Miguel [address] [address]  [phone] office  [phone] fax    Date of Service:  3/11/2021    Subjective:      Patient ID: Eboni Contreras is a 48 y.o. female      CC: Left arm pain    HPI  Joint Symptoms:  Patient complains of a many month history of pain, swelling and decreased mobility in left arm. Pain is persistent, sharp in nature, and is moderate in intensity. Radiation: none. Associated symptoms:  none. She denies paresthesia and fevers. Symptoms are exacerbated by lifting and changing positions. Precipitating factors: fall- onto left shoulder and carrying grandson. Symptoms are worse at night. Prior history of similar symptoms: yes she has been seen for this same problem several times. Previous treatment: rest, ice, NSAID- Ibuprofen, joint injection- to left shoulder, physical therapy, home exercises. Symptoms are worsening over time. Patient was seen back on 9/17/2020 by Ortho Dr. Daphne Goff. He did x-rays which showed subtle hooking to the acromium, mild AC joint arthritis but otherwise x-rays were unremarkable. No evidence of fracture or dislocation. He diagnosed patient with left shoulder biceps strain, left shoulder rotator cuff tendinitis and left shoulder AC joint arthritis. He recommended continued physical therapy and follow-up within 3 weeks. He stated if patient continues to have a pain without weakness he would consider a repeat injection however if patient started with significant weakness he would consider an MRI. Patient has been seen by Dr. Rory Mcrae several times for this problem. She was given subacromial injection on 1/4. She was recommended to follow with physical therapy for her bicep tendinitis. Patient lost her job and does not currently have insurance coverage. She states she has been doing the exercises at home. She also uses ice, ibuprofen/tylenol for pain.  She does not have any more tramadol left. Vitals:    03/11/21 1021   BP: (!) 142/92   Pulse: 96   Resp: 16   Temp: 97.5 °F (36.4 °C)   SpO2: 97%   Weight: 163 lb (73.9 kg)   Height: 5' 5\" (1.651 m)       Outpatient Medications Marked as Taking for the 3/11/21 encounter (Office Visit) with BETSY Harrell CNP   Medication Sig Dispense Refill    metFORMIN (GLUCOPHAGE-XR) 500 MG extended release tablet TAKE 2 TABLETS BY MOUTH WITH BREAKFAST AND 1 TABLET BY MOUTH AT BEDTIME 90 tablet 0    atorvastatin (LIPITOR) 20 MG tablet TAKE 1 TABLET BY MOUTH DAILY 90 tablet 0    ondansetron (ZOFRAN-ODT) 4 MG disintegrating tablet DISSOLVE 1 TABLET ON THE TONGUE THREE TIMES DAILY AS NEEDED FOR NAUSEA OR VOMITING 21 tablet 0    SITagliptin (JANUVIA) 100 MG tablet Take 1 tablet by mouth daily 90 tablet 0    glipiZIDE (GLUCOTROL) 10 MG tablet Take 1 tablet by mouth 2 times daily (before meals) 180 tablet 0    lisinopril (PRINIVIL;ZESTRIL) 5 MG tablet TAKE 1 TABLET BY MOUTH DAILY 90 tablet 0    Cranberry 1000 MG CAPS Take 1 tablet by mouth daily      meclizine (ANTIVERT) 25 MG tablet Take 1 tablet by mouth 3 times daily as needed for Nausea 30 tablet 0    Blood Glucose Monitoring Suppl (ONE TOUCH ULTRA MINI) w/Device KIT Use to check sugars 1 kit 0    glucose blood VI test strips (ASCENSIA AUTODISC VI;ONE TOUCH ULTRA TEST VI) strip 1 each by In Vitro route daily As needed. 100 each 3    ONE TOUCH LANCETS MISC 1 each by Does not apply route daily 100 each 3    aspirin 81 MG chewable tablet Take 81 mg by mouth daily         Past Medical History:   Diagnosis Date    Atrophic kidney 9/28/2017    sstone    Atrophic scarred right kidney again noted.  Some nephrolithiasis is noted without  hydronephrosis.       Diabetes ([address]     Family history of early CAD     FH: breast cancer 3/8/2017    23 yo mother reported    HLD (hyperlipidemia)     Iron deficiency anemia 7/18/2017    Right peroneal tendinosis 4/30/2018 Past Surgical History:   Procedure Laterality Date     SECTION      SKIN BIOPSY         Social History     Tobacco Use    Smoking status: Never Smoker    Smokeless tobacco: Never Used   Substance Use Topics    Alcohol use:  Yes     Alcohol/week: 0.0 standard drinks     Comment: rarely       Family History   Problem Relation Age of Onset   [place] [facility] Mother         voicebox and larynx, and lung cancer    Diabetes Father     Heart Disease Father     COPD Sister     Heart Disease Sister 50        MI,     Seizures Brother            Review of Systems  Constitutional:  Negative for activity or appetite change, fever or fatigue  HENT:  Negative for congestion,sinus pressure, or rhinorrhea  Eyes:  Negative for eye pain or visual changes  Resp:  Negative for SOB, chest tightness, cough  Cardiovascular: Negative for CP, palpitations, LU, orthopnea, PND, LE edema  Gastrointestinal: Negative for abd pain, melena, BRBPR, N/V/D  Endocrine:  Negative for polydipsia and polyuria  :  Negative for dysuria, flank pain or urinary frequency  Musculoskeletal:  Negative for back pain or myalgias + left shoulder and bicep pain  Neuro:  Negative for dizziness or lightheadedness  Psych: negative for depression or anxiety      Objective:   Constitutional:   · Reviewed vitals above  · Well Nourished, well developed, no distress       HENT:  · Normal external nose without lesions  · Normal nasal mucosa without swelling or erythema  Neck:  · Symmetric and without masses  Resp:  · Normal effort  · Clear to auscultation bilaterally without rhonchi, wheezing or crackles  Cardiovascular:  · On auscultation, normal S1 and S2 without murmurs, rubs or gallops  Gastrointestinal:  · Nontender, nondistended, and no masses  Musculoskeletal:  · Normal Gait  · All extremities without clubbing, cyanosis or edema  NECK: --full ROM              --no spinal tenderness to palpation              --neg barton spurling test bilaterally currently on lisinopril 5 mg daily. Patient is scheduled with follow-up appointment with PCP in April. Will reassess then when hopefully patient is in not so much pain. Controlled Substance Monitoring:    Acute and Chronic Pain Monitoring:   RX Monitoring 3/11/2021   Periodic Controlled Substance Monitoring Possible medication side effects, risk of tolerance/dependence & alternative treatments discussed. ;No signs of potential drug abuse or diversion identified.

## 2021-03-15 DIAGNOSIS — G89.29 CHRONIC RIGHT SHOULDER PAIN: ICD-10-CM

## 2021-03-15 DIAGNOSIS — M25.511 CHRONIC RIGHT SHOULDER PAIN: ICD-10-CM

## 2021-03-15 RX ORDER — TRAMADOL HYDROCHLORIDE 50 MG/1
50 TABLET ORAL 2 TIMES DAILY PRN
Qty: 20 TABLET | Refills: 0 | Status: SHIPPED | OUTPATIENT
Start: 2021-03-15 | End: 2021-04-02 | Stop reason: SDUPTHER

## 2021-03-26 ENCOUNTER — HOSPITAL ENCOUNTER (OUTPATIENT)
Dept: PHYSICAL THERAPY | Age: 56
Setting detail: THERAPIES SERIES
Discharge: HOME | End: 2021-03-26

## 2021-03-26 PROCEDURE — 97162 PT EVAL MOD COMPLEX 30 MIN: CPT

## 2021-03-26 PROCEDURE — 97140 MANUAL THERAPY 1/> REGIONS: CPT

## 2021-03-26 PROCEDURE — 97110 THERAPEUTIC EXERCISES: CPT

## 2021-03-26 NOTE — PLAN OF CARE
[address] [facility] [place]. [address] [address]  Phone: [phone]   Fax:     [phone]                                                       Physical Therapy Certification    Dear Referring Practitioner: Dr. Wheeler Brunner,    We had the pleasure of evaluating the following patient for physical therapy services at [facility]. A summary of our findings can be found in the initial assessment below. This includes our plan of care. If you have any questions or concerns regarding these findings, please do not hesitate to contact me at the office phone number checked above.   Thank you for the referral.       Physician Signature:_______________________________Date:__________________  By signing above (or electronic signature), therapists plan is approved by physician              Patient: Gualberto Guajardo   : 1965   MRN: [id number]  Referring Physician: Referring Practitioner: Dr. Wheeler Brunner      Evaluation Date: 3/26/2021      Medical Diagnosis Information:  Diagnosis: Coracoid impingement on left shoulder   Treatment Diagnosis: Limited left shoulder ROM, weakness, pain, kyphotic posture                                         Insurance information: PT Insurance Information: None    Precautions/ Contra-indications: OA and diabetes  Latex Allergy:   [x]  NO      []YES  Preferred Language for Healthcare:   [x]English       []other:    C-SSRS Triggered by Intake questionnaire (Past 2 wk assessment ):   [x] No, Questionnaire did not trigger screening.   [] Yes, Patient intake triggered C-SSRS Screening      [] C-SSRS Screening completed  [] PCP notified via Epic     SUBJECTIVE: Patient stated complaint: \"Can't raise my left arm and pain is moving into my left hand\"    Relevant Medical History:Additional Pertinent Hx: PLOF: Independent  Functional Outcomes Measure:  Quick Dash =36 Pain Scale: 3/10  Easing factors: Salonpas patches and medication as prescribed by MD  Provocative factors: Raising her left arm and laying on the left side. Type: []Constant   [x]Intermittent  []Radiating []Localized []other:     Numbness/Tingling: Left hand only on palm    Occupation/School: Not employed    Living Status/Prior Level of Function: Independent with ADLs and IADLs,     OBJECTIVE:   Posture: Right shoulder is sloped lower than left    Functional Mobility/Transfers:  Difficulty getting in and out of bed    Palpation: Tenderness on biceps and upper lateral arm     Bandages/Dressings/Incisions: NA    Gait: (include devices/WB status): WNL     CERV ROM     Cervical Flexion WNL    Cervical Extension 45 With referral to posterior left shoulder   Cervical SB 57 to left and 50 deg with tighness left shoulder and neck    Cervical rotation 56 to left and 70 to right         ROM Left Right   Shoulder Flex 124 with pain on bicep   WNL   Shoulder Abd 88 with pain on bicep WNL   Shoulder ER 8 WNL   Shoulder IR 20 WNL             Strength  Left Right   Shoulder Flex 4/5 4/5   Shoulder Scap 3/5 4/5   Shoulder ER 4/5 4/5   Shoulder IR 3/5 4/5           Reflexes Normal Abnormal Comments   [x]ALL NORMAL            S1-2 Seated achilles [] []    S1-2 Prone knee bend [] []    L3-4 Patellar tendon [] []    C5-6 Biceps [x] []    C6 Brachioradialis [x] []    C7-8 Triceps [x] []    Clonus [] []    Babinski [] []    Fink's [] []      Reflexes/Sensation:    [x]Dermatomes/Myotomes intact occasionally, changes with weather changes and edema   [x]Reflexes equal and normal bilaterally   []Other:    Joint mobility:   []Normal    [x]Hypo   []Hyper    Orthopedic Special Tests: Cross over into adduction aggravates the left shoulder pain                       [x] Patient history, allergies, meds reviewed. Medical chart reviewed. See intake form.      Review Of Systems (ROS):  [x]Performed Review of systems (Integumentary, CardioPulmonary, Neurological) by intake and observation. Intake form has been scanned into medical record. Patient has been instructed to contact their primary care physician regarding ROS issues if not already being addressed at this time. Co-morbidities/Complexities (which will affect course of rehabilitation):   []None           Arthritic conditions   []Rheumatoid arthritis (M05.9)  [x]Osteoarthritis (M19.91)   Cardiovascular conditions   []Hypertension (I10)  []Hyperlipidemia (E78.5)  []Angina pectoris (I20)  []Atherosclerosis (I70)  []CVA Musculoskeletal conditions   []Disc pathology   []Congenital spine pathologies   []Prior surgical intervention  []Osteoporosis (M81.8)  []Osteopenia (M85.8)   Endocrine conditions   []Hypothyroid (E03.9)  []Hyperthyroid Gastrointestinal conditions   []Constipation (S78.70)   Metabolic conditions   []Morbid obesity (E66.01)  [x]Diabetes type 1(E10.65) or 2 (E11.65)   []Neuropathy (G60.9)     Pulmonary conditions   []Asthma (J45)  []Coughing   []COPD (J44.9)   Psychological Disorders  []Anxiety (F41.9)  []Depression (F32.9)   []Other:   [x]Other:  Falls         Barriers to/and or personal factors that will affect rehab potential:              []Age  []Sex   []Smoker              []Motivation/Lack of Motivation                        []Co-Morbidities              []Cognitive Function, education/learning barriers              []Environmental, home barriers              []profession/work barriers  []past PT/medical experience  []other:  Justification:     Falls Risk Assessment (30 days):   [x] Falls Risk assessed and no intervention required. [] Falls Risk assessed and Patient requires intervention due to being higher risk   TUG score (>12s at risk):     [] Falls education provided, including         ASSESSMENT: Pt seems to be developing adhesive capsulitis and in view of her left hand pain and paresthesia,  possibly Chronic Regional  Pain Syndrome as well.  Movement must begin to improve with urgency to avoid worsening of her condition. Patient came only once before for her initial therapy evaluaiton in September, 2020,and then was not able to come any more due to being let go from her job and no further health insurance. Functional Impairments:     [x]Noted spinal or UE joint hypomobility   []Noted spinal or UE joint hypermobility   [x]Decreased spinal/UE functional ROM   []Abnormal reflexes/sensation/myotomal/dermatomal deficits   []Decreased RC/scapular/core strength and neuromuscular control    [x]Decreased UE functional strength   []other:      Functional Activity Limitations (from functional questionnaire and intake)   []Reduced ability to tolerate prolonged functional positions   [x]Reduced ability or difficulty with changes of positions or transfers between positions   [x]Reduced ability to maintain good posture and demonstrate good body mechanics with sitting, bending, and lifting   [] Reduced ability or tolerance with driving and/or computer work   [x]Reduced ability to perform lifting, reaching, carrying tasks   [x]Reduced ability to reach behind back   [x]Reduced ability to sleep    []Reduced ability to tolerate any impact through UE or spine   []Reduced ability to  or hold objects   []Reduced ability to throw or toss an object   []other:    Participation Restrictions   [x]Reduced participation in self care activities   [x]Reduced participation in home management activities   []Reduced participation in work activities   []Reduced participation in social activities. []Reduced participation in sport/recreational activities. Classification/Subgrouping:   [x]signs/symptoms consistent with adhesive capsulitis decreased ROM, strength and function.     []signs/symptoms consistent with joint sprain/strain    [x]signs/symptoms consistent with shoulder impingement (internal, external, primary or secondary)   []signs/symptoms consistent with shoulder/elbow/wrist tendinopathy   []Signs/symptoms consistent with Rotator cuff tear   []sign/symptoms consistent with labral tear   []signs/symptoms consistent with rib dysfunction   []signs/symptoms consistent with postural dysfunction   []signs/symptoms consistent with Glenohumeral IR Deficit - <45 degrees   []signs/symptoms consistent with facet dysfunction of cervical/thoracic spine   []signs/symptoms consistent with pathology which may benefit from Dry Needling   []signs/symptoms which may limit the use of advanced manual therapy techniques: (Elevated CV risk profile, recent trauma, intolerance to end range positions, prior TIA, visual issues, UE neurological compromise )     Prognosis/Rehab Potential:      []Excellent   [x]Good    []Fair   []Poor    Tolerance of evaluation/treatment:    []Excellent   [x]Good    []Fair   []Poor    Physical Therapy Evaluation Complexity Justification  [x] A history of present problem with:  [] no personal factors and/or comorbidities that impact the plan of care;  [x]1-2 personal factors and/or comorbidities that impact the plan of care  []3 personal factors and/or comorbidities that impact the plan of care  [x] An examination of body systems using standardized tests and measures addressing any of the following: body structures and functions (impairments), activity limitations, and/or participation restrictions;:  [] a total of 1-2 or more elements   [x] a total of 3 or more elements   [] a total of 4 or more elements   [x] A clinical presentation with:  [] stable and/or uncomplicated characteristics   [x] evolving clinical presentation with changing characteristics  [] unstable and unpredictable characteristics;   [x] Clinical decision making of [] low, [x] moderate, [] high complexity using standardized patient assessment instrument and/or measurable assessment of functional outcome.     [] EVAL (LOW) 78091 (typically 20 minutes face-to-face)  [x] EVAL (MOD) 23687 (typically 30 minutes increase in NM recruitment/activation and overall GH and scapular strength to within n5lbs HHD or WNL for proper functional mobility as indicated by patients Functional Deficits. [] Progressing: [] Met: [] Not Met: [] Adjusted  4. Patient will return to 70%  activities without increased symptoms or restriction. [] Progressing: [] Met: [] Not Met: [] Adjusted  5. Pt will increase mobility to normal shoulder range so she can return to work (patient specific functional goal)     [] Progressing: [] Met: [] Not Met: [] Adjusted    Electronically signed by:  Jesica Ochoa PT      Note: If patient does not return for scheduled/recommended follow up visits, this note will serve as a discharge from care along with the most recent update on progress.

## 2021-03-26 NOTE — FLOWSHEET NOTE
[address]  Phone: [phone]   Fax:     [phone]        Physical Therapy Treatment Note/ Progress Report:       Date:  3/26/2021    Patient Name:  Kyung Martines    :  1965  MRN: [id number]    Pertinent Medical History:Additional Pertinent Hx: PLOF: Independent    Medical/Treatment Diagnosis Information:  · Diagnosis: 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 Farmer Backer of care signed (Y/N): Sent to Dr. Garcia Wolfe 3/26/21    Date of Patient follow up with Physician:      Progress Report: []  Yes  [x]  No     Date Range for reporting period:  Beginning:  3/26/2021  Ending:      Progress report due (10 Rx/or 30 days whichever is less):     Recertification due (POC duration/ or 90 days whichever is less):     Visit # POC/Insurance Allowable Auth Needed   1 12 []Yes    [x]No     Functional Outcomes Measure:   Date Assessed:on eval , 3/26/21  Test: Medina Espitiain  Score: 36    Pain level:  3/10     History of Injury:  Pt picked up her 28 to 40# grandson and turned the wrong way and hurt her left arm on 20. She picked him up and carried him around. She came in late Sept for PT but could not continue and with PT. It has continued to get worse. She also fell in her her garage in February and fell onto the left side. Her physician has referred her back to PT since she is getting worse.     SUBJECTIVE:  See eval    OBJECTIVE: See eval   Observation:    Test measurements:      RESTRICTIONS/PRECAUTIONS:     Exercises/Interventions:   Therapeutic Ex (69483)  Min: Resistance/Reps Cues/Notes   Wand flexion 5 sec x 10    Wand scaption 5 sec x 10    Wand ER 5 sec x10    Wall slides 5 sec x 10    Wand IR behind her back 5 sec x 10    Manual Intervention  (35901)  Min:      GHJ and capsular mobs, long axis distraction         NMR re-education (76469)  Min:               Therapeutic Activity (84615)  Min:               Modalities:  Min                 Other Therapeutic Activities:  Pt was educated on PT POC, Diagnosis, Prognosis, pathomechanics as well as frequency and duration of scheduling future physical therapy appointments. Time was also taken on this day to answer all patient questions and participation in PT. Reviewed appointment policy in detail with patient and patient verbalized understanding. Home Exercise Program: Royer Ibarra program as above for the 5 exercises to start with stretching the shoulder capsule. Therapeutic Exercise and NMR EXR  [x] (14767) Provided verbal/tactile cueing for activities related to strengthening, flexibility, endurance, ROM  for improvements in scapular, scapulothoracic and UE control with self care, reaching, carrying, lifting, house/yardwork, driving/computer work.    [] (56357) Provided verbal/tactile cueing for activities related to improving balance, coordination, kinesthetic sense, posture, motor skill, proprioception  to assist with  scapular, scapulothoracic and UE control with self care, reaching, carrying, lifting, house/yardwork, driving/computer work. Therapeutic Activities:    [] (20821 or 98715) Provided verbal/tactile cueing for activities related to improving balance, coordination, kinesthetic sense, posture, motor skill, proprioception and motor activation to allow for proper function of scapular, scapulothoracic and UE control with self care, carrying, lifting, driving/computer work.      Home Exercise Program:    [x] (78157) Reviewed/Progressed HEP activities related to strengthening, flexibility, endurance, ROM of scapular, scapulothoracic and UE control with self care, reaching, carrying, lifting, house/yardwork, driving/computer work  [] (70069) Reviewed/Progressed HEP activities related to improving balance, coordination, kinesthetic sense, posture, motor skill, proprioception of scapular, scapulothoracic and UE control with self care, reaching, carrying, lifting, house/yardwork, driving/computer work      Manual Treatments:  PROM / STM / Oscillations-Mobs:  G-I, II, III, IV (PA's, Inf., Post.)  [x] (83229) Provided manual therapy to mobilize soft tissue/joints of cervical/CT, scapular GHJ and UE for the purpose of modulating pain, promoting relaxation,  increasing ROM, reducing/eliminating soft tissue swelling/inflammation/restriction, improving soft tissue extensibility and allowing for proper ROM for normal function with self care, reaching, carrying, lifting, house/yardwork, driving/computer work    Charges:  Timed Code Treatment Minutes: 30   Total Treatment Minutes: 65       [] EVAL (LOW) 71128 (typically 20 minutes face-to-face)  [x] EVAL (MOD) 87234 (typically 30 minutes face-to-face)  [] EVAL (HIGH) 28040 (typically 45 minutes face-to-face)  [] RE-EVAL     [x] JW(52550) x 1    [] Dry needle 1 or 2 Muscles (53891)  [] NMR (91588) x     [] Dry needle 3+ Muscles (10400)  [x] Manual (42503) x  1   [] Ultrasound (73184) x  [] TA (14659) x     [] Mech Traction (64289)  [] ES(attended) (14253)     [] ES (un) (14508):   [] Vasopump (62532) [] Ionto (44675)   [] Other:    If [facility] Please Indicate Time In/Out  CPT Code Time in Time out                                   Jojo Mcclure stated goal: \"To get back to work\"  []? Progressing: []? Met: []? Not Met: []? Adjusted     Therapist goals for Patient:   Short Term Goals: To be achieved in: 2 weeks  1. Independent in HEP and progression per patient tolerance, in order to prevent re-injury. []? Progressing: []? Met: []? Not Met: []? Adjusted  2. Patient will have a decrease in pain to facilitate improvement in movement, function, and ADLs as indicated by Functional Deficits. []? Progressing: []? Met: []? Not Met: []? Adjusted     Long Term Goals: To be achieved in: 8 weeks  1. Disability index score of 30% or less for the Quick DASH to assist with reaching prior level of function. []? Progressing: []? Met: []? Not Met: []? Adjusted  2. Patient will demonstrate increased AROM to 150 degrees of flexion and abduction to allow for proper joint functioning as indicated by Functional Deficits. []? Progressing: []? Met: []? Not Met: []? Adjusted  3. Patient will demonstrate an increase in NM recruitment/activation and overall GH and scapular strength to within n5lbs HHD or WNL for proper functional mobility as indicated by patients Functional Deficits. []? Progressing: []? Met: []? Not Met: []? Adjusted  4. Patient will return to 70%  activities without increased symptoms or restriction. []? Progressing: []? Met: []? Not Met: []? Adjusted  5. Pt will increase mobility to normal shoulder range so she can return to work (patient specific functional goal)           []? Progressing: []? Met: []? Not Met: []? Adjusted     ASSESSMENT:  Pt seems to be developing adhesive capsulitis and in view of her left hand pain, possibly Chronic Regional  Pain Syndrome as well. Movement must begin to improve with urgency to avoid worsening of her condition. Treatment/Activity Tolerance:  [x] Patient tolerated treatment well [] Patient limited by fatique  [] Patient limited by pain  [] Patient limited by other medical complications  [] Other:     Overall Progression Towards Functional goals/ Treatment Progress Update:  [] Patient is progressing as expected towards functional goals listed. [] Progression is slowed due to complexities/Impairments listed. [] Progression has been slowed due to co-morbidities.   [x] Plan just implemented, too soon to assess goals progression <30days   [] Goals require adjustment due to lack of progress  [] Patient is not progressing as expected and requires additional follow up with physician  [] Other    Prognosis for POC: [x] Good [] Fair  [] Poor    Patient requires continued skilled intervention: [x] Yes  [] No      PLAN: First focus on mobility of capsule and shoulder and decrease of pain. [] Continue per plan of care [] Alter current plan (see comments)  [x] Plan of care initiated [] Hold pending MD visit [] Discharge    Electronically signed by: Miranda March PT     Note: If patient does not return for scheduled/recommended follow up visits, this note will serve as a discharge from care along with the most recent update on progress.

## 2021-03-31 ENCOUNTER — HOSPITAL ENCOUNTER (OUTPATIENT)
Dept: PHYSICAL THERAPY | Age: 56
Setting detail: THERAPIES SERIES
Discharge: HOME | End: 2021-03-31

## 2021-03-31 PROCEDURE — 97110 THERAPEUTIC EXERCISES: CPT

## 2021-03-31 PROCEDURE — 97140 MANUAL THERAPY 1/> REGIONS: CPT

## 2021-03-31 NOTE — FLOWSHEET NOTE
[address]  Phone: [phone]   Fax:     [phone]        Physical Therapy Treatment Note/ Progress Report:       Date:  3/31/2021    Patient Name:  Ronaldo García    :  1965  MRN: [id number]    Pertinent Medical History:     Medical/Treatment Diagnosis Information:  · Diagnosis: 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 Camejo Her of care signed (Y/N): Sent to Dr. Jyothi Bojorquez 3/26/21    Date of Patient follow up with Physician:      Progress Report: []  Yes  [x]  No     Date Range for reporting period:  Beginning:  3/26/2021  Ending:      Progress report due (10 Rx/or 30 days whichever is less):     Recertification due (POC duration/ or 90 days whichever is less):     Visit # POC/Insurance Allowable Auth Needed   2 12 []Yes    [x]No     Functional Outcomes Measure:   Date Assessed:on eval , 3/26/21  Test: Abby Edge  Score: 36    Pain level:  6/10     History of Injury:  Pt picked up her 28 to 40# grandson and turned the wrong way and hurt her left arm on 20. She picked him up and carried him around. She came in late Sept for PT but could not continue and with PT. It has continued to get worse. She also fell in her her garage in February and fell onto the left side. Her physician has referred her back to PT since she is getting worse. SUBJECTIVE:  See eval  3/31/21: Pt reports that she felt well Friday after treatment and on Saturday, but then when the Posterior capsule stretchweather changed to cooler and rainy, she felt worse. On the days when weather is good, she feels well. She had a hard time doing her exercises on the cooler days, like today. OBJECTIVE: See eval   Observation:    Test measurements:      RESTRICTIONS/PRECAUTIONS:     Exercises/Interventions:   Therapeutic Ex (59012)  Min: 20 Resistance/Reps Cues/Notes   Wand flexion 5 sec x 10    Wand scaption 5 sec x 10    Wand ER 5 sec x10    Wall slides 5 sec x 10    OH pulleys 3 min Added 3/31   Posterior capsular Sleeper stretcher Int rot - 3 x 30 sec Added to HEP on 3/31/21   Right sidelying scap mob with UE ranger 20X (pt really liked this exercise) Added 3/31   Anterior capsule sleeper stretcher Ext rot add   Wand IR behind her back 5 sec x 10    Manual Intervention  (56799)  Min:30      GHJ and capsular mobs, long axis distraction, PROM all ranges         NMR re-education (41907)  Min:               Therapeutic Activity (98529)  Min:               Modalities:  Min                 Other Therapeutic Activities:  Pt was educated on PT POC, Diagnosis, Prognosis, pathomechanics as well as frequency and duration of scheduling future physical therapy appointments. Time was also taken on this day to answer all patient questions and participation in PT. Reviewed appointment policy in detail with patient and patient verbalized understanding. Home Exercise Program: Kady Gunderson program as above for the 5 exercises to start with stretching the shoulder capsule.     3/31/21: Added sleeper's stretch to HEP, 3 x 30 sec     Therapeutic Exercise and NMR EXR  [x] (68721) Provided verbal/tactile cueing for activities related to strengthening, flexibility, endurance, ROM  for improvements in scapular, scapulothoracic and UE control with self care, reaching, carrying, lifting, house/yardwork, driving/computer work.    [] (01547) Provided verbal/tactile cueing for activities related to improving balance, coordination, kinesthetic sense, posture, motor skill, proprioception  to assist with  scapular, scapulothoracic and UE control with self care, reaching, carrying, lifting, house/yardwork, driving/computer Maurice (54098)   [] Other:    If [facility] Please Indicate Time In/Out  CPT Code Time in Time out                                   GOALS:Patient stated goal: \"To get back to work\"  []? Progressing: []? Met: []? Not Met: []? Adjusted     Therapist goals for Patient:   Short Term Goals: To be achieved in: 2 weeks  1. Independent in HEP and progression per patient tolerance, in order to prevent re-injury. []? Progressing: []? Met: []? Not Met: []? Adjusted  2. Patient will have a decrease in pain to facilitate improvement in movement, function, and ADLs as indicated by Functional Deficits. []? Progressing: []? Met: []? Not Met: []? Adjusted     Long Term Goals: To be achieved in: 8 weeks  1. Disability index score of 30% or less for the Quick DASH to assist with reaching prior level of function. []? Progressing: []? Met: []? Not Met: []? Adjusted  2. Patient will demonstrate increased AROM to 150 degrees of flexion and abduction to allow for proper joint functioning as indicated by Functional Deficits. []? Progressing: []? Met: []? Not Met: []? Adjusted  3. Patient will demonstrate an increase in NM recruitment/activation and overall GH and scapular strength to within n5lbs HHD or WNL for proper functional mobility as indicated by patients Functional Deficits. []? Progressing: []? Met: []? Not Met: []? Adjusted  4. Patient will return to 70%  activities without increased symptoms or restriction. []? Progressing: []? Met: []? Not Met: []? Adjusted  5. Pt will increase mobility to normal shoulder range so she can return to work (patient specific functional goal)           []? Progressing: []? Met: []? Not Met: []? Adjusted     ASSESSMENT:  Pt seems to be developing adhesive capsulitis and in view of her left hand pain, possibly Chronic Regional  Pain Syndrome as well. Movement must begin to improve with urgency to avoid worsening of her condition.     Treatment/Activity Tolerance:  [x] Patient tolerated treatment well [] Patient limited by fatique  [] Patient limited by pain  [] Patient limited by other medical complications  [] Other:     Overall Progression Towards Functional goals/ Treatment Progress Update:  [] Patient is progressing as expected towards functional goals listed. [] Progression is slowed due to complexities/Impairments listed. [] Progression has been slowed due to co-morbidities. [x] Plan just implemented, too soon to assess goals progression <30days   [] Goals require adjustment due to lack of progress  [] Patient is not progressing as expected and requires additional follow up with physician  [] Other    Prognosis for POC: [x] Good [] Fair  [] Poor    Patient requires continued skilled intervention: [x] Yes  [] No      PLAN: First focus on mobility of capsule and shoulder and decrease of pain. [x] Continue per plan of care [] Alter current plan (see comments)  [] Plan of care initiated [] Hold pending MD visit [] Discharge    Electronically signed by: Olita Hamman, PT     Note: If patient does not return for scheduled/recommended follow up visits, this note will serve as a discharge from care along with the most recent update on progress.

## 2021-04-05 ENCOUNTER — HOSPITAL ENCOUNTER (OUTPATIENT)
Dept: PHYSICAL THERAPY | Age: 56
Setting detail: THERAPIES SERIES
Discharge: HOME | End: 2021-04-05

## 2021-04-05 DIAGNOSIS — E11.9 TYPE 2 DIABETES MELLITUS WITHOUT COMPLICATION, WITHOUT LONG-TERM CURRENT USE OF INSULIN (HCC): ICD-10-CM

## 2021-04-05 RX ORDER — GLIPIZIDE 10 MG/1
TABLET ORAL
Qty: 180 TABLET | Refills: 0 | Status: SHIPPED | OUTPATIENT
Start: 2021-04-05 | End: 2021-04-09 | Stop reason: SDUPTHER

## 2021-04-05 RX ORDER — LISINOPRIL 5 MG/1
TABLET ORAL
Qty: 90 TABLET | Refills: 0 | Status: SHIPPED | OUTPATIENT
Start: 2021-04-05 | End: 2021-04-09 | Stop reason: SDUPTHER

## 2021-04-05 NOTE — FLOWSHEET NOTE
Physical Therapy  Cancellation/No-show Note  Patient Name:  Justus Heaton  :  1965   Date:  2021  Cancelled visits to date: 1  No-shows to date: 0    For today's appointment patient:  [x]  Cancelled  []  Rescheduled appointment  []  No-show     Reason given by patient:  [x]  Patient ill  []  Conflicting appointment  []  No transportation    []  Conflict with work  []  No reason given  []  Other:     Comments:      Electronically signed by:  Sophie Rich PTA

## 2021-04-08 ENCOUNTER — HOSPITAL ENCOUNTER (OUTPATIENT)
Dept: PHYSICAL THERAPY | Age: 56
Setting detail: THERAPIES SERIES
Discharge: HOME | End: 2021-04-08

## 2021-04-08 PROCEDURE — 97110 THERAPEUTIC EXERCISES: CPT

## 2021-04-08 PROCEDURE — 97140 MANUAL THERAPY 1/> REGIONS: CPT

## 2021-04-08 NOTE — PROGRESS NOTES
all medicine as prescribed. Denies any side effects. Does not currently check her home blood pressure or her home blood sugars. No longer drinking coke, but still having one ginerale daily. States it has 1/2 the sugar. Complaining of intermittent tremor in left hand. She is in physical therapy currently for left frozen shoulder, and physical therapist noticed it and told her ot have eval.  Not occurring today. Patient states it has occurred intermittently over the last year. Taking tramadol very sparingly for frozen shoulder and admits that physical therapy is helping her pain.     Lab Results   Component Value Date    LABA1C 8.2 01/08/2021    LABA1C 7.6 09/16/2020    LABA1C 6.8 06/05/2020     Lab Results   Component Value Date    LABMICR <1.20 09/16/2020    CREATININE 0.6 01/08/2021     Lab Results   Component Value Date    ALT 16 08/11/2020    AST 16 08/11/2020     Lab Results   Component Value Date    CHOL 156 09/16/2020    TRIG 139 09/16/2020    HDL 47 09/16/2020    LDLCALC 81 09/16/2020          ROS:    Constitutional:  Negative for activity or appetite change, fever or fatigue  HENT:  Negative for congestion, sinus pressure, or rhinorrhea  Eyes:  Negative for eye pain or visual changes  Resp:  Negative for SOB, chest tightness, cough  Cardiovascular: Negative for CP, palpitations, LU, orthopnea, PND, LE edema  Gastrointestinal: Negative for abd pain, melena, BRBPR, N/V/D  Endocrine:  Negative for polydipsia and polyuria  :  Negative for dysuria, flank pain or urinary frequency  Musculoskeletal:  Negative for back pain or myalgias  Neuro:  Negative for dizziness or lightheadedness  Psych: negative for depression or anxiety      Vitals:    04/09/21 0944 04/09/21 0948   BP: (!) 146/91 (!) 133/95   Pulse: 94    Resp: 16    SpO2: 96%    Weight: 160 lb 9.6 oz (72.8 kg)        Outpatient Medications Marked as Taking for the 4/9/21 encounter (Office Visit) with Shannon Barber MD   Medication Sig Dispense Refill    glipiZIDE (GLUCOTROL) 10 MG tablet TAKE 1 TABLET BY MOUTH TWICE DAILY BEFORE MEALS 180 tablet 0    lisinopril (PRINIVIL;ZESTRIL) 5 MG tablet TAKE 1 TABLET BY MOUTH DAILY 90 tablet 0    traMADol (ULTRAM) 50 MG tablet Take 1 tablet by mouth 2 times daily as needed for Pain for up to 30 days. OK TO FILL EARLY 20 tablet 0    metFORMIN (GLUCOPHAGE-XR) 500 MG extended release tablet TAKE 2 TABLETS BY MOUTH WITH BREAKFAST AND 1 TABLET BY MOUTH AT BEDTIME 90 tablet 0    atorvastatin (LIPITOR) 20 MG tablet TAKE 1 TABLET BY MOUTH DAILY 90 tablet 0    ondansetron (ZOFRAN-ODT) 4 MG disintegrating tablet DISSOLVE 1 TABLET ON THE TONGUE THREE TIMES DAILY AS NEEDED FOR NAUSEA OR VOMITING 21 tablet 0    SITagliptin (JANUVIA) 100 MG tablet Take 1 tablet by mouth daily 90 tablet 0    Cranberry 1000 MG CAPS Take 1 tablet by mouth daily      meclizine (ANTIVERT) 25 MG tablet Take 1 tablet by mouth 3 times daily as needed for Nausea 30 tablet 0    Blood Glucose Monitoring Suppl (ONE TOUCH ULTRA MINI) w/Device KIT Use to check sugars 1 kit 0    glucose blood VI test strips (ASCENSIA AUTODISC VI;ONE TOUCH ULTRA TEST VI) strip 1 each by In Vitro route daily As needed.  100 each 3    ONE TOUCH LANCETS MISC 1 each by Does not apply route daily 100 each 3    aspirin 81 MG chewable tablet Take 81 mg by mouth daily      Evening Primrose Oil 500 MG CAPS Take by mouth               Objective:   Constitutional:   · Reviewed vitals above  · Well Nourished, well developed, no distress       NEURO  · No resting or active tremor in extremities today  Neck:  · Symmetric and without masses  · No thyromegaly  Resp:  · Normal effort  · Clear to auscultation bilaterally without rhonchi, wheezing or crackles  Cardiovascular:  · On auscultation, normal S1 and S2 without murmurs, rubs or gallops  · No bruits of bilateral carotids and no JVD  Gastrointestinal:  · Nontender, nondistended, and no masses  · No hepatosplenomegaly  Musculoskeletal:  · Normal Gait  · All extremities without clubbing, cyanosis or edema  · Decreased active range of motion of left shoulder  Skin:  · No rashes on inspection  · No areas of increased heat or induration on palpation  Psych:  · Normal mood and affect  · Normal insight and judgement

## 2021-04-09 ENCOUNTER — TELEPHONE (OUTPATIENT)
Dept: FAMILY MEDICINE CLINIC | Age: 56
End: 2021-04-09

## 2021-04-09 ENCOUNTER — OFFICE VISIT (OUTPATIENT)
Dept: FAMILY MEDICINE CLINIC | Age: 56
End: 2021-04-09

## 2021-04-09 VITALS
DIASTOLIC BLOOD PRESSURE: 95 MMHG | RESPIRATION RATE: 16 BRPM | SYSTOLIC BLOOD PRESSURE: 133 MMHG | HEART RATE: 94 BPM | OXYGEN SATURATION: 96 % | WEIGHT: 160.6 LBS | BODY MASS INDEX: 26.73 KG/M2

## 2021-04-09 DIAGNOSIS — R25.1 TREMOR OF LEFT HAND: ICD-10-CM

## 2021-04-09 DIAGNOSIS — M75.02 ADHESIVE CAPSULITIS OF LEFT SHOULDER: ICD-10-CM

## 2021-04-09 DIAGNOSIS — I10 ESSENTIAL HYPERTENSION: ICD-10-CM

## 2021-04-09 DIAGNOSIS — E78.00 PURE HYPERCHOLESTEROLEMIA: ICD-10-CM

## 2021-04-09 DIAGNOSIS — E11.9 TYPE 2 DIABETES MELLITUS WITHOUT COMPLICATION, WITHOUT LONG-TERM CURRENT USE OF INSULIN (HCC): Primary | ICD-10-CM

## 2021-04-09 PROCEDURE — 3052F HG A1C>EQUAL 8.0%<EQUAL 9.0%: CPT | Performed by: FAMILY MEDICINE

## 2021-04-09 PROCEDURE — 99214 OFFICE O/P EST MOD 30 MIN: CPT | Performed by: FAMILY MEDICINE

## 2021-04-09 RX ORDER — LISINOPRIL 10 MG/1
10 TABLET ORAL DAILY
Qty: 90 TABLET | Refills: 0 | Status: SHIPPED | OUTPATIENT
Start: 2021-04-09 | End: 2021-07-08

## 2021-04-09 RX ORDER — GLIPIZIDE 10 MG/1
20 TABLET ORAL
Qty: 360 TABLET | Refills: 0 | Status: SHIPPED | OUTPATIENT
Start: 2021-04-09 | End: 2021-07-08

## 2021-04-09 NOTE — PATIENT INSTRUCTIONS
[address] Neurology- Phil Hirsch MD  [address] [facility],   [place], [address] [place] [address] Road  [phone]    Schedule diabetic eye exam

## 2021-04-09 NOTE — TELEPHONE ENCOUNTER
Please call [employer] rep and see if there is a financial program as pt doesn't have insurance.     Please send me update

## 2021-04-12 ENCOUNTER — HOSPITAL ENCOUNTER (OUTPATIENT)
Dept: PHYSICAL THERAPY | Age: 56
Setting detail: THERAPIES SERIES
Discharge: HOME | End: 2021-04-12

## 2021-04-12 PROCEDURE — 97140 MANUAL THERAPY 1/> REGIONS: CPT

## 2021-04-12 PROCEDURE — 97110 THERAPEUTIC EXERCISES: CPT

## 2021-04-12 NOTE — FLOWSHEET NOTE
[address] [facility] [address] [address] [address]  Phone: [phone]   Fax:     [phone]        Physical Therapy Treatment Note/ Progress Report:       Date:  2021    Patient Name:  Patrick Perdomo    :  1965  MRN: [id number]    Pertinent Medical History:     Medical/Treatment Diagnosis Information:  · Diagnosis: 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 Quilla Cogan of care signed (Y/N): Sent to Dr. Dennis Quezada 3/26/21    Date of Patient follow up with Physician:      Progress Report: []  Yes  [x]  No     Date Range for reporting period:  Beginning:  3/26/2021  Ending:      Progress report due (10 Rx/or 30 days whichever is less):     Recertification due (POC duration/ or 90 days whichever is less):     Visit # POC/Insurance Allowable Auth Needed    []Yes    [x]No     Functional Outcomes Measure:   Date Assessed:on eval , 3/26/21  Test: Barbie Domínguez  Score: 36    Pain level:  3/10     History of Injury:  Pt picked up her 28 to 40# grandson and turned the wrong way and hurt her left arm on 20. She picked him up and carried him around. She came in late Sept for PT but could not continue and with PT. It has continued to get worse. She also fell in her her garage in February and fell onto the left side. Her physician has referred her back to PT since she is getting worse. SUBJECTIVE:  See eval  3/31/21: Pt reports that she felt well Friday after treatment and on Saturday, but then when the weather changed to cooler and rainy, she felt worse. On the days when weather is good, she feels well. She had a hard time doing her exercises on the cooler days, like today.   21: Pt reports that she is getting better, can lay on it and it is less swollen. She is doing exercises faithfully. Her left hand continues to shake when she is having pain. 4/12/21; Pt reports worse pain with rainy weather due to arthritis. OBJECTIVE: See eval   Observation:    Test measurements:      RESTRICTIONS/PRECAUTIONS:     Exercises/Interventions:   Therapeutic Ex (83004)  Min: 25 Resistance/Reps Cues/Notes   Wand flexion 5 sec x 10 with 2#    Wand scaption 5 sec x 10 with 2#    Wand ER 5 sec x10    Wall slides 5 sec x 10    OH pulleys 3 min Added 3/31   Posterior capsular Sleeper stretcher Int rot - 3 x 30 sec Added to HEP on 3/31/21   Right sidelying scap mob with UE ranger 20X (pt really liked this exercise) Added 3/31   Anterior capsule sleeper stretcher Ext rot- 3 x 30 sec Added 4/8/21   Band resisted IR/ER Green x 15 each with towel roll If well, will add band for HEP next time   Wand IR behind her back 5 sec x 10    Manual Intervention  (87848)  Min:20      GHJ and capsular mobs, long axis distraction, PROM all ranges     Rhythmic stabilization -3 x 30 sec     NMR re-education (62589)  Min:               Therapeutic Activity (15441)  Min:               Modalities:  Min                      Other Therapeutic Activities:  Pt was educated on PT POC, Diagnosis, Prognosis, pathomechanics as well as frequency and duration of scheduling future physical therapy appointments. Time was also taken on this day to answer all patient questions and participation in PT. Reviewed appointment policy in detail with patient and patient verbalized understanding. Home Exercise Program: Flora Soliman program as above for the 5 exercises to start with stretching the shoulder capsule.     3/31/21: Added sleeper's stretch to HEP, 3 x 30 sec   4/8/21: Added sleepers stretch into ext rot 3x 30 sec    Therapeutic Exercise and NMR EXR  [x] (68060) Provided verbal/tactile cueing for activities related to strengthening, flexibility, endurance, ROM for improvements in scapular, scapulothoracic and UE control with self care, reaching, carrying, lifting, house/yardwork, driving/computer work.    [] (87514) Provided verbal/tactile cueing for activities related to improving balance, coordination, kinesthetic sense, posture, motor skill, proprioception  to assist with  scapular, scapulothoracic and UE control with self care, reaching, carrying, lifting, house/yardwork, driving/computer work. Therapeutic Activities:    [] (93121 or 46731) Provided verbal/tactile cueing for activities related to improving balance, coordination, kinesthetic sense, posture, motor skill, proprioception and motor activation to allow for proper function of scapular, scapulothoracic and UE control with self care, carrying, lifting, driving/computer work.      Home Exercise Program:    [x] (60441) Reviewed/Progressed HEP activities related to strengthening, flexibility, endurance, ROM of scapular, scapulothoracic and UE control with self care, reaching, carrying, lifting, house/yardwork, driving/computer work  [] (75373) Reviewed/Progressed HEP activities related to improving balance, coordination, kinesthetic sense, posture, motor skill, proprioception of scapular, scapulothoracic and UE control with self care, reaching, carrying, lifting, house/yardwork, driving/computer work      Manual Treatments:  PROM / STM / Oscillations-Mobs:  G-I, II, III, IV (PA's, Inf., Post.)  [x] (41238) Provided manual therapy to mobilize soft tissue/joints of cervical/CT, scapular GHJ and UE for the purpose of modulating pain, promoting relaxation,  increasing ROM, reducing/eliminating soft tissue swelling/inflammation/restriction, improving soft tissue extensibility and allowing for proper ROM for normal function with self care, reaching, carrying, lifting, house/yardwork, driving/computer work    Charges:  Timed Code Treatment Minutes: 50   Total Treatment Minutes: 50       [] WON (LOW) 455 [id number] (typically 20 minutes face-to-face)  [] EVAL (MOD) 18453 (typically 30 minutes face-to-face)  [] EVAL (HIGH) 93825 (typically 45 minutes face-to-face)  [] RE-EVAL     [x] GV(88414) x 2    [] Dry needle 1 or 2 Muscles (75952)  [] NMR (49221) x     [] Dry needle 3+ Muscles (67970)  [x] Manual (64332) x  1   [] Ultrasound (49651) x  [] TA (25698) x     [] Mech Traction (50079)  [] ES(attended) (46232)     [] ES (un) (23434):   [] Vasopump (43373) [] Ionto (65932)   [] Other:    If [facility] Please Indicate Time In/Out  CPT Code Time in Time out                                   Kody Doss stated goal: \"To get back to work\"  []? Progressing: []? Met: []? Not Met: []? Adjusted     Therapist goals for Patient:   Short Term Goals: To be achieved in: 2 weeks  1. Independent in HEP and progression per patient tolerance, in order to prevent re-injury. []? Progressing: []? Met: []? Not Met: []? Adjusted  2. Patient will have a decrease in pain to facilitate improvement in movement, function, and ADLs as indicated by Functional Deficits. []? Progressing: []? Met: []? Not Met: []? Adjusted     Long Term Goals: To be achieved in: 8 weeks  1. Disability index score of 30% or less for the Quick DASH to assist with reaching prior level of function. []? Progressing: []? Met: []? Not Met: []? Adjusted  2. Patient will demonstrate increased AROM to 150 degrees of flexion and abduction to allow for proper joint functioning as indicated by Functional Deficits. []? Progressing: []? Met: []? Not Met: []? Adjusted  3. Patient will demonstrate an increase in NM recruitment/activation and overall GH and scapular strength to within n5lbs HHD or WNL for proper functional mobility as indicated by patients Functional Deficits. []? Progressing: []? Met: []? Not Met: []? Adjusted  4. Patient will return to 70%  activities without increased symptoms or restriction. []? Progressing: []? Met: []? Not Met: []? Adjusted  5.  Pt will increase mobility to normal shoulder range so she can return to work (patient specific functional goal)           []? Progressing: []? Met: []? Not Met: []? Adjusted     ASSESSMENT:  Pt seems to be developing adhesive capsulitis and in view of her left hand pain, possibly Chronic Regional  Pain Syndrome as well. Movement must begin to improve with urgency to avoid worsening of her condition. Treatment/Activity Tolerance:  [x] Patient tolerated treatment well [] Patient limited by fatique  [] Patient limited by pain  [] Patient limited by other medical complications  [] Other:     Overall Progression Towards Functional goals/ Treatment Progress Update:  [] Patient is progressing as expected towards functional goals listed. [] Progression is slowed due to complexities/Impairments listed. [] Progression has been slowed due to co-morbidities. [x] Plan just implemented, too soon to assess goals progression <30days   [] Goals require adjustment due to lack of progress  [] Patient is not progressing as expected and requires additional follow up with physician  [] Other    Prognosis for POC: [x] Good [] Fair  [] Poor    Patient requires continued skilled intervention: [x] Yes  [] No      PLAN: First focus on mobility of capsule and shoulder and decrease of pain. Add theraband resisted exercises. [x] Continue per plan of care [] Alter current plan (see comments)  [] Plan of care initiated [] Hold pending MD visit [] Discharge    Electronically signed by: Chica Wyman PT     Note: If patient does not return for scheduled/recommended follow up visits, this note will serve as a discharge from care along with the most recent update on progress.

## 2021-04-14 ENCOUNTER — HOSPITAL ENCOUNTER (OUTPATIENT)
Dept: PHYSICAL THERAPY | Age: 56
Setting detail: THERAPIES SERIES
Discharge: HOME | End: 2021-04-14

## 2021-04-14 PROCEDURE — 97110 THERAPEUTIC EXERCISES: CPT

## 2021-04-14 PROCEDURE — 97140 MANUAL THERAPY 1/> REGIONS: CPT

## 2021-04-14 NOTE — FLOWSHEET NOTE
[address] [facility]. [place] [address] [address]  Phone: [phone]   Fax:     [phone]        Physical Therapy Treatment Note/ Progress Report:       Date:  2021    Patient Name:  Justus Heaton    :  1965  MRN: [id number]    Pertinent Medical History:     Medical/Treatment Diagnosis Information:  · Diagnosis: 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 Burkett Alert of care signed (Y/N): Sent to Dr. Nishant Lester 3/26/21    Date of Patient follow up with Physician:      Progress Report: []  Yes  [x]  No     Date Range for reporting period:  Beginning:  3/26/2021  Ending:      Progress report due (10 Rx/or 30 days whichever is less):     Recertification due (POC duration/ or 90 days whichever is less):     Visit # POC/Insurance Allowable Auth Needed   5 12 []Yes    [x]No     Functional Outcomes Measure:   Date Assessed:on eval , 3/26/21  Test: Warren Sartell  Score: 36    Pain level:  2/10     History of Injury:  Pt picked up her 28 to 40# grandson and turned the wrong way and hurt her left arm on 20. She picked him up and carried him around. She came in late Sept for PT but could not continue and with PT. It has continued to get worse. She also fell in her her garage in February and fell onto the left side. Her physician has referred her back to PT since she is getting worse. SUBJECTIVE:  See eval  3/31/21: Pt reports that she felt well Friday after treatment and on Saturday, but then when the weather changed to cooler and rainy, she felt worse. On the days when weather is good, she feels well. She had a hard time doing her exercises on the cooler days, like today.   21: Pt reports that she is getting better, can lay on it and it is less swollen. She is doing exercises faithfully. Her left hand continues to shake when she is having pain. 4/12/21; Pt reports worse pain with rainy weather due to arthritis. 4/14/21: Pt reports that she feels well today and has been doing her exercises. OBJECTIVE: See eval   Observation:    Test measurements:      RESTRICTIONS/PRECAUTIONS:     Exercises/Interventions:   Therapeutic Ex (03696)  Min: 25 Resistance/Reps Cues/Notes   Wand flexion 5 sec x 10 with 2#    Wand scaption 5 sec x 10 with 2#    Wand ER 5 sec x10    Wall slides 5 sec x 10    OH pulleys 3 min Added 3/31   Posterior capsular Sleeper stretcher Int rot - 3 x 30 sec Added to HEP on 3/31/21   Right sidelying scap mob with UE ranger 20X (pt really liked this exercise) Added 3/31   Anterior capsule sleeper stretcher Ext rot- 3 x 30 sec Added 4/8/21   Band resisted IR/ER Green x 15 each with towel roll If well, will add band for HEP next time   Wand IR behind her back 5 sec x 10    Manual Intervention  (19689)  Min:20      GHJ and capsular mobs, long axis distraction, PROM all ranges     Rhythmic stabilization -3 x 30 sec     NMR re-education (85876)  Min:               Therapeutic Activity (86273)  Min:               Modalities:  Min                      Other Therapeutic Activities:  Pt was educated on PT POC, Diagnosis, Prognosis, pathomechanics as well as frequency and duration of scheduling future physical therapy appointments. Time was also taken on this day to answer all patient questions and participation in PT. Reviewed appointment policy in detail with patient and patient verbalized understanding. Home Exercise Program: Elina Lester program as above for the 5 exercises to start with stretching the shoulder capsule.     3/31/21: Added sleeper's stretch to HEP, 3 x 30 sec   4/8/21: Added sleepers stretch into ext rot 3x 30 sec  4/14/21: Issued blue band to pt for HEP  Access Code: [email or web address]: 04/14/2021Prepared by: Manuel Gayle   Shoulder External Rotation with Anchored Resistance - 1 x daily - 7 x weekly - 10 reps - 3 sets   Shoulder Internal Rotation with Resistance - 1 x daily - 7 x weekly - 10 reps - 3 sets    Therapeutic Exercise and NMR EXR  [x] (40991) Provided verbal/tactile cueing for activities related to strengthening, flexibility, endurance, ROM  for improvements in scapular, scapulothoracic and UE control with self care, reaching, carrying, lifting, house/yardwork, driving/computer work.    [] (06397) Provided verbal/tactile cueing for activities related to improving balance, coordination, kinesthetic sense, posture, motor skill, proprioception  to assist with  scapular, scapulothoracic and UE control with self care, reaching, carrying, lifting, house/yardwork, driving/computer work. Therapeutic Activities:    [] (13822 or 68762) Provided verbal/tactile cueing for activities related to improving balance, coordination, kinesthetic sense, posture, motor skill, proprioception and motor activation to allow for proper function of scapular, scapulothoracic and UE control with self care, carrying, lifting, driving/computer work.      Home Exercise Program:    [x] (23546) Reviewed/Progressed HEP activities related to strengthening, flexibility, endurance, ROM of scapular, scapulothoracic and UE control with self care, reaching, carrying, lifting, house/yardwork, driving/computer work  [] (02143) Reviewed/Progressed HEP activities related to improving balance, coordination, kinesthetic sense, posture, motor skill, proprioception of scapular, scapulothoracic and UE control with self care, reaching, carrying, lifting, house/yardwork, driving/computer work      Manual Treatments:  PROM / STM / Oscillations-Mobs:  G-I, II, III, IV (PA's, Inf., Post.)  [x] (76031) Provided manual therapy to mobilize soft tissue/joints of cervical/CT, scapular GHJ and UE for the purpose of modulating pain, promoting relaxation,  increasing ROM, reducing/eliminating soft tissue swelling/inflammation/restriction, improving soft tissue extensibility and allowing for proper ROM for normal function with self care, reaching, carrying, lifting, house/yardwork, driving/computer work    Charges:  Timed Code Treatment Minutes: 50   Total Treatment Minutes: 50       [] EVAL (LOW) 04758 (typically 20 minutes face-to-face)  [] EVAL (MOD) 60961 (typically 30 minutes face-to-face)  [] EVAL (HIGH) 28616 (typically 45 minutes face-to-face)  [] RE-EVAL     [x] UA(48796) x 2    [] Dry needle 1 or 2 Muscles (49597)  [] NMR (41671) x     [] Dry needle 3+ Muscles (38720)  [x] Manual (05169) x  1   [] Ultrasound (83212) x  [] TA (35689) x     [] Mech Traction (15047)  [] ES(attended) (60697)     [] ES (un) (41184):   [] Vasopump (48142) [] Ionto (61984)   [] Other:    If [facility] Please Indicate Time In/Out  CPT Code Time in Time out                                   Shannan Persaud stated goal: \"To get back to work\"  []? Progressing: []? Met: []? Not Met: []? Adjusted     Therapist goals for Patient:   Short Term Goals: To be achieved in: 2 weeks  1. Independent in HEP and progression per patient tolerance, in order to prevent re-injury. []? Progressing: []? Met: []? Not Met: []? Adjusted  2. Patient will have a decrease in pain to facilitate improvement in movement, function, and ADLs as indicated by Functional Deficits. []? Progressing: []? Met: []? Not Met: []? Adjusted     Long Term Goals: To be achieved in: 8 weeks  1. Disability index score of 30% or less for the Quick DASH to assist with reaching prior level of function. []? Progressing: []? Met: []? Not Met: []? Adjusted  2. Patient will demonstrate increased AROM to 150 degrees of flexion and abduction to allow for proper joint functioning as indicated by Functional Deficits. []? Progressing: []? Met: []? Not Met: []? Adjusted  3.  Patient will demonstrate an increase in NM recruitment/activation and overall GH and scapular strength to within n5lbs HHD or WNL for proper functional mobility as indicated by patients Functional Deficits. []? Progressing: []? Met: []? Not Met: []? Adjusted  4. Patient will return to 70%  activities without increased symptoms or restriction. []? Progressing: []? Met: []? Not Met: []? Adjusted  5. Pt will increase mobility to normal shoulder range so she can return to work (patient specific functional goal)           []? Progressing: []? Met: []? Not Met: []? Adjusted     ASSESSMENT:  Pt seems to be developing adhesive capsulitis and in view of her left hand pain, possibly Chronic Regional  Pain Syndrome as well. Movement must begin to improve with urgency to avoid worsening of her condition. Treatment/Activity Tolerance:  [x] Patient tolerated treatment well [] Patient limited by fatique  [] Patient limited by pain  [] Patient limited by other medical complications  [] Other:     Overall Progression Towards Functional goals/ Treatment Progress Update:  [] Patient is progressing as expected towards functional goals listed. [] Progression is slowed due to complexities/Impairments listed. [] Progression has been slowed due to co-morbidities. [x] Plan just implemented, too soon to assess goals progression <30days   [] Goals require adjustment due to lack of progress  [] Patient is not progressing as expected and requires additional follow up with physician  [] Other    Prognosis for POC: [x] Good [] Fair  [] Poor    Patient requires continued skilled intervention: [x] Yes  [] No      PLAN: First focus on mobility of capsule and shoulder and decrease of pain. Add theraband resisted exercises.   [x] Continue per plan of care [] Alter current plan (see comments)  [] Plan of care initiated [] Hold pending MD visit [] Discharge    Electronically signed by: Sarthak Bennett, PT     Note: If patient does not return for

## 2021-04-19 ENCOUNTER — HOSPITAL ENCOUNTER (OUTPATIENT)
Dept: PHYSICAL THERAPY | Age: 56
Setting detail: THERAPIES SERIES
Discharge: HOME | End: 2021-04-19

## 2021-04-21 ENCOUNTER — HOSPITAL ENCOUNTER (OUTPATIENT)
Dept: PHYSICAL THERAPY | Age: 56
Setting detail: THERAPIES SERIES
Discharge: HOME | End: 2021-04-21

## 2021-04-22 ENCOUNTER — OFFICE VISIT (OUTPATIENT)
Dept: FAMILY MEDICINE CLINIC | Age: 56
End: 2021-04-22

## 2021-04-22 ENCOUNTER — TELEPHONE (OUTPATIENT)
Dept: FAMILY MEDICINE CLINIC | Age: 56
End: 2021-04-22

## 2021-04-22 VITALS — TEMPERATURE: 98.1 F | OXYGEN SATURATION: 98 %

## 2021-04-22 DIAGNOSIS — R50.9 FEVER, UNSPECIFIED FEVER CAUSE: Primary | ICD-10-CM

## 2021-04-22 LAB
BILIRUBIN, POC: NEGATIVE
BLOOD URINE, POC: NEGATIVE
CLARITY, POC: CLEAR
COLOR, POC: YELLOW
GLUCOSE URINE, POC: 500
KETONES, POC: NEGATIVE
LEUKOCYTE EST, POC: NEGATIVE
NITRITE, POC: NEGATIVE
PH, POC: 6
PROTEIN, POC: NEGATIVE
S PYO AG THROAT QL: NORMAL
SPECIFIC GRAVITY, POC: 1.03
UROBILINOGEN, POC: 0.2

## 2021-04-22 PROCEDURE — 87880 STREP A ASSAY W/OPTIC: CPT | Performed by: NURSE PRACTITIONER

## 2021-04-22 PROCEDURE — 81002 URINALYSIS NONAUTO W/O SCOPE: CPT | Performed by: NURSE PRACTITIONER

## 2021-04-22 PROCEDURE — 99212 OFFICE O/P EST SF 10 MIN: CPT | Performed by: NURSE PRACTITIONER

## 2021-04-22 NOTE — TELEPHONE ENCOUNTER
Sure. Let her know it will be a carside assessment. Call when she gets here. Please document call and then close encounter.   thanks

## 2021-04-22 NOTE — TELEPHONE ENCOUNTER
Reached out to pt at 0:22, no answer.  LVM to call office ASAP to schedule    Please see below message from [facility]

## 2021-04-22 NOTE — TELEPHONE ENCOUNTER
Been having fevers. Wanting an appt. No availability. Please advise.  Please call Leann back at [phone]

## 2021-04-22 NOTE — TELEPHONE ENCOUNTER
PT called back in regarding missed call. PT is now scheduled at 3:20. PT aware to call when they arrive.

## 2021-04-22 NOTE — PROGRESS NOTES
PROGRESS NOTE     [facility]) Physicians  [address] [address]  [phone] office  [phone] fax    Date of Service:  4/22/2021    Subjective:      Patient ID: Eboni Bernal is a 55 y.o. female      CC: Fever    HPI  Patient complains of a 3-day history of fever. She reports temperatures between 99.8102. She does states she has a slight runny nose and mildly sore throat that started this morning. Denies headache, ear pain, cough, congestion, shortness of breath and chest pain. Patient states the last time she had fevers like this she was treated for UTI. She denies dysuria, frequency and urgency. Patient had persistent unexplained fevers last July. She had thorough lab work-up including Ceci-Barr virus antibody, JEANNE, HIV, sed rate, CRP, lipase, amylase, CMP and CBC which all came back completely normal.  At that time patient was referred to ID. She saw Dr. Charlotte Maria on 7/27. Patient had CT abdomen chest done on 7/31. Negative CT of the chest.  Diverticulosis. Indeterminate left inferior renal lesion, likely a hyperdense cyst.  Recommended ultrasound to confirm. Patient had renal ultrasound done on 8/4 which showed benign left renal hyperdense cyst.    Patient had Covid last fall. Vitals:    04/22/21 1612   Temp: 98.1 °F (36.7 °C)   SpO2: 98%       No outpatient medications have been marked as taking for the 4/22/21 encounter (Office Visit) with BETSY Ocasio CNP. Past Medical History:   Diagnosis Date    Atrophic kidney 09/28/2017    sstone    Atrophic scarred right kidney again noted.  Some nephrolithiasis is noted without  hydronephrosis.       Diabetes ([address]     Essential hypertension     Family history of early CAD     FH: breast cancer 03/08/2017    23 yo mother reported    HLD (hyperlipidemia)     Iron deficiency anemia 07/18/2017    Right peroneal tendinosis 04/30/2018       Past Surgical History: increased heat or induration on palpation  Psych:  · Normal mood and affect  · Normal insight and judgement    Assessment / Plan:     1. Fever, unspecified fever cause  Unknown cause. Rapid strep and urine both negative. Sending throat culture and urine culture to confirm. Patient has no other symptoms. Fevers she had last July resolved on their own. Advised patient to continue to monitor and notify office if she begins with any other symptoms.  She may need to go back to ID if fevers continue for further evaluation.     - POCT rapid strep A  - Culture, Throat  - POCT Urinalysis no Micro  - Culture, Urine

## 2021-04-23 LAB — URINE CULTURE, ROUTINE: NORMAL

## 2021-04-24 LAB — THROAT CULTURE: NORMAL

## 2021-04-26 ENCOUNTER — HOSPITAL ENCOUNTER (OUTPATIENT)
Dept: PHYSICAL THERAPY | Age: 56
Setting detail: THERAPIES SERIES
Discharge: HOME | End: 2021-04-26

## 2021-04-26 PROCEDURE — 97110 THERAPEUTIC EXERCISES: CPT

## 2021-04-26 PROCEDURE — 97140 MANUAL THERAPY 1/> REGIONS: CPT

## 2021-04-26 NOTE — FLOWSHEET NOTE
[address] [facility] [address] [place] [address]  Phone: [phone]   Fax:     [phone]        Physical Therapy Treatment Note/ Progress Report:       Date:  2021    Patient Name:  Max Mason    :  1965  MRN: [id number]    Pertinent Medical History:     Medical/Treatment Diagnosis Information:  · Diagnosis: 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 Sav Armstrong of care signed (Y/N): Sent to Dr. Mili Wang 3/26/21    Date of Patient follow up with Physician:      Progress Report: []  Yes  [x]  No     Date Range for reporting period:  Beginning:  3/26/2021  Ending:      Progress report due (10 Rx/or 30 days whichever is less):     Recertification due (POC duration/ or 90 days whichever is less):     Visit # POC/Insurance Allowable Auth Needed   6 12 []Yes    [x]No     Functional Outcomes Measure:   Date Assessed:on eval , 3/26/21  Test: Lonzell Car  Score: 36    Pain level:  3/10     History of Injury:  Pt picked up her 28 to 40# grandson and turned the wrong way and hurt her left arm on 20. She picked him up and carried him around. She came in late Sept for PT but could not continue and with PT. It has continued to get worse. She also fell in her her garage in February and fell onto the left side. Her physician has referred her back to PT since she is getting worse. SUBJECTIVE:  See eval  3/31/21: Pt reports that she felt well Friday after treatment and on Saturday, but then when the weather changed to cooler and rainy, she felt worse. On the days when weather is good, she feels well. She had a hard time doing her exercises on the cooler days, like today.   21: Pt reports that she is getting better, can lay on it and it is less swollen. She is doing exercises faithfully. Her left hand continues to shake when she is having pain. 4/12/21; Pt reports worse pain with rainy weather due to arthritis. 4/14/21: Pt reports that she feels well today and has been doing her exercises. 4/26/21: Pt reports she is a little more sore on her  left shoulder. OBJECTIVE:    Observation:    Test measurements:  4/26/21:  ROM Left shoulder flexion: 150   Abd:  160  IR: 77   ER:  40    RESTRICTIONS/PRECAUTIONS:     Exercises/Interventions:   Therapeutic Ex (94301)  Min: 25 Resistance/Reps Cues/Notes   Wand flexion 5 sec x 10 with 2#    Wand scaption 5 sec x 10 with 2#    Wand ER 5 sec x10    Wall slides 5 sec x 10    OH pulleys 4 min Added 3/31   Posterior capsular Sleeper stretcher Int rot - 3 x 30 sec Added to HEP on 3/31/21   Right sidelying scap mob with UE ranger 20X (pt really liked this exercise) Added 3/31   Anterior capsule sleeper stretcher Ext rot- 3 x 30 sec Added 4/8/21   Band resisted IR/ER Green x 15 each with towel roll If well, will add band for HEP next time. Gave blue band for HEP. Wand IR behind her back 5 sec x 10    Manual Intervention  (66205)  Min:20      GHJ and capsular mobs, long axis distraction, PROM all ranges     Rhythmic stabilization -3 x 30 sec     NMR re-education (88484)  Min:               Therapeutic Activity (71727)  Min:               Modalities:  Min                      Other Therapeutic Activities:  Pt was educated on PT POC, Diagnosis, Prognosis, pathomechanics as well as frequency and duration of scheduling future physical therapy appointments. Time was also taken on this day to answer all patient questions and participation in PT. Reviewed appointment policy in detail with patient and patient verbalized understanding. Home Exercise Program: Enmanuel Arce program as above for the 5 exercises to start with stretching the shoulder capsule.     3/31/21: Added sleeper's stretch to HEP, 3 x 30 sec   4/8/21: Added sleepers stretch into ext rot 3x 30 sec  4/14/21: Issued blue band to pt for HEP  Access Code: [email or web address]: [email or web address] [employer]/Date: 04/14/2021Prepared by: Dustin Lama   Shoulder External Rotation with Anchored Resistance - 1 x daily - 7 x weekly - 10 reps - 3 sets   Shoulder Internal Rotation with Resistance - 1 x daily - 7 x weekly - 10 reps - 3 sets    Therapeutic Exercise and NMR EXR  [x] (63298) Provided verbal/tactile cueing for activities related to strengthening, flexibility, endurance, ROM  for improvements in scapular, scapulothoracic and UE control with self care, reaching, carrying, lifting, house/yardwork, driving/computer work.    [] (44545) Provided verbal/tactile cueing for activities related to improving balance, coordination, kinesthetic sense, posture, motor skill, proprioception  to assist with  scapular, scapulothoracic and UE control with self care, reaching, carrying, lifting, house/yardwork, driving/computer work. Therapeutic Activities:    [] (73346 or 56870) Provided verbal/tactile cueing for activities related to improving balance, coordination, kinesthetic sense, posture, motor skill, proprioception and motor activation to allow for proper function of scapular, scapulothoracic and UE control with self care, carrying, lifting, driving/computer work.      Home Exercise Program:    [x] (41026) Reviewed/Progressed HEP activities related to strengthening, flexibility, endurance, ROM of scapular, scapulothoracic and UE control with self care, reaching, carrying, lifting, house/yardwork, driving/computer work  [] (03260) Reviewed/Progressed HEP activities related to improving balance, coordination, kinesthetic sense, posture, motor skill, proprioception of scapular, scapulothoracic and UE control with self care, reaching, carrying, lifting, house/yardwork, driving/computer work      Manual Treatments:  PROM / STM / Oscillations-Mobs:  G-I, II, III, IV (PA's, Inf., Post.)  [x] (57976) Provided manual therapy to mobilize soft tissue/joints of cervical/CT, scapular GHJ and UE for the purpose of modulating pain, promoting relaxation,  increasing ROM, reducing/eliminating soft tissue swelling/inflammation/restriction, improving soft tissue extensibility and allowing for proper ROM for normal function with self care, reaching, carrying, lifting, house/yardwork, driving/computer work    Charges:  Timed Code Treatment Minutes: 50   Total Treatment Minutes: 50       [] EVAL (LOW) 15352 (typically 20 minutes face-to-face)  [] EVAL (MOD) 54729 (typically 30 minutes face-to-face)  [] EVAL (HIGH) 51381 (typically 45 minutes face-to-face)  [] RE-EVAL     [x] ON(89045) x 2    [] Dry needle 1 or 2 Muscles (25492)  [] NMR (81081) x     [] Dry needle 3+ Muscles (84919)  [x] Manual (79959) x  1   [] Ultrasound (60690) x  [] TA (08191) x     [] Mech Traction (75053)  [] ES(attended) (03637)     [] ES (un) (86013):   [] Vasopump (63840) [] Ionto (26228)   [] Other:    If [facility] Please Indicate Time In/Out  CPT Code Time in Time out                                   Tracey Lyles stated goal: \"To get back to work\"  []? Progressing: []? Met: []? Not Met: []? Adjusted     Therapist goals for Patient:   Short Term Goals: To be achieved in: 2 weeks  1. Independent in HEP and progression per patient tolerance, in order to prevent re-injury. []? Progressing: []? Met: []? Not Met: []? Adjusted  2. Patient will have a decrease in pain to facilitate improvement in movement, function, and ADLs as indicated by Functional Deficits. []? Progressing: []? Met: []? Not Met: []? Adjusted     Long Term Goals: To be achieved in: 8 weeks  1. Disability index score of 30% or less for the Quick DASH to assist with reaching prior level of function. []? Progressing: []? Met: []? Not Met: []? Adjusted  2.  Patient will demonstrate increased AROM to 150 degrees of flexion and abduction to allow for proper joint functioning as indicated by Functional Deficits. []? Progressing: []? Met: []? Not Met: []? Adjusted  3. Patient will demonstrate an increase in NM recruitment/activation and overall GH and scapular strength to within n5lbs HHD or WNL for proper functional mobility as indicated by patients Functional Deficits. []? Progressing: []? Met: []? Not Met: []? Adjusted  4. Patient will return to 70%  activities without increased symptoms or restriction. []? Progressing: []? Met: []? Not Met: []? Adjusted  5. Pt will increase mobility to normal shoulder range so she can return to work (patient specific functional goal)           []? Progressing: []? Met: []? Not Met: []? Adjusted     ASSESSMENT:  Pt seems to be developing adhesive capsulitis and in view of her left hand pain, possibly Chronic Regional  Pain Syndrome as well. Movement must begin to improve with urgency to avoid worsening of her condition. Treatment/Activity Tolerance:  [x] Patient tolerated treatment well [] Patient limited by fatique  [] Patient limited by pain  [] Patient limited by other medical complications  [] Other:     Overall Progression Towards Functional goals/ Treatment Progress Update:  [] Patient is progressing as expected towards functional goals listed. [] Progression is slowed due to complexities/Impairments listed. [] Progression has been slowed due to co-morbidities. [x] Plan just implemented, too soon to assess goals progression <30days   [] Goals require adjustment due to lack of progress  [] Patient is not progressing as expected and requires additional follow up with physician  [] Other    Prognosis for POC: [x] Good [] Fair  [] Poor    Patient requires continued skilled intervention: [x] Yes  [] No      PLAN: First focus on mobility of capsule and shoulder and decrease of pain. Add theraband resisted exercises.   [x] Continue per plan of care [] Alter current plan (see comments)  [] Plan of care initiated [] Hold pending MD visit [] Discharge    Electronically signed by: Nanine Boas, PT     Note: If patient does not return for scheduled/recommended follow up visits, this note will serve as a discharge from care along with the most recent update on progress.

## 2021-04-28 ENCOUNTER — HOSPITAL ENCOUNTER (OUTPATIENT)
Dept: PHYSICAL THERAPY | Age: 56
Setting detail: THERAPIES SERIES
Discharge: HOME | End: 2021-04-28

## 2021-04-28 PROCEDURE — 97110 THERAPEUTIC EXERCISES: CPT

## 2021-04-28 PROCEDURE — 97140 MANUAL THERAPY 1/> REGIONS: CPT

## 2021-04-28 NOTE — FLOWSHEET NOTE
[address] [facility] [place] [place] [address]  Phone: [phone]   Fax:     [phone]        Physical Therapy Treatment Note/ Progress Report:       Date:  2021    Patient Name:  Martina Pisano    :  1965  MRN: [id number]    Pertinent Medical History:     Medical/Treatment Diagnosis Information:  · Diagnosis: 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 Vivian Remy of care signed (Y/N): Sent to Dr. Jaden Leon 3/26/21    Date of Patient follow up with Physician:      Progress Report: []  Yes  [x]  No     Date Range for reporting period:  Beginning:  3/26/2021  Ending:      Progress report due (10 Rx/or 30 days whichever is less):     Recertification due (POC duration/ or 90 days whichever is less):     Visit # POC/Insurance Allowable Auth Needed   7 12 []Yes    [x]No     Functional Outcomes Measure:   Date Assessed:on eval , 3/26/21  Test: Reba Puga  Score: 36    Pain level:  0/10     History of Injury:  Pt picked up her 28 to 40# grandson and turned the wrong way and hurt her left arm on 20. She picked him up and carried him around. She came in late Sept for PT but could not continue and with PT. It has continued to get worse. She also fell in her her garage in February and fell onto the left side. Her physician has referred her back to PT since she is getting worse. SUBJECTIVE:  See eval  3/31/21: Pt reports that she felt well Friday after treatment and on Saturday, but then when the weather changed to cooler and rainy, she felt worse. On the days when weather is good, she feels well. She had a hard time doing her exercises on the cooler days, like today.   21: Pt reports that she is program as above for the 5 exercises to start with stretching the shoulder capsule. 3/31/21: Added sleeper's stretch to HEP, 3 x 30 sec   4/8/21: Added sleepers stretch into ext rot 3x 30 sec  4/14/21: Issued blue band to pt for HEP  Access Code: [email or web address]: [email or web address] com/Date: 04/14/2021Prepared by: Sourav Thibodeaux   Shoulder External Rotation with Anchored Resistance - 1 x daily - 7 x weekly - 10 reps - 3 sets   Shoulder Internal Rotation with Resistance - 1 x daily - 7 x weekly - 10 reps - 3 sets    Therapeutic Exercise and NMR EXR  [x] (51361) Provided verbal/tactile cueing for activities related to strengthening, flexibility, endurance, ROM  for improvements in scapular, scapulothoracic and UE control with self care, reaching, carrying, lifting, house/yardwork, driving/computer work.    [] (31419) Provided verbal/tactile cueing for activities related to improving balance, coordination, kinesthetic sense, posture, motor skill, proprioception  to assist with  scapular, scapulothoracic and UE control with self care, reaching, carrying, lifting, house/yardwork, driving/computer work. Therapeutic Activities:    [] (78658 or 20687) Provided verbal/tactile cueing for activities related to improving balance, coordination, kinesthetic sense, posture, motor skill, proprioception and motor activation to allow for proper function of scapular, scapulothoracic and UE control with self care, carrying, lifting, driving/computer work.      Home Exercise Program:    [x] (50177) Reviewed/Progressed HEP activities related to strengthening, flexibility, endurance, ROM of scapular, scapulothoracic and UE control with self care, reaching, carrying, lifting, house/yardwork, driving/computer work  [] (09721) Reviewed/Progressed HEP activities related to improving balance, coordination, kinesthetic sense, posture, motor skill, proprioception of scapular, scapulothoracic and UE control with self care, reaching, carrying, lifting, house/yardwork, driving/computer work      Manual Treatments:  PROM / STM / Oscillations-Mobs:  G-I, II, III, IV (PA's, Inf., Post.)  [x] (65248) Provided manual therapy to mobilize soft tissue/joints of cervical/CT, scapular GHJ and UE for the purpose of modulating pain, promoting relaxation,  increasing ROM, reducing/eliminating soft tissue swelling/inflammation/restriction, improving soft tissue extensibility and allowing for proper ROM for normal function with self care, reaching, carrying, lifting, house/yardwork, driving/computer work    Charges:  Timed Code Treatment Minutes: 50   Total Treatment Minutes: 50       [] EVAL (LOW) 33518 (typically 20 minutes face-to-face)  [] EVAL (MOD) 34905 (typically 30 minutes face-to-face)  [] EVAL (HIGH) 51460 (typically 45 minutes face-to-face)  [] RE-EVAL     [x] GB(75544) x 2    [] Dry needle 1 or 2 Muscles (34630)  [] NMR (54537) x     [] Dry needle 3+ Muscles (57352)  [x] Manual (08532) x  1   [] Ultrasound (07058) x  [] TA (38883) x     [] Mech Traction (43851)  [] ES(attended) (74414)     [] ES (un) (06014):   [] Vasopump (60827) [] Ionto (01212)   [] Other:    If [facility] Please Indicate Time In/Out  CPT Code Time in Time out                                   Axel Malagon stated goal: \"To get back to work\"  []? Progressing: []? Met: []? Not Met: []? Adjusted     Therapist goals for Patient:   Short Term Goals: To be achieved in: 2 weeks  1. Independent in HEP and progression per patient tolerance, in order to prevent re-injury. []? Progressing: []? Met: []? Not Met: []? Adjusted  2. Patient will have a decrease in pain to facilitate improvement in movement, function, and ADLs as indicated by Functional Deficits. []? Progressing: []? Met: []? Not Met: []? Adjusted     Long Term Goals: To be achieved in: 8 weeks  1. Disability index score of 30% or less for the Quick DASH to assist with reaching prior level of function. []?  Progressing:

## 2021-05-05 ENCOUNTER — HOSPITAL ENCOUNTER (OUTPATIENT)
Dept: PHYSICAL THERAPY | Age: 56
Setting detail: THERAPIES SERIES
Discharge: HOME | End: 2021-05-05

## 2021-05-05 PROCEDURE — 97110 THERAPEUTIC EXERCISES: CPT

## 2021-05-05 PROCEDURE — 97140 MANUAL THERAPY 1/> REGIONS: CPT

## 2021-05-05 NOTE — FLOWSHEET NOTE
[address] [facility] [address] [place] [address]  Phone: [phone]   Fax:     [phone]        Physical Therapy Treatment Note/ Progress Report:       Date:  2021    Patient Name:  Nissa Hunter    :  1965  MRN: [id number]    Pertinent Medical History:     Medical/Treatment Diagnosis Information:  · Diagnosis: 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 Gokul Borja of care signed (Y/N): Sent to Dr. Deborah Benavides 3/26/21    Date of Patient follow up with Physician:      Progress Report: []  Yes  [x]  No     Date Range for reporting period:  Beginning:  3/26/2021  Ending:      Progress report due (10 Rx/or 30 days whichever is less):     Recertification due (POC duration/ or 90 days whichever is less):     Visit # POC/Insurance Allowable Auth Needed   8 12 []Yes    [x]No     Functional Outcomes Measure:   Date Assessed:on eval , 3/26/21  Test: Idella Kehr  Score: 36    Pain level:  0/10     History of Injury:  Pt picked up her 28 to 40# grandson and turned the wrong way and hurt her left arm on 20. She picked him up and carried him around. She came in late Sept for PT but could not continue and with PT. It has continued to get worse. She also fell in her her garage in February and fell onto the left side. Her physician has referred her back to PT since she is getting worse. SUBJECTIVE:  See eval  3/31/21: Pt reports that she felt well Friday after treatment and on Saturday, but then when the weather changed to cooler and rainy, she felt worse. On the days when weather is good, she feels well. She had a hard time doing her exercises on the cooler days, like today.   21: Pt reports that she is getting better, can lay on it and it is less swollen. She is doing exercises faithfully. Her left hand continues to shake when she is having pain. 4/12/21; Pt reports worse pain with rainy weather due to arthritis. 4/14/21: Pt reports that she feels well today and has been doing her exercises. 4/26/21: Pt reports she is a little more sore on her  left shoulder. 4/28/21: Pt reports no pain today. Feels better overall. 5/5/21: Pt reports no pain in shoulder, but stiffness in neck. OBJECTIVE:    Observation:    Test measurements:  4/26/21:  ROM Left shoulder flexion: 150   Abd:  160  IR: 77   ER:  40    RESTRICTIONS/PRECAUTIONS:     Exercises/Interventions:   Therapeutic Ex (55515)  Min: 25 Resistance/Reps Cues/Notes   5 sec x 10 with 3#    5 sec x 10 with 3#    5 sec x10    5 sec x 10    OH pulleys4 min Added 3/31   Int rot - 3 x 30 sec Added to HEP on 3/31/21   Standing UE ranger 20X side to side and 20X up and down      20X (pt really liked this exercise) Added 3/31    Ext rot- 3 x 30 sec Added 4/8/21   Band resisted IR/ER  Blackx 15 each with towel roll If well, will add band for HEP next time. Gave blue band for HEP. Rows Black band x 20 Added to HEP on 4/28/21   Body Blade 1 min x 2 sets 5/5/21   Active flexion and scaption 2# x 20 each 5/5/21    5 sec x 10    Manual Intervention  (86373)  Min:20      GHJ and capsular mobs, long axis distraction, PROM all ranges     Rhythmic stabilization -3 x 30 sec     NMR re-education (42652)  Min:               Therapeutic Activity (92446)  Min:               Modalities:  Min                      Other Therapeutic Activities:  Pt was educated on PT POC, Diagnosis, Prognosis, pathomechanics as well as frequency and duration of scheduling future physical therapy appointments. Time was also taken on this day to answer all patient questions and participation in PT. Reviewed appointment policy in detail with patient and patient verbalized understanding.      Home Exercise Program: Katie Robert program as above for the 5 exercises to start with stretching the shoulder capsule. 3/31/21: Added sleeper's stretch to HEP, 3 x 30 sec   4/8/21: Added sleepers stretch into ext rot 3x 30 sec  4/14/21: Issued blue band to pt for HEP  Access Code: [email or web address]: [email or web address]/Date: 04/14/2021Prepared by: Brisa Has   Shoulder External Rotation with Anchored Resistance - 1 x daily - 7 x weekly - 10 reps - 3 sets   Shoulder Internal Rotation with Resistance - 1 x daily - 7 x weekly - 10 reps - 3 sets    Therapeutic Exercise and NMR EXR  [x] (55314) Provided verbal/tactile cueing for activities related to strengthening, flexibility, endurance, ROM  for improvements in scapular, scapulothoracic and UE control with self care, reaching, carrying, lifting, house/yardwork, driving/computer work.    [] (73889) Provided verbal/tactile cueing for activities related to improving balance, coordination, kinesthetic sense, posture, motor skill, proprioception  to assist with  scapular, scapulothoracic and UE control with self care, reaching, carrying, lifting, house/yardwork, driving/computer work. Therapeutic Activities:    [] (76860 or 87673) Provided verbal/tactile cueing for activities related to improving balance, coordination, kinesthetic sense, posture, motor skill, proprioception and motor activation to allow for proper function of scapular, scapulothoracic and UE control with self care, carrying, lifting, driving/computer work.      Home Exercise Program:    [x] (88034) Reviewed/Progressed HEP activities related to strengthening, flexibility, endurance, ROM of scapular, scapulothoracic and UE control with self care, reaching, carrying, lifting, house/yardwork, driving/computer work  [] (96989) Reviewed/Progressed HEP activities related to improving balance, coordination, kinesthetic sense, posture, motor skill, proprioception of scapular, scapulothoracic of function. []? Progressing: []? Met: []? Not Met: []? Adjusted  2. Patient will demonstrate increased AROM to 150 degrees of flexion and abduction to allow for proper joint functioning as indicated by Functional Deficits. []? Progressing: []? Met: []? Not Met: []? Adjusted  3. Patient will demonstrate an increase in NM recruitment/activation and overall GH and scapular strength to within n5lbs HHD or WNL for proper functional mobility as indicated by patients Functional Deficits. []? Progressing: []? Met: []? Not Met: []? Adjusted  4. Patient will return to 70%  activities without increased symptoms or restriction. []? Progressing: []? Met: []? Not Met: []? Adjusted  5. Pt will increase mobility to normal shoulder range so she can return to work (patient specific functional goal)           []? Progressing: []? Met: []? Not Met: []? Adjusted     ASSESSMENT:  Pt seems to be developing adhesive capsulitis and in view of her left hand pain, possibly Chronic Regional  Pain Syndrome as well. Movement must begin to improve with urgency to avoid worsening of her condition. Treatment/Activity Tolerance:  [x] Patient tolerated treatment well [] Patient limited by fatique  [] Patient limited by pain  [] Patient limited by other medical complications  [] Other:     Overall Progression Towards Functional goals/ Treatment Progress Update:  [] Patient is progressing as expected towards functional goals listed. [] Progression is slowed due to complexities/Impairments listed. [] Progression has been slowed due to co-morbidities. [x] Plan just implemented, too soon to assess goals progression <30days   [] Goals require adjustment due to lack of progress  [] Patient is not progressing as expected and requires additional follow up with physician  [] Other    Prognosis for POC: [x] Good [] Fair  [] Poor    Patient requires continued skilled intervention: [x] Yes  [] No      PLAN:  Add theraband resisted exercises. Progress strength. [x] Continue per plan of care [] Alter current plan (see comments)  [] Plan of care initiated [] Hold pending MD visit [] Discharge    Electronically signed by: Chica Wyman PT     Note: If patient does not return for scheduled/recommended follow up visits, this note will serve as a discharge from care along with the most recent update on progress.

## 2021-05-07 ENCOUNTER — HOSPITAL ENCOUNTER (OUTPATIENT)
Dept: PHYSICAL THERAPY | Age: 56
Setting detail: THERAPIES SERIES
Discharge: HOME | End: 2021-05-07

## 2021-05-07 PROCEDURE — 97140 MANUAL THERAPY 1/> REGIONS: CPT

## 2021-05-07 PROCEDURE — 97112 NEUROMUSCULAR REEDUCATION: CPT

## 2021-05-07 PROCEDURE — 97110 THERAPEUTIC EXERCISES: CPT

## 2021-05-07 NOTE — FLOWSHEET NOTE
[address] [facility] [address] [address] [address]  Phone: [phone]   Fax:     [phone]        Physical Therapy Treatment Note/ Progress Report:       Date:  2021    Patient Name:  Alicia Wen    :  1965  MRN: [id number]    Pertinent Medical History:     Medical/Treatment Diagnosis Information:  · Diagnosis: 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 Carmen Henderson of care signed (Y/N): Sent to Dr. Dell Benjamin 3/26/21    Date of Patient follow up with Physician:      Progress Report: []  Yes  [x]  No     Date Range for reporting period:  Beginning:  3/26/2021  Ending:      Progress report due (10 Rx/or 30 days whichever is less):     Recertification due (POC duration/ or 90 days whichever is less):     Visit # POC/Insurance Allowable Auth Needed   9 12 []Yes    [x]No     Functional Outcomes Measure:   Date Assessed:on eval , 3/26/21  Test: George Guardian  Score: 36    Pain level:  1-2/10     History of Injury:  Pt picked up her 28 to 40# grandson and turned the wrong way and hurt her left arm on 20. She picked him up and carried him around. She came in late Sept for PT but could not continue and with PT. It has continued to get worse. She also fell in her her garage in February and fell onto the left side. Her physician has referred her back to PT since she is getting worse. SUBJECTIVE:  See eval  3/31/21: Pt reports that she felt well Friday after treatment and on Saturday, but then when the weather changed to cooler and rainy, she felt worse. On the days when weather is good, she feels well. She had a hard time doing her exercises on the cooler days, like today.   21: Pt reports that she is getting better, can lay on it and it is less swollen. She is doing exercises faithfully. Her left hand continues to shake when she is having pain. 4/12/21; Pt reports worse pain with rainy weather due to arthritis. 4/14/21: Pt reports that she feels well today and has been doing her exercises. 4/26/21: Pt reports she is a little more sore on her  left shoulder. 4/28/21: Pt reports no pain today. Feels better overall. 5/5/21: Pt reports no pain in shoulder, but stiffness in neck. 5/7/21: Pt is tired, but pain is not bad today. OBJECTIVE:    Observation:    Test measurements:  4/26/21:  ROM Left shoulder flexion: 150   Abd:  160  IR: 77   ER:  40    RESTRICTIONS/PRECAUTIONS:     Exercises/Interventions:   Therapeutic Ex (51552)  Min: 25 Resistance/Reps Cues/Notes   Wand flexion 5 sec x 10 with 3#    Wand scaption 5 sec x 10 with 3#    Wand ER 5 sec x10 3#    Wall slides5 sec x 10    OH pulleys4 min Added 3/31   Int rot - 3 x 30 sec Added to HEP on 3/31/21   Standing UE ranger 20X side to side and 20X up and down      20X (pt really liked this exercise) Added 3/31    Ext rot- 3 x 30 sec Added 4/8/21   Band resisted IR/ER  Black x 15 each with towel roll If well, will add band for HEP next time. Gave blue band for HEP. Rows Black band x 20 Added to HEP on 4/28/21   Body Blade 1 min x 2 sets 5/5/21   Active flexion and scaption and circles out to side 2# x 20 each 5/5/21 and 5/7/21    5 sec x 10    Manual Intervention  (14767)  Min:20      GHJ and capsular mobs, long axis distraction, PROM all ranges     Rhythmic stabilization -3 x 30 sec     NMR re-education (72955)  Min:               Therapeutic Activity (23322)  Min:               Modalities:  Min                      Other Therapeutic Activities:  Pt was educated on PT POC, Diagnosis, Prognosis, pathomechanics as well as frequency and duration of scheduling future physical therapy appointments.  Time was also taken on this day to answer all patient activities related to improving balance, coordination, kinesthetic sense, posture, motor skill, proprioception of scapular, scapulothoracic and UE control with self care, reaching, carrying, lifting, house/yardwork, driving/computer work      Manual Treatments:  PROM / STM / Oscillations-Mobs:  G-I, II, III, IV (PA's, Inf., Post.)  [x] (27852) Provided manual therapy to mobilize soft tissue/joints of cervical/CT, scapular GHJ and UE for the purpose of modulating pain, promoting relaxation,  increasing ROM, reducing/eliminating soft tissue swelling/inflammation/restriction, improving soft tissue extensibility and allowing for proper ROM for normal function with self care, reaching, carrying, lifting, house/yardwork, driving/computer work    Charges:  Timed Code Treatment Minutes: 50   Total Treatment Minutes: 50       [] EVAL (LOW) 92026 (typically 20 minutes face-to-face)  [] EVAL (MOD) 20329 (typically 30 minutes face-to-face)  [] EVAL (HIGH) 56277 (typically 45 minutes face-to-face)  [] RE-EVAL     [x] QS(72739) x 2    [] Dry needle 1 or 2 Muscles (08876)  [] NMR (78392) x     [] Dry needle 3+ Muscles (16273)  [x] Manual (45434) x  1   [] Ultrasound (31220) x  [] TA (85840) x     [] Mech Traction (10048)  [] ES(attended) (76680)     [] ES (un) (98826):   [] Vasopump (70037) [] Ionto (33204)   [] Other:    If BW Please Indicate Time In/Out  CPT Code Time in Time out                                   Cayden Rater stated goal: \"To get back to work\"  []? Progressing: []? Met: []? Not Met: []? Adjusted     Therapist goals for Patient:   Short Term Goals: To be achieved in: 2 weeks  1. Independent in HEP and progression per patient tolerance, in order to prevent re-injury. []? Progressing: []? Met: []? Not Met: []? Adjusted  2. Patient will have a decrease in pain to facilitate improvement in movement, function, and ADLs as indicated by Functional Deficits. []? Progressing: []? Met: []?  Not Met: []? Adjusted     Long Term Goals: To be achieved in: 8 weeks  1. Disability index score of 30% or less for the Quick DASH to assist with reaching prior level of function. []? Progressing: []? Met: []? Not Met: []? Adjusted  2. Patient will demonstrate increased AROM to 150 degrees of flexion and abduction to allow for proper joint functioning as indicated by Functional Deficits. []? Progressing: []? Met: []? Not Met: []? Adjusted  3. Patient will demonstrate an increase in NM recruitment/activation and overall GH and scapular strength to within n5lbs HHD or WNL for proper functional mobility as indicated by patients Functional Deficits. []? Progressing: []? Met: []? Not Met: []? Adjusted  4. Patient will return to 70%  activities without increased symptoms or restriction. []? Progressing: []? Met: []? Not Met: []? Adjusted  5. Pt will increase mobility to normal shoulder range so she can return to work (patient specific functional goal)           []? Progressing: []? Met: []? Not Met: []? Adjusted     ASSESSMENT:  Pt seems to be developing adhesive capsulitis and in view of her left hand pain, possibly Chronic Regional  Pain Syndrome as well. Movement must begin to improve with urgency to avoid worsening of her condition. Treatment/Activity Tolerance:  [x] Patient tolerated treatment well [] Patient limited by fatique  [] Patient limited by pain  [] Patient limited by other medical complications  [] Other:     Overall Progression Towards Functional goals/ Treatment Progress Update:  [x] Patient is progressing as expected towards functional goals listed. [] Progression is slowed due to complexities/Impairments listed. [] Progression has been slowed due to co-morbidities.   [] Plan just implemented, too soon to assess goals progression <30days   [] Goals require adjustment due to lack of progress  [] Patient is not progressing as expected and requires additional follow up with physician  [] Other    Prognosis for POC: [x] Good [] Fair  [] Poor    Patient requires continued skilled intervention: [x] Yes  [] No      PLAN:  Add theraband resisted exercises. Progress strength. [x] Continue per plan of care [] Alter current plan (see comments)  [] Plan of care initiated [] Hold pending MD visit [] Discharge    Electronically signed by: Shana Lee PT     Note: If patient does not return for scheduled/recommended follow up visits, this note will serve as a discharge from care along with the most recent update on progress.

## 2021-05-10 ENCOUNTER — HOSPITAL ENCOUNTER (OUTPATIENT)
Dept: PHYSICAL THERAPY | Age: 56
Setting detail: THERAPIES SERIES
Discharge: HOME | End: 2021-05-10

## 2021-05-10 PROCEDURE — 97140 MANUAL THERAPY 1/> REGIONS: CPT

## 2021-05-10 PROCEDURE — 97112 NEUROMUSCULAR REEDUCATION: CPT

## 2021-05-10 PROCEDURE — 97110 THERAPEUTIC EXERCISES: CPT

## 2021-05-10 NOTE — FLOWSHEET NOTE
[address] [facility] [address]  Phone: [phone]   Fax:     [phone]        Physical Therapy Treatment Note/ Progress Report:       Date:  5/10/2021    Patient Name:  Radha Aguiar    :  1965  MRN: [id number]    Pertinent Medical History:     Medical/Treatment Diagnosis Information:  · Diagnosis: 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 Nick Johnson of care signed (Y/N): Sent to Dr. Gonzales Wolfe 3/26/21    Date of Patient follow up with Physician:      Progress Report: []  Yes  [x]  No     Date Range for reporting period:  Beginning:  3/26/2021  Ending:      Progress report due (10 Rx/or 30 days whichever is less):     Recertification due (POC duration/ or 90 days whichever is less):     Visit # POC/Insurance Allowable Auth Needed   10 12 []Yes    [x]No     Functional Outcomes Measure:   Date Assessed:on eval , 3/26/21  Test: Hernandez Bible  Score: 36    Pain level:  3/10     History of Injury:  Pt picked up her 28 to 40# grandson and turned the wrong way and hurt her left arm on 20. She picked him up and carried him around. She came in late Sept for PT but could not continue and with PT. It has continued to get worse. She also fell in her her garage in February and fell onto the left side. Her physician has referred her back to PT since she is getting worse. SUBJECTIVE:  See eval  3/31/21: Pt reports that she felt well Friday after treatment and on Saturday, but then when the weather changed to cooler and rainy, she felt worse. On the days when weather is good, she feels well. She had a hard time doing her exercises on the cooler days, like today.   21: Pt reports that she is endurance, ROM of scapular, scapulothoracic and UE control with self care, reaching, carrying, lifting, house/yardwork, driving/computer work  [] (14781) Reviewed/Progressed HEP activities related to improving balance, coordination, kinesthetic sense, posture, motor skill, proprioception of scapular, scapulothoracic and UE control with self care, reaching, carrying, lifting, house/yardwork, driving/computer work      Manual Treatments:  PROM / STM / Oscillations-Mobs:  G-I, II, III, IV (PA's, Inf., Post.)  [x] (79294) Provided manual therapy to mobilize soft tissue/joints of cervical/CT, scapular GHJ and UE for the purpose of modulating pain, promoting relaxation,  increasing ROM, reducing/eliminating soft tissue swelling/inflammation/restriction, improving soft tissue extensibility and allowing for proper ROM for normal function with self care, reaching, carrying, lifting, house/yardwork, driving/computer work    Charges:  Timed Code Treatment Minutes: 50   Total Treatment Minutes: 50       [] EVAL (LOW) 85522 (typically 20 minutes face-to-face)  [] EVAL (MOD) 62267 (typically 30 minutes face-to-face)  [] EVAL (HIGH) 15322 (typically 45 minutes face-to-face)  [] RE-EVAL     [x] MF(06208) x 1    [] Dry needle 1 or 2 Muscles (28478)  [x] NMR (32109) x  1   [] Dry needle 3+ Muscles (35326)  [x] Manual (66987) x  1   [] Ultrasound (88627) x  [] TA (16708) x     [] Mech Traction (70795)  [] ES(attended) (86473)     [] ES (un) (70329):   [] Vasopump (02476) [] Ionto (69051)   [] Other:    If [facility] Please Indicate Time In/Out  CPT Code Time in Time out                                   Buddy Bueno stated goal: \"To get back to work\"  []? Progressing: []? Met: []? Not Met: []? Adjusted     Therapist goals for Patient:   Short Term Goals: To be achieved in: 2 weeks  1. Independent in HEP and progression per patient tolerance, in order to prevent re-injury. []? Progressing: []? Met: []? Not Met: []? Adjusted  2.  Patient will have a decrease in pain to facilitate improvement in movement, function, and ADLs as indicated by Functional Deficits. []? Progressing: []? Met: []? Not Met: []? Adjusted     Long Term Goals: To be achieved in: 8 weeks  1. Disability index score of 30% or less for the Quick DASH to assist with reaching prior level of function. []? Progressing: []? Met: []? Not Met: []? Adjusted  2. Patient will demonstrate increased AROM to 150 degrees of flexion and abduction to allow for proper joint functioning as indicated by Functional Deficits. []? Progressing: []? Met: []? Not Met: []? Adjusted  3. Patient will demonstrate an increase in NM recruitment/activation and overall GH and scapular strength to within n5lbs HHD or WNL for proper functional mobility as indicated by patients Functional Deficits. []? Progressing: []? Met: []? Not Met: []? Adjusted  4. Patient will return to 70%  activities without increased symptoms or restriction. []? Progressing: []? Met: []? Not Met: []? Adjusted  5. Pt will increase mobility to normal shoulder range so she can return to work (patient specific functional goal)           []? Progressing: []? Met: []? Not Met: []? Adjusted     ASSESSMENT:  Pt seems to be developing adhesive capsulitis and in view of her left hand pain, possibly Chronic Regional  Pain Syndrome as well. Movement must begin to improve with urgency to avoid worsening of her condition. Treatment/Activity Tolerance:  [x] Patient tolerated treatment well [] Patient limited by fatique  [] Patient limited by pain  [] Patient limited by other medical complications  [] Other:     Overall Progression Towards Functional goals/ Treatment Progress Update:  [x] Patient is progressing as expected towards functional goals listed. [] Progression is slowed due to complexities/Impairments listed. [] Progression has been slowed due to co-morbidities.   [] Plan just implemented, too soon to assess goals progression <30days [] Goals require adjustment due to lack of progress  [] Patient is not progressing as expected and requires additional follow up with physician  [] Other    Prognosis for POC: [x] Good [] Fair  [] Poor    Patient requires continued skilled intervention: [x] Yes  [] No      PLAN:  Add theraband resisted exercises. Progress strength. [x] Continue per plan of care [] Alter current plan (see comments)  [] Plan of care initiated [] Hold pending MD visit [] Discharge    Electronically signed by: Ankur Fuchs PT     Note: If patient does not return for scheduled/recommended follow up visits, this note will serve as a discharge from care along with the most recent update on progress.

## 2021-05-12 ENCOUNTER — HOSPITAL ENCOUNTER (OUTPATIENT)
Dept: PHYSICAL THERAPY | Age: 56
Setting detail: THERAPIES SERIES
Discharge: HOME | End: 2021-05-12

## 2021-05-12 PROCEDURE — 97112 NEUROMUSCULAR REEDUCATION: CPT

## 2021-05-12 PROCEDURE — 97140 MANUAL THERAPY 1/> REGIONS: CPT

## 2021-05-12 PROCEDURE — 97110 THERAPEUTIC EXERCISES: CPT

## 2021-05-12 NOTE — FLOWSHEET NOTE
[facility]. [address]  Phone: [phone]   Fax:     [phone]        Physical Therapy Treatment Note/ Progress Report:       Date:  2021    Patient Name:  Landen Riggins    :  1965  MRN: [id number]    Pertinent Medical History:     Medical/Treatment Diagnosis Information:  · Diagnosis: 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 Kinsey Ramirez of care signed (Y/N): Sent to Dr. Lynne Reno 3/26/21    Date of Patient follow up with Physician:      Progress Report: []  Yes  [x]  No     Date Range for reporting period:  Beginning:  3/26/2021  Ending:      Progress report due (10 Rx/or 30 days whichever is less):     Recertification due (POC duration/ or 90 days whichever is less):     Visit # POC/Insurance Allowable Auth Needed   11  []Yes    [x]No     Functional Outcomes Measure:   Date Assessed:on eval , 3/26/21  Test: Ulyess Ala  Score: 36    Pain level:  0/10     History of Injury:  Pt picked up her 28 to 40# grandson and turned the wrong way and hurt her left arm on 20. She picked him up and carried him around. She came in late Sept for PT but could not continue and with PT. It has continued to get worse. She also fell in her her garage in February and fell onto the left side. Her physician has referred her back to PT since she is getting worse. SUBJECTIVE:  See eval  3/31/21: Pt reports that she felt well Friday after treatment and on Saturday, but then when the weather changed to cooler and rainy, she felt worse. On the days when weather is good, she feels well. She had a hard time doing her exercises on the cooler days, like today.   21: Pt reports that she is getting better, can lay on it and it is less swollen. She is doing exercises faithfully. Her left hand continues to shake when she is having pain. 4/12/21; Pt reports worse pain with rainy weather due to arthritis. 4/14/21: Pt reports that she feels well today and has been doing her exercises. 4/26/21: Pt reports she is a little more sore on her  left shoulder. 4/28/21: Pt reports no pain today. Feels better overall. 5/5/21: Pt reports no pain in shoulder, but stiffness in neck. 5/7/21: Pt is tired, but pain is not bad today. 5/10/21: Pt reports that her pain is worse today because of the weather and not doing her exercise yesterday. 5/12/21: Pt reports no pain. OBJECTIVE:    Observation:    Test measurements:  4/26/21:  ROM Left shoulder flexion: 150   Abd:  160  IR: 77   ER:  40    RESTRICTIONS/PRECAUTIONS:     Exercises/Interventions:   Therapeutic Ex (17811)  Min: 25 Resistance/Reps Cues/Notes   Wand flexion 5 sec x 10 with 3#    Wand scaption 5 sec x 10 with 3#    Wand ER 5 sec x10 3#    Wall slides5 sec x 10    OH pulleys4 min Added 3/31   Int rot - 3 x 30 sec Added to HEP on 3/31/21   Standing UE ranger 20X side to side and 20X up and down      20X (pt really liked this exercise) Added 3/31    Ext rot- 3 x 30 sec Added 4/8/21   Band resisted IR/ER  Black x 15 each with towel roll If well, will add band for HEP next time. Gave blue band for HEP.    Rows Black band x 20 Added to HEP on 4/28/21 5/5/21   Active flexion and scaption and circles out to side 3# x 10 each, 2# x 10 each 5/5/21 and 5/7/21    5 sec x 10    Manual Intervention  (36541)  Min:20      GHJ and capsular mobs, long axis distraction, PROM all ranges     Rhythmic stabilization -3 x 30 sec          NMR re-education (78739)  Min:     Body Blade 2 min 1    Ball on the wall 20X co-contraction    Pressure on Rock on table 2 min    Therapeutic Activity (44970)  Min:               Modalities:  Min                      Other Therapeutic Activities:  Pt was educated on PT POC, Diagnosis, Prognosis, pathomechanics as well as frequency and duration of scheduling future physical therapy appointments. Time was also taken on this day to answer all patient questions and participation in PT. Reviewed appointment policy in detail with patient and patient verbalized understanding. Home Exercise Program: Flora Soliman program as above for the 5 exercises to start with stretching the shoulder capsule. 3/31/21: Added sleeper's stretch to HEP, 3 x 30 sec   4/8/21: Added sleepers stretch into ext rot 3x 30 sec  4/14/21: Issued blue band to pt for HEP  Access Code: [email or web address]: [employer] [address] com/Date: 04/14/2021Prepared by: Debra Seip   Shoulder External Rotation with Anchored Resistance - 1 x daily - 7 x weekly - 10 reps - 3 sets   Shoulder Internal Rotation with Resistance - 1 x daily - 7 x weekly - 10 reps - 3 sets    Therapeutic Exercise and NMR EXR  [x] (61604) Provided verbal/tactile cueing for activities related to strengthening, flexibility, endurance, ROM  for improvements in scapular, scapulothoracic and UE control with self care, reaching, carrying, lifting, house/yardwork, driving/computer work.    [] (09585) Provided verbal/tactile cueing for activities related to improving balance, coordination, kinesthetic sense, posture, motor skill, proprioception  to assist with  scapular, scapulothoracic and UE control with self care, reaching, carrying, lifting, house/yardwork, driving/computer work. Therapeutic Activities:    [] (89999 or 77236) Provided verbal/tactile cueing for activities related to improving balance, coordination, kinesthetic sense, posture, motor skill, proprioception and motor activation to allow for proper function of scapular, scapulothoracic and UE control with self care, carrying, lifting, driving/computer work.      Home Exercise Program:    [x] (46695) Reviewed/Progressed HEP activities related to strengthening, flexibility, endurance, ROM of scapular, scapulothoracic and UE control with self care, reaching, carrying, lifting, house/yardwork, driving/computer work  [] (76758) Reviewed/Progressed HEP activities related to improving balance, coordination, kinesthetic sense, posture, motor skill, proprioception of scapular, scapulothoracic and UE control with self care, reaching, carrying, lifting, house/yardwork, driving/computer work      Manual Treatments:  PROM / STM / Oscillations-Mobs:  G-I, II, III, IV (PA's, Inf., Post.)  [x] (23008) Provided manual therapy to mobilize soft tissue/joints of cervical/CT, scapular GHJ and UE for the purpose of modulating pain, promoting relaxation,  increasing ROM, reducing/eliminating soft tissue swelling/inflammation/restriction, improving soft tissue extensibility and allowing for proper ROM for normal function with self care, reaching, carrying, lifting, house/yardwork, driving/computer work    Charges:  Timed Code Treatment Minutes: 50   Total Treatment Minutes: 50       [] EVAL (LOW) 69112 (typically 20 minutes face-to-face)  [] EVAL (MOD) 51744 (typically 30 minutes face-to-face)  [] EVAL (HIGH) 02808 (typically 45 minutes face-to-face)  [] RE-EVAL     [x] WG(12402) x 1    [] Dry needle 1 or 2 Muscles (01477)  [x] NMR (53759) x  1   [] Dry needle 3+ Muscles (06421)  [x] Manual (29011) x  1   [] Ultrasound (93457) x  [] TA (51259) x     [] Mech Traction (78343)  [] ES(attended) (00787)     [] ES (un) (96216):   [] Vasopump (90813) [] Ionto (36439)   [] Other:    If BWC Please Indicate Time In/Out  CPT Code Time in Time out                                   Martinez Dyer stated goal: \"To get back to work\"  []? Progressing: []? Met: []? Not Met: []? Adjusted     Therapist goals for Patient:   Short Term Goals: To be achieved in: 2 weeks  1. Independent in HEP and progression per patient tolerance, in order to prevent re-injury. []?  Progressing: []? Met: []? Not Met: []? Adjusted  2. Patient will have a decrease in pain to facilitate improvement in movement, function, and ADLs as indicated by Functional Deficits. []? Progressing: []? Met: []? Not Met: []? Adjusted     Long Term Goals: To be achieved in: 8 weeks  1. Disability index score of 30% or less for the Quick DASH to assist with reaching prior level of function. []? Progressing: []? Met: []? Not Met: []? Adjusted  2. Patient will demonstrate increased AROM to 150 degrees of flexion and abduction to allow for proper joint functioning as indicated by Functional Deficits. []? Progressing: []? Met: []? Not Met: []? Adjusted  3. Patient will demonstrate an increase in NM recruitment/activation and overall GH and scapular strength to within n5lbs HHD or WNL for proper functional mobility as indicated by patients Functional Deficits. []? Progressing: []? Met: []? Not Met: []? Adjusted  4. Patient will return to 70%  activities without increased symptoms or restriction. []? Progressing: []? Met: []? Not Met: []? Adjusted  5. Pt will increase mobility to normal shoulder range so she can return to work (patient specific functional goal)           []? Progressing: []? Met: []? Not Met: []? Adjusted     ASSESSMENT:  Pt demonstrates nearly full ROM on left shoulder. Much improved mobility and no pain. Treatment/Activity Tolerance:  [x] Patient tolerated treatment well [] Patient limited by fatique  [] Patient limited by pain  [] Patient limited by other medical complications  [] Other:     Overall Progression Towards Functional goals/ Treatment Progress Update:  [x] Patient is progressing as expected towards functional goals listed. [] Progression is slowed due to complexities/Impairments listed. [] Progression has been slowed due to co-morbidities.   [] Plan just implemented, too soon to assess goals progression <30days   [] Goals require adjustment due to lack of progress  [] Patient is not progressing as expected and requires additional follow up with physician  [] Other    Prognosis for POC: [x] Good [] Fair  [] Poor    Patient requires continued skilled intervention: [x] Yes  [] No      PLAN:  Add theraband resisted exercises. Progress strength. One more visit, then DC. [x] Continue per plan of care [] Alter current plan (see comments)  [] Plan of care initiated [] Hold pending MD visit [] Discharge    Electronically signed by: Shant Braden PT     Note: If patient does not return for scheduled/recommended follow up visits, this note will serve as a discharge from care along with the most recent update on progress.

## 2021-05-17 ENCOUNTER — HOSPITAL ENCOUNTER (OUTPATIENT)
Dept: PHYSICAL THERAPY | Age: 56
Setting detail: THERAPIES SERIES
Discharge: HOME | End: 2021-05-17

## 2021-06-03 ENCOUNTER — TELEPHONE (OUTPATIENT)
Dept: PRIMARY CARE CLINIC | Age: 56
End: 2021-06-03

## 2021-06-03 NOTE — TELEPHONE ENCOUNTER
Please move patient from the morning of Thursday July 8th, as I won't be in clinic that morning. Find a different spot for her    Please document call and then close encounter.   thanks

## 2021-06-17 ENCOUNTER — TELEPHONE (OUTPATIENT)
Dept: PRIMARY CARE CLINIC | Age: 56
End: 2021-06-17

## 2021-06-17 ENCOUNTER — NURSE TRIAGE (OUTPATIENT)
Dept: OTHER | Facility: CLINIC | Age: 56
End: 2021-06-17

## 2021-06-17 ENCOUNTER — TELEPHONE (OUTPATIENT)
Dept: FAMILY MEDICINE CLINIC | Age: 56
End: 2021-06-17

## 2021-06-17 NOTE — TELEPHONE ENCOUNTER
Called and spoke with pt. Offered pt an appointment for tomorrow with Dr Gianna Garcia and pt declined stated that she did not want to see a male. Informed to pt to go to the ER per pcp and pt declined stating that she didn't have medical insurance and will just have to deal with pain.

## 2021-06-17 NOTE — TELEPHONE ENCOUNTER
----- Message from Phoenix Carrillo sent at 6/17/2021  1:01 PM EDT -----  Subject: Appointment Request    Reason for Call: Urgent (Patient Request) No Script    QUESTIONS  Type of Appointment? Established Patient  Reason for appointment request? Available appointments did not meet   patient need  Additional Information for Provider? Pt states she has been throwing up   all morning , wants to see Dr. Barrington Santiago only please advise. Pt also states   she has been taking the nausea medication prescribed , pain in right side   . ---------------------------------------------------------------------------  --------------  Katherin PRATT  What is the best way for the office to contact you? OK to leave message on   voicemail  Preferred Call Back Phone Number? [phone]  ---------------------------------------------------------------------------  --------------  SCRIPT ANSWERS  Relationship to Patient? Self  Appointment reason? Symptomatic  Select script based on patient symptoms? Adult No Script  (Is the patient requesting to see the provider for a procedure?)? No  (Is the patient requesting to see the provider urgently  today or   tomorrow. )? Yes  Have you been diagnosed with, awaiting test results for, or told that you   are suspected of having COVID-19 (Coronavirus)? (If patient has tested   negative or was tested as a requirement for work, school, or travel and   not based on symptoms, answer no)? No  Do you currently have flu-like symptoms including fever or chills, cough,   shortness of breath, difficulty breathing, or new loss of taste or smell? No  Have you had close contact with someone with COVID-19 in the last 14 days? No  (Service Expert  click yes below to proceed with [employer] As Usual   Scheduling)?  Yes

## 2021-06-17 NOTE — TELEPHONE ENCOUNTER
[facility] calling for triage of pt. Pt has already been triaged and has already spoken to someone in the PCP office. Pt disconnected before [facility] transferred.

## 2021-06-17 NOTE — TELEPHONE ENCOUNTER
have you vomited up today? \" \"Have you been able to keep any fluids down? \"      Yes after taking zofran    4. ABDOMINAL PAIN: Lexine Prier your having any abdominal pain? \" If yes : \"How bad is it and what does it feel like? \" (e.g., crampy, dull, intermittent, constant)       Right lower dull soreness    5. DIARRHEA: \"Is there any diarrhea? \" If so, ask: \"How many times today? \"       No    6. CONTACTS: \"Is there anyone else in the family with the same symptoms? \"       No    7. CAUSE: \"What do you think is causing your vomiting? \"      Unknown    8. HYDRATION STATUS: \"Any signs of dehydration? \" (e.g., dry mouth [not only dry lips], too weak to stand) \"When did you last urinate? \"      Dry mouth    9. OTHER SYMPTOMS: \"Do you have any other symptoms? \" (e.g., fever, headache, vertigo, vomiting blood or coffee grounds, recent head injury)      Fatigue    10. PREGNANCY: \"Is there any chance you are pregnant? \" \"When was your last menstrual period? \"        No    Protocols used: VOMITING-ADULT-OH

## 2021-06-17 NOTE — TELEPHONE ENCOUNTER
Daughter calling for pt. States she has UTI, vomiting, side hurting her. Wanting an appointment. Requesting to see [facility]. Please advise.  Please call pt back at [phone]

## 2021-07-01 DIAGNOSIS — E11.9 TYPE 2 DIABETES MELLITUS WITHOUT COMPLICATION, WITHOUT LONG-TERM CURRENT USE OF INSULIN (HCC): ICD-10-CM

## 2021-07-01 RX ORDER — LISINOPRIL 10 MG/1
10 TABLET ORAL DAILY
Qty: 90 TABLET | Refills: 0 | Status: SHIPPED | OUTPATIENT
Start: 2021-07-01 | End: 2021-10-11

## 2021-07-07 DIAGNOSIS — E11.9 TYPE 2 DIABETES MELLITUS WITHOUT COMPLICATION, WITHOUT LONG-TERM CURRENT USE OF INSULIN (HCC): ICD-10-CM

## 2021-07-07 NOTE — PROGRESS NOTES
PROGRESS NOTE     Janny Roper MD  [facility] [address] [place] [address]  [phone] office  [phone] fax    Date of Service:  7/8/2021     Patient ID: Eboni Boo is a 55 y.o. female      Assessment / Plan:       1. Type 2 diabetes mellitus without complication, without long-term current use of insulin ([facility])  poc A1c=8.4    Worsening his A1c was down to 7.9 three months ago. Patient's largest barrier is not having insulin right now. Patient to continue metformin XR 1000 mg in the morning and 500 mg at night. Patient to continue glipizide 20 mg twice daily. Most affordable addition at this time would be 70/30 insulin, but patient wants to wait, as she is getting insurance due to a new job next month. Patient to schedule virtual visit for next month, so we can discuss options of starting new oral medicines, and follow-up instructions. Patient expressed understanding. Patient still drinking 24 ounces of regular soda daily. Discussed the vital importance of stopping this habit, and the risk of morbidity mortality of her uncontrolled diabetes. States had a DM eye exam from [facility] a couple months ago. [facility]     Pt to continue aspirin, statin and ACEI      2. HTN  At goal.  Continue lisinopril 10 mg. Checking BMP.     3. Pure hypercholesterolemia  Well-controlled continue Lipitor 20 mg. She has eaten so will do FLP at next visit. Checking LFTs.         HM:      shingrix and COVID vaccines not completed. Pt going to get COVID shots and then once has insurance will consider shingrix.     Colonoscopy done 8/29/18: next due 2023     Mammogram completed 10/08/20     Pap smear normal with negative HPV on 3/24/17      Subjective:     CC:    DM2, HLD, HTN    HPI    43-year-old white female presenting with the above medical conditions. Patient states she is taking all medicine as prescribed. Denies any side effects.   Does not currently check her home blood pressure or her home blood sugars. Patient still does not have insurance, but started a new job at [employer], and will be getting insurance next month. States will have an easier time eating certain diabetic medicines at that time. Patient admits that \"I have been bad \", and is back to drinking 24 ounces of regular soda daily. She gets plenty of daily exercise is she works as a . She has negative review of systems. See below.     Lab Results   Component Value Date    LABA1C 8.4 07/08/2021    LABA1C 8.2 01/08/2021    LABA1C 7.6 09/16/2020     Lab Results   Component Value Date    LABMICR <1.20 09/16/2020    CREATININE 0.6 01/08/2021     Lab Results   Component Value Date    ALT 16 08/11/2020    AST 16 08/11/2020     Lab Results   Component Value Date    CHOL 156 09/16/2020    TRIG 139 09/16/2020    HDL 47 09/16/2020    LDLCALC 81 09/16/2020          ROS:    Constitutional:  Negative for activity or appetite change, fever or fatigue  HENT:  Negative for congestion, sinus pressure, or rhinorrhea  Eyes:  Negative for eye pain or visual changes  Resp:  Negative for SOB, chest tightness, cough  Cardiovascular: Negative for CP, palpitations, LU, orthopnea, PND, LE edema  Gastrointestinal: Negative for abd pain, melena, BRBPR, N/V/D  Endocrine:  Negative for polydipsia and polyuria  :  Negative for dysuria, flank pain or urinary frequency  Musculoskeletal:  Negative for back pain or myalgias  Neuro:  Negative for dizziness or lightheadedness  Psych: negative for depression or anxiety      Vitals:    07/08/21 1349   BP: 122/84   Pulse: 76   Temp: 98 °F (36.7 °C)   TempSrc: Temporal   SpO2: 98%   Weight: 155 lb (70.3 kg)   Height: 5' 5\" (1.651 m)       Outpatient Medications Marked as Taking for the 7/8/21 encounter (Office Visit) with Monie Suazo MD   Medication Sig Dispense Refill    lisinopril (PRINIVIL;ZESTRIL) 10 MG tablet Take 1 tablet by mouth daily 90 tablet 0    metFORMIN (GLUCOPHAGE-XR) 500 MG extended release tablet TAKE 2 TABLETS BY MOUTH WITH BREAKFAST AND 1 TABLET BY MOUTH AT BEDTIME 270 tablet 0    lisinopril (PRINIVIL;ZESTRIL) 10 MG tablet Take 1 tablet by mouth daily 90 tablet 0    glipiZIDE (GLUCOTROL) 10 MG tablet Take 2 tablets by mouth 2 times daily (before meals) 360 tablet 0    spinosad (NATROBA) 0.9 % SUSP topical suspension Use as directed 1 Bottle 1    atorvastatin (LIPITOR) 20 MG tablet TAKE 1 TABLET BY MOUTH DAILY 90 tablet 0    ondansetron (ZOFRAN-ODT) 4 MG disintegrating tablet DISSOLVE 1 TABLET ON THE TONGUE THREE TIMES DAILY AS NEEDED FOR NAUSEA OR VOMITING 21 tablet 0    SITagliptin (JANUVIA) 100 MG tablet Take 1 tablet by mouth daily 90 tablet 0    naproxen (NAPROSYN) 500 MG tablet TAKE 1 TABLET BY MOUTH TWO TIMES A DAY WITH MEALS 60 tablet 0    Cranberry 1000 MG CAPS Take 1 tablet by mouth daily      meclizine (ANTIVERT) 25 MG tablet Take 1 tablet by mouth 3 times daily as needed for Nausea 30 tablet 0    Blood Glucose Monitoring Suppl (ONE TOUCH ULTRA MINI) w/Device KIT Use to check sugars 1 kit 0    glucose blood VI test strips (ASCENSIA AUTODISC VI;ONE TOUCH ULTRA TEST VI) strip 1 each by In Vitro route daily As needed.  100 each 3    ONE TOUCH LANCETS MISC 1 each by Does not apply route daily 100 each 3    aspirin 81 MG chewable tablet Take 81 mg by mouth daily      Evening Primrose Oil 500 MG CAPS Take by mouth               Objective:   Constitutional:   · Reviewed vitals above  · Well Nourished, well developed, no distress       Neck:  · Symmetric and without masses  · No thyromegaly  Resp:  · Normal effort  · Clear to auscultation bilaterally without rhonchi, wheezing or crackles  Cardiovascular:  · On auscultation, normal S1 and S2 without murmurs, rubs or gallops  · No bruits of bilateral carotids and no JVD  Gastrointestinal:  · Nontender, nondistended, and no masses  · No hepatosplenomegaly  Musculoskeletal:  · Normal Gait  · All extremities without clubbing, cyanosis or edema  Skin:  · No rashes on inspection  · No areas of increased heat or induration on palpation  Psych:  · Normal mood and affect  · Normal insight and judgement

## 2021-07-08 ENCOUNTER — HOSPITAL ENCOUNTER (OUTPATIENT)
Age: 56
Discharge: HOME | End: 2021-07-08

## 2021-07-08 ENCOUNTER — OFFICE VISIT (OUTPATIENT)
Dept: PRIMARY CARE CLINIC | Age: 56
End: 2021-07-08

## 2021-07-08 VITALS
HEART RATE: 76 BPM | OXYGEN SATURATION: 98 % | WEIGHT: 155 LBS | DIASTOLIC BLOOD PRESSURE: 84 MMHG | BODY MASS INDEX: 25.83 KG/M2 | HEIGHT: 65 IN | TEMPERATURE: 98 F | SYSTOLIC BLOOD PRESSURE: 122 MMHG

## 2021-07-08 DIAGNOSIS — E78.00 PURE HYPERCHOLESTEROLEMIA: ICD-10-CM

## 2021-07-08 DIAGNOSIS — E11.9 TYPE 2 DIABETES MELLITUS WITHOUT COMPLICATION, WITHOUT LONG-TERM CURRENT USE OF INSULIN (HCC): Primary | ICD-10-CM

## 2021-07-08 DIAGNOSIS — I10 ESSENTIAL HYPERTENSION: ICD-10-CM

## 2021-07-08 LAB
ALBUMIN SERPL-MCNC: 4.5 G/DL (ref 3.4–5)
ALP BLD-CCNC: 95 U/L (ref 40–129)
ALT SERPL-CCNC: 29 U/L (ref 10–40)
ANION GAP SERPL CALCULATED.3IONS-SCNC: 14 MMOL/L (ref 3–16)
AST SERPL-CCNC: 23 U/L (ref 15–37)
BILIRUB SERPL-MCNC: 0.4 MG/DL (ref 0–1)
BILIRUBIN DIRECT: <0.2 MG/DL (ref 0–0.3)
BILIRUBIN, INDIRECT: NORMAL MG/DL (ref 0–1)
BUN BLDV-MCNC: 11 MG/DL (ref 7–20)
CALCIUM SERPL-MCNC: 10.1 MG/DL (ref 8.3–10.6)
CHLORIDE BLD-SCNC: 101 MMOL/L (ref 99–110)
CO2: 24 MMOL/L (ref 21–32)
CREAT SERPL-MCNC: 0.6 MG/DL (ref 0.6–1.1)
GFR AFRICAN AMERICAN: >60
GFR NON-AFRICAN AMERICAN: >60
GLUCOSE BLD-MCNC: 256 MG/DL (ref 70–99)
HBA1C MFR BLD: 8.4 %
POTASSIUM SERPL-SCNC: 4.3 MMOL/L (ref 3.5–5.1)
SODIUM BLD-SCNC: 139 MMOL/L (ref 136–145)
TOTAL PROTEIN: 7.5 G/DL (ref 6.4–8.2)

## 2021-07-08 PROCEDURE — 83036 HEMOGLOBIN GLYCOSYLATED A1C: CPT | Performed by: FAMILY MEDICINE

## 2021-07-08 PROCEDURE — 80076 HEPATIC FUNCTION PANEL: CPT

## 2021-07-08 PROCEDURE — 80048 BASIC METABOLIC PNL TOTAL CA: CPT

## 2021-07-08 PROCEDURE — 99214 OFFICE O/P EST MOD 30 MIN: CPT | Performed by: FAMILY MEDICINE

## 2021-07-08 PROCEDURE — 36415 COLL VENOUS BLD VENIPUNCTURE: CPT

## 2021-07-08 PROCEDURE — 3052F HG A1C>EQUAL 8.0%<EQUAL 9.0%: CPT | Performed by: FAMILY MEDICINE

## 2021-07-08 RX ORDER — GLIPIZIDE 10 MG/1
TABLET ORAL
Qty: 360 TABLET | Refills: 0 | Status: SHIPPED | OUTPATIENT
Start: 2021-07-08 | End: 2021-08-31 | Stop reason: SDUPTHER

## 2021-07-08 RX ORDER — ATORVASTATIN CALCIUM 20 MG/1
20 TABLET, FILM COATED ORAL EVERY EVENING
Qty: 90 TABLET | Refills: 0 | Status: SHIPPED | OUTPATIENT
Start: 2021-07-08 | End: 2021-10-11

## 2021-07-08 RX ORDER — METFORMIN HYDROCHLORIDE 500 MG/1
TABLET, EXTENDED RELEASE ORAL
Qty: 270 TABLET | Refills: 0 | Status: SHIPPED | OUTPATIENT
Start: 2021-07-08 | End: 2021-10-11

## 2021-08-02 ENCOUNTER — OFFICE VISIT (OUTPATIENT)
Dept: PRIMARY CARE CLINIC | Age: 56
End: 2021-08-02

## 2021-08-02 VITALS
BODY MASS INDEX: 25.83 KG/M2 | HEIGHT: 65 IN | RESPIRATION RATE: 16 BRPM | TEMPERATURE: 98.4 F | SYSTOLIC BLOOD PRESSURE: 122 MMHG | DIASTOLIC BLOOD PRESSURE: 72 MMHG | WEIGHT: 155 LBS | HEART RATE: 96 BPM | OXYGEN SATURATION: 97 %

## 2021-08-02 DIAGNOSIS — E11.9 TYPE 2 DIABETES MELLITUS WITHOUT COMPLICATION, WITHOUT LONG-TERM CURRENT USE OF INSULIN (HCC): ICD-10-CM

## 2021-08-02 DIAGNOSIS — L50.9 HIVES: Primary | ICD-10-CM

## 2021-08-02 PROCEDURE — 99214 OFFICE O/P EST MOD 30 MIN: CPT | Performed by: FAMILY MEDICINE

## 2021-08-02 PROCEDURE — 3052F HG A1C>EQUAL 8.0%<EQUAL 9.0%: CPT | Performed by: FAMILY MEDICINE

## 2021-08-02 RX ORDER — METHYLPREDNISOLONE 4 MG/1
TABLET ORAL
Qty: 1 KIT | Refills: 0 | Status: SHIPPED | OUTPATIENT
Start: 2021-08-02 | End: 2021-08-08

## 2021-08-02 NOTE — PROGRESS NOTES
PROGRESS NOTE     Leonardo Lara MD  [address] [place] [address]. [address] [facility] [address]         Phone: [phone]    Date of Service:  8/2/2021     Patient ID: Eboni Muro is a 55 y.o. female      Assessment / Plan:     1. Hives  Etiology as denies any recent culprits. As one dose of Benadryl has already helped symptoms, recommend she take Benadryl every 6 hours around-the-clock. If this does not improve symptoms, she can then add steroids cautiously. - methylPREDNISolone (MEDROL DOSEPACK) 4 MG tablet; Take by mouth. Dispense: 1 kit; Refill: 0        2. Type 2 diabetes mellitus without complication, without long-term current use of insulin (HCC)  Is a type II diabetic, and her blood sugars have been uncontrolled for the last 6 months. She has been doing better lately, stopping her sugary drinks, and states sugars have been looking better. For this reason we will try Benadryl first before adding oral steroid. Patient told to Call or return to clinic prn if these symptoms worsen or fail to improve as anticipated. Subjective:     CC: rash    HPI    55-year-old white female type 2 diabetes, hypertension hyperlipidemia, presenting with 12 hours of an itchy rash. Patient states she had nausea and vomiting 2 days ago, and this morning woke up in the middle the night with an itchy rash all over her body. She described the rash as red raised welts that were extremely itchy. She took a dose of Benadryl earlier today, and states symptoms have improved, the rash is still quite significant. Has not had any nausea or vomiting in the last 24 hours. Patient denies arthralgias, myalgias, recent tick or bug bites, headache, fever, upper respiratory symptoms, abdominal pain, bright red blood per rectum, melena, diarrhea. Denies any new medications or new foods.   She denies any throat, tongue, lip swelling. No shortness of breath. No recent immunizations. ROS:    See above    Vitals:    08/02/21 1437   BP: 122/72   Pulse: 96   Resp: 16   Temp: 98.4 °F (36.9 °C)   TempSrc: Temporal   SpO2: 97%   Weight: 155 lb (70.3 kg)   Height: 5' 5\" (1.651 m)       Outpatient Medications Marked as Taking for the 8/2/21 encounter (Office Visit) with Tatum Parsons MD   Medication Sig Dispense Refill    methylPREDNISolone (MEDROL DOSEPACK) 4 MG tablet Take by mouth. 1 kit 0    glipiZIDE (GLUCOTROL) 10 MG tablet TAKE 2 TABLETS BY MOUTH TWICE DAILY BEFORE MEALS 360 tablet 0    metFORMIN (GLUCOPHAGE-XR) 500 MG extended release tablet TAKE 2 TABLETS BY MOUTH WITH BREAKFAST AND 1 TABLET BY MOUTH AT BEDTIME 270 tablet 0    atorvastatin (LIPITOR) 20 MG tablet Take 1 tablet by mouth every evening 90 tablet 0    lisinopril (PRINIVIL;ZESTRIL) 10 MG tablet Take 1 tablet by mouth daily 90 tablet 0    spinosad (NATROBA) 0.9 % SUSP topical suspension Use as directed 1 Bottle 1    ondansetron (ZOFRAN-ODT) 4 MG disintegrating tablet DISSOLVE 1 TABLET ON THE TONGUE THREE TIMES DAILY AS NEEDED FOR NAUSEA OR VOMITING 21 tablet 0    SITagliptin (JANUVIA) 100 MG tablet Take 1 tablet by mouth daily 90 tablet 0    naproxen (NAPROSYN) 500 MG tablet TAKE 1 TABLET BY MOUTH TWO TIMES A DAY WITH MEALS 60 tablet 0    Cranberry 1000 MG CAPS Take 1 tablet by mouth daily      meclizine (ANTIVERT) 25 MG tablet Take 1 tablet by mouth 3 times daily as needed for Nausea 30 tablet 0    Blood Glucose Monitoring Suppl (ONE TOUCH ULTRA MINI) w/Device KIT Use to check sugars 1 kit 0    glucose blood VI test strips (ASCENSIA AUTODISC VI;ONE TOUCH ULTRA TEST VI) strip 1 each by In Vitro route daily As needed.  100 each 3    ONE TOUCH LANCETS MISC 1 each by Does not apply route daily 100 each 3    aspirin 81 MG chewable tablet Take 81 mg by mouth daily      Evening Primrose Oil 500 MG CAPS Take by mouth Objective:   Constitutional:   · Reviewed vitals above  · Well Nourished, well developed, no distress       HENT:  · Normal external nose without lesions  · Normal oropharynx without erythema or exudate  · Normal nasal mucosa without swelling or erythema  Neck:  · Symmetric and without masses  Resp:  · Normal effort  · Clear to auscultation bilaterally without rhonchi, wheezing or crackles  Cardiovascular:  · On auscultation, normal S1 and S2 without murmurs, rubs or gallops  · No bruits of bilateral carotids and no JVD  Gastrointestinal:  · Nontender, nondistended, and no masses  · No hepatosplenomegaly  Musculoskeletal:  · Normal Gait  · All extremities without clubbing, cyanosis or edema  Skin:  · Significant erythematous circular rash that coalesces to cover large areas of her lower back, buttock and inguinal region. Slightly warm to the touch and not well demarcated. Smaller circular areas on upper chest and a few on legs. · No areas of fluctuance or induration on palpation  Psych:  · Normal mood and affect  · Normal insight and judgement        EMR Dragon/transcription disclaimer:  Much of this encounter note is electronic transcription/translation of spoken language to printed texts. The electronic translation of spoken language may be erroneous, or at times, nonsensical words or phrases may be inadvertently transcribed.   Although I have reviewed the note for such errors, some may still exist.

## 2021-08-11 ENCOUNTER — PATIENT MESSAGE (OUTPATIENT)
Dept: PRIMARY CARE CLINIC | Age: 56
End: 2021-08-11

## 2021-08-11 RX ORDER — ONDANSETRON 4 MG/1
4 TABLET, FILM COATED ORAL EVERY 8 HOURS PRN
Qty: 15 TABLET | Refills: 0 | Status: SHIPPED | OUTPATIENT
Start: 2021-08-11 | End: 2021-10-05 | Stop reason: CLARIF

## 2021-08-11 RX ORDER — MECLIZINE HYDROCHLORIDE 25 MG/1
25 TABLET ORAL 3 TIMES DAILY PRN
Qty: 30 TABLET | Refills: 0 | Status: SHIPPED | OUTPATIENT
Start: 2021-08-11 | End: 2022-10-31 | Stop reason: SDUPTHER

## 2021-08-11 NOTE — TELEPHONE ENCOUNTER
From: Ani Long  To: Valencia Magana MD  Sent: 8/11/2021 9:09 AM EDT  Subject: Prescription Question    Can you send me in a prescription for that nosia medication that melts under your tung and meclazine? I've been out for awhile. I've been throwing up again today.

## 2021-08-28 DIAGNOSIS — E11.9 TYPE 2 DIABETES MELLITUS WITHOUT COMPLICATION, WITHOUT LONG-TERM CURRENT USE OF INSULIN (HCC): ICD-10-CM

## 2021-08-31 ENCOUNTER — TELEPHONE (OUTPATIENT)
Dept: NEUROLOGY | Age: 56
End: 2021-08-31

## 2021-08-31 RX ORDER — GLIPIZIDE 10 MG/1
TABLET ORAL
Qty: 360 TABLET | Refills: 0 | Status: CANCELLED | OUTPATIENT
Start: 2021-08-31

## 2021-08-31 RX ORDER — GLIPIZIDE 10 MG/1
TABLET ORAL
Qty: 360 TABLET | Refills: 0 | Status: SHIPPED | OUTPATIENT
Start: 2021-08-31 | End: 2021-09-21

## 2021-08-31 NOTE — TELEPHONE ENCOUNTER
Pt no-showed for 8/31/21 appt after confirming therefore pt can-not be rescheduled with Dr Grey Cope.

## 2021-09-07 ENCOUNTER — TELEPHONE (OUTPATIENT)
Dept: ADMINISTRATIVE | Age: 56
End: 2021-09-07

## 2021-09-07 NOTE — TELEPHONE ENCOUNTER
Submitted PA for glipiZIDE 10MG tablets Key: [id number]. Via CMM. Per plan Drug is covered by current benefit plan. No further PA activity needed. Please notify patient. Thank you. Maren Lyle

## 2021-09-14 ENCOUNTER — OFFICE VISIT (OUTPATIENT)
Dept: PRIMARY CARE CLINIC | Age: 56
End: 2021-09-14
Payer: COMMERCIAL

## 2021-09-14 VITALS
RESPIRATION RATE: 97 BRPM | WEIGHT: 154 LBS | BODY MASS INDEX: 25.63 KG/M2 | SYSTOLIC BLOOD PRESSURE: 124 MMHG | HEART RATE: 94 BPM | TEMPERATURE: 97.4 F | DIASTOLIC BLOOD PRESSURE: 84 MMHG

## 2021-09-14 DIAGNOSIS — G89.29 CHRONIC PAIN OF RIGHT THUMB: Primary | ICD-10-CM

## 2021-09-14 DIAGNOSIS — M79.644 CHRONIC PAIN OF RIGHT THUMB: Primary | ICD-10-CM

## 2021-09-14 DIAGNOSIS — E11.9 TYPE 2 DIABETES MELLITUS WITHOUT COMPLICATION, WITHOUT LONG-TERM CURRENT USE OF INSULIN (HCC): ICD-10-CM

## 2021-09-14 LAB — HBA1C MFR BLD: 8.4 %

## 2021-09-14 PROCEDURE — 99213 OFFICE O/P EST LOW 20 MIN: CPT | Performed by: FAMILY MEDICINE

## 2021-09-14 PROCEDURE — 3052F HG A1C>EQUAL 8.0%<EQUAL 9.0%: CPT | Performed by: FAMILY MEDICINE

## 2021-09-14 PROCEDURE — 83036 HEMOGLOBIN GLYCOSYLATED A1C: CPT | Performed by: FAMILY MEDICINE

## 2021-09-14 SDOH — ECONOMIC STABILITY: FOOD INSECURITY: WITHIN THE PAST 12 MONTHS, THE FOOD YOU BOUGHT JUST DIDN'T LAST AND YOU DIDN'T HAVE MONEY TO GET MORE.: NEVER TRUE

## 2021-09-14 SDOH — ECONOMIC STABILITY: FOOD INSECURITY: WITHIN THE PAST 12 MONTHS, YOU WORRIED THAT YOUR FOOD WOULD RUN OUT BEFORE YOU GOT MONEY TO BUY MORE.: NEVER TRUE

## 2021-09-14 ASSESSMENT — SOCIAL DETERMINANTS OF HEALTH (SDOH): HOW HARD IS IT FOR YOU TO PAY FOR THE VERY BASICS LIKE FOOD, HOUSING, MEDICAL CARE, AND HEATING?: NOT HARD AT ALL

## 2021-09-14 NOTE — PROGRESS NOTES
Refill    glipiZIDE (GLUCOTROL) 10 MG tablet TAKE 2 TABLETS BY MOUTH TWICE DAILY BEFORE MEALS 360 tablet 0    meclizine (ANTIVERT) 25 MG tablet Take 1 tablet by mouth 3 times daily as needed for Nausea 30 tablet 0    metFORMIN (GLUCOPHAGE-XR) 500 MG extended release tablet TAKE 2 TABLETS BY MOUTH WITH BREAKFAST AND 1 TABLET BY MOUTH AT BEDTIME 270 tablet 0    atorvastatin (LIPITOR) 20 MG tablet Take 1 tablet by mouth every evening 90 tablet 0    lisinopril (PRINIVIL;ZESTRIL) 10 MG tablet Take 1 tablet by mouth daily 90 tablet 0    SITagliptin (JANUVIA) 100 MG tablet Take 1 tablet by mouth daily 90 tablet 0    Cranberry 1000 MG CAPS Take 1 tablet by mouth daily      Blood Glucose Monitoring Suppl (ONE TOUCH ULTRA MINI) w/Device KIT Use to check sugars 1 kit 0    glucose blood VI test strips (ASCENSIA AUTODISC VI;ONE TOUCH ULTRA TEST VI) strip 1 each by In Vitro route daily As needed. 100 each 3    ONE TOUCH LANCETS MISC 1 each by Does not apply route daily 100 each 3    aspirin 81 MG chewable tablet Take 81 mg by mouth daily      Evening Primrose Oil 500 MG CAPS Take by mouth               Objective:   Constitutional:   · Reviewed vitals above  · Well Nourished, well developed, no distress       Resp:  · Normal effort  Musculoskeletal:  · Normal Gait  · All extremities without clubbing, cyanosis or edema  · Right thumb, painful 2-3cm cystic lesion radial side of DIP joint  · Some pain with patient on palmar surface of PIP joint of right thumb  Skin:  · No rashes on inspection  · No areas of increased heat or induration on palpation  Psych:  · Normal mood and affect  · Normal insight and judgement        EMR Dragon/transcription disclaimer:  Much of this encounter note is electronic transcription/translation of spoken language to printed texts. The electronic translation of spoken language may be erroneous, or at times, nonsensical words or phrases may be inadvertently transcribed.   Although I have reviewed the note for such errors, some may still exist.

## 2021-09-14 NOTE — PATIENT INSTRUCTIONS
Sweetie Baker [address], [address], [address] [facility]  [place]   Phone: [phone]

## 2021-09-21 DIAGNOSIS — E11.9 TYPE 2 DIABETES MELLITUS WITHOUT COMPLICATION, WITHOUT LONG-TERM CURRENT USE OF INSULIN (HCC): ICD-10-CM

## 2021-09-21 RX ORDER — GLIPIZIDE 10 MG/1
TABLET ORAL
Qty: 360 TABLET | Refills: 1 | Status: SHIPPED | OUTPATIENT
Start: 2021-09-21 | End: 2021-10-14

## 2021-09-21 NOTE — TELEPHONE ENCOUNTER
LOV 9/14/21    Future Appointments   Date Time Provider Yaritza Cabrera   9/24/2021  9:30 AM Nile SPAIN   10/5/2021 12:30 PM Adalgisa Chong MD [facility]

## 2021-09-24 ENCOUNTER — OFFICE VISIT (OUTPATIENT)
Dept: ORTHOPEDIC SURGERY | Age: 56
End: 2021-09-24
Payer: COMMERCIAL

## 2021-09-24 VITALS — BODY MASS INDEX: 25.66 KG/M2 | RESPIRATION RATE: 15 BRPM | HEIGHT: 65 IN | WEIGHT: 154 LBS

## 2021-09-24 DIAGNOSIS — M65.30 TRIGGER FINGER, ACQUIRED: Primary | ICD-10-CM

## 2021-09-24 PROCEDURE — 99244 OFF/OP CNSLTJ NEW/EST MOD 40: CPT | Performed by: ORTHOPAEDIC SURGERY

## 2021-09-24 NOTE — LETTER
[address] [facility] - HAND SURGERY  Surgery Scheduling Form:      DEMOGRAPHICS:                                                                                                              .  Patient Name:  Adriana Melton  Patient :  1965   Patient SS#:  [id number]    Patient Phone:  [phone] (home)    Patient Address:  [place] [address]    PCP:  Monie Suazo MD  Insurance:    Payor/Plan Subscr  Sex Relation Sub. Ins. ID Effective Group Num   1. [id number] 1965 Female Self [id number]                                   [address]     DIAGNOSIS & PROCEDURE:                                                                                            .  Diagnosis:   right Thumb Trigger Finger (727.03)    M65.30  Operation:  right Thumb Trigger Finger Release  [CPT: 42685]  Location:  [facility]  Surgeon:  Eyal Montejo    SCHEDULING INFORMATION:                                                                                         .  Surgeon's Scheduling Instruction:  elective    RN Post-op Appt:  [x] Yes   [] No  Preferred Thursday:   [] Yes   [] No    Requested Date:   cxl OR Time:  7:30 Patient Arrival Time:    OR Time Required:  10  Minutes  Anesthesia:  MAC/TIVA  Equipment:  None  Mini C-Arm:  No   Standard C-Arm:  No  Status:  Outpatient                                   COVID   10-15  PAT Required:  Yes  Comments:                      Mohan Whitaker. Tegan Conde MD  21 9:26 AM    BILLING INFORMATION:                                                                                                    .    Procedure:       CPT Code Modifier  right Thumb Trigger Finger Release      .                        Pre Operative Physician Prophylaxis Orders - SCIP Protocols      Pre-Operative Antibiotic Order:    Allergies   Allergen Reactions    Pcn [Penicillins] Other (See Comments)    Benadryl [Diphenhydramine] Rash    Benzonatate Rash    Ciprofloxacin Nausea And Vomiting    Morphine Rash    Sulfa Antibiotics Hives and Rash          [x]  ----  No Antibiotic Ordered       []  ----  Give the following Antibiotic within 1 hour prior to start time:         Ancef 1 gram IV if patient is less than 200 pounds    or       Ancef 2 grams IV if patient is greater than 200 pounds    or      Vancomycin 1 gram IV (over 1 hour) if patient is allergic to           PENICILLINS or CEFALOSPORINS     SURG   10-21                                    COVID    10-15                               H&P AT PCP       Procedure: right Thumb Trigger Finger Release     Patient: Wendy Collier  :    1965    Physician Signature:     Date: 21  Time: 9:26 AM

## 2021-09-24 NOTE — PATIENT INSTRUCTIONS
Pre-Operative Instructions    1. The night before your surgery, unless otherwise instructed, do not eat any food, drink any liquids, chew gum or mints after midnight. Abstain from alcohol for 24 hours prior to surgery. 2. You will be contacted by the Hospital the working day prior to your procedure to confirm your arrival time. 3. Patients under 25years of age must have a parent or legal guardian present to sign their consent and discharge paperwork. 4. On the day of surgery,  you will be seen pre-operatively by an anesthesiologist.     5. If you are having hand surgery, it is recommended that nail polish and acrylic nails be removed prior to surgery if possible. 6. Please bring cases for glasses, contact lenses, hearing aids or dentures. They will likely be removed prior to surgery. 7. Wear casual, loose-fitting and comfortable clothing. Consider that you may have a large dressing to fit under your clothing after surgery. 9. Please do not bring valuables such as jewelry or large sums of cash to the hospital. Remove all body piercings before coming to the hospital. Colin Travis may not  wear any rings on the hand if you are having surgery on that hand, wrist or elbow. 10. Do not smoke or chew tobacco before your surgery. [address] [place] [address] and surgery facilities are smoke-free environments. Smoking is not permitted anywhere on campus. 11. Be sure to follow any additional instructions from your physician. If the above conditions are not met, your surgery may be cancelled and rescheduled for another day. Should you develop any change in your health such as fever, cough, sore throat, cold, flu, or infection, or if you have any questions regarding your Pre-admission or surgery, please contact [address] - Surgery Scheduling at [phone], Monday through Friday, 9 a.m. to 5 p.m.

## 2021-09-24 NOTE — PROGRESS NOTES
Ms. Charlene Abdi is a 55 y.o. left handed woman  who is seen today in Hand Surgical Consultation at the request of Devin Garcia MD.    She presents today regarding right symptoms which have been present for approximately 1 weeks. A history of antecedent trauma or injury is Absent. She reports symptoms to include moderate pain and stiffness with frequent popping, catching or locking of the right Thumb. Finger symptoms are Frequently worse in the morning or overnight. She reports moderate pain located at the base of the symptomatic finger(s). Symptoms show no change over time. She is also seen today regarding a 1 week(s) history of right basilar thumb pain without history of previous injury. She  was seen for this concern by her primary care physician; previous treatment has included conservative measures. She  reports mild Basilar Thumb pain and Thumb Stiffness located about the base of the right thumbs at the level of the [facility] Joint, no tenderness of the remaining hand, wrist, or elbow on either side. She notes today, no neurologic symptoms in the hands. Symptoms show no change over time. Previous treatment has included conservative measures. She does not claim relation of her symptoms to her required work activities. She has not undergone any form of testing. I have today reviewed with Charlene Abdi the clinically relevant, past medical history, medications, allergies,  family history, social history, and Review Of Systems & I have documented any details relevant to today's presenting complaints in my history above. Ms. Rock Luciana Corleys self-reported past medical history, medications, allergies,  family history, social history, and Review Of Systems have been scanned into the chart under the \"Media\" tab.     Physical Exam:  Ms. Rock Luciana Barnhart's most recent vitals:  Vitals  Resp: 15  Height: 5' 5\" (165.1 cm)  Weight: 154 lb (69.9 kg)    She is well nourished, oriented to person, place & time. She demonstrates appropriate mood and affect as well as normal gait and station. Skin: Normal in appearance, Normal Color and Free of Lesions Bilaterally   Digital range of motion is Full bilaterally. Inducible triggering is noted upon flexion in the right Thumb. There is not an associated flexion contracture of the IP joint. No other digit demonstrates evidence for stenosing tenosynovitis bilaterally. Wrist range of motion is without significant limitation bilaterally. Sensation is normal in the Whole Hand bilaterally  Vascular examination reveals normal and good capillary refill bilaterally  Swelling is mild in the symptomatic digit, absent elsewhere bilaterally  There is no evidence of gross joint instability bilaterally. Muscular strength is clinically appropriate bilaterally. Examination for Stenosing Tenosynovitis demonstrates moderate tenderness, thickening & nodularity at the A-1 pulley(s) of the Right Thumb. There is a palpable Nota's Node. There is Inducible triggering on active flexion with pain. No other digits demonstrate evidence of Stenosing Tenosynovitis. Examination of the right first Carpo-Metacarpal Joint of the wrist demonstrates little radial subluxation of the Thumb Metacarpal base upon the Trapezium. There is mild pain with palpation at the [facility] joint line; pain is minimally worsened with Crank & Grind Maneuvers. Impression:  Ms. Trav Wilson is showing clinical evidence of Stenosing Tenosynovitis (Trigger Finger) with some underlying clinical Thumb Carpo-Metacarpal Joint osteoarthritis  and presents requesting further treatment. Plan:  I have had a thorough discussion with Ms. Trav Wilson regarding the treatment options available for her initially presenting Thumb stenosing tenosynovitis, which is causing her significant  limitations. I have outlined for Ms. Trav Wilson the benefits and consequences of the various treatment modalities, including the fact that surgical treatment is the only modality which is reasonably expected to provide long lasting or permanent resolution of her symptoms. Based upon our current discussion and a reasonable understating of the options available to her, Ms. Muna Vazquez has selected to proceed with surgical Thumb Trigger Finger Release. I have discussed the details of the surgical procedure, the pre, michael and postoperative concerns and the appropriate expectations after surgery with Ms. Muna Vazquez today. She was given the opportunity to ask questions, voiced an understanding of the procedure, and she did wish to proceed with Right Thumb Trigger Finger Release (A1 Pulley Release). I had an extensive discussion with Ms. Muna Vazquez (and any family members present with her today) regarding the natural history, etiology, and long term consequences of this problem. We have mutually selected a surgical treatment plan  and, in my opinion, surgical intervention is indicated at this time. I have discussed with her the potential complications, limitations, expectations, alternatives, and risks of Trigger Finger Release. She has had full opportunity to ask her questions. I have answered them all to her satisfaction. I feel that Ms. Muna Vazquez (and any family members present with her today) do understand our discussion today and she has provided informed consent for Right Thumb Trigger Finger Release (A1 Pulley Release). I have also discussed with Ms. Muna Vazquez the other treatment options available to her for this condition. We have today selected to proceed with Surgical treatment. She has voiced and  understanding that there are other less aggressive treatment options which are available in this situation, albeit possibly less efficacious or durable, and she is comfortable with the plan that she has chosen. I have explained to Ms. Muna Vazquez that despite successful treatment (surgical or otherwise) of her current presenting condition, that due to her coexistent conditions (both diagnosed and undiagnosed), that she is likely to have some permanent residual symptoms related to these conditions that do not improve long term. I have also explained that maximal recovery of function & symptom improvement may take a full year or longer to realize. She voiced a clear understanding of this. I have had a thorough discussion with Ms. Eden Jade regarding the treatment options available for her initially presenting right Thumb Basilar Joint osteoarthritis, which is causing her significant symptoms and difficulty. I have outlined for Ms. Eden Jade the risk, benefits and consequences of the various treatment modalities, including a reasonable expectation for the long term success of each. We have discussed the likelihood that further more aggressive treatment may be required for her current presenting condition. Based upon our current discussion and a reasonable understating of the options available to her, Ms. Eden Jade has selected to proceed with a conservative plan of treatment consisting of: the use of protective splints, activity modification, and the judicious use of over-the-counter anti-inflammatory medications if allowed by her primary care physician. An appropriately sized Neoprene Hand-based Thumb-Spica orthosis was offered and declined. Instructions were given regarding splint use and wear as well as suggestions for use of the other modalities were discussed. I have clearly explained to her that the above outlined treatment plan should not be expected to 'cure' her arthritic condition, but we are rather treating the symptoms with which she presents. She has understood that in order to achieve more durable relief of her symptoms and to prevent future worsening or further damage, that definitive surgical treatment would be required.  Ms. Eden Jade  voiced an appropriate understanding of our discussion, the options available to her, and of the expectations of her selected  treatment. Ms. Suzette Elias has been given a full verbal list of instructions and precautions related to her present condition. I have asked her to followup with me in the office at the prescribed time. She is also specifically requested to call or return to the office sooner if her symptoms change or worsen prior to the next scheduled appointment.

## 2021-09-24 NOTE — LETTER
CONSENT TO OPERATION  AND/OR OTHER PROCEDURE(S)          PATIENT : Leann Barnhart   YOB: 1965      DATE : 9/24/21          1. I request and consent that Dr. Charles Farmer. Jeannie Mcneil,  and/or his associates or assistants perform an operation and/or procedures on the above patient at  [place] [address], to treat the condition(s) which appear indicated by the diagnostic studies already performed. I have been told that in general terms the nature, purpose and reasonable expectations of the operation and/or procedure(s) are:      right Thumb Trigger Finger Release       2. It has been explained to me by the informing physician that during the course of the operation and/or procedure(s) unforeseen conditions may be revealed that necessitate an extension of the original operation and/or procedure(s) or different operation and/or procedures than those set forth in Paragraph 1. I therefore authorize and request that my physician and/or his associates or assistants perform such operations and/or procedures as are necessary and desirable in the exercise of professional judgment. The authority granted under this Paragraph 2 shall extend to all conditions that require treatment and are known to my physician at the time the operation is commenced. 3. I have been made aware by the informing physician of certain risks and consequences that are associated with the operation and/or procedure(s) described in Paragraph 1, the reasonable alternative methods or treatment, the possible consequences, the possibility that the operation and/or procedure(s) may be unsuccessful and the possibility of complications.   I understand the reasonably known risks to be:      - Bleeding  - Infection  - Poor Healing  - Possible Damage to Nerve, Vessel, Tendon/Muscle or Bone  - Need for further Treatment/Surgery  - Stiffness  - Pain  - Residual or Recurrent Symptoms  - Anesthetic and/or Medical Risks  - We have discussed the specific limitations and risks of hospital and/or office based treatment at this time due to the COVID-19 pandemic                I have been counseled about the risks of nereyda Covid-19 in the michael-operative and post-operative periods related to this procedure. I have been made aware that nereyda Covid-19 around the time of a surgical procedure may worsen my prognosis for recovering from the virus and lend to a higher morbidity and or mortality risk. With this knowledge, I have requested to proceed with the procedure as scheduled. 4. I have also been informed by the informing physician that there are other risks from both known and unknown causes that are attendant to the performance of any surgical procedure. I am aware that the practice of medicine and surgery is not an exact science, and that no guarantees have been made to me concerning the results of the operation and/or procedure(s). 5. I   CONSENT / REFUSE CONSENT  (strike the phrase that does not apply) to the taking of photographs before, during and/or after the operation or procedure for scientific/educational purposes. 6. I consent to the administration of anesthesia and to the use of such anesthetics as may be deemed advisable by the anesthesiologist who has been engaged by me or my physician.     7. I certify that I have read and understand the above consent to operation and/or other procedure(s); that the explanations therein referred to were made to me by the informing physician in advance of my signing this consent; that all blanks or statements requiring insertion or completion were filled in and inapplicable paragraphs, if any, were stricken before I signed; and that all questions asked by me about the operation and/or procedure(s) which I have consented to have been fully answered in a satisfactory manner.                                 _______________________           9/24/21 Witness     Signature Of Patient         Date        Anatoliy Rodríguez. Jus                                                 Informing Physician                                           Signature of Informing Physician                              If patient is unable to sign or is a minor, complete one of the following:    (A)  Patient is a minor   years of age. (B)  Patient is unable to sign because: The undersigned represents that he or she is duly authorized to execute this consent for and on behalf of the above named patient. Witness               o  Parent  o  Guardian   o  Spouse       o  Other (specify)                                           Patient Name: Norman Bhatti  Patient YOB: 1965  Dr. Wanda Neri' Return To Work Policy  Regarding your ability to return to work after surgery or injury, Dr. Wanda Neri will not state that any patient is off of work or cannot work at all. He will place you on restrictions after your surgical procedure or injury. Depending on the details of your particular situation, Dr. Wanda Neri may state that you will have either light use or no use of your hand for a specific number of weeks. It is your obligation to communicate with your employer regarding your restrictions. It is your employer's decision as to whether they will accommodate your restrictions (i.e. allow you to come to work in your restricted capacity) or to not allow you to return to work under your restrictions. Dr. Wanda Neri does not participate in making this decision and cannot influence your employer regarding their decision. If you do not communicate your restrictions to your employer, or if you do not present to work as you are scheduled to, Dr. Wanda Neri will not provide an 'excuse' to explain your absence. A doctors note, or official forms (BWC, FMLA, etc.) will be filled out, upon request, to indicate your date of surgery and your restrictions as stated above.   Dr. Wanda Neri' Narcotic Policy  Patients will only be prescribed narcotics after surgical procedures or significant injury. Not all procedures cause pain great enough to require Narcotics and thus, not all patients will receive prescriptions after surgical procedures or injuries. Narcotics are never prescribed for chronic conditions. Narcotics are never prescribed for use longer than one week at a time. Refills are only granted in unusual circumstances and only at Dr. Júnior Rodriguez discretion. Patients who are receiving narcotic medication from another physician or who are under pain management contracts will not be given a prescription for narcotics for any reason. Surgery Arrival Time:  You have been advised of the START TIME for your surgery as well as the ARRIVAL TIME at which you need to arrive at the surgery facility. Please understand that there is a certain amount of preparation which takes place at the surgery facility prior to the start of your surgery. If you arrive later than your scheduled ARRIVAL TIME, it may be necessary to cancel your surgery for that day and reschedule your procedure due to lack of adequate time for pre-surgery preparations. Thank you for being on time for your arrival.    I have read the above policies and understand that by agreeing to proceed with treatment by Dr. Jose Greenwood and his team, that I am agreeing to abide by these policies.   Patient Name:  Sang Martel    Patient Signature:  _____________________________    Hiram Stone Date:   9/24/21

## 2021-09-24 NOTE — Clinical Note
Dear  Kirk Berry MD,    Thank you very much for your referral or Ms. Poly Reyes to me for evaluation and treatment of her Hand & Wrist condition. I appreciate your confidence in me and thank you for allowing me the opportunity to care for your patients. If I can be of any further assistance to you on this or any other patient, please do not hesitate to contact me. Sincerely,    Bubba Magdaleno.  Samaria Mcdaniel MD

## 2021-09-30 ENCOUNTER — TELEPHONE (OUTPATIENT)
Dept: ORTHOPEDIC SURGERY | Age: 56
End: 2021-09-30

## 2021-10-05 ENCOUNTER — HOSPITAL ENCOUNTER (OUTPATIENT)
Age: 56
Discharge: HOME | End: 2021-10-05
Payer: COMMERCIAL

## 2021-10-05 ENCOUNTER — OFFICE VISIT (OUTPATIENT)
Dept: PRIMARY CARE CLINIC | Age: 56
End: 2021-10-05
Payer: COMMERCIAL

## 2021-10-05 VITALS
RESPIRATION RATE: 16 BRPM | SYSTOLIC BLOOD PRESSURE: 132 MMHG | WEIGHT: 151 LBS | DIASTOLIC BLOOD PRESSURE: 82 MMHG | HEART RATE: 82 BPM | BODY MASS INDEX: 25.13 KG/M2

## 2021-10-05 DIAGNOSIS — I10 ESSENTIAL HYPERTENSION: ICD-10-CM

## 2021-10-05 DIAGNOSIS — E78.00 PURE HYPERCHOLESTEROLEMIA: ICD-10-CM

## 2021-10-05 DIAGNOSIS — Z12.31 ENCOUNTER FOR SCREENING MAMMOGRAM FOR MALIGNANT NEOPLASM OF BREAST: ICD-10-CM

## 2021-10-05 DIAGNOSIS — E11.9 TYPE 2 DIABETES MELLITUS WITHOUT COMPLICATION, WITHOUT LONG-TERM CURRENT USE OF INSULIN (HCC): Primary | ICD-10-CM

## 2021-10-05 LAB
A/G RATIO: 1.8 (ref 1.1–2.2)
ALBUMIN SERPL-MCNC: 4.6 G/DL (ref 3.4–5)
ALP BLD-CCNC: 87 U/L (ref 40–129)
ALT SERPL-CCNC: 18 U/L (ref 10–40)
ANION GAP SERPL CALCULATED.3IONS-SCNC: 14 MMOL/L (ref 3–16)
AST SERPL-CCNC: 18 U/L (ref 15–37)
BILIRUB SERPL-MCNC: 0.4 MG/DL (ref 0–1)
BUN BLDV-MCNC: 11 MG/DL (ref 7–20)
CALCIUM SERPL-MCNC: 9.7 MG/DL (ref 8.3–10.6)
CHLORIDE BLD-SCNC: 103 MMOL/L (ref 99–110)
CHOLESTEROL, TOTAL: 155 MG/DL (ref 0–199)
CO2: 25 MMOL/L (ref 21–32)
CREAT SERPL-MCNC: 0.6 MG/DL (ref 0.6–1.1)
GFR AFRICAN AMERICAN: >60
GFR NON-AFRICAN AMERICAN: >60
GLOBULIN: 2.6 G/DL
GLUCOSE BLD-MCNC: 95 MG/DL (ref 70–99)
HBA1C MFR BLD: 8.7 %
HDLC SERPL-MCNC: 45 MG/DL (ref 40–60)
LDL CHOLESTEROL CALCULATED: 93 MG/DL
POTASSIUM SERPL-SCNC: 4.6 MMOL/L (ref 3.5–5.1)
SODIUM BLD-SCNC: 142 MMOL/L (ref 136–145)
TOTAL PROTEIN: 7.2 G/DL (ref 6.4–8.2)
TRIGL SERPL-MCNC: 83 MG/DL (ref 0–150)
VLDLC SERPL CALC-MCNC: 17 MG/DL

## 2021-10-05 PROCEDURE — 80061 LIPID PANEL: CPT

## 2021-10-05 PROCEDURE — 3052F HG A1C>EQUAL 8.0%<EQUAL 9.0%: CPT | Performed by: FAMILY MEDICINE

## 2021-10-05 PROCEDURE — 83036 HEMOGLOBIN GLYCOSYLATED A1C: CPT | Performed by: FAMILY MEDICINE

## 2021-10-05 PROCEDURE — 99214 OFFICE O/P EST MOD 30 MIN: CPT | Performed by: FAMILY MEDICINE

## 2021-10-05 PROCEDURE — 80053 COMPREHEN METABOLIC PANEL: CPT

## 2021-10-05 PROCEDURE — 36415 COLL VENOUS BLD VENIPUNCTURE: CPT

## 2021-10-05 RX ORDER — EMPAGLIFLOZIN 25 MG/1
25 TABLET, FILM COATED ORAL DAILY
Qty: 30 TABLET | Refills: 2 | Status: CANCELLED | OUTPATIENT
Start: 2021-10-05

## 2021-10-05 RX ORDER — DULAGLUTIDE 0.75 MG/.5ML
0.75 INJECTION, SOLUTION SUBCUTANEOUS WEEKLY
Qty: 4 PEN | Refills: 2 | Status: SHIPPED | OUTPATIENT
Start: 2021-10-05 | End: 2021-10-15

## 2021-10-05 NOTE — PROGRESS NOTES
PROGRESS NOTE      Dallas Barr MD  [address], [address], [address] [facility] [address]         Phone: [phone]        Date of Service:  10/5/2021     Patient ID: Eboni Quach is a 55 y.o. female      Assessment / Plan:       1. Type 2 diabetes mellitus without complication, without long-term current use of insulin ([facility] [address]  poc A1c=8.7 today    Explained to patient that I would not be able to clear her for surgery at her preop next week due to her uncontrolled blood pressures. Now that patient has insurance, we can start using other medications. We will discontinue Januvia and start Trulicity 5.68 mg weekly. Reviewed side effects and expected course. Patient did not tolerate SGLT2 inhibitors in the past, felt it caused diarrhea and nausea, as summarized below. Patient to continue metformin XR 1000 mg in the morning and 500 mg at night. Patient to continue glipizide 20 mg twice daily. There is a good chance that we will be stopping sulfonylurea and starting insulin in the future, assuming cannot get sugars controlled with this regimen. Patient to bring fasting sugars, and 2-hour postprandial sugars, to appointment in 2 weeks for review. If still uncontrolled, will increase Trulicity to 1.5 mg and determine whether or not insulin as needed. Made it clear I would not be able to do anything in 2 weeks without her blood sugars, and discussed the importance of bringing them. Foot exam was normal today. Pt to continue aspirin, statin and ACEI      2. HTN  Slightly above goal today. Continue lisinopril 10 mg for now, will reevaluate in 2 weeks. .  Checking CMP.     3. Pure hypercholesterolemia  Checking fasting lipid panel. Will adjust Lipitor 20 mg if necessary.         HM:      shingrix and COVID vaccines not completed.   Pt going to get COVID shots and then once has insurance will tibialis anterior and dorsalis pedis pulses bilaterally  Normal sensation to monofilament testing bilaterally  No calluses  No ulcers  Non mycotic toe nails.

## 2021-10-07 ENCOUNTER — PATIENT MESSAGE (OUTPATIENT)
Dept: PRIMARY CARE CLINIC | Age: 56
End: 2021-10-07

## 2021-10-07 RX ORDER — LIRAGLUTIDE 6 MG/ML
INJECTION SUBCUTANEOUS
Qty: 4 PEN | Refills: 0 | Status: SHIPPED | OUTPATIENT
Start: 2021-10-07 | End: 2021-10-15

## 2021-10-07 NOTE — TELEPHONE ENCOUNTER
From: Randy Gleason  To:  Yanni Pike MD  Sent: 10/7/2021 3:54 PM EDT  Subject: Prescription Question    No they didn't say which one just that it was covered can you just call one in and then I'll see if it's the one thank you

## 2021-10-08 DIAGNOSIS — E11.9 TYPE 2 DIABETES MELLITUS WITHOUT COMPLICATION, WITHOUT LONG-TERM CURRENT USE OF INSULIN (HCC): ICD-10-CM

## 2021-10-08 DIAGNOSIS — E78.00 PURE HYPERCHOLESTEROLEMIA: ICD-10-CM

## 2021-10-11 RX ORDER — LISINOPRIL 10 MG/1
10 TABLET ORAL DAILY
Qty: 90 TABLET | Refills: 0 | Status: SHIPPED | OUTPATIENT
Start: 2021-10-11 | End: 2021-12-13 | Stop reason: SDUPTHER

## 2021-10-11 RX ORDER — METFORMIN HYDROCHLORIDE 500 MG/1
TABLET, EXTENDED RELEASE ORAL
Qty: 270 TABLET | Refills: 0 | Status: SHIPPED | OUTPATIENT
Start: 2021-10-11 | End: 2021-12-13 | Stop reason: SDUPTHER

## 2021-10-11 RX ORDER — ATORVASTATIN CALCIUM 20 MG/1
20 TABLET, FILM COATED ORAL EVERY EVENING
Qty: 90 TABLET | Refills: 0 | Status: SHIPPED | OUTPATIENT
Start: 2021-10-11 | End: 2021-12-13 | Stop reason: SDUPTHER

## 2021-10-13 ENCOUNTER — TELEPHONE (OUTPATIENT)
Dept: PRIMARY CARE CLINIC | Age: 56
End: 2021-10-13

## 2021-10-13 DIAGNOSIS — E11.9 TYPE 2 DIABETES MELLITUS WITHOUT COMPLICATION, WITHOUT LONG-TERM CURRENT USE OF INSULIN (HCC): ICD-10-CM

## 2021-10-13 NOTE — TELEPHONE ENCOUNTER
----- Message from Abilio Vonda sent at 10/13/2021 10:44 AM EDT -----  Subject: Message to Provider    QUESTIONS  Information for Provider? Pt needs her provider to call her as soon as she   can regarding finding a new medication she can afford. She was messaging   her on Mychart, but she'd rather speak to her verbally. ---------------------------------------------------------------------------  --------------  Tiff Debbie SANTA  What is the best way for the office to contact you? OK to leave message on   voicemail  Preferred Call Back Phone Number? [phone]  ---------------------------------------------------------------------------  --------------  SCRIPT ANSWERS  Relationship to Patient?  Self

## 2021-10-13 NOTE — TELEPHONE ENCOUNTER
LOV 10/5/21  Future Appointments   Date Time Provider Yaritza Deborah   10/19/2021 12:30 PM Adalgisa Umanzor MD [facility]   10/21/2021  7:35 AM José Miguel Santiago MD [facility]   10/22/2021  7:20 AM [facility]  Preet Lewis 2   10/29/2021 10:15 AM Kathryn Woods [facility]

## 2021-10-14 RX ORDER — GLIPIZIDE 10 MG/1
TABLET ORAL
Qty: 360 TABLET | Refills: 1 | Status: SHIPPED | OUTPATIENT
Start: 2021-10-14 | End: 2022-01-05

## 2021-10-15 NOTE — TELEPHONE ENCOUNTER
Spoke with pt. She has a list of meds that are covered, but not the prices. She has decided not to try the GLP-1 inhibitors, she does not think they are affordable. Requesting refill of Januvia, and would like to start insulin. Asked patient to record blood sugars before every meal and nightly, and bring them to follow-up appointment on 10/26/2021, and will start insulin at that time. Patient expressed understanding.

## 2021-12-03 NOTE — PLAN OF CARE
[address]  Phone: [phone]   Fax:     [phone]          [facility]  Phone: [phone]   Fax:     [phone]     Physical Therapy Discharge Summary    Dear Referring Practitioner: Dr. Marni Wall,    We had the pleasure of treating the following patient for physical therapy services at [address] [place] [address]. A summary of our findings can be found in the discharge summary below. If you have any questions or concerns regarding these findings, please do not hesitate to contact me at the office phone number above.   Thank you for the referral.     Physician Signature:________________________________Date:__________________  By signing above (or electronic signature), therapists plan is approved by physician      Overall Response to Treatment:   [x]Patient is responding well to treatment and improvement is noted with regards  to goals   []Patient should continue to improve in reasonable time if they continue HEP   []Patient has plateaued and is no longer responding to skilled PT intervention    []Patient is getting worse and would benefit from return to referring MD   []Patient unable to adhere to initial POC   []Other:   Date range of Visits: 3/26/21-21  Total Visits: 11        Date:  6/3/2021    Patient Name:  Ary Pendleton    :  1965  MRN: [id number]    Pertinent Medical History:Additional Pertinent Hx: PLOF: Independent    Medical/Treatment Diagnosis Information:  · Diagnosis: 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 Vani Castro of care signed (Y/N): Sent to Dr. Leesa Perry 3/26/21    Date of Patient follow up with Physician:      Progress Report: []  Yes  [x]  No     Date Range for reporting period:  Beginning:  3/26/2021  Endin21    Progress report due (10 Rx/or 30 days whichever is less):     Recertification due (POC duration/ or 90 days whichever is less):     Visit # POC/Insurance Allowable Auth Needed   11 12 []Yes    [x]No     Functional Outcomes Measure:   Date Assessed:on eval , 3/26/21  Test: Deyanira Found  Score: 36    Pain level:  0/10     History of Injury:  Pt picked up her 28 to 40# grandson and turned the wrong way and hurt her left arm on 20. She picked him up and carried him around. She came in late Sept for PT but could not continue and with PT. It has continued to get worse. She also fell in her her garage in February and fell onto the left side. Her physician has referred her back to PT since she is getting worse. SUBJECTIVE:  See eval  3/31/21: Pt reports that she felt well Friday after treatment and on Saturday, but then when the weather changed to cooler and rainy, she felt worse. On the days when weather is good, she feels well. She had a hard time doing her exercises on the cooler days, like today. 21: Pt reports that she is getting better, can lay on it and it is less swollen. She is doing exercises faithfully. Her left hand continues to shake when she is having pain. 21; Pt reports worse pain with rainy weather due to arthritis. 21: Pt reports that she feels well today and has been doing her exercises. 21: Pt reports she is a little more sore on her  left shoulder. 21: Pt reports no pain today. Feels better overall. 21: Pt reports no pain in shoulder, but stiffness in neck. 21: Pt is tired, but pain is not bad today. 5/10/21: Pt reports that her pain is worse today because of the weather and not doing her exercise yesterday. 21: Pt reports no pain.     OBJECTIVE:   [facility] Observation:    Test measurements:  4/26/21:  ROM Left shoulder flexion: 150   Abd:  160  IR: 77   ER:  40    RESTRICTIONS/PRECAUTIONS:     Exercises/Interventions:   Therapeutic Ex (20405)  Min: 25 Resistance/Reps Cues/Notes   Wand flexion 5 sec x 10 with 3#    Wand scaption 5 sec x 10 with 3#    Wand ER 5 sec x10 3#    Wall slides5 sec x 10    OH pulleys4 min Added 3/31   Int rot - 3 x 30 sec Added to HEP on 3/31/21   Standing UE ranger 20X side to side and 20X up and down      20X (pt really liked this exercise) Added 3/31    Ext rot- 3 x 30 sec Added 4/8/21   Band resisted IR/ER  Black x 15 each with towel roll If well, will add band for HEP next time. Gave blue band for HEP. Rows Black band x 20 Added to HEP on 4/28/21 5/5/21   Active flexion and scaption and circles out to side 3# x 10 each, 2# x 10 each 5/5/21 and 5/7/21    5 sec x 10    Manual Intervention  (39878)  Min:20      GHJ and capsular mobs, long axis distraction, PROM all ranges     Rhythmic stabilization -3 x 30 sec          NMR re-education (36923)  Min:     Body Blade 2 min 1    Ball on the wall 20X co-contraction    Pressure on Rock on table 2 min    Therapeutic Activity (04101)  Min:               Modalities:  Min                      Other Therapeutic Activities:  Pt was educated on PT POC, Diagnosis, Prognosis, pathomechanics as well as frequency and duration of scheduling future physical therapy appointments. Time was also taken on this day to answer all patient questions and participation in PT. Reviewed appointment policy in detail with patient and patient verbalized understanding. Home Exercise Program: Lizbet Caputo program as above for the 5 exercises to start with stretching the shoulder capsule. 3/31/21: Added sleeper's stretch to HEP, 3 x 30 sec   4/8/21: Added sleepers stretch into ext rot 3x 30 sec  4/14/21: Issued blue band to pt for HEP  Access Code: [email or web address]: 04/14/2021Prepared by: Belén Johnson   Shoulder External Rotation with Anchored Resistance - 1 x daily - 7 x weekly - 10 reps - 3 sets   Shoulder Internal Rotation with Resistance - 1 x daily - 7 x weekly - 10 reps - 3 sets    Therapeutic Exercise and NMR EXR  [x] (71937) Provided verbal/tactile cueing for activities related to strengthening, flexibility, endurance, ROM  for improvements in scapular, scapulothoracic and UE control with self care, reaching, carrying, lifting, house/yardwork, driving/computer work.    [] (05226) Provided verbal/tactile cueing for activities related to improving balance, coordination, kinesthetic sense, posture, motor skill, proprioception  to assist with  scapular, scapulothoracic and UE control with self care, reaching, carrying, lifting, house/yardwork, driving/computer work. Therapeutic Activities:    [] (18017 or 04521) Provided verbal/tactile cueing for activities related to improving balance, coordination, kinesthetic sense, posture, motor skill, proprioception and motor activation to allow for proper function of scapular, scapulothoracic and UE control with self care, carrying, lifting, driving/computer work.      Home Exercise Program:    [x] (09122) Reviewed/Progressed HEP activities related to strengthening, flexibility, endurance, ROM of scapular, scapulothoracic and UE control with self care, reaching, carrying, lifting, house/yardwork, driving/computer work  [] (45120) Reviewed/Progressed HEP activities related to improving balance, coordination, kinesthetic sense, posture, motor skill, proprioception of scapular, scapulothoracic and UE control with self care, reaching, carrying, lifting, house/yardwork, driving/computer work      Manual Treatments:  PROM / STM / Oscillations-Mobs:  G-I, II, III, IV (PA's, Inf., Post.)  [x] (64883) Provided manual therapy to mobilize soft tissue/joints of cervical/CT, scapular GHJ and UE for the purpose of modulating pain, promoting relaxation,  increasing ROM, reducing/eliminating soft tissue swelling/inflammation/restriction, improving soft tissue extensibility and allowing for proper ROM for normal function with self care, reaching, carrying, lifting, house/yardwork, driving/computer work    Charges:  Timed Code Treatment Minutes: 50   Total Treatment Minutes: 50       [] EVAL (LOW) 08868 (typically 20 minutes face-to-face)  [] EVAL (MOD) 62211 (typically 30 minutes face-to-face)  [] EVAL (HIGH) 11120 (typically 45 minutes face-to-face)  [] RE-EVAL     [x] HI(42050) x 1    [] Dry needle 1 or 2 Muscles (14784)  [x] NMR (60294) x  1   [] Dry needle 3+ Muscles (30304)  [x] Manual (90072) x  1   [] Ultrasound (76105) x  [] TA (86911) x     [] Mech Traction (10896)  [] ES(attended) (58012)     [] ES (un) (97174):   [] Vasopump (31462) [] Ionto (35955)   [] Other:    If [facility] Please Indicate Time In/Out  CPT Code Time in Time out                                   Thelda Speaker stated goal: \"To get back to work\"  []? Progressing: [x]? Met: []? Not Met: []? Adjusted     Therapist goals for Patient:   Short Term Goals: To be achieved in: 2 weeks  1. Independent in HEP and progression per patient tolerance, in order to prevent re-injury. []? Progressing: [x]? Met: []? Not Met: []? Adjusted  2. Patient will have a decrease in pain to facilitate improvement in movement, function, and ADLs as indicated by Functional Deficits. []? Progressing: [x]? Met: []? Not Met: []? Adjusted     Long Term Goals: To be achieved in: 8 weeks  1. Disability index score of 30% or less for the Quick DASH to assist with reaching prior level of function. []? Progressing: [x]? Met: []? Not Met: []? Adjusted  2. Patient will demonstrate increased AROM to 150 degrees of flexion and abduction to allow for proper joint functioning as indicated by Functional Deficits. []? Progressing: [x]? Met: []? Not Met: []? Adjusted  3.  Patient will demonstrate an increase in NM recruitment/activation and overall GH and scapular strength to within n5lbs HHD or WNL for proper functional mobility as indicated by patients Functional Deficits. []? Progressing: [x]? Met: []? Not Met: []? Adjusted  4. Patient will return to 70%  activities without increased symptoms or restriction. []? Progressing: [x]? Met: []? Not Met: []? Adjusted  5. Pt will increase mobility to normal shoulder range so she can return to work (patient specific functional goal)           []? Progressing: [x]? Met: []? Not Met: []? Adjusted     ASSESSMENT:  Pt demonstrates nearly full ROM on left shoulder. Much improved mobility and no pain. She missed 4 sessions and did not attend her last two scheduled visits, so will now be discharged. Treatment/Activity Tolerance:  [x] Patient tolerated treatment well [] Patient limited by fatique  [] Patient limited by pain  [] Patient limited by other medical complications  [] Other:     Overall Progression Towards Functional goals/ Treatment Progress Update:  [x] Patient is progressing as expected towards functional goals listed. [] Progression is slowed due to complexities/Impairments listed. [] Progression has been slowed due to co-morbidities. [] Plan just implemented, too soon to assess goals progression <30days   [] Goals require adjustment due to lack of progress  [] Patient is not progressing as expected and requires additional follow up with physician  [] Other    Prognosis for POC: [x] Good [] Fair  [] Poor    Patient requires continued skilled intervention: [x] Yes  [] No      PLAN:   [] Continue per plan of care [] Alter current plan (see comments)  [] Plan of care initiated [] Hold pending MD visit [x] Discharge    Electronically signed by: Geovanna Delgado PT     Note: If patient does not return for scheduled/recommended follow up visits, this note will serve as a discharge from care along with the most recent update on progress. No

## 2021-12-06 ENCOUNTER — NURSE TRIAGE (OUTPATIENT)
Dept: OTHER | Facility: CLINIC | Age: 56
End: 2021-12-06

## 2021-12-06 NOTE — TELEPHONE ENCOUNTER
Reason for Disposition   Fever and red area (or area very tender to touch)    Answer Assessment - Initial Assessment Questions  1. ONSET: \"When did the swelling start? \" (e.g., minutes, hours, days)         states that daughter told him that pt had hives and then started swelling in hands and feet. Hives have gone away but swelling is still there. 2. LOCATION: \"What part of the leg is swollen? \"  \"Are both legs swollen or just one leg? \"           BLLE in feet and ankles           BLUE in hands only     3. SEVERITY: \"How bad is the swelling? \" (e.g., localized; mild, moderate, severe)   - Localized - small area of swelling localized to one leg   - MILD pedal edema - swelling limited to foot and ankle, pitting edema < 1/4 inch (6 mm) deep, rest and elevation eliminate most or all swelling   - MODERATE edema - swelling of lower leg to knee, pitting edema > 1/4 inch (6 mm) deep, rest and elevation only partially reduce swelling   - SEVERE edema - swelling extends above knee, facial or hand swelling present           Severe     4. REDNESS: \"Does the swelling look red or infected? \"           Hands and feet are warm to touch with redness in all areas - temp in hands have decreased and  checked while speaking to writer. 5. PAIN: \"Is the swelling painful to touch? \" If so, ask: \"How painful is it? \"   (Scale 1-10; mild, moderate or severe)            Pt is sleeping-  rates pain at a minimum of 5/10     6. FEVER: \"Do you have a fever? \" If so, ask: \"What is it, how was it measured, and when did it start? \"           Intermittent and highest of 102    7. CAUSE: \"What do you think is causing the leg swelling? \"          Pt has hx of [facility] fever and she has had these same symptoms in past and  is unsure if they are related     8. MEDICAL HISTORY: \"Do you have a history of heart failure, kidney disease, liver failure, or cancer? \"           Denies     9.  RECURRENT SYMPTOM: \"Have you had leg swelling before? \" If so, ask: \"When was the last time? \" \"What happened that time? \"          Pt has hx of [facility] fever - but  states they didn't know what caused the swelling and symptoms in past and did not give a dx. 10. OTHER SYMPTOMS: \"Do you have any other symptoms? \" (e.g., chest pain, difficulty breathing)        Hives for about 24 hrs from Saturday into Sunday     11. PREGNANCY: \"Is there any chance you are pregnant? \" \"When was your last menstrual period? \"    Protocols used: LEG SWELLING AND EDEMA-ADULT-OH    Received call from Jaya Caruso  at [facility] with Red Flag Complaint. Brief description of triage: see above -    Triage indicates for patient to go to [address] [place] [address] for redness, swelling and pain in hands and feet with fever up to 102 since Saturday evening. Care advice provided, patient verbalizes understanding; denies any other questions or concerns; instructed to call back for any new or worsening symptoms. Attention Provider: Thank you for allowing me to participate in the care of your patient. The patient was connected to triage in response to information provided to the ECC/PSC. Please do not respond through this encounter as the response is not directed to a shared pool.

## 2021-12-08 ENCOUNTER — HOSPITAL ENCOUNTER (OUTPATIENT)
Dept: WOMENS IMAGING | Age: 56
Discharge: HOME | End: 2021-12-08
Payer: COMMERCIAL

## 2021-12-08 DIAGNOSIS — Z12.31 ENCOUNTER FOR SCREENING MAMMOGRAM FOR MALIGNANT NEOPLASM OF BREAST: ICD-10-CM

## 2021-12-08 PROCEDURE — 77063 BREAST TOMOSYNTHESIS BI: CPT

## 2021-12-13 ENCOUNTER — OFFICE VISIT (OUTPATIENT)
Dept: PRIMARY CARE CLINIC | Age: 56
End: 2021-12-13
Payer: COMMERCIAL

## 2021-12-13 ENCOUNTER — HOSPITAL ENCOUNTER (OUTPATIENT)
Age: 56
Discharge: HOME | End: 2021-12-13
Payer: COMMERCIAL

## 2021-12-13 VITALS
DIASTOLIC BLOOD PRESSURE: 84 MMHG | SYSTOLIC BLOOD PRESSURE: 122 MMHG | HEIGHT: 65 IN | WEIGHT: 150.2 LBS | BODY MASS INDEX: 25.02 KG/M2 | OXYGEN SATURATION: 98 % | HEART RATE: 84 BPM | TEMPERATURE: 98.9 F

## 2021-12-13 DIAGNOSIS — E11.9 TYPE 2 DIABETES MELLITUS WITHOUT COMPLICATION, WITHOUT LONG-TERM CURRENT USE OF INSULIN (HCC): ICD-10-CM

## 2021-12-13 DIAGNOSIS — E78.00 PURE HYPERCHOLESTEROLEMIA: ICD-10-CM

## 2021-12-13 DIAGNOSIS — I10 ESSENTIAL HYPERTENSION: Primary | ICD-10-CM

## 2021-12-13 LAB
CREATININE URINE: 154.5 MG/DL (ref 28–259)
HBA1C MFR BLD: 8.1 %
MICROALBUMIN UR-MCNC: 1.3 MG/DL
MICROALBUMIN/CREAT UR-RTO: 8.4 MG/G (ref 0–30)

## 2021-12-13 PROCEDURE — 99214 OFFICE O/P EST MOD 30 MIN: CPT | Performed by: FAMILY MEDICINE

## 2021-12-13 PROCEDURE — 82043 UR ALBUMIN QUANTITATIVE: CPT

## 2021-12-13 PROCEDURE — 3052F HG A1C>EQUAL 8.0%<EQUAL 9.0%: CPT | Performed by: FAMILY MEDICINE

## 2021-12-13 PROCEDURE — 82570 ASSAY OF URINE CREATININE: CPT

## 2021-12-13 PROCEDURE — 83036 HEMOGLOBIN GLYCOSYLATED A1C: CPT | Performed by: FAMILY MEDICINE

## 2021-12-13 RX ORDER — INSULIN DETEMIR 100 [IU]/ML
INJECTION, SOLUTION SUBCUTANEOUS
Qty: 4 PEN | Refills: 0 | Status: SHIPPED | OUTPATIENT
Start: 2021-12-13 | End: 2021-12-15

## 2021-12-13 RX ORDER — METFORMIN HYDROCHLORIDE 500 MG/1
TABLET, EXTENDED RELEASE ORAL
Qty: 270 TABLET | Refills: 0 | Status: SHIPPED | OUTPATIENT
Start: 2021-12-13 | End: 2022-02-13

## 2021-12-13 RX ORDER — GLIPIZIDE 10 MG/1
TABLET ORAL
Qty: 360 TABLET | Refills: 1 | Status: CANCELLED | OUTPATIENT
Start: 2021-12-13

## 2021-12-13 RX ORDER — LISINOPRIL 10 MG/1
10 TABLET ORAL DAILY
Qty: 90 TABLET | Refills: 0 | Status: SHIPPED | OUTPATIENT
Start: 2021-12-13 | End: 2022-03-14

## 2021-12-13 RX ORDER — ATORVASTATIN CALCIUM 20 MG/1
20 TABLET, FILM COATED ORAL EVERY EVENING
Qty: 90 TABLET | Refills: 0 | Status: SHIPPED | OUTPATIENT
Start: 2021-12-13 | End: 2022-03-21 | Stop reason: SDUPTHER

## 2021-12-13 NOTE — PROGRESS NOTES
PROGRESS NOTE      Kaleigh Caballero MD  [address] , [address], [address] [facility] [place] [address]         Phone: [phone]        Date of Service:  12/13/2021     Patient ID: . Amos Doran is a 55 y.o. female      Assessment / Plan:       1. Type 2 diabetes mellitus without complication, without long-term current use of insulin ([facility])  poc A1c=8.1 today    Some improvement but not yet at goal.  Because patient cannot tolerate SGLT2 inhibitors, cannot afford GLP-1 agonist, will add Levemir 10 units nightly to her regimen. We will have patient follow-up in 2 weeks with fasting blood sugars, and further titrate Levemir to goal.    Patient to continue metformin, Januvia, and glipizide for now. If insulins are affordable, will be able to stop glipizide in the future once we get sugars controlled with insulins. Pt to continue aspirin, statin and ACEI      2. HTN  At goal..  Continue lisinopril 20mg. Recent BMP WNLs     3. Pure hypercholesterolemia  . Continue Lipitor 20 mg.       HM:     Pt is going to look into the cost of Shingrix. Patient still declining Covid and flu vaccine at this time.     Colonoscopy done 8/29/18: next due 2023     Mammogram completed 12/8/21: normal     Pap smear normal with negative HPV on 3/24/17      Subjective:     CC:    DM2, HLD, HTN    HPI    27-year-old white female here for follow-up of the above chronic medical conditions. At last visit A1c was found to be 8.7. Januvia was stopped and Trulicity was started in its place. Medication, however; was not affordable with insurance and patient was switched back to Januvia. She was not able to tolerate SGLT2 inhibitors in the past due to side effects. She is continued on 1000 mg XR Metformin in the morning and 500 mg XR Metformin in the evening. She is also taking 20 mg of glipizide twice a day.   She is still drinking 1 can of soda a day and does eat a lot of candy. 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 Gets lots of activity. Patient does not check blood pressures at home. She does avoid salt. She has negative review of systems. See below.     Lab Results   Component Value Date    LABA1C 8.7 10/05/2021    LABA1C 8.4 09/14/2021    LABA1C 8.4 07/08/2021     Lab Results   Component Value Date    LABMICR <1.20 09/16/2020    CREATININE 0.6 10/05/2021     Lab Results   Component Value Date    ALT 18 10/05/2021    AST 18 10/05/2021     Lab Results   Component Value Date    CHOL 155 10/05/2021    TRIG 83 10/05/2021    HDL 45 10/05/2021    LDLCALC 93 10/05/2021          ROS:    Constitutional:  Negative for activity or appetite change, fever or fatigue  HENT:  Negative for congestion, sinus pressure, or rhinorrhea  Eyes:  Negative for eye pain or visual changes  Resp:  Negative for SOB, chest tightness, cough  Cardiovascular: Negative for CP, palpitations, LU, orthopnea, PND, LE edema  Gastrointestinal: Negative for abd pain, melena, BRBPR, N/V/D  Endocrine:  Negative for polydipsia and polyuria  :  Negative for dysuria, flank pain or urinary frequency  Musculoskeletal:  Negative for back pain or myalgias  Neuro:  Negative for dizziness or lightheadedness  Psych: negative for depression or anxiety      Vitals:    12/13/21 1241   BP: 122/84   Pulse: 84   Temp: 98.9 °F (37.2 °C)   TempSrc: Temporal   SpO2: 98%   Weight: 150 lb 3.2 oz (68.1 kg)   Height: 5' 5\" (1.651 m)       Outpatient Medications Marked as Taking for the 10/5/21 encounter (Office Visit) with Franki Lewis MD   Medication Sig Dispense Refill    glipiZIDE (GLUCOTROL) 10 MG tablet TAKE 2 TABLETS BY MOUTH TWICE DAILY BEFORE MEALS 360 tablet 1    metFORMIN (GLUCOPHAGE-XR) 500 MG extended release tablet TAKE 2 TABLETS BY MOUTH WITH BREAKFAST AND 1 TABLET BY MOUTH AT BEDTIME 270 tablet 0    atorvastatin (LIPITOR) 20 MG tablet Take 1 tablet by mouth every evening 90 tablet 0    lisinopril (PRINIVIL;ZESTRIL) 10 MG tablet Take 1 tablet by mouth daily 90 tablet 0    SITagliptin (JANUVIA) 100 MG tablet Take 1 tablet by mouth daily 90 tablet 0    aspirin 81 MG chewable tablet Take 81 mg by mouth daily               Objective:   Constitutional:   · Reviewed vitals above  · Well Nourished, well developed, no distress       Neck:  · Symmetric and without masses  · No thyromegaly  Resp:  · Normal effort  · Clear to auscultation bilaterally without rhonchi, wheezing or crackles  Cardiovascular:  · On auscultation, normal S1 and S2 without murmurs, rubs or gallops  · No bruits of bilateral carotids and no JVD  Gastrointestinal:  · Nontender, nondistended, and no masses  · No hepatosplenomegaly  Musculoskeletal:  · Normal Gait  · All extremities without clubbing, cyanosis or edema  Skin:  · No rashes on inspection  · No areas of increased heat or induration on palpation  Psych:  · Normal mood and affect  · Normal insight and judgement        EMR Dragon/transcription disclaimer:  Much of this encounter note is electronic transcription/translation of spoken language to printed texts. The electronic translation of spoken language may be erroneous, or at times, nonsensical words or phrases may be inadvertently transcribed.   Although I have reviewed the note for such errors, some may still exist.

## 2021-12-15 ENCOUNTER — PATIENT MESSAGE (OUTPATIENT)
Dept: PRIMARY CARE CLINIC | Age: 56
End: 2021-12-15

## 2021-12-15 DIAGNOSIS — Z79.4 TYPE 2 DIABETES MELLITUS WITHOUT COMPLICATION, WITH LONG-TERM CURRENT USE OF INSULIN (HCC): Primary | ICD-10-CM

## 2021-12-15 DIAGNOSIS — E11.9 TYPE 2 DIABETES MELLITUS WITHOUT COMPLICATION, WITH LONG-TERM CURRENT USE OF INSULIN (HCC): Primary | ICD-10-CM

## 2021-12-15 RX ORDER — INSULIN GLARGINE 100 [IU]/ML
INJECTION, SOLUTION SUBCUTANEOUS
Qty: 5 PEN | Refills: 0 | Status: SHIPPED | OUTPATIENT
Start: 2021-12-15 | End: 2021-12-16

## 2021-12-15 NOTE — TELEPHONE ENCOUNTER
From: Randy Gleason  To: Dr. Norman Munoz: 12/15/2021 12:54 PM EST  Subject: Medicine     Can you call in [address] [place] [address] cause I cant afford the other Insulin you called in and the pharmacy thinks my insurance will cover this one more . Thank .

## 2021-12-16 RX ORDER — INSULIN GLARGINE 100 [IU]/ML
INJECTION, SOLUTION SUBCUTANEOUS
Qty: 5 PEN | Refills: 3 | Status: SHIPPED | OUTPATIENT
Start: 2021-12-16

## 2021-12-29 ENCOUNTER — TELEPHONE (OUTPATIENT)
Dept: PRIMARY CARE CLINIC | Age: 56
End: 2021-12-29

## 2021-12-29 NOTE — TELEPHONE ENCOUNTER
PT has had a fever off and on since Saturday. The highest was 102.8 on Sunday. Today it was 101. She has been taking idprofin and tylenol. She woke up with her eye swollen and pink so she began taking OTC pink eye medication and her eye has gotten better. She had sinus symptoms just some drainage and sneezing. PT has not been covid tested. PT would like to be seen.   [phone]

## 2021-12-30 NOTE — TELEPHONE ENCOUNTER
Let pt know Dr Devyn Bernla is on vacation today and there are only a few docs in, and all are booked. Pt should go to urgent care to get flu and covid test and have full eval    Please document call and then close encounter.   thanks

## 2021-12-30 NOTE — TELEPHONE ENCOUNTER
Called and spoke with pt. Pt went and got a Covid test done and results were positive. Pt has been taking sinus medications for the drainage and advil/tylenol for the headaches.

## 2022-01-03 DIAGNOSIS — E11.9 TYPE 2 DIABETES MELLITUS WITHOUT COMPLICATION, WITHOUT LONG-TERM CURRENT USE OF INSULIN (HCC): ICD-10-CM

## 2022-01-05 RX ORDER — GLIPIZIDE 10 MG/1
TABLET ORAL
Qty: 360 TABLET | Refills: 1 | Status: SHIPPED | OUTPATIENT
Start: 2022-01-05 | End: 2022-03-21 | Stop reason: SDUPTHER

## 2022-01-18 ENCOUNTER — OFFICE VISIT (OUTPATIENT)
Dept: PRIMARY CARE CLINIC | Age: 57
End: 2022-01-18
Payer: COMMERCIAL

## 2022-01-18 VITALS
BODY MASS INDEX: 25.66 KG/M2 | SYSTOLIC BLOOD PRESSURE: 118 MMHG | TEMPERATURE: 98.1 F | HEART RATE: 87 BPM | HEIGHT: 65 IN | DIASTOLIC BLOOD PRESSURE: 84 MMHG | WEIGHT: 154 LBS | OXYGEN SATURATION: 98 %

## 2022-01-18 DIAGNOSIS — Z79.4 TYPE 2 DIABETES MELLITUS WITHOUT COMPLICATION, WITH LONG-TERM CURRENT USE OF INSULIN (HCC): Primary | ICD-10-CM

## 2022-01-18 DIAGNOSIS — E11.9 TYPE 2 DIABETES MELLITUS WITHOUT COMPLICATION, WITH LONG-TERM CURRENT USE OF INSULIN (HCC): Primary | ICD-10-CM

## 2022-01-18 PROCEDURE — 99214 OFFICE O/P EST MOD 30 MIN: CPT | Performed by: FAMILY MEDICINE

## 2022-01-18 ASSESSMENT — ANXIETY QUESTIONNAIRES
IF YOU CHECKED OFF ANY PROBLEMS ON THIS QUESTIONNAIRE, HOW DIFFICULT HAVE THESE PROBLEMS MADE IT FOR YOU TO DO YOUR WORK, TAKE CARE OF THINGS AT HOME, OR GET ALONG WITH OTHER PEOPLE: NOT DIFFICULT AT ALL
1. FEELING NERVOUS, ANXIOUS, OR ON EDGE: 0
GAD7 TOTAL SCORE: 0
3. WORRYING TOO MUCH ABOUT DIFFERENT THINGS: 0
7. FEELING AFRAID AS IF SOMETHING AWFUL MIGHT HAPPEN: 0
4. TROUBLE RELAXING: 0
5. BEING SO RESTLESS THAT IT IS HARD TO SIT STILL: 0
2. NOT BEING ABLE TO STOP OR CONTROL WORRYING: 0
6. BECOMING EASILY ANNOYED OR IRRITABLE: 0

## 2022-01-18 ASSESSMENT — PATIENT HEALTH QUESTIONNAIRE - PHQ9
SUM OF ALL RESPONSES TO PHQ9 QUESTIONS 1 & 2: 0
SUM OF ALL RESPONSES TO PHQ QUESTIONS 1-9: 0
SUM OF ALL RESPONSES TO PHQ QUESTIONS 1-9: 0
2. FEELING DOWN, DEPRESSED OR HOPELESS: 0
9. THOUGHTS THAT YOU WOULD BE BETTER OFF DEAD, OR OF HURTING YOURSELF: 0
SUM OF ALL RESPONSES TO PHQ QUESTIONS 1-9: 0
7. TROUBLE CONCENTRATING ON THINGS, SUCH AS READING THE NEWSPAPER OR WATCHING TELEVISION: 0
4. FEELING TIRED OR HAVING LITTLE ENERGY: 0
8. MOVING OR SPEAKING SO SLOWLY THAT OTHER PEOPLE COULD HAVE NOTICED. OR THE OPPOSITE, BEING SO FIGETY OR RESTLESS THAT YOU HAVE BEEN MOVING AROUND A LOT MORE THAN USUAL: 0
3. TROUBLE FALLING OR STAYING ASLEEP: 0
1. LITTLE INTEREST OR PLEASURE IN DOING THINGS: 0
6. FEELING BAD ABOUT YOURSELF - OR THAT YOU ARE A FAILURE OR HAVE LET YOURSELF OR YOUR FAMILY DOWN: 0
SUM OF ALL RESPONSES TO PHQ QUESTIONS 1-9: 0
5. POOR APPETITE OR OVEREATING: 0
10. IF YOU CHECKED OFF ANY PROBLEMS, HOW DIFFICULT HAVE THESE PROBLEMS MADE IT FOR YOU TO DO YOUR WORK, TAKE CARE OF THINGS AT HOME, OR GET ALONG WITH OTHER PEOPLE: 0

## 2022-01-18 NOTE — PROGRESS NOTES
PROGRESS NOTE      Claudetta Pray MD  [address], [address], [address] [facility] [place] [address]         Phone: [phone]        Date of Service:  1/18/2022     Patient ID: Eboni Watts is a 55 y.o. female      Assessment / Plan:       1. Type 2 diabetes mellitus without complication, with long-term current use of insulin (HCC)  Uncontrolled. As shown below, recent A1c a month ago with 100 and fasting blood sugars are closer to normal now that we have added Lantus 10 units to her regimen. We will increase Lantus to 15 units nightly. To MyChart me with her fasting blood sugars in 1 week to see if further adjustments need to be made. Because patient cannot tolerate SGLT2 inhibitors, cannot afford GLP-1 agonist, will continue metformin, Januvia, and glipizide for now. In 3 months we will follow-up with A1c level, and determine if mealtime insulin is also needed    Pt to continue aspirin, statin and ACEI      HM:     shingrix ordered, but we are out. Patient still declining Covid and flu vaccine at this time.     Colonoscopy done 8/29/18: next due 2023     Mammogram completed 12/8/21: normal     Pap smear normal with negative HPV on 3/24/17      Subjective:     CC:    DM2    HPI      21-year-old white female here for follow-up of uncontrolled diabetes    A1c was found to be elevated 8.1 one month ago. 10 units was added to drug regimen, as patient is on able to tolerate SGLT2 inhibitors, and unable to afford GLP-1 agonists. He was continued on metformin, Januvia, and glipizide for the time being. She was asked to follow-up today for further adjustment of medication. She did not bring her sugar log with her, but reports her fasting sugars are between 120 and 140s in the morning. 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 % SUSP topical suspension Use as directed 1 Bottle 1    Cranberry 1000 MG CAPS Take 1 tablet by mouth daily      Blood Glucose Monitoring Suppl (ONE TOUCH ULTRA MINI) w/Device KIT Use to check sugars 1 kit 0    glucose blood VI test strips (ASCENSIA AUTODISC VI;ONE TOUCH ULTRA TEST VI) strip 1 each by In Vitro route daily As needed. 100 each 3    ONE TOUCH LANCETS MISC 1 each by Does not apply route daily 100 each 3    aspirin 81 MG chewable tablet Take 81 mg by mouth daily      Evening Primrose Oil 500 MG CAPS Take by mouth      m        Objective:   Constitutional:   · Reviewed vitals above  · Well Nourished, well developed, no distress         Psych:  · Normal mood and affect  · Normal insight and judgement        EMR Dragon/transcription disclaimer:  Much of this encounter note is electronic transcription/translation of spoken language to printed texts. The electronic translation of spoken language may be erroneous, or at times, nonsensical words or phrases may be inadvertently transcribed.   Although I have reviewed the note for such errors, some may still exist.

## 2022-01-27 NOTE — TELEPHONE ENCOUNTER
PT wanted to know when Dr. Tierra Mike would try reaching out to her again. I advised the PT that it would most likely be Monday due to Dr. Tierra Mike being out of office today. PT then hung up on me as I was trying to let her know that I would put a message back. Subjective:       Patient ID: Bryon Martinez is a 4 wk.o. male.    Chief Complaint: Follow-up (3 week follow up)    Here for check up with his mother. Has a lot of unexplained crying. Gaining good weight. Similac Advanced.     Review of Systems   Constitutional: Positive for crying (cries a lot for no reason). Negative for appetite change and irritability.   HENT: Negative.  Negative for congestion.    Eyes: Negative.    Respiratory: Negative.  Negative for cough.    Cardiovascular: Negative.  Negative for fatigue with feeds.   Gastrointestinal: Negative.    Genitourinary: Negative.    Musculoskeletal: Negative.    Skin: Negative.  Negative for rash.   Neurological: Negative.    All other systems reviewed and are negative.      Objective:      Physical Exam  Nursing note reviewed.   Constitutional:       General: He is not in acute distress.     Appearance: He is well-developed and well-nourished.   HENT:      Head: Normocephalic and atraumatic. Anterior fontanelle is full.      Right Ear: Tympanic membrane normal.      Left Ear: Tympanic membrane normal.      Nose: Nose normal.      Mouth/Throat:      Mouth: Mucous membranes are moist.      Pharynx: Oropharynx is clear.   Eyes:      General: Red reflex is present bilaterally.      Extraocular Movements: EOM normal.      Conjunctiva/sclera: Conjunctivae normal.      Pupils: Pupils are equal, round, and reactive to light.   Cardiovascular:      Rate and Rhythm: Normal rate and regular rhythm.      Pulses: Normal pulses.      Heart sounds: Normal heart sounds, S1 normal and S2 normal. No murmur heard.      Pulmonary:      Effort: Pulmonary effort is normal. No respiratory distress.      Breath sounds: Normal breath sounds.   Abdominal:      General: Bowel sounds are normal.      Palpations: Abdomen is soft.      Tenderness: There is no abdominal tenderness.      Hernia: There is no hernia in the right inguinal area or left inguinal area.   Genitourinary:      Penis: Normal.       Testes: Normal.         Right: Right testis is descended.         Left: Left testis is descended.   Musculoskeletal:         General: Normal range of motion.      Cervical back: Normal range of motion and neck supple.   Lymphadenopathy:      Cervical: No cervical adenopathy.   Skin:     General: Skin is warm and dry.      Findings: No rash.   Neurological:      Mental Status: He is alert.      Primitive Reflexes: Symmetric Vandana.         Assessment:       1. Well baby exam, over 28 days old    2. Infantile colic    3. Gastroesophageal reflux disease, unspecified whether esophagitis present        Plan:   Bryon was seen today for follow-up.    Diagnoses and all orders for this visit:    Well baby exam, over 28 days old    Infantile colic  -     hyoscyamine (LEVSIN) 0.125 mg/mL Drop; 4 drops orally every 6 hours for colic    Gastroesophageal reflux disease, unspecified whether esophagitis present  -     infant formula-iron-dha-pérez (SIMILAC TOTAL COMFORT) 2.32-5.4-10.7 gram/100 kcal Powd; Feed on demand    Spoke to mother 1/28 - pepcid and levsin sent to [facility]. Baby spitting up a lot and did not sleep.  RTC 1 week for recheck

## 2022-02-14 ENCOUNTER — PATIENT MESSAGE (OUTPATIENT)
Dept: PRIMARY CARE CLINIC | Age: 57
End: 2022-02-14

## 2022-02-14 RX ORDER — ONDANSETRON 4 MG/1
4 TABLET, ORALLY DISINTEGRATING ORAL 3 TIMES DAILY PRN
Qty: 30 TABLET | Refills: 0 | Status: SHIPPED | OUTPATIENT
Start: 2022-02-14

## 2022-03-13 DIAGNOSIS — E11.9 TYPE 2 DIABETES MELLITUS WITHOUT COMPLICATION, WITHOUT LONG-TERM CURRENT USE OF INSULIN (HCC): ICD-10-CM

## 2022-03-14 RX ORDER — METFORMIN HYDROCHLORIDE 500 MG/1
TABLET, EXTENDED RELEASE ORAL
Qty: 270 TABLET | Refills: 0 | Status: SHIPPED | OUTPATIENT
Start: 2022-03-14 | End: 2022-04-18 | Stop reason: SDUPTHER

## 2022-03-14 RX ORDER — LISINOPRIL 10 MG/1
10 TABLET ORAL DAILY
Qty: 90 TABLET | Refills: 0 | Status: SHIPPED | OUTPATIENT
Start: 2022-03-14 | End: 2022-03-21 | Stop reason: SDUPTHER

## 2022-03-17 ENCOUNTER — HOSPITAL ENCOUNTER (OUTPATIENT)
Age: 57
Discharge: HOME | End: 2022-03-17
Payer: COMMERCIAL

## 2022-03-17 ENCOUNTER — TELEPHONE (OUTPATIENT)
Dept: PRIMARY CARE CLINIC | Age: 57
End: 2022-03-17

## 2022-03-17 ENCOUNTER — OFFICE VISIT (OUTPATIENT)
Dept: PRIMARY CARE CLINIC | Age: 57
End: 2022-03-17
Payer: COMMERCIAL

## 2022-03-17 VITALS
WEIGHT: 153.4 LBS | RESPIRATION RATE: 18 BRPM | OXYGEN SATURATION: 96 % | HEIGHT: 65 IN | DIASTOLIC BLOOD PRESSURE: 72 MMHG | TEMPERATURE: 100.9 F | HEART RATE: 103 BPM | BODY MASS INDEX: 25.56 KG/M2 | SYSTOLIC BLOOD PRESSURE: 98 MMHG

## 2022-03-17 DIAGNOSIS — R10.9 ABDOMINAL DISCOMFORT: ICD-10-CM

## 2022-03-17 DIAGNOSIS — N12 PYELONEPHRITIS: Primary | ICD-10-CM

## 2022-03-17 DIAGNOSIS — R50.9 FEVER, UNSPECIFIED FEVER CAUSE: ICD-10-CM

## 2022-03-17 DIAGNOSIS — Z79.4 TYPE 2 DIABETES MELLITUS WITHOUT COMPLICATION, WITH LONG-TERM CURRENT USE OF INSULIN (HCC): ICD-10-CM

## 2022-03-17 DIAGNOSIS — E11.9 TYPE 2 DIABETES MELLITUS WITHOUT COMPLICATION, WITH LONG-TERM CURRENT USE OF INSULIN (HCC): ICD-10-CM

## 2022-03-17 LAB
ALBUMIN SERPL-MCNC: 4.1 G/DL (ref 3.4–5)
ALP BLD-CCNC: 92 U/L (ref 40–129)
ALT SERPL-CCNC: 20 U/L (ref 10–40)
ANION GAP SERPL CALCULATED.3IONS-SCNC: 11 MMOL/L (ref 3–16)
AST SERPL-CCNC: 15 U/L (ref 15–37)
BILIRUB SERPL-MCNC: 0.6 MG/DL (ref 0–1)
BILIRUBIN DIRECT: <0.2 MG/DL (ref 0–0.3)
BILIRUBIN, INDIRECT: NORMAL MG/DL (ref 0–1)
BILIRUBIN, POC: NORMAL
BLOOD URINE, POC: NORMAL
BUN BLDV-MCNC: 17 MG/DL (ref 7–20)
CALCIUM SERPL-MCNC: 9.2 MG/DL (ref 8.3–10.6)
CHLORIDE BLD-SCNC: 101 MMOL/L (ref 99–110)
CLARITY, POC: CLEAR
CO2: 26 MMOL/L (ref 21–32)
COLOR, POC: YELLOW
CREAT SERPL-MCNC: 0.8 MG/DL (ref 0.6–1.1)
GFR AFRICAN AMERICAN: >60
GFR NON-AFRICAN AMERICAN: >60
GLUCOSE BLD-MCNC: 112 MG/DL (ref 70–99)
GLUCOSE URINE, POC: NORMAL
KETONES, POC: NORMAL
LEUKOCYTE EST, POC: NORMAL
LIPASE: 39 U/L (ref 13–60)
NITRITE, POC: NORMAL
PH, POC: 5.5
POTASSIUM SERPL-SCNC: 4 MMOL/L (ref 3.5–5.1)
PROTEIN, POC: NORMAL
SODIUM BLD-SCNC: 138 MMOL/L (ref 136–145)
SPECIFIC GRAVITY, POC: 1.02
TOTAL PROTEIN: 6.4 G/DL (ref 6.4–8.2)
UROBILINOGEN, POC: 0.2

## 2022-03-17 PROCEDURE — 36415 COLL VENOUS BLD VENIPUNCTURE: CPT

## 2022-03-17 PROCEDURE — 99214 OFFICE O/P EST MOD 30 MIN: CPT | Performed by: FAMILY MEDICINE

## 2022-03-17 PROCEDURE — 80048 BASIC METABOLIC PNL TOTAL CA: CPT

## 2022-03-17 PROCEDURE — 85025 COMPLETE CBC W/AUTO DIFF WBC: CPT

## 2022-03-17 PROCEDURE — 80076 HEPATIC FUNCTION PANEL: CPT

## 2022-03-17 PROCEDURE — 83690 ASSAY OF LIPASE: CPT

## 2022-03-17 PROCEDURE — 81002 URINALYSIS NONAUTO W/O SCOPE: CPT | Performed by: FAMILY MEDICINE

## 2022-03-17 RX ORDER — CIPROFLOXACIN 500 MG/1
500 TABLET, FILM COATED ORAL 2 TIMES DAILY
Qty: 20 TABLET | Refills: 0 | Status: SHIPPED | OUTPATIENT
Start: 2022-03-17 | End: 2022-03-27

## 2022-03-17 RX ORDER — ONDANSETRON 4 MG/1
4 TABLET, ORALLY DISINTEGRATING ORAL 3 TIMES DAILY PRN
Qty: 21 TABLET | Refills: 0 | Status: SHIPPED | OUTPATIENT
Start: 2022-03-17 | End: 2022-10-31

## 2022-03-17 SDOH — ECONOMIC STABILITY: TRANSPORTATION INSECURITY
IN THE PAST 12 MONTHS, HAS THE LACK OF TRANSPORTATION KEPT YOU FROM MEDICAL APPOINTMENTS OR FROM GETTING MEDICATIONS?: NO

## 2022-03-17 SDOH — ECONOMIC STABILITY: TRANSPORTATION INSECURITY
IN THE PAST 12 MONTHS, HAS LACK OF TRANSPORTATION KEPT YOU FROM MEETINGS, WORK, OR FROM GETTING THINGS NEEDED FOR DAILY LIVING?: NO

## 2022-03-17 ASSESSMENT — PATIENT HEALTH QUESTIONNAIRE - PHQ9
SUM OF ALL RESPONSES TO PHQ QUESTIONS 1-9: 0
SUM OF ALL RESPONSES TO PHQ9 QUESTIONS 1 & 2: 0
SUM OF ALL RESPONSES TO PHQ QUESTIONS 1-9: 0
1. LITTLE INTEREST OR PLEASURE IN DOING THINGS: 0
2. FEELING DOWN, DEPRESSED OR HOPELESS: 0
SUM OF ALL RESPONSES TO PHQ QUESTIONS 1-9: 0
SUM OF ALL RESPONSES TO PHQ QUESTIONS 1-9: 0

## 2022-03-17 NOTE — PROGRESS NOTES
PROGRESS NOTE     Rakel Stearns MD  [address], [address] [facility] [address], [place] [address]         Phone: [phone]    Date of Service:  3/17/2022     Patient ID: Eboni Lockwood is a 55 y.o. female      Assessment / Plan:     1. Pyelonephritis  Acute problem of undetermined significance    Given patient's symptoms, pyelonephritis is highest on my differential.  She right sided CVA tenderness with fever, starting 3 days after urinary frequency, urgency, incontinence, and suprapubic pressure. UA is negative, but will start Cipro 500 mg twice daily for 10 days while waiting for urine culture. She is allergic to cephalosporins and Bactrim. Reviewed side effects of Cipro including back box warning, and expected course. Patient expressed understanding.  - Culture, Urine  - ciprofloxacin (CIPRO) 500 MG tablet; Take 1 tablet by mouth 2 times daily for 10 days  Dispense: 20 tablet; Refill: 0      Results for POC orders placed in visit on 03/17/22   POCT Urinalysis no Micro   Result Value Ref Range    Color, UA Yellow     Clarity, UA Clear     Glucose, UA POC NEG     Bilirubin, UA NEG     Ketones, UA TRACE     Spec Grav, UA 1.025     Blood, UA POC NEG     pH, UA 5.5     Protein, UA POC NEG     Urobilinogen, UA 0.2     Leukocytes, UA NEG     Nitrite, UA NEG           Kidney stone is unlikely since pain is not described as a colicky pain, does not radiate, and there is no blood in the urine. She also does not have any history of kidney stone. We did review that fever with kidney stone is a medical emergency, so for symptoms of pain were to acutely worsen or become more colicky in nature, she is to go to the nearest emergency room. Patient expressed understanding.     Other intra-abdominal infections would be much less likely to cause the above symptoms, but since she has some vague abdominal discomfort, and did have nausea and vomiting last night, check the following to ensure all within normal limits.  - Hepatic Function Panel; Future  - Basic Metabolic Panel; Future  - CBC with Auto Differential; Future  - Lipase; Future    Giving anti-nausea meds prn  - ondansetron (ZOFRAN-ODT) 4 MG disintegrating tablet; Take 1 tablet by mouth 3 times daily as needed for Nausea or Vomiting  Dispense: 21 tablet; Refill: 0        2. Type 2 diabetes mellitus without complication, with long-term current use of insulin (HCC)    Discussed reaction with glipizide causing high or low sugars. Pt to monitor sugars more freq          Requested [employer] message in 24 hours with an update of how she is doing, and when to get emergent medical care if red flags were to occur        Subjective:     CC: flank pain, N/V, fever, urinary sx's    HPI    56 yo WF presenting with symptoms of illness since 10pm last night. Symptoms started with nausea with vomiting. She had a fever of 101.2F at that time. Tmax = 102F at 3am.  Hasn't vomited since 3 am.  She started with right sided CVA pain x 2-3 days. She has also had more requent urination the last 2-3 days as well, associated with urgency. States is she couldn't get to the bathroom in time, she would have mild urinary incontinence. She is also having some suprapubic tenderness    Pt does have some vague abd discomfort    Last night didn't have insulin or nigthtime meds secondary to nausea (she didn't eat last night). She did take her all her am meds this morning. AM sugar was 137.     Pt states she has never had a kidney stone and pain is not colicky and it does not radiate      ROS:   no dysuria, abd pain, diarrhea, rhinitis, nasal congestion, sore throat, HA, Neck pain, CP, SOB    Vitals:    03/17/22 1411   BP: 98/72   Site: Left Upper Arm   Position: Sitting   Cuff Size: Medium Adult   Pulse: 103   Resp: 18   Temp: 100.9 °F (38.3 °C)   TempSrc: Infrared   SpO2: 96%   Weight: 153 lb 6.4 oz (69.6 kg)   Height: 5' 5\" (1.651 m)       Outpatient Medications Marked as Taking for the 3/17/22 encounter (Office Visit) with Makayla Francis MD   Medication Sig Dispense Refill    ondansetron (ZOFRAN-ODT) 4 MG disintegrating tablet Take 1 tablet by mouth 3 times daily as needed for Nausea or Vomiting 21 tablet 0    ciprofloxacin (CIPRO) 500 MG tablet Take 1 tablet by mouth 2 times daily for 10 days 20 tablet 0    lisinopril (PRINIVIL;ZESTRIL) 10 MG tablet TAKE 1 TABLET BY MOUTH DAILY 90 tablet 0    metFORMIN (GLUCOPHAGE-XR) 500 MG extended release tablet TAKE 2 TABLETS BY MOUTH EVERY MORNING WITH BREAKFAST AND 1 TABLET BY MOUTH EVERY NIGHT AT BEDTIME 270 tablet 0    ondansetron (ZOFRAN-ODT) 4 MG disintegrating tablet Take 1 tablet by mouth 3 times daily as needed for Nausea or Vomiting 30 tablet 0    glipiZIDE (GLUCOTROL) 10 MG tablet TAKE 2 TABLETS BY MOUTH TWICE DAILY BEFORE MEALS 360 tablet 1    insulin glargine (LANTUS SOLOSTAR) 100 UNIT/ML injection pen Inject 10 units qhs (will tirate, likely up to 40 units) 5 pen 3    atorvastatin (LIPITOR) 20 MG tablet Take 1 tablet by mouth every evening 90 tablet 0    SITagliptin (JANUVIA) 100 MG tablet Take 1 tablet by mouth daily 30 tablet 2    meclizine (ANTIVERT) 25 MG tablet Take 1 tablet by mouth 3 times daily as needed for Nausea 30 tablet 0    spinosad (NATROBA) 0.9 % SUSP topical suspension Use as directed 1 Bottle 1    Cranberry 1000 MG CAPS Take 1 tablet by mouth daily      Blood Glucose Monitoring Suppl (ONE TOUCH ULTRA MINI) w/Device KIT Use to check sugars 1 kit 0    glucose blood VI test strips (ASCENSIA AUTODISC VI;ONE TOUCH ULTRA TEST VI) strip 1 each by In Vitro route daily As needed.  100 each 3    ONE TOUCH LANCETS MISC 1 each by Does not apply route daily 100 each 3    aspirin 81 MG chewable tablet Take 81 mg by mouth daily      Evening Primrose Oil 500 MG CAPS Take by mouth               Objective:   Constitutional: · Reviewed vitals above  · Well Nourished, well developed, no distress       HENT:  · Normal external nose without lesions  · Bilateral TMs translucent with normal light reflex and bony landmarks  · Normal oropharynx without erythema or exudate  · Normal nasal mucosa without swelling or erythema  Neck:  · Symmetric and without masses  · No thyromegaly  Resp:  · Normal effort  · Clear to auscultation bilaterally without rhonchi, wheezing or crackles  Cardiovascular:  · On auscultation, normal S1 and S2 without murmurs, rubs or gallops  · No bruits of bilateral carotids and no JVD  Gastrointestinal:  · +Vague diffuse very mild discomfort with palpation all 4 quadrants  · +suprapubic discomfort with palpation  · No rebound tenderness or guarding  ·  nondistended, and no masses  · No hepatosplenomegaly  · +right CVA tenderness  Musculoskeletal:  · Normal Gait  · All extremities without clubbing, cyanosis or edema  Skin:  · No rashes on inspection  · No areas of increased heat or induration on palpation  Psych:  · Normal mood and affect  · Normal insight and judgement        EMR Dragon/transcription disclaimer:  Much of this encounter note is electronic transcription/translation of spoken language to printed texts. The electronic translation of spoken language may be erroneous, or at times, nonsensical words or phrases may be inadvertently transcribed.   Although I have reviewed the note for such errors, some may still exist.

## 2022-03-17 NOTE — TELEPHONE ENCOUNTER
Tell pt to come up now and I can fit her in    Please document call and then close encounter.   thanks

## 2022-03-17 NOTE — TELEPHONE ENCOUNTER
Patient is calling stating that she thinks she has a bladder infection, has a fever 100.2, vomiting, feels off and having stomach pains, and low back pain on right side and frequent urination     Leann [phone] (home)

## 2022-03-19 LAB
BASOPHILS ABSOLUTE: 0 K/UL (ref 0–0.2)
BASOPHILS RELATIVE PERCENT: 0.3 %
EOSINOPHILS ABSOLUTE: 0.2 K/UL (ref 0–0.6)
EOSINOPHILS RELATIVE PERCENT: 4.2 %
HCT VFR BLD CALC: 37.2 % (ref 36–48)
HEMOGLOBIN: 11.9 G/DL (ref 12–16)
LYMPHOCYTES ABSOLUTE: 0.6 K/UL (ref 1–5.1)
LYMPHOCYTES RELATIVE PERCENT: 13.2 %
MCH RBC QN AUTO: 26.9 PG (ref 26–34)
MCHC RBC AUTO-ENTMCNC: 31.9 G/DL (ref 31–36)
MCV RBC AUTO: 84.3 FL (ref 80–100)
MONOCYTES ABSOLUTE: 0.3 K/UL (ref 0–1.3)
MONOCYTES RELATIVE PERCENT: 6.6 %
NEUTROPHILS ABSOLUTE: 3.7 K/UL (ref 1.7–7.7)
NEUTROPHILS RELATIVE PERCENT: 75.7 %
PDW BLD-RTO: 17.9 % (ref 12.4–15.4)
PLATELET # BLD: 239 K/UL (ref 135–450)
PMV BLD AUTO: 9.7 FL (ref 5–10.5)
RBC # BLD: 4.41 M/UL (ref 4–5.2)
WBC # BLD: 4.9 K/UL (ref 4–11)

## 2022-03-21 ENCOUNTER — OFFICE VISIT (OUTPATIENT)
Dept: PRIMARY CARE CLINIC | Age: 57
End: 2022-03-21
Payer: COMMERCIAL

## 2022-03-21 VITALS
BODY MASS INDEX: 25.89 KG/M2 | OXYGEN SATURATION: 98 % | HEART RATE: 75 BPM | WEIGHT: 155.4 LBS | TEMPERATURE: 97.9 F | DIASTOLIC BLOOD PRESSURE: 82 MMHG | HEIGHT: 65 IN | SYSTOLIC BLOOD PRESSURE: 122 MMHG

## 2022-03-21 DIAGNOSIS — N12 PYELONEPHRITIS: Primary | ICD-10-CM

## 2022-03-21 DIAGNOSIS — E11.9 TYPE 2 DIABETES MELLITUS WITHOUT COMPLICATION, WITHOUT LONG-TERM CURRENT USE OF INSULIN (HCC): ICD-10-CM

## 2022-03-21 PROCEDURE — 99214 OFFICE O/P EST MOD 30 MIN: CPT | Performed by: FAMILY MEDICINE

## 2022-03-21 RX ORDER — ATORVASTATIN CALCIUM 20 MG/1
20 TABLET, FILM COATED ORAL EVERY EVENING
Qty: 90 TABLET | Refills: 0 | Status: SHIPPED | OUTPATIENT
Start: 2022-03-21 | End: 2022-04-04

## 2022-03-21 RX ORDER — GLIPIZIDE 10 MG/1
20 TABLET ORAL
Qty: 360 TABLET | Refills: 0 | Status: SHIPPED | OUTPATIENT
Start: 2022-03-21 | End: 2022-04-18 | Stop reason: SDUPTHER

## 2022-03-21 RX ORDER — LISINOPRIL 10 MG/1
10 TABLET ORAL DAILY
Qty: 90 TABLET | Refills: 0 | Status: SHIPPED | OUTPATIENT
Start: 2022-03-21 | End: 2022-04-18 | Stop reason: SDUPTHER

## 2022-03-21 ASSESSMENT — PATIENT HEALTH QUESTIONNAIRE - PHQ9
4. FEELING TIRED OR HAVING LITTLE ENERGY: 0
8. MOVING OR SPEAKING SO SLOWLY THAT OTHER PEOPLE COULD HAVE NOTICED. OR THE OPPOSITE, BEING SO FIGETY OR RESTLESS THAT YOU HAVE BEEN MOVING AROUND A LOT MORE THAN USUAL: 0
SUM OF ALL RESPONSES TO PHQ QUESTIONS 1-9: 0
5. POOR APPETITE OR OVEREATING: 0
SUM OF ALL RESPONSES TO PHQ QUESTIONS 1-9: 0
1. LITTLE INTEREST OR PLEASURE IN DOING THINGS: 0
6. FEELING BAD ABOUT YOURSELF - OR THAT YOU ARE A FAILURE OR HAVE LET YOURSELF OR YOUR FAMILY DOWN: 0
10. IF YOU CHECKED OFF ANY PROBLEMS, HOW DIFFICULT HAVE THESE PROBLEMS MADE IT FOR YOU TO DO YOUR WORK, TAKE CARE OF THINGS AT HOME, OR GET ALONG WITH OTHER PEOPLE: 0
SUM OF ALL RESPONSES TO PHQ9 QUESTIONS 1 & 2: 0
3. TROUBLE FALLING OR STAYING ASLEEP: 0
2. FEELING DOWN, DEPRESSED OR HOPELESS: 0
7. TROUBLE CONCENTRATING ON THINGS, SUCH AS READING THE NEWSPAPER OR WATCHING TELEVISION: 0
9. THOUGHTS THAT YOU WOULD BE BETTER OFF DEAD, OR OF HURTING YOURSELF: 0

## 2022-03-21 ASSESSMENT — ANXIETY QUESTIONNAIRES
2. NOT BEING ABLE TO STOP OR CONTROL WORRYING: 0
IF YOU CHECKED OFF ANY PROBLEMS ON THIS QUESTIONNAIRE, HOW DIFFICULT HAVE THESE PROBLEMS MADE IT FOR YOU TO DO YOUR WORK, TAKE CARE OF THINGS AT HOME, OR GET ALONG WITH OTHER PEOPLE: NOT DIFFICULT AT ALL
3. WORRYING TOO MUCH ABOUT DIFFERENT THINGS: 0
4. TROUBLE RELAXING: 0
5. BEING SO RESTLESS THAT IT IS HARD TO SIT STILL: 0
6. BECOMING EASILY ANNOYED OR IRRITABLE: 0
7. FEELING AFRAID AS IF SOMETHING AWFUL MIGHT HAPPEN: 0
GAD7 TOTAL SCORE: 0
1. FEELING NERVOUS, ANXIOUS, OR ON EDGE: 0

## 2022-03-21 NOTE — PROGRESS NOTES
PROGRESS NOTE     Emily Fermin MD  [address] [facility], [facility] [address] [address]         Phone: [phone]    Date of Service:  3/21/2022     Patient ID: Eboni Trevino is a 55 y.o. female      Assessment / Plan:     1. Pyelonephritis  Acute problem of undetermined significance    It is very unfortunate that the lab never received patient's urine culture. It is too late to repeat culture at this time since she has been on antibiotics now for 4 days. Pyelonephritis is still highest on my differential given initial presentation, and the fact that both her fevers and urinary symptoms have resolved after being on antibiotics for 48 hours. Her suprapubic tenderness, right CVA and low back pain, are likely all related to possible upper and lower urinary tract infection. Kidney stone is still in differential, but never had any blood in her urine, has never been in severe pain, and pain is not colicky. As things are improving, told patient I would like to continue to monitor. If symptoms not completely resolved in the next 2 days, I will see her Wednesday morning. Reviewed red flags and when to go to the emergency room if and when they were to occur. If symptoms start to worsen in the meantime, including return of fever or worsening pain, she is to let me know. Continue cipro for now. 2. Type 2 diabetes mellitus without complication, with long-term current use of insulin ([facility] [address]  Reviewed  reaction of cipro with glipizide causing high or low sugars. Pt to monitor sugars more freq. No issues thus far        Subjective:     CC: flank pain, N/V, fever, urinary sx's    HPI    64-year-old white female here for 4-day follow-up.  4 days ago, patient presented with less than 24 hours of nausea and vomiting, and fever. T-max was 102F.   Associated symptoms included 2 to 3 days of right Medication Sig Dispense Refill    atorvastatin (LIPITOR) 20 MG tablet Take 1 tablet by mouth every evening 90 tablet 0    glipiZIDE (GLUCOTROL) 10 MG tablet Take 2 tablets by mouth 2 times daily (before meals) 360 tablet 0    lisinopril (PRINIVIL;ZESTRIL) 10 MG tablet Take 1 tablet by mouth daily 90 tablet 0    ondansetron (ZOFRAN-ODT) 4 MG disintegrating tablet Take 1 tablet by mouth 3 times daily as needed for Nausea or Vomiting 21 tablet 0    ciprofloxacin (CIPRO) 500 MG tablet Take 1 tablet by mouth 2 times daily for 10 days 20 tablet 0    metFORMIN (GLUCOPHAGE-XR) 500 MG extended release tablet TAKE 2 TABLETS BY MOUTH EVERY MORNING WITH BREAKFAST AND 1 TABLET BY MOUTH EVERY NIGHT AT BEDTIME 270 tablet 0    ondansetron (ZOFRAN-ODT) 4 MG disintegrating tablet Take 1 tablet by mouth 3 times daily as needed for Nausea or Vomiting 30 tablet 0    insulin glargine (LANTUS SOLOSTAR) 100 UNIT/ML injection pen Inject 10 units qhs (will tirate, likely up to 40 units) 5 pen 3    SITagliptin (JANUVIA) 100 MG tablet Take 1 tablet by mouth daily 30 tablet 2    meclizine (ANTIVERT) 25 MG tablet Take 1 tablet by mouth 3 times daily as needed for Nausea 30 tablet 0    spinosad (NATROBA) 0.9 % SUSP topical suspension Use as directed 1 Bottle 1    Cranberry 1000 MG CAPS Take 1 tablet by mouth daily      Blood Glucose Monitoring Suppl (ONE TOUCH ULTRA MINI) w/Device KIT Use to check sugars 1 kit 0    glucose blood VI test strips (ASCENSIA AUTODISC VI;ONE TOUCH ULTRA TEST VI) strip 1 each by In Vitro route daily As needed.  100 each 3    ONE TOUCH LANCETS MISC 1 each by Does not apply route daily 100 each 3    aspirin 81 MG chewable tablet Take 81 mg by mouth daily      Evening Primrose Oil 500 MG CAPS Take by mouth               Objective:   Constitutional:   · Reviewed vitals above  · Well Nourished, well developed, no distress       HENT:  · Normal external nose without lesions  · Bilateral TMs translucent with normal light reflex and bony landmarks  · Normal oropharynx without erythema or exudate  · Normal nasal mucosa without swelling or erythema  Neck:  · Symmetric and without masses  · No thyromegaly  Resp:  · Normal effort  · Clear to auscultation bilaterally without rhonchi, wheezing or crackles  Cardiovascular:  · On auscultation, normal S1 and S2 without murmurs, rubs or gallops  · No bruits of bilateral carotids and no JVD  Gastrointestinal:  · +suprapubic discomfort with palpation  · No rebound tenderness or guarding  ·  nondistended, and no masses  · No hepatosplenomegaly  · +mild right CVA tenderness:  Doesn't jump like she did 4 days ago  Musculoskeletal:  · Normal Gait  · All extremities without clubbing, cyanosis or edema  Skin:  · No rashes on inspection  · No areas of increased heat or induration on palpation  Psych:  · Normal mood and affect  · Normal insight and judgement        EMR Dragon/transcription disclaimer:  Much of this encounter note is electronic transcription/translation of spoken language to printed texts. The electronic translation of spoken language may be erroneous, or at times, nonsensical words or phrases may be inadvertently transcribed.   Although I have reviewed the note for such errors, some may still exist.

## 2022-03-28 ENCOUNTER — OFFICE VISIT (OUTPATIENT)
Dept: PRIMARY CARE CLINIC | Age: 57
End: 2022-03-28
Payer: COMMERCIAL

## 2022-03-28 ENCOUNTER — HOSPITAL ENCOUNTER (OUTPATIENT)
Age: 57
Discharge: HOME | End: 2022-03-28
Payer: COMMERCIAL

## 2022-03-28 ENCOUNTER — HOSPITAL ENCOUNTER (OUTPATIENT)
Dept: CT IMAGING | Age: 57
Discharge: HOME | End: 2022-03-28
Payer: COMMERCIAL

## 2022-03-28 VITALS
HEART RATE: 94 BPM | TEMPERATURE: 97.9 F | OXYGEN SATURATION: 95 % | WEIGHT: 158 LBS | RESPIRATION RATE: 18 BRPM | DIASTOLIC BLOOD PRESSURE: 68 MMHG | HEIGHT: 65 IN | SYSTOLIC BLOOD PRESSURE: 112 MMHG | BODY MASS INDEX: 26.33 KG/M2

## 2022-03-28 DIAGNOSIS — R50.81 FEVER IN OTHER DISEASES: ICD-10-CM

## 2022-03-28 DIAGNOSIS — R10.31 RLQ ABDOMINAL PAIN: ICD-10-CM

## 2022-03-28 DIAGNOSIS — R10.31 RLQ ABDOMINAL PAIN: Primary | ICD-10-CM

## 2022-03-28 PROCEDURE — 6360000004 HC RX CONTRAST MEDICATION: Performed by: FAMILY MEDICINE

## 2022-03-28 PROCEDURE — 36415 COLL VENOUS BLD VENIPUNCTURE: CPT

## 2022-03-28 PROCEDURE — 74177 CT ABD & PELVIS W/CONTRAST: CPT

## 2022-03-28 PROCEDURE — 85025 COMPLETE CBC W/AUTO DIFF WBC: CPT

## 2022-03-28 PROCEDURE — 99214 OFFICE O/P EST MOD 30 MIN: CPT | Performed by: FAMILY MEDICINE

## 2022-03-28 RX ADMIN — IOPAMIDOL 75 ML: 755 INJECTION, SOLUTION INTRAVENOUS at 18:38

## 2022-03-28 RX ADMIN — IOHEXOL 50 ML: 240 INJECTION, SOLUTION INTRATHECAL; INTRAVASCULAR; INTRAVENOUS; ORAL at 18:38

## 2022-03-28 ASSESSMENT — PATIENT HEALTH QUESTIONNAIRE - PHQ9
SUM OF ALL RESPONSES TO PHQ9 QUESTIONS 1 & 2: 0
1. LITTLE INTEREST OR PLEASURE IN DOING THINGS: 0
SUM OF ALL RESPONSES TO PHQ QUESTIONS 1-9: 0
2. FEELING DOWN, DEPRESSED OR HOPELESS: 0
SUM OF ALL RESPONSES TO PHQ QUESTIONS 1-9: 0

## 2022-03-28 NOTE — PROGRESS NOTES
PROGRESS NOTE     Chantel Kennedy MD  [address] [facility], [address], [address] [address]         Phone: [phone]    Date of Service:  3/28/2022     Patient ID: Eboni Herrera is a 65 y.o. female      Assessment / Plan:     1. RLQ abdominal pain/Fever in other diseases  Undiagnosed new problem with uncertain prognosis    Currently well appearing. No fever in office, but states has APAP or NSAID 2 hours ago. Unclear etiology as patient initially presented with right CVA tenderness, and urinary symptoms, but urine culture was never received to the lab. It still possible she initially had pyelonephritis, and it was not fully treated, especially with her only taking once every day for the first 3 days. However, she is no longer having any urinary symptoms, and most of her pain is in the right lower quadrant. Smoldering appendicitis is possible. The fact that pain also started in the right thoracic back, and now has moved down to right lower quadrant, makes kidney stone possible, no description of pain and mild severity make this very unlikely. Continued pyelonephritis also still in differential.  Will order stat CT scan of abdomen and pelvis with and without contrast to look for the above differential.    Also ordered CBC to see if there is any left shift at this point. Initially there was not. - CBC with Auto Differential; Future      As she has only been off of antibiotics for 24 hours, he will be too early to try to check a urine for culture at this point, plus no longer having any urinary symptoms. CT scan and CBC are both normal, will need to consider other differential, possibly ordering transvaginal ultrasound and doing pelvic exam.  She is adamant that there is no chance she could have an STD.   Pain is constant and mild, making ovarian torsion very unlikely      Will f/u on results and act accordingly      Subjective:     CC: fever and RLQ abd pain    HPI    27-year-old white female presenting for the third time in the last 2 weeks with a complaint of fever and abdominal pain. She had initially presented on 3/17/2022 with   less than 24 hours of nausea and vomiting, and fever. T-max was 102F. Associated symptoms included 2 to 3 days of right sided CVA pain, frequent urination with urgency and incontinence if she did not make it to the bathroom in time, and some suprapubic tenderness. She also had diffuse vague abdominal discomfort. She denied any colicky nature to her pain, no flank pain, no radiation of pain, no history kidney stone. UA was completely normal.  Due to CVA tenderness and fever, patient was started on Cipro for presumed pyelonephritis. Urine culture was ordered and send, however was cancelled by the lab, as they stated they didn't receive the sample. CBC was normal with no elevation of white blood cells. BMP, LFTs, lipase, are also normal.    He followed up 4 days later on 3/21/22, asking if she should have a CAT scan, and she continued to have fevers for 2 days after starting the antibiotics. Had fever in the last 48 hours during that appointment, all urinary symptoms have resolved, and her only residual symptoms was still some right CVA tenderness mild right lower back pain that wrapped around to the right lower abdomen, and some suprapubic tenderness. Continue treatment and monitoring was recommended    Today, she reports her fevers have returned in the last 24 hours. T-max orally 101.4. She states she took her last pill of Cipro yesterday. She now admits the first 3 days of the course of medication she only took 1 Cipro a day because she was afraid it would make her nauseous. She had not told me this until now. She does not feel the same back pain she felt before, but feels the pain in the right lower quadrant has worsened.   Still has some mild suprapubic tenderness. No other urinary symptoms.   Review of systems below    Denies any vaginal symptoms, and states there is a 0% chance she could have an STD      ROS:   no dysuria,, urinary frequency, urinary incontinence, lack of appetite, diarrhea, rhinitis, nasal congestion, sore throat, HA, Neck pain, CP, SOB    Vitals:    03/28/22 1554 03/28/22 1559   BP: 112/68    Site: Left Upper Arm    Position: Sitting    Cuff Size: Medium Adult    Pulse: 94    Resp: 18    Temp:  97.9 °F (36.6 °C)   TempSrc:  Oral   SpO2: 95%    Weight: 158 lb (71.7 kg)    Height: 5' 5\" (1.651 m)        Outpatient Medications Marked as Taking for the 3/28/22 encounter (Office Visit) with Stephen Mina MD   Medication Sig Dispense Refill    atorvastatin (LIPITOR) 20 MG tablet Take 1 tablet by mouth every evening 90 tablet 0    glipiZIDE (GLUCOTROL) 10 MG tablet Take 2 tablets by mouth 2 times daily (before meals) 360 tablet 0    lisinopril (PRINIVIL;ZESTRIL) 10 MG tablet Take 1 tablet by mouth daily 90 tablet 0    ondansetron (ZOFRAN-ODT) 4 MG disintegrating tablet Take 1 tablet by mouth 3 times daily as needed for Nausea or Vomiting 21 tablet 0    metFORMIN (GLUCOPHAGE-XR) 500 MG extended release tablet TAKE 2 TABLETS BY MOUTH EVERY MORNING WITH BREAKFAST AND 1 TABLET BY MOUTH EVERY NIGHT AT BEDTIME 270 tablet 0    ondansetron (ZOFRAN-ODT) 4 MG disintegrating tablet Take 1 tablet by mouth 3 times daily as needed for Nausea or Vomiting 30 tablet 0    insulin glargine (LANTUS SOLOSTAR) 100 UNIT/ML injection pen Inject 10 units qhs (will tirate, likely up to 40 units) 5 pen 3    SITagliptin (JANUVIA) 100 MG tablet Take 1 tablet by mouth daily 30 tablet 2    meclizine (ANTIVERT) 25 MG tablet Take 1 tablet by mouth 3 times daily as needed for Nausea 30 tablet 0    spinosad (NATROBA) 0.9 % SUSP topical suspension Use as directed 1 Bottle 1    Cranberry 1000 MG CAPS Take 1 tablet by mouth daily      Blood Glucose Monitoring Suppl (ONE TOUCH ULTRA MINI) w/Device KIT Use to check sugars 1 kit 0    glucose blood VI test strips (ASCENSIA AUTODISC VI;ONE TOUCH ULTRA TEST VI) strip 1 each by In Vitro route daily As needed. 100 each 3    ONE TOUCH LANCETS MISC 1 each by Does not apply route daily 100 each 3    aspirin 81 MG chewable tablet Take 81 mg by mouth daily      Evening Primrose Oil 500 MG CAPS Take by mouth               Objective:   Constitutional:   · Reviewed vitals above  · Well Nourished, well developed, no distress       HENT:  · Normal external nose without lesions  · Normal oropharynx without erythema or exudate  · Normal nasal mucosa without swelling or erythema  Neck:  · Symmetric and without masses  · No thyromegaly  Resp:  · Normal effort  · Clear to auscultation bilaterally without rhonchi, wheezing or crackles  Cardiovascular:  · On auscultation, normal S1 and S2 without murmurs, rubs or gallops  · No bruits of bilateral carotids and no JVD  Gastrointestinal:  · +mild and improvedsuprapubic discomfort with palpation  · +worsening RLQ abd pain with palpation  · No rebound tenderness or guarding  ·  nondistended, and no masses  · No hepatosplenomegaly  · +mild right CVA tenderness:    Musculoskeletal:  · Normal Gait  · All extremities without clubbing, cyanosis or edema  Skin:  · No rashes on inspection  · No areas of increased heat or induration on palpation  Psych:  · Normal mood and affect  · Normal insight and judgement        EMR Dragon/transcription disclaimer:  Much of this encounter note is electronic transcription/translation of spoken language to printed texts. The electronic translation of spoken language may be erroneous, or at times, nonsensical words or phrases may be inadvertently transcribed.   Although I have reviewed the note for such errors, some may still exist.

## 2022-03-29 LAB
BASOPHILS ABSOLUTE: 0.1 K/UL (ref 0–0.2)
BASOPHILS RELATIVE PERCENT: 0.8 %
EOSINOPHILS ABSOLUTE: 0.1 K/UL (ref 0–0.6)
EOSINOPHILS RELATIVE PERCENT: 1.7 %
HCT VFR BLD CALC: 36.2 % (ref 36–48)
HEMOGLOBIN: 11.9 G/DL (ref 12–16)
LYMPHOCYTES ABSOLUTE: 1 K/UL (ref 1–5.1)
LYMPHOCYTES RELATIVE PERCENT: 14.6 %
MCH RBC QN AUTO: 27.4 PG (ref 26–34)
MCHC RBC AUTO-ENTMCNC: 33 G/DL (ref 31–36)
MCV RBC AUTO: 83 FL (ref 80–100)
MONOCYTES ABSOLUTE: 0.3 K/UL (ref 0–1.3)
MONOCYTES RELATIVE PERCENT: 4.9 %
NEUTROPHILS ABSOLUTE: 5.1 K/UL (ref 1.7–7.7)
NEUTROPHILS RELATIVE PERCENT: 78 %
PDW BLD-RTO: 16.5 % (ref 12.4–15.4)
PLATELET # BLD: 246 K/UL (ref 135–450)
PMV BLD AUTO: 9.4 FL (ref 5–10.5)
RBC # BLD: 4.36 M/UL (ref 4–5.2)
WBC # BLD: 6.5 K/UL (ref 4–11)

## 2022-03-30 ENCOUNTER — PATIENT MESSAGE (OUTPATIENT)
Dept: PRIMARY CARE CLINIC | Age: 57
End: 2022-03-30

## 2022-03-30 DIAGNOSIS — R10.2 PELVIC PAIN: ICD-10-CM

## 2022-03-30 DIAGNOSIS — R35.0 URINARY FREQUENCY: Primary | ICD-10-CM

## 2022-03-30 NOTE — TELEPHONE ENCOUNTER
From: Scott Stoner  To: Dr. Erik Thakur: 3/30/2022 9:36 AM EDT  Subject: Duglas Medina still    Still not feeling well have loose bowels and still having to rush to go to the bathroom to pee no fevers . Still feeling tried and having pain in my side .

## 2022-04-04 RX ORDER — ATORVASTATIN CALCIUM 20 MG/1
20 TABLET, FILM COATED ORAL EVERY EVENING
Qty: 90 TABLET | Refills: 0 | Status: SHIPPED | OUTPATIENT
Start: 2022-04-04 | End: 2022-07-15

## 2022-04-11 ENCOUNTER — HOSPITAL ENCOUNTER (OUTPATIENT)
Dept: ULTRASOUND IMAGING | Age: 57
Discharge: HOME | End: 2022-04-11
Payer: COMMERCIAL

## 2022-04-11 ENCOUNTER — HOSPITAL ENCOUNTER (OUTPATIENT)
Age: 57
Discharge: HOME | End: 2022-04-11
Payer: COMMERCIAL

## 2022-04-11 DIAGNOSIS — R35.0 URINARY FREQUENCY: ICD-10-CM

## 2022-04-11 DIAGNOSIS — R10.2 PELVIC PAIN: ICD-10-CM

## 2022-04-11 LAB
BILIRUBIN URINE: NEGATIVE
BLOOD, URINE: NEGATIVE
CLARITY: CLEAR
COLOR: NORMAL
EPITHELIAL CELLS, UA: 0 /HPF (ref 0–5)
GLUCOSE URINE: NEGATIVE MG/DL
HYALINE CASTS: 0 /LPF (ref 0–8)
KETONES, URINE: NEGATIVE MG/DL
LEUKOCYTE ESTERASE, URINE: NEGATIVE
MICROSCOPIC EXAMINATION: NORMAL
NITRITE, URINE: NEGATIVE
PH UA: 6 (ref 5–8)
PROTEIN UA: NEGATIVE MG/DL
RBC UA: 0 /HPF (ref 0–4)
SPECIFIC GRAVITY UA: <=1.005 (ref 1–1.03)
URINE TYPE: NORMAL
UROBILINOGEN, URINE: 0.2 E.U./DL
WBC UA: 0 /HPF (ref 0–5)

## 2022-04-11 PROCEDURE — 87491 CHLMYD TRACH DNA AMP PROBE: CPT

## 2022-04-11 PROCEDURE — 76856 US EXAM PELVIC COMPLETE: CPT

## 2022-04-11 PROCEDURE — 81001 URINALYSIS AUTO W/SCOPE: CPT

## 2022-04-11 PROCEDURE — 76830 TRANSVAGINAL US NON-OB: CPT

## 2022-04-11 PROCEDURE — 87591 N.GONORRHOEAE DNA AMP PROB: CPT

## 2022-04-15 LAB
C. TRACHOMATIS DNA ,URINE: NEGATIVE
N. GONORRHOEAE DNA, URINE: NEGATIVE

## 2022-04-18 ENCOUNTER — OFFICE VISIT (OUTPATIENT)
Dept: PRIMARY CARE CLINIC | Age: 57
End: 2022-04-18
Payer: COMMERCIAL

## 2022-04-18 VITALS
BODY MASS INDEX: 26.29 KG/M2 | HEART RATE: 85 BPM | DIASTOLIC BLOOD PRESSURE: 82 MMHG | WEIGHT: 158 LBS | SYSTOLIC BLOOD PRESSURE: 124 MMHG | OXYGEN SATURATION: 98 % | TEMPERATURE: 97.7 F

## 2022-04-18 DIAGNOSIS — E11.9 TYPE 2 DIABETES MELLITUS WITHOUT COMPLICATION, WITHOUT LONG-TERM CURRENT USE OF INSULIN (HCC): Primary | ICD-10-CM

## 2022-04-18 DIAGNOSIS — Z23 NEED FOR SHINGLES VACCINE: ICD-10-CM

## 2022-04-18 DIAGNOSIS — I10 ESSENTIAL HYPERTENSION: ICD-10-CM

## 2022-04-18 DIAGNOSIS — E78.00 PURE HYPERCHOLESTEROLEMIA: ICD-10-CM

## 2022-04-18 LAB — HBA1C MFR BLD: 7 %

## 2022-04-18 PROCEDURE — 3051F HG A1C>EQUAL 7.0%<8.0%: CPT | Performed by: FAMILY MEDICINE

## 2022-04-18 PROCEDURE — 99214 OFFICE O/P EST MOD 30 MIN: CPT | Performed by: FAMILY MEDICINE

## 2022-04-18 PROCEDURE — 90750 HZV VACC RECOMBINANT IM: CPT | Performed by: FAMILY MEDICINE

## 2022-04-18 PROCEDURE — 83036 HEMOGLOBIN GLYCOSYLATED A1C: CPT | Performed by: FAMILY MEDICINE

## 2022-04-18 PROCEDURE — 90471 IMMUNIZATION ADMIN: CPT | Performed by: FAMILY MEDICINE

## 2022-04-18 RX ORDER — GLIPIZIDE 10 MG/1
20 TABLET ORAL
Qty: 360 TABLET | Refills: 0 | Status: SHIPPED | OUTPATIENT
Start: 2022-04-18 | End: 2022-07-26

## 2022-04-18 RX ORDER — LISINOPRIL 10 MG/1
10 TABLET ORAL DAILY
Qty: 90 TABLET | Refills: 0 | Status: SHIPPED | OUTPATIENT
Start: 2022-04-18 | End: 2022-06-13

## 2022-04-18 RX ORDER — METFORMIN HYDROCHLORIDE 500 MG/1
TABLET, EXTENDED RELEASE ORAL
Qty: 270 TABLET | Refills: 0 | Status: SHIPPED | OUTPATIENT
Start: 2022-04-18 | End: 2022-06-08

## 2022-04-18 ASSESSMENT — PATIENT HEALTH QUESTIONNAIRE - PHQ9
1. LITTLE INTEREST OR PLEASURE IN DOING THINGS: 0
SUM OF ALL RESPONSES TO PHQ QUESTIONS 1-9: 0
SUM OF ALL RESPONSES TO PHQ QUESTIONS 1-9: 0
2. FEELING DOWN, DEPRESSED OR HOPELESS: 0
SUM OF ALL RESPONSES TO PHQ9 QUESTIONS 1 & 2: 0
SUM OF ALL RESPONSES TO PHQ QUESTIONS 1-9: 0
SUM OF ALL RESPONSES TO PHQ QUESTIONS 1-9: 0

## 2022-04-18 ASSESSMENT — ANXIETY QUESTIONNAIRES
4. TROUBLE RELAXING: 0-NOT AT ALL
2. NOT BEING ABLE TO STOP OR CONTROL WORRYING: 0-NOT AT ALL
GAD7 TOTAL SCORE: 0
6. BECOMING EASILY ANNOYED OR IRRITABLE: 0-NOT AT ALL
3. WORRYING TOO MUCH ABOUT DIFFERENT THINGS: 0-NOT AT ALL
7. FEELING AFRAID AS IF SOMETHING AWFUL MIGHT HAPPEN: 0-NOT AT ALL
5. BEING SO RESTLESS THAT IT IS HARD TO SIT STILL: 0-NOT AT ALL
1. FEELING NERVOUS, ANXIOUS, OR ON EDGE: 0

## 2022-06-08 DIAGNOSIS — E11.9 TYPE 2 DIABETES MELLITUS WITHOUT COMPLICATION, WITHOUT LONG-TERM CURRENT USE OF INSULIN (HCC): ICD-10-CM

## 2022-06-08 RX ORDER — METFORMIN HYDROCHLORIDE 500 MG/1
TABLET, EXTENDED RELEASE ORAL
Qty: 270 TABLET | Refills: 0 | Status: SHIPPED | OUTPATIENT
Start: 2022-06-08 | End: 2022-09-06 | Stop reason: SDUPTHER

## 2022-06-08 NOTE — TELEPHONE ENCOUNTER
Refill Request        Last Seen: Last Seen Department: 4/18/2022  Last Seen by PCP: 4/18/2022    Last Written: 4/18, 0 refills    Next Appointment:   Future Appointments   Date Time Provider Yaritza Cabrera   7/21/2022  1:00 PM Adalgisa Glover MD [facility] AND [facility]              Requested Prescriptions     Pending Prescriptions Disp Refills    metFORMIN (GLUCOPHAGE-XR) 500 mg extended release tablet [Pharmacy Med Name: METFORMIN ER 500MG 24HR TABS] 270 tablet 0     Sig: TAKE 2 TABLETS BY MOUTH EVERY MORNING WITH BREAKFAST AND 1 TABLET EVERY NIGHT AT BEDTIME

## 2022-06-11 DIAGNOSIS — E11.9 TYPE 2 DIABETES MELLITUS WITHOUT COMPLICATION, WITHOUT LONG-TERM CURRENT USE OF INSULIN (HCC): ICD-10-CM

## 2022-06-13 RX ORDER — LISINOPRIL 10 MG/1
10 TABLET ORAL DAILY
Qty: 90 TABLET | Refills: 0 | Status: SHIPPED | OUTPATIENT
Start: 2022-06-13 | End: 2022-09-10

## 2022-06-13 NOTE — TELEPHONE ENCOUNTER
Refill Request     Last Seen: Last Seen Department: 4/18/2022  Last Seen by PCP: 4/18/2022    Last Written: 04/18/2022  #90 w/ no refills    Next Appointment:   Future Appointments   Date Time Provider Yaritza Cabrera   7/21/2022  1:00 PM Adalgisa Garcia MD [facility] AND [facility]             Requested Prescriptions     Pending Prescriptions Disp Refills    lisinopril (PRINIVIL;ZESTRIL) 10 MG tablet [Pharmacy Med Name: LISINOPRIL 10MG TABLETS] 90 tablet 0     Sig: TAKE 1 TABLET BY MOUTH DAILY

## 2022-07-15 RX ORDER — ATORVASTATIN CALCIUM 20 MG/1
20 TABLET, FILM COATED ORAL EVERY EVENING
Qty: 90 TABLET | Refills: 0 | Status: SHIPPED | OUTPATIENT
Start: 2022-07-15

## 2022-07-15 NOTE — TELEPHONE ENCOUNTER
Medication Refill       Last Visit 04/18/2022     Next Scheduled appointment  07/21/2022       atorvastatin (LIPITOR) 20 MG tablet    04/04/2022  #90 w/ no refills

## 2022-07-26 DIAGNOSIS — E11.9 TYPE 2 DIABETES MELLITUS WITHOUT COMPLICATION, WITHOUT LONG-TERM CURRENT USE OF INSULIN (HCC): ICD-10-CM

## 2022-07-26 RX ORDER — GLIPIZIDE 10 MG/1
TABLET ORAL
Qty: 360 TABLET | Refills: 0 | Status: SHIPPED | OUTPATIENT
Start: 2022-07-26 | End: 2022-10-26

## 2022-08-25 ENCOUNTER — PATIENT MESSAGE (OUTPATIENT)
Dept: PRIMARY CARE CLINIC | Age: 57
End: 2022-08-25

## 2022-08-25 RX ORDER — CLINDAMYCIN HYDROCHLORIDE 300 MG/1
300 CAPSULE ORAL 3 TIMES DAILY
Qty: 30 CAPSULE | Refills: 0 | Status: SHIPPED | OUTPATIENT
Start: 2022-08-25 | End: 2022-09-04

## 2022-08-25 NOTE — TELEPHONE ENCOUNTER
From: [facility]  To: Dr. Nallely Acharya: 8/25/2022 8:24 AM EDT  Subject: Tooth pain    Im having pain from my tooth and my dentist is on vacation and wont be back until August 31 can you send in a Anabiotic for me Nicki Connellypenny been taking Tylenol and Advil for the pain.

## 2022-09-06 DIAGNOSIS — E11.9 TYPE 2 DIABETES MELLITUS WITHOUT COMPLICATION, WITHOUT LONG-TERM CURRENT USE OF INSULIN (HCC): ICD-10-CM

## 2022-09-06 RX ORDER — METFORMIN HYDROCHLORIDE 500 MG/1
TABLET, EXTENDED RELEASE ORAL
Qty: 270 TABLET | Refills: 0 | Status: SHIPPED | OUTPATIENT
Start: 2022-09-06

## 2022-09-06 NOTE — TELEPHONE ENCOUNTER
Refill Request       Last Seen: Last Seen Department: 4/18/2022  Last Seen by PCP: 4/18/2022    Last Written: 6/8, #270, 0 refills    Next Appointment:   Future Appointments   Date Time Provider Yaritza Cabrera   9/26/2022  1:30 PM Adalgisa Jimenez MD [facility] AND [facility]         Requested Prescriptions     Pending Prescriptions Disp Refills    metFORMIN (GLUCOPHAGE-XR) 500 MG extended release tablet 270 tablet 0     Sig: TAKE 2 TABLETS BY MOUTH EVERY MORNING WITH BREAKFAST AND 1 TABLET EVERY NIGHT AT BEDTIME

## 2022-09-09 DIAGNOSIS — E11.9 TYPE 2 DIABETES MELLITUS WITHOUT COMPLICATION, WITHOUT LONG-TERM CURRENT USE OF INSULIN (HCC): ICD-10-CM

## 2022-09-09 NOTE — TELEPHONE ENCOUNTER
Refill Request       Last Seen: Last Seen Department: 4/18/2022  Last Seen by PCP: 4/18/2022    Last Written: 06/13/2022    Next Appointment:   Future Appointments   Date Time Provider Yaritza Deborah   9/26/2022  1:30 PM Adalgisa Carrillo MD [facility] AND [facility]             Requested Prescriptions     Pending Prescriptions Disp Refills    lisinopril (PRINIVIL;ZESTRIL) 10 MG tablet [Pharmacy Med Name: LISINOPRIL 10MG TABLETS] 90 tablet 0     Sig: TAKE 1 TABLET BY MOUTH DAILY

## 2022-09-10 RX ORDER — LISINOPRIL 10 MG/1
10 TABLET ORAL DAILY
Qty: 90 TABLET | Refills: 0 | Status: SHIPPED | OUTPATIENT
Start: 2022-09-10

## 2022-10-11 ENCOUNTER — OFFICE VISIT (OUTPATIENT)
Dept: PRIMARY CARE CLINIC | Age: 57
End: 2022-10-11
Payer: COMMERCIAL

## 2022-10-11 VITALS
DIASTOLIC BLOOD PRESSURE: 68 MMHG | HEIGHT: 65 IN | WEIGHT: 159.8 LBS | HEART RATE: 112 BPM | OXYGEN SATURATION: 97 % | RESPIRATION RATE: 18 BRPM | SYSTOLIC BLOOD PRESSURE: 108 MMHG | BODY MASS INDEX: 26.62 KG/M2 | TEMPERATURE: 98.2 F

## 2022-10-11 DIAGNOSIS — J02.9 PHARYNGITIS, UNSPECIFIED ETIOLOGY: Primary | ICD-10-CM

## 2022-10-11 LAB — S PYO AG THROAT QL: NORMAL

## 2022-10-11 PROCEDURE — 87880 STREP A ASSAY W/OPTIC: CPT | Performed by: STUDENT IN AN ORGANIZED HEALTH CARE EDUCATION/TRAINING PROGRAM

## 2022-10-11 PROCEDURE — 99213 OFFICE O/P EST LOW 20 MIN: CPT | Performed by: STUDENT IN AN ORGANIZED HEALTH CARE EDUCATION/TRAINING PROGRAM

## 2022-10-11 SDOH — ECONOMIC STABILITY: FOOD INSECURITY: WITHIN THE PAST 12 MONTHS, YOU WORRIED THAT YOUR FOOD WOULD RUN OUT BEFORE YOU GOT MONEY TO BUY MORE.: NEVER TRUE

## 2022-10-11 SDOH — ECONOMIC STABILITY: FOOD INSECURITY: WITHIN THE PAST 12 MONTHS, THE FOOD YOU BOUGHT JUST DIDN'T LAST AND YOU DIDN'T HAVE MONEY TO GET MORE.: NEVER TRUE

## 2022-10-11 ASSESSMENT — PATIENT HEALTH QUESTIONNAIRE - PHQ9
SUM OF ALL RESPONSES TO PHQ QUESTIONS 1-9: 0
SUM OF ALL RESPONSES TO PHQ9 QUESTIONS 1 & 2: 0
SUM OF ALL RESPONSES TO PHQ QUESTIONS 1-9: 0
1. LITTLE INTEREST OR PLEASURE IN DOING THINGS: 0
2. FEELING DOWN, DEPRESSED OR HOPELESS: 0

## 2022-10-11 ASSESSMENT — ENCOUNTER SYMPTOMS
DIARRHEA: 1
RHINORRHEA: 1
SORE THROAT: 1
VOMITING: 0
TROUBLE SWALLOWING: 1
EYE REDNESS: 0
SINUS PRESSURE: 0
COUGH: 0
NAUSEA: 0
SINUS PAIN: 0

## 2022-10-11 ASSESSMENT — LIFESTYLE VARIABLES
HOW OFTEN DO YOU HAVE A DRINK CONTAINING ALCOHOL: 2-4 TIMES A MONTH
HOW MANY STANDARD DRINKS CONTAINING ALCOHOL DO YOU HAVE ON A TYPICAL DAY: 1 OR 2

## 2022-10-11 NOTE — PROGRESS NOTES
[address],  Karissa Hernandez, [address] [facility] [address]        Phone: [phone]    The following is written by a medical student, please see below for resident/attending attestation and plan. Name:  Ethel Lebron  :    1965    Consultants:   Patient Care Team:  Bird Alvarado MD as PCP - General (Family Medicine)  Bird Alvarado MD as PCP - [facility]    Chief Complaint:     Ethel Lebron is a 65 y.o. female  who presents today for an established patient care visit with Personalized Prevention Plan Services as noted below. HPI:     The patient is a 64 y/o female with a history of HLD, diabetes mellitus II, and HTN, presents for sore throat. The onset of the pain was sudden  Her family that lives with her tested positive for strep at [facility] in the [facility] location about two weeks ago, and they are improving. She ran a fever of 101 Friday and Saturday with it resolving thereafter. Other other symptoms include feeling tired, loss of appetite, rhinorrhea, diarrhea, myalgia, and pain when speaking. She denies a cough, ear pain, and congestion. She states her course has improved slightly over the weekend. She has been controlling her symptoms with ibuprofen, acetaminophen, robotussen, and menthol cough drops, which has helped her. She is able to drink liquids and yesterday was able to eat solid foods, which has been difficult because of the pain. She had three Covid-19 tests since 10/6/2022 which were all negative. She denies any symptoms of GERD as well as problems with seasonal allergies. Her seasonal allergies are well-controlled on Zyrtec. She refused the flu shot today.           Patient Active Problem List   Diagnosis    Diabetes ([facility] [address]    Family history of early CAD    HLD (hyperlipidemia) FH: breast cancer    Iron deficiency anemia    Diverticulosis    Atrophic kidney    Right peroneal tendinosis    Essential hypertension         Past Medical History:    Past Medical History:   Diagnosis Date    Atrophic kidney 2017    sstone    Atrophic scarred right kidney again noted. Some nephrolithiasis is noted without  hydronephrosis. Diabetes [facility])     Essential hypertension     Family history of early CAD     FH: breast cancer 2017    23 yo mother reported    HLD (hyperlipidemia)     Iron deficiency anemia 2017    Right peroneal tendinosis 2018       Past Surgical History:  Past Surgical History:   Procedure Laterality Date     SECTION      SKIN BIOPSY         Home Meds:  Prior to Visit Medications    Medication Sig Taking? Authorizing Provider   lisinopril (PRINIVIL;ZESTRIL) 10 MG tablet TAKE 1 TABLET BY MOUTH DAILY Yes Mora Farrar MD   metFORMIN (GLUCOPHAGE-XR) 500 MG extended release tablet TAKE 2 TABLETS BY MOUTH EVERY MORNING WITH BREAKFAST AND 1 TABLET EVERY NIGHT AT BEDTIME Yes Mora Farrar MD   glipiZIDE (GLUCOTROL) 10 MG tablet TAKE 2 TABLETS BY MOUTH TWICE DAILY BEFORE MEALS Yes Mora Farrar MD   atorvastatin (LIPITOR) 20 MG tablet TAKE 1 TABLET BY MOUTH EVERY EVENING Yes Luisa Aceves.  Jesenia Boys., DO   ondansetron (ZOFRAN-ODT) 4 MG disintegrating tablet Take 1 tablet by mouth 3 times daily as needed for Nausea or Vomiting Yes Adalgisa Duncan MD   ondansetron (ZOFRAN-ODT) 4 MG disintegrating tablet Take 1 tablet by mouth 3 times daily as needed for Nausea or Vomiting Yes Adalgisa Duncan MD   insulin glargine (LANTUS SOLOSTAR) 100 UNIT/ML injection pen Inject 10 units qhs (will tirate, likely up to 40 units)  Patient taking differently: Inject 15 units qhs (will tirate, likely up to 40 units) Yes Mora Farrar MD   SITagliptin (JANUVIA) 100 MG tablet Take 1 tablet by mouth daily Yes Mora Farrar MD   meclimyron (ANTIVERT) 25 MG tablet Take 1 tablet by mouth 3 times daily as needed for Nausea Yes Daniella Lemme. Glory Lever., DO   spinosad (NATROBA) 0.9 % SUSP topical suspension Use as directed Yes Alvin Johnson MD   Cranberry 1000 MG CAPS Take 1 tablet by mouth daily Yes Historical Provider, MD   Blood Glucose Monitoring Suppl (ONE TOUCH ULTRA MINI) w/Device KIT Use to check sugars Yes Alvin Johnson MD   glucose blood VI test strips (ASCENSIA AUTODISC VI;ONE TOUCH ULTRA TEST VI) strip 1 each by In Vitro route daily As needed.  Yes Alvin Johnson MD   ONE TOUCH LANCETS MISC 1 each by Does not apply route daily Yes Alvin Johnson MD   aspirin 81 MG chewable tablet Take 81 mg by mouth daily Yes Historical Provider, MD   Evening Primrose Oil 500 MG CAPS Take by mouth Yes Historical Provider, MD       Allergies:    Pcn [penicillins], Benadryl [diphenhydramine], Benzonatate, Ciprofloxacin, Morphine, and Sulfa antibiotics    Family History:       Problem Relation Age of Onset    Cancer Mother         voicebox and larynx, and lung cancer    Diabetes Father     Heart Disease Father     COPD Sister     Heart Disease Sister 50        MI,     Seizures Brother          Health Maintenance Completed:  Health Maintenance   Topic Date Due    Cervical cancer screen  2020    Diabetic retinal exam  2022    Shingles vaccine (2 of 2) 2022    Flu vaccine (1) 2022    Diabetic foot exam  10/05/2022    Lipids  10/05/2022    COVID-19 Vaccine (1) 2023 (Originally 1965)    Diabetic microalbuminuria test  2022    A1C test (Diabetic or Prediabetic)  2023    Depression Screen  2023    Colorectal Cancer Screen  2023    Breast cancer screen  2023    DTaP/Tdap/Td vaccine (2 - Td or Tdap) 2026    Hepatitis B vaccine  Completed    Pneumococcal 0-64 years Vaccine  Completed    Hepatitis C screen  Completed    HIV screen  Completed    Hepatitis A vaccine  Aged Out Hib vaccine  Aged Out    Meningococcal (ACWY) vaccine  Aged Out          Immunization History   Administered Date(s) Administered    Hepatitis B Adult (Engerix-B) 12/03/2018    Hepatitis B Adult (Recombivax HB) 10/01/2018, 09/27/2019    Influenza, FLUARIX, FLULAVAL, FLUZONE (age 10 mo+) AND AFLURIA, (age 1 y+), PF, 0.5mL 09/27/2019, 09/16/2020    Pneumococcal Polysaccharide ([id number]) 02/20/2017    Tdap (Boostrix, Adacel) 09/19/2016    Zoster Recombinant (Shingrix) 04/18/2022         Review of Systems:  Review of Systems   HENT:  Positive for rhinorrhea, sore throat and trouble swallowing. Negative for ear pain, postnasal drip, sinus pressure and sinus pain. Eyes:  Negative for redness. Respiratory:  Negative for cough. Cardiovascular:  Negative for chest pain. Gastrointestinal:  Positive for diarrhea. Negative for nausea and vomiting. Musculoskeletal:  Positive for myalgias. Physical Exam:   Vitals:    10/11/22 1522   BP: 108/68   Site: Left Upper Arm   Position: Sitting   Cuff Size: Medium Adult   Pulse: (!) 112   Resp: 18   Temp: 98.2 °F (36.8 °C)   TempSrc: Infrared   SpO2: 97%   Weight: 159 lb 12.8 oz (72.5 kg)   Height: 5' 5\" (1.651 m)     Body mass index is 26.59 kg/m². Wt Readings from Last 3 Encounters:   10/11/22 159 lb 12.8 oz (72.5 kg)   04/18/22 158 lb (71.7 kg)   03/28/22 158 lb (71.7 kg)       BP Readings from Last 3 Encounters:   10/11/22 108/68   04/18/22 124/82   03/28/22 112/68       Physical Exam  HENT:      Mouth/Throat:      Pharynx: No oropharyngeal exudate. Eyes:      General: No scleral icterus. Right eye: No discharge. Left eye: No discharge. Conjunctiva/sclera: Conjunctivae normal.   Cardiovascular:      Rate and Rhythm: Normal rate and regular rhythm. Pulses: Normal pulses. Heart sounds: Normal heart sounds. No murmur heard. No friction rub. No gallop.    Pulmonary:      Effort: Pulmonary effort is normal.      Breath sounds: Normal breath sounds. No wheezing. Musculoskeletal:      Cervical back: Normal range of motion and neck supple. Lymphadenopathy:      Cervical: Cervical adenopathy (No swelling, tender) present. Neurological:      Mental Status: She is alert. Lab Review:   No visits with results within 2 Month(s) from this visit. Latest known visit with results is:   Office Visit on 04/18/2022   Component Date Value    Hemoglobin A1C 04/18/2022 7.0           Assessment/Plan:  Leann was seen today for pharyngitis. Diagnoses and all orders for this visit:    Pharyngitis, unspecified etiology  Differential includes most likely GAS pharyngitis due to exposure as well as symptomatology. Moreover, given the suspicion for it, it is important to consider because of the consequences of untreated GAS infection. Other less likely etiologies include pharyngitis due to influenza or post-nasal drip.       -Rapid strep test   -If negative, will be sent for culture   -A positive culture will warrant antibiotics: will start on azithromycin 500 mg QD for 5 days    2. Type 2 diabetes mellitus without complication, with long-term current use of insulin (HCC)    -Not discussed today  -We will continue Lantus 15 units nightly, Metformin 1500 mg XR, glipizide 20 mg twice daily, and Januvia 100 mg for now.    -As summarized below, cannot tolerate SGLT 2 inhibitors and cannot afford GLP-1 agonist)  -No diabetic retinopathy on eye exam on 5/21/2021.  -Pt to continue aspirin, statin and ACEI         2. HTN  Not discussed today. At goal..    -Continue lisinopril 20mg. Recent BMP WNLs     3. Pure hypercholesterolemia    -Not discussed today. At goal.  Continue Lipitor 20 mg. HM:     -Patient encouraged to receive flu shot.           Health Maintenance Due:  Health Maintenance Due   Topic Date Due    Cervical cancer screen  03/24/2020    Diabetic retinal exam  05/21/2022    Shingles vaccine (2 of 2) 06/13/2022    Flu vaccine (1) 08/01/2022    Diabetic foot exam  10/05/2022    Lipids  10/05/2022          Health care decision maker:  <72years old      Health Maintenance: (USPSTF Recommendations)  (F) Breast Cancer Screen: (40-49 (C), 50-74 biennial screening mammogram (B))  (F) Cervical Cancer Screen: (21-29 q3yr cytology alone; 30-65 q3yr cytology alone, q5yr with hrHPV alone, or q5yr cytology+hrHPV (A))  (M) Prostate Cancer Screen: (54-79 yo discuss benefits/harm, does not recommend testing PSA in men >75 yo (D):   (M) AAA Screen: (men 73-69 yo who has ever smoked (B), consider in nonsmokers if high risk):  CRC/Colonoscopy Screening: (adults 39-53 (B), 50-75 (A))  Lung Ca Screening: Annual LDCT (+smoker age 49-80, smoked within 15 years, total of 20 pack yr history (B)):  DEXA Screen: (women >65 and older, <65 if at risk/postmenopausal (B))  HIV Screen: (16-65 yr old, and all pregnant patients (A)): Hep C Screen: (18-79 yr old (B)):  HCC Screen: (all pts with cirrhosis and high risk Hep B (US q6 mo)):  Immunizations:    RTC:  Return if symptoms worsen or fail to improve. RESIDENT/ATTENDING ATTESTATION:    After medical student evaluation and exam, I independently gathered patients history, independently did a physical, and agree with A/P as written in medical student's note (other than clarified below). Please see below for additional information documented by the resident/attending including the A/P. Assessment/Plan:  57F p/w acute pharyngitis c/f viral vs strep. Rapid strep neg. Throat cx pending. Will prescribe azithromycin if throat cx positive. EMR Dragon/transcription disclaimer:  Much of this encounter note is electronic transcription/translation of spoken language to printed texts. The electronic translation of spoken language may be erroneous, or at times, nonsensical words or phrases may be inadvertently transcribed.   Although I have reviewed the note for such errors, some may still exist.

## 2022-10-14 LAB — THROAT CULTURE: NORMAL

## 2022-10-26 DIAGNOSIS — E11.9 TYPE 2 DIABETES MELLITUS WITHOUT COMPLICATION, WITHOUT LONG-TERM CURRENT USE OF INSULIN (HCC): ICD-10-CM

## 2022-10-26 RX ORDER — GLIPIZIDE 10 MG/1
TABLET ORAL
Qty: 360 TABLET | Refills: 0 | Status: SHIPPED | OUTPATIENT
Start: 2022-10-26

## 2022-10-26 NOTE — TELEPHONE ENCOUNTER
Refill Request       Last Seen: Last Seen Department: 10/11/2022  Last Seen by PCP: 4/18/2022    Last Written: 07/26/2022    Next Appointment:   Future Appointments   Date Time Provider Yaritza Cabrera   11/7/2022  1:00 PM Adalgisa Martines MD [facility] AND [facility]             Requested Prescriptions     Pending Prescriptions Disp Refills    glipiZIDE (GLUCOTROL) 10 MG tablet [Pharmacy Med Name: GLIPIZIDE 10MG TABLETS] 360 tablet 0     Sig: TAKE 2 TABLETS BY MOUTH TWICE DAILY BEFORE MEALS

## 2022-10-27 DIAGNOSIS — R10.9 ABDOMINAL DISCOMFORT: ICD-10-CM

## 2022-10-27 NOTE — TELEPHONE ENCOUNTER
Refill Request       Last Seen: Last Seen Department: 10/11/2022  Last Seen by PCP: 4/18/2022    Last Written: 3/17/22    Next Appointment:   Future Appointments   Date Time Provider Yaritza Cabrera   11/7/2022  1:00 PM Adalgisa Kaba MD [facility] AND [facility]         Requested Prescriptions     Pending Prescriptions Disp Refills    ondansetron (ZOFRAN-ODT) 4 MG disintegrating tablet [Pharmacy Med Name: ONDANSETRON ODT 4MG TABLETS] 21 tablet 0     Sig: DISSOLVE 1 TABLET ON THE TONGUE THREE TIMES DAILY AS NEEDED FOR NAUSEA OR VOMITING

## 2022-10-28 NOTE — TELEPHONE ENCOUNTER
Refill Request       Last Seen: Last Seen Department: 10/11/2022  Last Seen by PCP: 4/18/2022    Last Written: 8/11/2021  30 Tablets no refills     Next Appointment:   Future Appointments   Date Time Provider Yaritza Cabrera   11/7/2022  1:00 PM Adalgisa Roldan MD [facility] AND [facility]             Requested Prescriptions     Pending Prescriptions Disp Refills    meclizine (ANTIVERT) 25 MG tablet 30 tablet 0     Sig: Take 1 tablet by mouth 3 times daily as needed for Nausea

## 2022-10-31 RX ORDER — ONDANSETRON 4 MG/1
TABLET, ORALLY DISINTEGRATING ORAL
Qty: 21 TABLET | Refills: 0 | Status: SHIPPED | OUTPATIENT
Start: 2022-10-31

## 2022-10-31 RX ORDER — MECLIZINE HYDROCHLORIDE 25 MG/1
25 TABLET ORAL 3 TIMES DAILY PRN
Qty: 30 TABLET | Refills: 0 | Status: SHIPPED | OUTPATIENT
Start: 2022-10-31

## 2022-10-31 NOTE — TELEPHONE ENCOUNTER
"[facility]   Physical Therapy Treatment Note     Name: Linette LIU [facility]  Clinic Number: [id number]    Therapy Diagnosis:   Encounter Diagnoses   Name Primary?    Muscle hypertonicity Yes    Acute pain of left shoulder     Weakness of left upper extremity        Physician: Ira Rhodes PA-C    Visit Date: 10/31/2022    Physician Orders: PT Eval and Treat   Medical Diagnosis from Referral: M75.52 (ICD-10-CM) - Subacromial bursitis of left shoulder joint   Evaluation Date: 9/12/2022  Authorization Period Expiration: 12/31/2022  Plan of Care Expiration: 12/12/2022  Visit # / Visits authorized: 12/ 15 (+eval)    PTA Visit #: 1/5   PN DUE: 11/17/2022    Precautions: Standard    Time In: 9:00 AM  Time Out: 9:45 AM  Total Billable Time: 45 minutes    SUBJECTIVE   Pt reports: she is only experiencing pain with certain movements, such as crossing her arms, however has no current pain.    She was compliant with home exercise program.  Response to previous treatment: none reported  Functional change: none reported    Pain: 0/10  Location: Left shoulder    OBJECTIVE     Objective Measures updated at progress report unless specified.     TREATMENT     Linette received the treatments listed below:    (exercises and techniques performed today are bolded)     THERAPEUTIC EXERCISES to develop  strength, endurance, ROM, flexibility, posture, and core stabilization for 30 minutes including:  UBE 2/2 for range of motion, strength and posture  Upper trap stretch 2 x 30"  Levator scap stretch 2 x 30"  Posterior shoulder roll x 1 minute  Scapular retraction  Rows +13#  2 x 10 x 3" hold  Shoulder Extension GTB 3 x 10  +Straight arm pull downs 13# 2 x 10  Shoulder flexion YTB 2 x 15  Shoulder scaption YTB 2 x 10  Shoulder flexion YTB 2 x 10  B shoulder external rotation RTB 3 x 10  (L) shoulder ER with +GTB 2 x 10  (L) shoulder IR with GTB 2 x 10  Shoulder press in scaption 2 x 10 #3  Sidelying external " Patient is scheduled for lab rotations AROM 3 x 10 #3  Supine serratus punch circles 20/20 x 2 sets  Bicep curls 4# 2 x 10  Tricep extensions 17# 2 x 10  Doorway Pec stretch 30 sec x 3   Prone middle/lower trap strengthening (T's/Y's) 2 x 10 each  Plinth shoulder taps 2 x 10    MANUAL THERAPY TECHNIQUES were applied  for 10 minutes including:   Shoulder Distraction   Posterior and inferior joint mobs  PROM with overpressure to left shoulder in all planes of motion   STM to upper trap (L)  Soft tissue mobilization to supraspinatus, infraspinatus and biceps  IASTM to biceps tendon and muscle belly    NEUROMUSCULAR RE-EDUCATION ACTIVITIES to improve Balance, Coordination, Kinesthetic, Sense, Proprioception, and Posture for 5 minutes.  The following were included:   Serratus wall slide with pillow case 2 x 10  Wall Ball (up, down, clockwuise, counterclockwise) 2 x 10  plinth shoulder taps 3 x 10  (L) ABCs with +red theraband x 3    PATIENT EDUCATION AND HOME EXERCISES     Home Exercises Provided and Patient Education Provided     Education provided:   - educated pt that mild soreness is an expected outcome to therapy session  - educated pt to continue with HEP as issued and prescribed    Written Home Exercises Provided: Patient instructed to cont prior HEP. Exercises were reviewed and Linette was able to demonstrate them prior to the end of the session.  Linette demonstrated good  understanding of the education provided. See EMR under Patient Instructions for exercises provided during therapy sessions    ASSESSMENT   Pt tolerated therapy session well today with no provocation of symptoms. This session is focused on strengthening of the L RTC. Pt tolerates weight increase with tricep extension and rows exercises well with no reports of symptoms. Pt demonstrates appropriate performance of all exercises requiring minimal verbal cues for postural awareness to avoid compensation.  Pt tolerates manuals well, with minimal reports of TTP with STM of  infraspinatus tendon, supraspinatus tendon, or biceps.     Linette Is progressing well towards her goals.   Pt prognosis is Good.     Pt will continue to benefit from skilled outpatient physical therapy to address the deficits listed in the problem list box on initial evaluation, provide pt/family education and to maximize pt's level of independence in the home and community environment.     Pt's spiritual, cultural and educational needs considered and pt agreeable to plan of care and goals.     Anticipated barriers to physical therapy: pain    Goals:   Short Term Goals (5 Weeks):   1. Patient will demonstrate independence with HEP in order to progress toward functional independence Met  2. Pt will demonstrate improved pain by reports of less than or equal to 7/10 worst pain on the verbal rating scale in order to progress toward maximal functional ability and improve QOL. Met  3. Pt will be able to correct slouched posture with minimal verbal cueing for improved static sitting posture and increased QOL. Met     Long Term Goals (10 Weeks):   1. Patient will demonstrate improved function as indicated by a functional limitation score of 31% on the FOTO. Progressing, not met  2. Pt will demonstrate improved pain by reports of less than or equal to 5/10 worst pain on the verbal rating scale in order to progress toward maximal functional ability and improve QOL. Progressing, not met  3. Pt will be able to correct slouched posture independently for improved static sitting posture and increased QOL. Met  4. Pt will move left shoulder through functional ROM in all planes without pain to improve independence in performing yard work, house chores, and ADLs Progressing, not met  5. Pt will improve left shoulder strength to 5/5 for improved ability to perform house work, yard work, and hold grandchildren. Progressing, not met    PLAN     Continue PT per POC, assess tolerance to today's session,  progress exercise as tolerated and  appropriate    Linette Tena (Becca), LIOR,  10/31/2022

## 2022-12-08 ENCOUNTER — OFFICE VISIT (OUTPATIENT)
Dept: FAMILY MEDICINE CLINIC | Age: 57
End: 2022-12-08
Payer: COMMERCIAL

## 2022-12-08 VITALS
SYSTOLIC BLOOD PRESSURE: 110 MMHG | TEMPERATURE: 97.5 F | OXYGEN SATURATION: 97 % | BODY MASS INDEX: 27.49 KG/M2 | DIASTOLIC BLOOD PRESSURE: 70 MMHG | HEIGHT: 65 IN | HEART RATE: 93 BPM | WEIGHT: 165 LBS

## 2022-12-08 DIAGNOSIS — E78.49 OTHER HYPERLIPIDEMIA: ICD-10-CM

## 2022-12-08 DIAGNOSIS — E11.00 TYPE 2 DIABETES MELLITUS WITH HYPEROSMOLARITY WITHOUT COMA, UNSPECIFIED WHETHER LONG TERM INSULIN USE (HCC): Primary | ICD-10-CM

## 2022-12-08 DIAGNOSIS — Z23 NEED FOR SHINGLES VACCINE: ICD-10-CM

## 2022-12-08 DIAGNOSIS — I10 PRIMARY HYPERTENSION: ICD-10-CM

## 2022-12-08 LAB
ALBUMIN SERPL-MCNC: 4.5 G/DL (ref 3.4–5)
ALP BLD-CCNC: 102 U/L (ref 40–129)
ALT SERPL-CCNC: 30 U/L (ref 10–40)
ANION GAP SERPL CALCULATED.3IONS-SCNC: 12 MMOL/L (ref 3–16)
AST SERPL-CCNC: 20 U/L (ref 15–37)
BILIRUB SERPL-MCNC: 0.3 MG/DL (ref 0–1)
BILIRUBIN DIRECT: <0.2 MG/DL (ref 0–0.3)
BILIRUBIN, INDIRECT: NORMAL MG/DL (ref 0–1)
BUN BLDV-MCNC: 12 MG/DL (ref 7–20)
CALCIUM SERPL-MCNC: 9.7 MG/DL (ref 8.3–10.6)
CHLORIDE BLD-SCNC: 99 MMOL/L (ref 99–110)
CHOLESTEROL, TOTAL: 148 MG/DL (ref 0–199)
CO2: 26 MMOL/L (ref 21–32)
CREAT SERPL-MCNC: 0.5 MG/DL (ref 0.6–1.1)
CREATININE URINE: 162.5 MG/DL (ref 28–259)
GFR SERPL CREATININE-BSD FRML MDRD: >60 ML/MIN/{1.73_M2}
GLUCOSE BLD-MCNC: 250 MG/DL (ref 70–99)
HDLC SERPL-MCNC: 43 MG/DL (ref 40–60)
LDL CHOLESTEROL CALCULATED: 85 MG/DL
MICROALBUMIN UR-MCNC: 1.3 MG/DL
MICROALBUMIN/CREAT UR-RTO: 8 MG/G (ref 0–30)
PHOSPHORUS: 3.3 MG/DL (ref 2.5–4.9)
POTASSIUM SERPL-SCNC: 4.8 MMOL/L (ref 3.5–5.1)
SODIUM BLD-SCNC: 137 MMOL/L (ref 136–145)
TOTAL PROTEIN: 7 G/DL (ref 6.4–8.2)
TRIGL SERPL-MCNC: 100 MG/DL (ref 0–150)
VITAMIN B-12: >2000 PG/ML (ref 211–911)
VLDLC SERPL CALC-MCNC: 20 MG/DL

## 2022-12-08 PROCEDURE — 3074F SYST BP LT 130 MM HG: CPT | Performed by: FAMILY MEDICINE

## 2022-12-08 PROCEDURE — 99214 OFFICE O/P EST MOD 30 MIN: CPT | Performed by: FAMILY MEDICINE

## 2022-12-08 PROCEDURE — 3051F HG A1C>EQUAL 7.0%<8.0%: CPT | Performed by: FAMILY MEDICINE

## 2022-12-08 PROCEDURE — 90750 HZV VACC RECOMBINANT IM: CPT | Performed by: FAMILY MEDICINE

## 2022-12-08 PROCEDURE — 3078F DIAST BP <80 MM HG: CPT | Performed by: FAMILY MEDICINE

## 2022-12-08 PROCEDURE — 90471 IMMUNIZATION ADMIN: CPT | Performed by: FAMILY MEDICINE

## 2022-12-08 RX ORDER — GLIPIZIDE 10 MG/1
TABLET ORAL
Qty: 360 TABLET | Refills: 3 | Status: SHIPPED | OUTPATIENT
Start: 2022-12-08

## 2022-12-08 RX ORDER — METFORMIN HYDROCHLORIDE 500 MG/1
TABLET, EXTENDED RELEASE ORAL
Qty: 270 TABLET | Refills: 3 | Status: SHIPPED | OUTPATIENT
Start: 2022-12-08

## 2022-12-08 RX ORDER — LISINOPRIL 10 MG/1
10 TABLET ORAL DAILY
Qty: 90 TABLET | Refills: 3 | Status: SHIPPED | OUTPATIENT
Start: 2022-12-08

## 2022-12-08 RX ORDER — ATORVASTATIN CALCIUM 20 MG/1
20 TABLET, FILM COATED ORAL EVERY EVENING
Qty: 90 TABLET | Refills: 3 | Status: SHIPPED | OUTPATIENT
Start: 2022-12-08

## 2022-12-09 LAB
ESTIMATED AVERAGE GLUCOSE: 197.3 MG/DL
HBA1C MFR BLD: 8.5 %

## 2022-12-09 NOTE — PROGRESS NOTES
A/P:    Diagnosis Orders   1. Type 2 diabetes mellitus with hyperosmolarity without coma, unspecified whether long term insulin use (HCC)  atorvastatin (LIPITOR) 20 MG tablet    glipiZIDE (GLUCOTROL) 10 MG tablet    SITagliptin (JANUVIA) 100 MG tablet    metFORMIN (GLUCOPHAGE-XR) 500 MG extended release tablet    lisinopril (PRINIVIL;ZESTRIL) 10 MG tablet    Hemoglobin A1C    Vitamin B12      2. Other hyperlipidemia  Hepatic Function Panel    Lipid Panel      3. Primary hypertension  Renal Function Panel    Microalbumin / Creatinine Urine Ratio      4. Need for shingles vaccine  Zoster, SHINGRIX, (18 yrs +), IM        Her diabetes was borderline controlled on last check, will update lab work today    Her hyperlipidemia was well controlled on last check, she will continue statin    Her hypertension was well controlled on last check, she will continue current medication management    Follow-up in 6 months or sooner if needed    O:   /70   Pulse 93   Temp 97.5 °F (36.4 °C)   Ht 5' 5\" (1.651 m)   Wt 165 lb (74.8 kg)   SpO2 97%   BMI 27.46 kg/m²     Gen- NAD, pleasant  HEENT- Eyes without icterus or injection, throat and tms unremarkable  Neck- Supple, no lymphadenopathy appreciated  Lungs- CTAB  Heart- RRR  Abd- Soft, non tender  Ext- No edema  Psych- Appropriate    S: CC-reestablish care  HPI-patient presents to reestablish care with dental clinic. She previously saw Dr. Kavya Rainey. She has no concerns. She reports she is up-to-date with her eye exam and she checks her feet regularly. She reports she is up-to-date with her Pap smear and mammogram as well.     ROS  Denies fever, chills, night sweats  Denies headaches, sore throat, ear pain  Denies chest pain, dyspnea, palpitations  Denies nausea, vomiting, diarrhea  Denies joint pain  Denies depression, anxiety  Denies urinary symptoms  Denies rashes    Current Outpatient Medications   Medication Sig Dispense Refill    atorvastatin (LIPITOR) 20 MG tablet Take 1 tablet by mouth every evening 90 tablet 3    glipiZIDE (GLUCOTROL) 10 MG tablet TAKE 2 TABLETS BY MOUTH TWICE DAILY BEFORE MEALS 360 tablet 3    SITagliptin (JANUVIA) 100 MG tablet Take 1 tablet by mouth daily 90 tablet 3    metFORMIN (GLUCOPHAGE-XR) 500 MG extended release tablet TAKE 2 TABLETS BY MOUTH EVERY MORNING WITH BREAKFAST AND 1 TABLET EVERY NIGHT AT BEDTIME 270 tablet 3    lisinopril (PRINIVIL;ZESTRIL) 10 MG tablet Take 1 tablet by mouth daily 90 tablet 3    meclizine (ANTIVERT) 25 MG tablet Take 1 tablet by mouth 3 times daily as needed for Nausea 30 tablet 0    ondansetron (ZOFRAN-ODT) 4 MG disintegrating tablet Take 1 tablet by mouth 3 times daily as needed for Nausea or Vomiting 30 tablet 0    insulin glargine (LANTUS SOLOSTAR) 100 UNIT/ML injection pen Inject 10 units qhs (will tirate, likely up to 40 units) (Patient taking differently: Inject 15 units qhs (will tirate, likely up to 40 units)) 5 pen 3    Cranberry 1000 MG CAPS Take 1 tablet by mouth daily      Blood Glucose Monitoring Suppl (ONE TOUCH ULTRA MINI) w/Device KIT Use to check sugars 1 kit 0    glucose blood VI test strips (ASCENSIA AUTODISC VI;ONE TOUCH ULTRA TEST VI) strip 1 each by In Vitro route daily As needed. 100 each 3    ONE TOUCH LANCETS MISC 1 each by Does not apply route daily 100 each 3    aspirin 81 MG chewable tablet Take 81 mg by mouth daily      Evening Primrose Oil 500 MG CAPS Take by mouth       No current facility-administered medications for this visit. Allergies   Allergen Reactions    Pcn [Penicillins] Other (See Comments)    Benadryl [Diphenhydramine] Rash    Benzonatate Rash    Ciprofloxacin Nausea And Vomiting    Morphine Rash    Sulfa Antibiotics Hives and Rash        Past Medical History:   Diagnosis Date    Atrophic kidney 09/28/2017    sstone    Atrophic scarred right kidney again noted. Some nephrolithiasis is noted without  hydronephrosis.       Diabetes [facility])     Essential hypertension Family history of early CAD     FH: breast cancer 2017    25 yo mother reported    HLD (hyperlipidemia)     Iron deficiency anemia 2017    Right peroneal tendinosis 2018        Past Surgical History:   Procedure Laterality Date     SECTION      COLONOSCOPY      SKIN BIOPSY          Social History     Social History Narrative    , 3 bio, 2 step c, works in [employer], likes to spend time with grandkids        Family History   Problem Relation Age of Onset    Cancer Mother         voicebox and larynx, and lung cancer    Diabetes Father     Heart Disease Father         valve    COPD Sister     Heart Disease Sister 50        MI,     Seizures Brother     Stroke Brother         35s          Maryann Escobar DO

## 2023-01-17 ENCOUNTER — HOSPITAL ENCOUNTER (OUTPATIENT)
Dept: WOMENS IMAGING | Age: 58
Discharge: HOME | End: 2023-01-17
Payer: COMMERCIAL

## 2023-01-17 DIAGNOSIS — Z12.31 VISIT FOR SCREENING MAMMOGRAM: ICD-10-CM

## 2023-01-17 PROCEDURE — 77063 BREAST TOMOSYNTHESIS BI: CPT

## 2023-02-14 ENCOUNTER — TELEMEDICINE (OUTPATIENT)
Dept: FAMILY MEDICINE CLINIC | Age: 58
End: 2023-02-14
Payer: COMMERCIAL

## 2023-02-14 DIAGNOSIS — J06.9 ACUTE URI: Primary | ICD-10-CM

## 2023-02-14 PROCEDURE — 99213 OFFICE O/P EST LOW 20 MIN: CPT | Performed by: FAMILY MEDICINE

## 2023-02-14 RX ORDER — AZITHROMYCIN 250 MG/1
TABLET, FILM COATED ORAL
Qty: 6 TABLET | Refills: 0 | Status: SHIPPED | OUTPATIENT
Start: 2023-02-14

## 2023-02-14 RX ORDER — DOXYCYCLINE HYCLATE 100 MG
100 TABLET ORAL 2 TIMES DAILY
Qty: 14 TABLET | Refills: 0 | Status: CANCELLED | OUTPATIENT
Start: 2023-02-14 | End: 2023-02-21

## 2023-02-14 SDOH — ECONOMIC STABILITY: INCOME INSECURITY: HOW HARD IS IT FOR YOU TO PAY FOR THE VERY BASICS LIKE FOOD, HOUSING, MEDICAL CARE, AND HEATING?: NOT HARD AT ALL

## 2023-02-14 SDOH — ECONOMIC STABILITY: FOOD INSECURITY: WITHIN THE PAST 12 MONTHS, YOU WORRIED THAT YOUR FOOD WOULD RUN OUT BEFORE YOU GOT MONEY TO BUY MORE.: NEVER TRUE

## 2023-02-14 SDOH — ECONOMIC STABILITY: FOOD INSECURITY: WITHIN THE PAST 12 MONTHS, THE FOOD YOU BOUGHT JUST DIDN'T LAST AND YOU DIDN'T HAVE MONEY TO GET MORE.: NEVER TRUE

## 2023-02-14 SDOH — ECONOMIC STABILITY: HOUSING INSECURITY
IN THE LAST 12 MONTHS, WAS THERE A TIME WHEN YOU DID NOT HAVE A STEADY PLACE TO SLEEP OR SLEPT IN A SHELTER (INCLUDING NOW)?: NO

## 2023-02-14 NOTE — LETTER
[address], [address] [place] [address],[facility] [address]68  Phone: [phone]  Fax: [phone] [facility],         February 15, 2023     Patient: Santino Valladares   YOB: 1965   Date of Visit: 2/14/2023       To Whom it May Concern:    Timothy Sepulveda was seen in my clinic on 2/14/2023. Please excuse her absence from 2/14-2/17/23. If you have any questions or concerns, please don't hesitate to call.     Sincerely,         Roseline Ramirez DO

## 2023-02-14 NOTE — Clinical Note
[address], [address] [address]  Phone: [phone]  Fax: [phone] [place] [address],         February 14, 2023     Patient: Eliot Johns   YOB: 1965   Date of Visit: 2/14/2023       To Whom It May Concern: It is my medical opinion that Leonardo English {Work release (duty restriction):33430}. If you have any questions or concerns, please don't hesitate to call.     Sincerely,        Gideon Peres DO

## 2023-02-14 NOTE — PROGRESS NOTES
Appointment was done audiovisually. Patient gave consent for an audiovisual visit. Patient was in their state of residency, [place] at time of visit. Parties involved in visit were myself, nurse, and patient. A/P:    Diagnosis Orders   1. Acute URI          Z-Allan course with supportive care recommended     I have encouraged her to watch her sugar levels with acute illness    Patient to be evaluated if symptoms worsen or fail to improve/resolve      O: There were no vitals taken for this visit. Gen-sick, but nontoxic-appearing  HEENT- Eyes without icterus or injection  Lungs- no increased work of breathing appreciated  Psych- Appropriate    S: CC-cold symptoms  HPI-Leann reports starting with cough, headache, fatigue, nasal congestion, fever up to 101, chest soreness with coughing about 5 days ago. She tested herself for COVID yesterday and was negative. She notes some increased urination. She denies vomiting, diarrhea, dyspnea. She feels she is getting a bit worse with time. She reports her  has been sick with similar symptoms and was COVID-negative.     ROS- Per HPI    Patient's medications, allergies, and past medical hx were reviewed

## 2023-02-14 NOTE — LETTER
[address], [address] [address]  Phone: [phone]  Fax: [phone] [facility],         February 14, 2023     Patient: Zander Leal   YOB: 1965   Date of Visit: 2/14/2023       To Whom it May Concern:    Fernando Le was seen via virtual visit in my clinic on 2/14/2023. Please excuse her absence for 2/14-2/15/23. If you have any questions or concerns, please don't hesitate to call.     Sincerely,         Jose G Pendleton, DO

## 2023-02-15 ENCOUNTER — TELEPHONE (OUTPATIENT)
Dept: FAMILY MEDICINE CLINIC | Age: 58
End: 2023-02-15

## 2023-02-15 DIAGNOSIS — E11.00 TYPE 2 DIABETES MELLITUS WITH HYPEROSMOLARITY WITHOUT COMA, UNSPECIFIED WHETHER LONG TERM INSULIN USE (HCC): ICD-10-CM

## 2023-02-15 RX ORDER — GLIPIZIDE 10 MG/1
TABLET ORAL
Qty: 360 TABLET | Refills: 3 | OUTPATIENT
Start: 2023-02-15

## 2023-02-15 NOTE — TELEPHONE ENCOUNTER
Patient was seen yesterday by Dr. Darrion Muse. Yes it is ok to provide work note until Friday. Please document call and then close encounter.   thanks

## 2023-05-05 ENCOUNTER — TELEPHONE (OUTPATIENT)
Dept: FAMILY MEDICINE CLINIC | Age: 58
End: 2023-05-05

## 2023-05-05 ENCOUNTER — OFFICE VISIT (OUTPATIENT)
Dept: FAMILY MEDICINE CLINIC | Age: 58
End: 2023-05-05

## 2023-05-05 DIAGNOSIS — J06.9 ACUTE URI: Primary | ICD-10-CM

## 2023-05-05 DIAGNOSIS — E11.9 TYPE 2 DIABETES MELLITUS WITHOUT COMPLICATION, WITHOUT LONG-TERM CURRENT USE OF INSULIN (HCC): ICD-10-CM

## 2023-05-05 LAB — S PYO AG THROAT QL: NORMAL

## 2023-05-05 RX ORDER — AZITHROMYCIN 250 MG/1
TABLET, FILM COATED ORAL
Qty: 6 TABLET | Refills: 0 | Status: SHIPPED | OUTPATIENT
Start: 2023-05-05

## 2023-05-05 NOTE — TELEPHONE ENCOUNTER
Patient needs to do hoe covid test and if neg she may come for a car side visit. Left a message to return call at [phone] per HIPAA.

## 2023-05-05 NOTE — TELEPHONE ENCOUNTER
Has cough, sore throat, glands are swollen. Wanting an in person visit, no availability. Please advise. Pt thinks she has strep throat.     Please call Leann back at [phone]

## 2023-05-05 NOTE — TELEPHONE ENCOUNTER
Pt returning call. Advised to take home covid test, and to call with results. If negative instructed her would do car side visit today.

## 2023-05-05 NOTE — PROGRESS NOTES
Appointment was done audiovisually. Patient gave consent for an audiovisual visit. Patient was in their state of residency, [place] at time of visit. Parties involved in visit were myself, nurse, and patient. A/P:    Diagnosis Orders   1. Acute URI  Z pack, supportive care recommended, Patient to call or be evaluated if symptoms worsen or fail to improve/resolve. 2.      Type II DM, last A1C very well controlled Continue current med management              Follow-up in    O: There were no vitals taken for this visit. Gen- NAD, pleasant  HEENT- Eyes without icterus or injection  Lungs- no increased work of breathing appreciated  Psych- Appropriate    S: CC- feeling sick  HPI- Patient reports 2 days of sore throat, difficulty swallowing, dry cough,, chest congestion. She denies fever, chest pain, dyspnea, vomiting, diarrhea, rash. Her symptoms are getting a bit worse with time. Patient reports her Covid testing at home was negative.     ROS- Per HPI    Patient's medications, allergies, and past medical hx were reviewed

## 2023-05-07 LAB — BACTERIA THROAT AEROBE CULT: NORMAL

## 2023-06-08 ENCOUNTER — TELEPHONE (OUTPATIENT)
Dept: FAMILY MEDICINE CLINIC | Age: 58
End: 2023-06-08

## 2023-06-08 ENCOUNTER — OFFICE VISIT (OUTPATIENT)
Dept: FAMILY MEDICINE CLINIC | Age: 58
End: 2023-06-08

## 2023-06-08 VITALS
WEIGHT: 166 LBS | OXYGEN SATURATION: 99 % | HEIGHT: 65 IN | BODY MASS INDEX: 27.66 KG/M2 | DIASTOLIC BLOOD PRESSURE: 86 MMHG | TEMPERATURE: 98.5 F | HEART RATE: 85 BPM | SYSTOLIC BLOOD PRESSURE: 138 MMHG

## 2023-06-08 DIAGNOSIS — I10 ESSENTIAL HYPERTENSION: ICD-10-CM

## 2023-06-08 DIAGNOSIS — M79.671 RIGHT FOOT PAIN: ICD-10-CM

## 2023-06-08 DIAGNOSIS — Z79.4 DIABETES MELLITUS DUE TO UNDERLYING CONDITION WITH HYPEROSMOLARITY WITHOUT COMA, WITH LONG-TERM CURRENT USE OF INSULIN (HCC): Primary | ICD-10-CM

## 2023-06-08 DIAGNOSIS — E08.00 DIABETES MELLITUS DUE TO UNDERLYING CONDITION WITH HYPEROSMOLARITY WITHOUT COMA, WITH LONG-TERM CURRENT USE OF INSULIN (HCC): Primary | ICD-10-CM

## 2023-06-08 DIAGNOSIS — E11.00 TYPE 2 DIABETES MELLITUS WITH HYPEROSMOLARITY WITHOUT COMA, UNSPECIFIED WHETHER LONG TERM INSULIN USE (HCC): ICD-10-CM

## 2023-06-08 LAB
EST. AVERAGE GLUCOSE BLD GHB EST-MCNC: 148.5 MG/DL
HBA1C MFR BLD: 6.8 %

## 2023-06-08 RX ORDER — GLIPIZIDE 10 MG/1
TABLET ORAL
Qty: 360 TABLET | Refills: 3 | COMMUNITY
Start: 2023-06-08

## 2023-06-08 RX ORDER — INSULIN GLARGINE 100 [IU]/ML
INJECTION, SOLUTION SUBCUTANEOUS
COMMUNITY
Start: 2023-06-08

## 2023-06-08 RX ORDER — METFORMIN HYDROCHLORIDE 500 MG/1
TABLET, EXTENDED RELEASE ORAL
Qty: 270 TABLET | Refills: 3 | COMMUNITY
Start: 2023-06-08

## 2023-06-08 SDOH — ECONOMIC STABILITY: FOOD INSECURITY: WITHIN THE PAST 12 MONTHS, THE FOOD YOU BOUGHT JUST DIDN'T LAST AND YOU DIDN'T HAVE MONEY TO GET MORE.: NEVER TRUE

## 2023-06-08 SDOH — ECONOMIC STABILITY: FOOD INSECURITY: WITHIN THE PAST 12 MONTHS, YOU WORRIED THAT YOUR FOOD WOULD RUN OUT BEFORE YOU GOT MONEY TO BUY MORE.: NEVER TRUE

## 2023-06-08 SDOH — ECONOMIC STABILITY: INCOME INSECURITY: HOW HARD IS IT FOR YOU TO PAY FOR THE VERY BASICS LIKE FOOD, HOUSING, MEDICAL CARE, AND HEATING?: NOT HARD AT ALL

## 2023-06-08 ASSESSMENT — PATIENT HEALTH QUESTIONNAIRE - PHQ9
SUM OF ALL RESPONSES TO PHQ QUESTIONS 1-9: 0
2. FEELING DOWN, DEPRESSED OR HOPELESS: 0
1. LITTLE INTEREST OR PLEASURE IN DOING THINGS: 0
SUM OF ALL RESPONSES TO PHQ9 QUESTIONS 1 & 2: 0
SUM OF ALL RESPONSES TO PHQ QUESTIONS 1-9: 0

## 2023-06-08 NOTE — TELEPHONE ENCOUNTER
PT called in stating that she was reviewing her medications on her AVS from her appointment today with Dr. Pinky Mathew. Pt noticed a few discrepancies and she wanted to be sure that she was taking them the correct way. PT noted that she is supposed to be taking Glipizide 4 each day. Two in the morning & two in the afternoon but the directions state to take 2 tablets twice a daily before meals. With her Lantus, Dr. Santi Borden changed it to 10 units but when Dr. Siomara Brewer had her on it she had PT injecting 15 units & when she est it was then changed to 19 units so she wants know what the correct dosage she's supposed to be taking. Lastly, the directions for the Metformin state to take 2 tablets in the morning & 1 tablet at bedtime but PT adv that she takes it now at lunch time and not at bed time due to feelings of nausea.      Please call PT back: [phone]

## 2023-06-08 NOTE — TELEPHONE ENCOUNTER
Since she has been taking the 19 units of Lantus, I would like her to continue that dose, I have updated this dose in chart    I would like her to take 2 glipizide with breakfast and 2 glipizide with dinner, I feel taking them with breakfast and lunch would be too close together    For the metformin, she can try 4 tabs in the morning with breakfast, if unable to tolerate this GI wise, she can try 2 tablets with breakfast and 2 tablets with lunch    Please let me know if she has any additional questions

## 2023-06-08 NOTE — TELEPHONE ENCOUNTER
PT called back returning the VM from [facility]. Adv PT of Dr. Marina Moreno note. PT expressed understanding.

## 2023-06-09 ENCOUNTER — TELEPHONE (OUTPATIENT)
Dept: FAMILY MEDICINE CLINIC | Age: 58
End: 2023-06-09

## 2023-06-09 DIAGNOSIS — E11.00 TYPE 2 DIABETES MELLITUS WITH HYPEROSMOLARITY WITHOUT COMA, UNSPECIFIED WHETHER LONG TERM INSULIN USE (HCC): ICD-10-CM

## 2023-06-09 RX ORDER — LISINOPRIL 10 MG/1
20 TABLET ORAL DAILY
Qty: 90 TABLET | Refills: 3 | COMMUNITY
Start: 2023-06-09

## 2023-06-09 NOTE — TELEPHONE ENCOUNTER
I would like her to increase her lisinopril to 20 mg and update me in about 5 days on her BP readings--- would like her taking once per day.

## 2023-06-09 NOTE — TELEPHONE ENCOUNTER
Patient called in stating her BP is still elevated. Yesterday was 139/102, today was 145/100. Last night 137/97.

## 2023-06-09 NOTE — PROGRESS NOTES
Diagnosis Orders   1. Diabetes mellitus due to underlying condition with hyperosmolarity without coma, with long-term current use of insulin (HCC)  Hemoglobin A1C      2. Right foot pain  AFL - Darwin Selby, Zenon Jones DPM, Podiatry, [facility]      3. Essential hypertension          Diabetes wise, her A1c was uncontrolled on last check, check A1c today    Her hypertension is a bit uncontrolled, continue 10 mg of lisinopril for now, she will update me on a few of her home BP readings next few days    Hyperlipidemia wise, her last LDL was very well controlled    For her right foot pain, possibility of a foreign body must be considered, referral to podiatry placed    Follow-up in 6 months, or sooner if needed        O: /86 (Site: Right Upper Arm, Position: Sitting, Cuff Size: Medium Adult)   Pulse 85   Temp 98.5 °F (36.9 °C)   Ht 5' 5\" (1.651 m)   Wt 166 lb (75.3 kg)   SpO2 99%   BMI 27.62 kg/m²   Gen- NAD, pleasant  Neck-supple, no lymphadenopathy appreciated  HEENT- Eyes without icterus or injection  Lungs- CTAB  Heart- RRR  Abdomen- Soft, non tender  Ext- No edema, small knot of right foot plantar fascia that is quite tender to palpation, her nails are in good health  Psych- Appropriate, euthymic, no si/hi    S: CC-checkup    HPI-Leann presents for follow-up of her diabetes, hypertension, hyperlipidemia. She reports she has been working on her diet and exercise since last visit. She reports compliance with meds. She stepped on a toy with right foot approximately 2 months ago. There were bleeding. Since then, she has had persisting pain that can be severe at times.     ROS- Per HPI    Patient's medications, allergies, and past medical hx were reviewed     [address] [facility]

## 2023-07-05 ENCOUNTER — HOSPITAL ENCOUNTER (INPATIENT)
Age: 58
LOS: 2 days | Discharge: HOME | End: 2023-07-07
Attending: EMERGENCY MEDICINE | Admitting: INTERNAL MEDICINE
Payer: COMMERCIAL

## 2023-07-05 DIAGNOSIS — R19.7 NAUSEA VOMITING AND DIARRHEA: Primary | ICD-10-CM

## 2023-07-05 DIAGNOSIS — D72.829 LEUKOCYTOSIS, UNSPECIFIED TYPE: ICD-10-CM

## 2023-07-05 DIAGNOSIS — R11.2 NAUSEA VOMITING AND DIARRHEA: Primary | ICD-10-CM

## 2023-07-05 DIAGNOSIS — N17.9 AKI (ACUTE KIDNEY INJURY) (HCC): ICD-10-CM

## 2023-07-05 DIAGNOSIS — E86.0 DEHYDRATION: ICD-10-CM

## 2023-07-05 DIAGNOSIS — E87.5 HYPERKALEMIA: ICD-10-CM

## 2023-07-05 PROBLEM — E87.20 NORMAL ANION GAP METABOLIC ACIDOSIS: Status: ACTIVE | Noted: 2023-07-05

## 2023-07-05 LAB
ALBUMIN SERPL-MCNC: 4.1 G/DL (ref 3.4–5)
ALBUMIN/GLOB SERPL: 1.6 {RATIO} (ref 1.1–2.2)
ALP SERPL-CCNC: 81 U/L (ref 40–129)
ALT SERPL-CCNC: 17 U/L (ref 10–40)
ANION GAP SERPL CALCULATED.3IONS-SCNC: 10 MMOL/L (ref 3–16)
AST SERPL-CCNC: 15 U/L (ref 15–37)
BACTERIA URNS QL MICRO: ABNORMAL /HPF
BASOPHILS # BLD: 0 K/UL (ref 0–0.2)
BASOPHILS NFR BLD: 0.1 %
BILIRUB SERPL-MCNC: 0.6 MG/DL (ref 0–1)
BILIRUB UR QL STRIP.AUTO: NEGATIVE
BUDDING YEAST: ABNORMAL
BUN SERPL-MCNC: 24 MG/DL (ref 7–20)
CALCIUM SERPL-MCNC: 7.9 MG/DL (ref 8.3–10.6)
CHARACTER UR: ABNORMAL
CHLORIDE SERPL-SCNC: 105 MMOL/L (ref 99–110)
CHP ED QC CHECK: YES
CK SERPL-CCNC: 35 U/L (ref 26–192)
CLARITY UR: ABNORMAL
CO2 SERPL-SCNC: 19 MMOL/L (ref 21–32)
COLOR UR: YELLOW
CREAT SERPL-MCNC: 1.4 MG/DL (ref 0.6–1.1)
DEPRECATED RDW RBC AUTO: 15.8 % (ref 12.4–15.4)
EOSINOPHIL # BLD: 0 K/UL (ref 0–0.6)
EOSINOPHIL NFR BLD: 0 %
GFR SERPLBLD CREATININE-BSD FMLA CKD-EPI: 44 ML/MIN/{1.73_M2}
GLUCOSE BLD-MCNC: 181 MG/DL
GLUCOSE BLD-MCNC: 181 MG/DL (ref 70–99)
GLUCOSE BLD-MCNC: 205 MG/DL
GLUCOSE BLD-MCNC: 205 MG/DL (ref 70–99)
GLUCOSE BLD-MCNC: 267 MG/DL
GLUCOSE BLD-MCNC: 267 MG/DL (ref 70–99)
GLUCOSE BLD-MCNC: 282 MG/DL
GLUCOSE BLD-MCNC: 282 MG/DL (ref 70–99)
GLUCOSE BLD-MCNC: 288 MG/DL
GLUCOSE BLD-MCNC: 288 MG/DL (ref 70–99)
GLUCOSE SERPL-MCNC: 335 MG/DL (ref 70–99)
GLUCOSE UR STRIP.AUTO-MCNC: >=1000 MG/DL
HCG SERPL QL: NEGATIVE
HCT VFR BLD AUTO: 43.1 % (ref 36–48)
HGB BLD-MCNC: 14.4 G/DL (ref 12–16)
HGB UR QL STRIP.AUTO: NEGATIVE
HYALINE CASTS #/AREA URNS LPF: ABNORMAL /LPF (ref 0–2)
KETONES UR STRIP.AUTO-MCNC: ABNORMAL MG/DL
LEUKOCYTE ESTERASE UR QL STRIP.AUTO: ABNORMAL
LIPASE SERPL-CCNC: 46 U/L (ref 13–60)
LYMPHOCYTES # BLD: 0.4 K/UL (ref 1–5.1)
LYMPHOCYTES NFR BLD: 2.3 %
MCH RBC QN AUTO: 29 PG (ref 26–34)
MCHC RBC AUTO-ENTMCNC: 33.4 G/DL (ref 31–36)
MCV RBC AUTO: 87 FL (ref 80–100)
MONOCYTES # BLD: 0.3 K/UL (ref 0–1.3)
MONOCYTES NFR BLD: 1.6 %
NEUTROPHILS # BLD: 17.7 K/UL (ref 1.7–7.7)
NEUTROPHILS NFR BLD: 96 %
NITRITE UR QL STRIP.AUTO: NEGATIVE
PERFORMED ON: ABNORMAL
PH UR STRIP.AUTO: 5.5 [PH] (ref 5–8)
PLATELET # BLD AUTO: 302 K/UL (ref 135–450)
PMV BLD AUTO: 10.2 FL (ref 5–10.5)
POTASSIUM SERPL-SCNC: 5 MMOL/L (ref 3.5–5.1)
POTASSIUM SERPL-SCNC: 7.8 MMOL/L (ref 3.5–5.1)
POTASSIUM SERPL-SCNC: 7.8 MMOL/L (ref 3.5–5.1)
PROT SERPL-MCNC: 6.6 G/DL (ref 6.4–8.2)
PROT UR STRIP.AUTO-MCNC: ABNORMAL MG/DL
RBC # BLD AUTO: 4.96 M/UL (ref 4–5.2)
RBC #/AREA URNS HPF: ABNORMAL /HPF (ref 0–4)
REASON FOR REJECTION: NORMAL
REJECTED TEST: NORMAL
SODIUM SERPL-SCNC: 134 MMOL/L (ref 136–145)
SP GR UR STRIP.AUTO: 1.02 (ref 1–1.03)
UA COMPLETE W REFLEX CULTURE PNL UR: ABNORMAL
UA DIPSTICK W REFLEX MICRO PNL UR: YES
URN SPEC COLLECT METH UR: ABNORMAL
UROBILINOGEN UR STRIP-ACNC: 0.2 E.U./DL
WBC # BLD AUTO: 18.4 K/UL (ref 4–11)
WBC #/AREA URNS HPF: ABNORMAL /HPF (ref 0–5)

## 2023-07-05 PROCEDURE — 2500000003 HC RX 250 WO HCPCS: Performed by: EMERGENCY MEDICINE

## 2023-07-05 PROCEDURE — 84132 ASSAY OF SERUM POTASSIUM: CPT

## 2023-07-05 PROCEDURE — 83036 HEMOGLOBIN GLYCOSYLATED A1C: CPT

## 2023-07-05 PROCEDURE — 6370000000 HC RX 637 (ALT 250 FOR IP): Performed by: EMERGENCY MEDICINE

## 2023-07-05 PROCEDURE — 81001 URINALYSIS AUTO W/SCOPE: CPT

## 2023-07-05 PROCEDURE — 6360000002 HC RX W HCPCS: Performed by: PHYSICIAN ASSISTANT

## 2023-07-05 PROCEDURE — 85025 COMPLETE CBC W/AUTO DIFF WBC: CPT

## 2023-07-05 PROCEDURE — 1200000000 HC SEMI PRIVATE

## 2023-07-05 PROCEDURE — 80053 COMPREHEN METABOLIC PANEL: CPT

## 2023-07-05 PROCEDURE — 82550 ASSAY OF CK (CPK): CPT

## 2023-07-05 PROCEDURE — 83690 ASSAY OF LIPASE: CPT

## 2023-07-05 PROCEDURE — 84703 CHORIONIC GONADOTROPIN ASSAY: CPT

## 2023-07-05 PROCEDURE — 6360000002 HC RX W HCPCS: Performed by: EMERGENCY MEDICINE

## 2023-07-05 PROCEDURE — 93005 ELECTROCARDIOGRAM TRACING: CPT | Performed by: EMERGENCY MEDICINE

## 2023-07-05 PROCEDURE — 2580000003 HC RX 258: Performed by: PHYSICIAN ASSISTANT

## 2023-07-05 PROCEDURE — 99285 EMERGENCY DEPT VISIT HI MDM: CPT

## 2023-07-05 PROCEDURE — 2580000003 HC RX 258: Performed by: EMERGENCY MEDICINE

## 2023-07-05 RX ORDER — DEXTROSE MONOHYDRATE 100 MG/ML
INJECTION, SOLUTION INTRAVENOUS CONTINUOUS PRN
Status: DISCONTINUED | OUTPATIENT
Start: 2023-07-05 | End: 2023-07-05 | Stop reason: SDUPTHER

## 2023-07-05 RX ORDER — INSULIN LISPRO 100 [IU]/ML
0-16 INJECTION, SOLUTION INTRAVENOUS; SUBCUTANEOUS
Status: DISCONTINUED | OUTPATIENT
Start: 2023-07-06 | End: 2023-07-07 | Stop reason: HOSPADM

## 2023-07-05 RX ORDER — CALCIUM GLUCONATE 94 MG/ML
1000 INJECTION, SOLUTION INTRAVENOUS ONCE
Status: COMPLETED | OUTPATIENT
Start: 2023-07-05 | End: 2023-07-05

## 2023-07-05 RX ORDER — 0.9 % SODIUM CHLORIDE 0.9 %
500 INTRAVENOUS SOLUTION INTRAVENOUS ONCE
Status: COMPLETED | OUTPATIENT
Start: 2023-07-05 | End: 2023-07-05

## 2023-07-05 RX ORDER — INSULIN LISPRO 100 [IU]/ML
0-4 INJECTION, SOLUTION INTRAVENOUS; SUBCUTANEOUS NIGHTLY
Status: DISCONTINUED | OUTPATIENT
Start: 2023-07-06 | End: 2023-07-07 | Stop reason: HOSPADM

## 2023-07-05 RX ORDER — DEXTROSE MONOHYDRATE 100 MG/ML
INJECTION, SOLUTION INTRAVENOUS CONTINUOUS PRN
Status: DISCONTINUED | OUTPATIENT
Start: 2023-07-05 | End: 2023-07-07 | Stop reason: HOSPADM

## 2023-07-05 RX ORDER — 0.9 % SODIUM CHLORIDE 0.9 %
1000 INTRAVENOUS SOLUTION INTRAVENOUS ONCE
Status: COMPLETED | OUTPATIENT
Start: 2023-07-05 | End: 2023-07-05

## 2023-07-05 RX ORDER — INSULIN LISPRO 100 [IU]/ML
0-4 INJECTION, SOLUTION INTRAVENOUS; SUBCUTANEOUS EVERY 4 HOURS
Status: DISCONTINUED | OUTPATIENT
Start: 2023-07-06 | End: 2023-07-05 | Stop reason: SDUPTHER

## 2023-07-05 RX ADMIN — DEXTROSE MONOHYDRATE 250 ML: 100 INJECTION, SOLUTION INTRAVENOUS at 20:51

## 2023-07-05 RX ADMIN — CALCIUM GLUCONATE 1000 MG: 98 INJECTION, SOLUTION INTRAVENOUS at 20:38

## 2023-07-05 RX ADMIN — SODIUM CHLORIDE 1000 ML: 9 INJECTION, SOLUTION INTRAVENOUS at 18:08

## 2023-07-05 RX ADMIN — SODIUM CHLORIDE 500 ML: 9 INJECTION, SOLUTION INTRAVENOUS at 20:52

## 2023-07-05 RX ADMIN — INSULIN HUMAN 10 UNITS: 100 INJECTION, SOLUTION PARENTERAL at 21:01

## 2023-07-05 RX ADMIN — SODIUM BICARBONATE 50 MEQ: 84 INJECTION INTRAVENOUS at 20:31

## 2023-07-05 RX ADMIN — Medication 25 MG: at 18:12

## 2023-07-05 ASSESSMENT — LIFESTYLE VARIABLES
HOW MANY STANDARD DRINKS CONTAINING ALCOHOL DO YOU HAVE ON A TYPICAL DAY: 1 OR 2
HOW OFTEN DO YOU HAVE A DRINK CONTAINING ALCOHOL: MONTHLY OR LESS

## 2023-07-06 LAB
EKG ATRIAL RATE: 84 BPM
EKG DIAGNOSIS: NORMAL
EKG P AXIS: 38 DEGREES
EKG P-R INTERVAL: 126 MS
EKG Q-T INTERVAL: 378 MS
EKG QRS DURATION: 72 MS
EKG QTC CALCULATION (BAZETT): 446 MS
EKG R AXIS: 2 DEGREES
EKG T AXIS: 11 DEGREES
EKG VENTRICULAR RATE: 84 BPM
EST. AVERAGE GLUCOSE BLD GHB EST-MCNC: 159.9 MG/DL
GLUCOSE BLD-MCNC: 120 MG/DL (ref 70–99)
GLUCOSE BLD-MCNC: 146 MG/DL (ref 70–99)
GLUCOSE BLD-MCNC: 169 MG/DL (ref 70–99)
GLUCOSE BLD-MCNC: 209 MG/DL (ref 70–99)
GLUCOSE BLD-MCNC: 228 MG/DL (ref 70–99)
GLUCOSE BLD-MCNC: 232 MG/DL (ref 70–99)
HBA1C MFR BLD: 7.2 %
PERFORMED ON: ABNORMAL

## 2023-07-06 PROCEDURE — 6370000000 HC RX 637 (ALT 250 FOR IP): Performed by: INTERNAL MEDICINE

## 2023-07-06 PROCEDURE — 2580000003 HC RX 258: Performed by: INTERNAL MEDICINE

## 2023-07-06 PROCEDURE — 93010 ELECTROCARDIOGRAM REPORT: CPT | Performed by: INTERNAL MEDICINE

## 2023-07-06 PROCEDURE — 6370000000 HC RX 637 (ALT 250 FOR IP): Performed by: HOSPITALIST

## 2023-07-06 PROCEDURE — 2060000000 HC ICU INTERMEDIATE R&B

## 2023-07-06 RX ORDER — VANCOMYCIN HYDROCHLORIDE 250 MG/1
250 CAPSULE ORAL EVERY 6 HOURS SCHEDULED
Status: DISCONTINUED | OUTPATIENT
Start: 2023-07-06 | End: 2023-07-07 | Stop reason: HOSPADM

## 2023-07-06 RX ORDER — VIT C/B6/B5/MAGNESIUM/HERB 173 50-5-6-5MG
CAPSULE ORAL 2 TIMES DAILY
COMMUNITY

## 2023-07-06 RX ORDER — CETIRIZINE HYDROCHLORIDE 10 MG/1
10 TABLET ORAL DAILY
COMMUNITY

## 2023-07-06 RX ORDER — DICLOFENAC SODIUM 75 MG/1
75 TABLET, DELAYED RELEASE ORAL 2 TIMES DAILY PRN
COMMUNITY
Start: 2023-06-15

## 2023-07-06 RX ORDER — GLUCOSAMINE HCL 500 MG
TABLET ORAL DAILY
COMMUNITY

## 2023-07-06 RX ORDER — SODIUM CHLORIDE 9 MG/ML
INJECTION, SOLUTION INTRAVENOUS CONTINUOUS
Status: DISCONTINUED | OUTPATIENT
Start: 2023-07-06 | End: 2023-07-07 | Stop reason: HOSPADM

## 2023-07-06 RX ADMIN — VANCOMYCIN HYDROCHLORIDE 250 MG: 250 CAPSULE ORAL at 17:23

## 2023-07-06 RX ADMIN — SODIUM CHLORIDE: 9 INJECTION, SOLUTION INTRAVENOUS at 13:14

## 2023-07-06 RX ADMIN — SODIUM CHLORIDE: 9 INJECTION, SOLUTION INTRAVENOUS at 03:40

## 2023-07-06 RX ADMIN — VANCOMYCIN HYDROCHLORIDE 250 MG: 250 CAPSULE ORAL at 23:35

## 2023-07-06 RX ADMIN — INSULIN LISPRO 4 UNITS: 100 INJECTION, SOLUTION INTRAVENOUS; SUBCUTANEOUS at 17:23

## 2023-07-06 RX ADMIN — SODIUM CHLORIDE: 9 INJECTION, SOLUTION INTRAVENOUS at 19:44

## 2023-07-06 RX ADMIN — VANCOMYCIN HYDROCHLORIDE 250 MG: 250 CAPSULE ORAL at 13:13

## 2023-07-06 NOTE — PROGRESS NOTES
4 Eyes Skin Assessment     NAME:  Torrey Buckley  YOB: 1965  MEDICAL RECORD NUMBER:  [id number]    The patient is being assessed for  Admission    I agree that at least one RN has performed a thorough Head to Toe Skin Assessment on the patient. ALL assessment sites listed below have been assessed. Areas assessed by both nurses:    Head, Face, Ears, Shoulders, Back, Chest, Arms, Elbows, Hands, Sacrum. Buttock, Coccyx, Ischium, Legs. Feet and Heels, and Under Medical Devices         Does the Patient have a Wound?  No noted wound(s)       Barrett Prevention initiated by RN: No  Wound Care Orders initiated by RN: No    Pressure Injury (Stage 3,4, Unstageable, DTI, NWPT, and Complex wounds) if present, place Wound referral order by RN under : No    New Ostomies, if present place, Ostomy referral order under : No     Nurse 1 eSignature: Electronically signed by Mary Ellen Martins RN on 7/6/23 at 6:49 AM EDT    **SHARE this note so that the co-signing nurse can place an eSignature**    Nurse 2 eSignature: Electronically signed by Padmini Copeland RN on 7/6/23 at 6:51 AM EDT

## 2023-07-06 NOTE — PLAN OF CARE
Problem: Discharge Planning  Goal: Discharge to home or other facility with appropriate resources  7/6/2023 1849 by Araceli Cole RN  Outcome: Progressing  Flowsheets (Taken 7/6/2023 1849)  Discharge to home or other facility with appropriate resources:   Identify barriers to discharge with patient and caregiver   Arrange for needed discharge resources and transportation as appropriate  7/6/2023 0648 by Jin Salazar RN  Outcome: Progressing     Problem: Safety - Adult  Goal: Free from fall injury  7/6/2023 1849 by Araceli Cole RN  Outcome: Progressing  Flowsheets (Taken 7/6/2023 1849)  Free From Fall Injury: Instruct family/caregiver on patient safety  7/6/2023 0648 by Jin Salazar RN  Outcome: Progressing     Problem: ABCDS Injury Assessment  Goal: Absence of physical injury  7/6/2023 0648 by Jin Salazar RN  Outcome: Progressing     Problem: Chronic Conditions and Co-morbidities  Goal: Patient's chronic conditions and co-morbidity symptoms are monitored and maintained or improved  Outcome: Progressing  Flowsheets (Taken 7/6/2023 1849)  Care Plan - Patient's Chronic Conditions and Co-Morbidity Symptoms are Monitored and Maintained or Improved:   Monitor and assess patient's chronic conditions and comorbid symptoms for stability, deterioration, or improvement   Collaborate with multidisciplinary team to address chronic and comorbid conditions and prevent exacerbation or deterioration   Araceli Cole RN  7/6/2023

## 2023-07-06 NOTE — ED NOTES
Pt. To be admitted to  room 52. Report called to RN. Ready for transport.       Matt Fox RN  07/05/23 3554

## 2023-07-06 NOTE — PROGRESS NOTES
Pharmacy Medication Reconciliation Note     List of medications patient is currently taking is complete. Source of information:   1. Rx disp history  2. Patient interview    Notes regarding home medications:   1. Patient states Lisinopril recently increased to 20mg daily (2-10mg)  2. Patient states Metformin ER recently increased to 500mg bid  3.  Added some OTC products    Denies taking any other OTC or herbal medications    CRISPIN Prieto [facility], [address] [place] [address] 7/6/2023 1:31 PM

## 2023-07-06 NOTE — PROGRESS NOTES
Pt arrived via stretcher from ED to room 5297. Heart monitor connected and verified with [facility]. VS, assessment, and admission complete. 4 eyes assessment complete. Pt oriented to unit and room. Call light and bedside table in reach. All questions answered. Pt resting quietly in bed with no complaints or voiced needs at this time. Patient stating that nausea has improved since the administration of phenergan, no diarrhea since coming to floor or emesis. Fluids started, BG improved no lispro given, no reports of itching. Patient ambulated to bathroom well with no reports of dizziness or weakness. Stool sample still needed.

## 2023-07-06 NOTE — H&P
Range    Glucose 288 mg/dL    QC OK? Yes    Urinalysis with Reflex to Culture    Collection Time: 07/05/23  7:49 PM    Specimen: Urine   Result Value Ref Range    Color, UA Yellow Straw/Yellow    Clarity, UA CLOUDY (A) Clear    Glucose, Ur >=1000 (A) Negative mg/dL    Bilirubin Urine Negative Negative    Ketones, Urine TRACE (A) Negative mg/dL    Specific Gravity, UA 1.018 1.005 - 1.030    Blood, Urine Negative Negative    pH, UA 5.5 5.0 - 8.0    Protein, UA TRACE (A) Negative mg/dL    Urobilinogen, Urine 0.2 <2.0 E.U./dL    Nitrite, Urine Negative Negative    Leukocyte Esterase, Urine TRACE (A) Negative    Microscopic Examination YES     Urine Type NotGiven     Urine Reflex to Culture Not Indicated    Microscopic Urinalysis    Collection Time: 07/05/23  7:49 PM   Result Value Ref Range    Hyaline Casts, UA 11-20 (A) 0 - 2 /LPF    WBC, UA 0-2 0 - 5 /HPF    RBC, UA None seen 0 - 4 /HPF    Bacteria, UA Rare (A) None Seen /HPF    Urinalysis Comments see below     Budding Yeast see below (A) None Seen   POCT Glucose    Collection Time: 07/05/23  8:49 PM   Result Value Ref Range    Glucose 267 mg/dL    QC OK? Yes    POCT Glucose    Collection Time: 07/05/23  8:49 PM   Result Value Ref Range    POC Glucose 267 (H) 70 - 99 mg/dl    Performed on ACCU-CHEK        Vitals:   Vitals:    07/05/23 1857   BP: 99/78   Pulse: 86   Resp: 25   Temp:    SpO2: 98%       Physical Exam:     Gen: NAD. Eye: REN, No Icterus  Nose: Midline, No Rhinorrhea  Oropharynx: dry Mucus membrane. Ears: B/L Symmetrical, Normal hearing. Neck: Supple, No JVD  CVS: S1-S2, RRR. Resp: CTAB, No end expiratory wheezing, No Crackles. Normal Resp effort. Abdo: BS+, S, NT/ND, No Guarding, No rigidity, no rebound  Neuro:  nonfocal sensory/motor exam. Normal Speech, CN II-XII grossly normal  Psych: AOX3.  Appropriate mood and affect, Good Insight and judgement  Extrem: No  Edema, No Cyanosis, ROM normal   Skin: No rash, no subcutaneous nodule

## 2023-07-07 VITALS
TEMPERATURE: 97.8 F | BODY MASS INDEX: 26.52 KG/M2 | DIASTOLIC BLOOD PRESSURE: 92 MMHG | RESPIRATION RATE: 23 BRPM | HEART RATE: 83 BPM | WEIGHT: 159.17 LBS | OXYGEN SATURATION: 97 % | SYSTOLIC BLOOD PRESSURE: 123 MMHG | HEIGHT: 65 IN

## 2023-07-07 LAB
ANION GAP SERPL CALCULATED.3IONS-SCNC: 10 MMOL/L (ref 3–16)
BASOPHILS # BLD: 0 K/UL (ref 0–0.2)
BASOPHILS NFR BLD: 0.6 %
BUN SERPL-MCNC: 13 MG/DL (ref 7–20)
CALCIUM SERPL-MCNC: 8 MG/DL (ref 8.3–10.6)
CHLORIDE SERPL-SCNC: 109 MMOL/L (ref 99–110)
CO2 SERPL-SCNC: 22 MMOL/L (ref 21–32)
CREAT SERPL-MCNC: 0.5 MG/DL (ref 0.6–1.1)
DEPRECATED RDW RBC AUTO: 15.1 % (ref 12.4–15.4)
EOSINOPHIL # BLD: 0.2 K/UL (ref 0–0.6)
EOSINOPHIL NFR BLD: 2.8 %
GFR SERPLBLD CREATININE-BSD FMLA CKD-EPI: >60 ML/MIN/{1.73_M2}
GLUCOSE BLD-MCNC: 176 MG/DL (ref 70–99)
GLUCOSE BLD-MCNC: 245 MG/DL (ref 70–99)
GLUCOSE SERPL-MCNC: 178 MG/DL (ref 70–99)
HCT VFR BLD AUTO: 33.6 % (ref 36–48)
HGB BLD-MCNC: 11.2 G/DL (ref 12–16)
LYMPHOCYTES # BLD: 1.5 K/UL (ref 1–5.1)
LYMPHOCYTES NFR BLD: 25.3 %
MCH RBC QN AUTO: 28.3 PG (ref 26–34)
MCHC RBC AUTO-ENTMCNC: 33.3 G/DL (ref 31–36)
MCV RBC AUTO: 85.1 FL (ref 80–100)
MONOCYTES # BLD: 0.4 K/UL (ref 0–1.3)
MONOCYTES NFR BLD: 6.3 %
NEUTROPHILS # BLD: 3.9 K/UL (ref 1.7–7.7)
NEUTROPHILS NFR BLD: 65 %
PERFORMED ON: ABNORMAL
PERFORMED ON: ABNORMAL
PLATELET # BLD AUTO: 189 K/UL (ref 135–450)
PMV BLD AUTO: 10.2 FL (ref 5–10.5)
POTASSIUM SERPL-SCNC: 4.3 MMOL/L (ref 3.5–5.1)
RBC # BLD AUTO: 3.95 M/UL (ref 4–5.2)
SODIUM SERPL-SCNC: 141 MMOL/L (ref 136–145)
WBC # BLD AUTO: 6 K/UL (ref 4–11)

## 2023-07-07 PROCEDURE — 6370000000 HC RX 637 (ALT 250 FOR IP): Performed by: HOSPITALIST

## 2023-07-07 PROCEDURE — 85025 COMPLETE CBC W/AUTO DIFF WBC: CPT

## 2023-07-07 PROCEDURE — 36415 COLL VENOUS BLD VENIPUNCTURE: CPT

## 2023-07-07 PROCEDURE — 2580000003 HC RX 258: Performed by: INTERNAL MEDICINE

## 2023-07-07 PROCEDURE — 80048 BASIC METABOLIC PNL TOTAL CA: CPT

## 2023-07-07 PROCEDURE — 6370000000 HC RX 637 (ALT 250 FOR IP): Performed by: INTERNAL MEDICINE

## 2023-07-07 RX ORDER — VANCOMYCIN HYDROCHLORIDE 250 MG/1
250 CAPSULE ORAL 4 TIMES DAILY
Qty: 28 CAPSULE | Refills: 0 | Status: SHIPPED | OUTPATIENT
Start: 2023-07-07 | End: 2023-07-14

## 2023-07-07 RX ADMIN — SODIUM CHLORIDE: 9 INJECTION, SOLUTION INTRAVENOUS at 04:08

## 2023-07-07 RX ADMIN — VANCOMYCIN HYDROCHLORIDE 250 MG: 250 CAPSULE ORAL at 06:01

## 2023-07-07 RX ADMIN — VANCOMYCIN HYDROCHLORIDE 250 MG: 250 CAPSULE ORAL at 12:30

## 2023-07-07 RX ADMIN — INSULIN LISPRO 4 UNITS: 100 INJECTION, SOLUTION INTRAVENOUS; SUBCUTANEOUS at 12:30

## 2023-07-07 NOTE — PROGRESS NOTES
Data- discharge order received, pt verbalized agreement to discharge, disposition to previous residence, no needs for HHC/DME. Action- discharge instructions prepared and given to pt, pt verbalized understanding. Medication information packet given r/t NEW and/or CHANGED prescriptions emphasizing name/purpose/side effects, pt verbalized understanding. Discharge instruction summary: Diet- general, Activity- as tolerated, Primary Care Physician as Estelle Gu -[phone] f/u appointment 1 week, immunizations reviewe, prescription medications filled meds to beds. Inpatient surgical procedure precautions reviewed: 61 CHF Education reviewed. Pt/ Family has had a total of 60 minutes CHF education this admission encounter. Response- Pt belongings gathered, IV removed. Disposition is home (no HHC/DME needs), transported with sister, taken to lobby via w/c w/ RN, no complications.       Electronically signed by Jennifer Nuno RN on 7/7/23 at 1:50 PM EDT

## 2023-07-07 NOTE — PLAN OF CARE
Problem: Discharge Planning  Goal: Discharge to home or other facility with appropriate resources  7/6/2023 2321 by Ernie Price RN  Outcome: Progressing  7/6/2023 1849 by Marcelo Chaparro RN  Outcome: Progressing  Flowsheets (Taken 7/6/2023 1849)  Discharge to home or other facility with appropriate resources:   Identify barriers to discharge with patient and caregiver   Arrange for needed discharge resources and transportation as appropriate     Problem: Safety - Adult  Goal: Free from fall injury  7/6/2023 2321 by Ernie Price RN  Outcome: Progressing  7/6/2023 1849 by Marcelo Chaparro RN  Outcome: Progressing  Flowsheets (Taken 7/6/2023 1849)  Free From Fall Injury: Instruct family/caregiver on patient safety     Problem: ABCDS Injury Assessment  Goal: Absence of physical injury  Outcome: Progressing     Problem: Chronic Conditions and Co-morbidities  Goal: Patient's chronic conditions and co-morbidity symptoms are monitored and maintained or improved  7/6/2023 2321 by Ernie Price RN  Outcome: Progressing  7/6/2023 1849 by Marcelo Chaparro RN  Outcome: Progressing  Flowsheets (Taken 7/6/2023 1849)  Care Plan - Patient's Chronic Conditions and Co-Morbidity Symptoms are Monitored and Maintained or Improved:   Monitor and assess patient's chronic conditions and comorbid symptoms for stability, deterioration, or improvement   Collaborate with multidisciplinary team to address chronic and comorbid conditions and prevent exacerbation or deterioration

## 2023-07-08 NOTE — DISCHARGE SUMMARY
V2.0  Discharge Summary    Name:  Torrey Buckley /Age/Sex: 1965 (63 y.o. female)   Admit Date: 2023  Discharge Date: 23    MRN & CSN:  [id number] & [id number] Encounter Date and Time 23 1:14 PM EDT    Attending:  No att. providers found Discharging Provider: Jarrell Lua MD       Hospital Course:     Patient is a 67-year-old female past medical history of diabetes, osteoarthritis, hyperlipidemia, who was admitted for nausea, vomiting and diarrhea after completing a course of antibiotics for sinus infection. Patient was also found to have acute kidney injury, hypokalemia. Her electrolytes were replaced. She was empirically started on p.o. vancomycin due to significant leukocytosis on admission. Patient has significant improvement in her diarrhea and leukocytosis with oral vancomycin. Since patient has significant improvement, diarrhea has improved, she will be discharged back home in stable condition on p.o. vancomycin. She was advised to follow with her PCP for transition of care. Discharge Instruction:     Primary care physician: Deloria Jeans, DO within 2 weeks  Diet: regular diet   Activity: activity as tolerated  Disposition: Discharged to:   [x]Home, [][facility], []SNF, []Acute Rehab, []Hospice   Condition on discharge: Stable    Discharge Medications:        Medication List        START taking these medications      vancomycin 250 MG capsule  Commonly known as: VANCOCIN  Take 1 capsule by mouth 4 times daily for 7 days            CONTINUE taking these medications      aspirin 81 MG chewable tablet     atorvastatin 20 MG tablet  Commonly known as: LIPITOR  Take 1 tablet by mouth every evening     blood glucose test strips strip  Commonly known as: ASCENSIA AUTODISC VI;ONE TOUCH ULTRA TEST VI  1 each by In Vitro route daily As needed.      cetirizine 10 MG tablet  Commonly known as: ZYRTEC     Cinnamon 500 MG Tabs     Coenzyme Q10 100 MG Tabs     Cranberry 1000 MG Caps

## 2023-07-10 ENCOUNTER — CARE COORDINATION (OUTPATIENT)
Dept: CASE MANAGEMENT | Age: 58
End: 2023-07-10

## 2023-07-10 NOTE — CARE COORDINATION
[facility] Care Transitions Initial Follow Up Call    Call within 2 business days of discharge: Yes     Patient: Brenda Tavares Patient : 1965   MRN: [id number]  Reason for Admission: Nausea and vomiting  Discharge Date: 23 RARS: Readmission Risk Score: 9.5      Last Discharge [address] [facility]       Date Complaint Diagnosis Description Type Department Provider    23 Emesis; Diarrhea Nausea vomiting and diarrhea . .. ED to Hosp-Admission (Discharged) (ADMITTED) CHIDI Lucas MD; Abdelrahman Bonilla. .. First initial 24 hr follow up outreach call attempt, no answer. CTN left  with contact information and request for return call. CTN will continue with outreach call attempts.       Follow Up  Future Appointments   Date Time Provider [address]   2023  8:15 AM DO GUANACO Wilkins [address] Gaines, RN   Care Transition Nurse  Mobile: [phone]

## 2023-07-11 ENCOUNTER — CARE COORDINATION (OUTPATIENT)
Dept: CASE MANAGEMENT | Age: 58
End: 2023-07-11

## 2023-07-11 NOTE — CARE COORDINATION
[facility] Care Transitions Initial Follow Up Call    Call within 2 business days of discharge: Yes     Patient: Som Holliday Patient : 1965   MRN: [id number]  Reason for Admission: Nausea, vomiting, diarrhea  Discharge Date: 23 RARS: Readmission Risk Score: 9.5      Last Discharge [address] [facility]       Date Complaint Diagnosis Description Type Department Provider    23 Emesis; Diarrhea Nausea vomiting and diarrhea . .. ED to Hosp-Admission (Discharged) (ADMITTED) CHIDI Braun MD; Collin Avila. .. Second and final outreach call attempt, no answer. CTN left VM with contact information and request for return call. CTN will resolve episode and remain available. Pt has an appt with PCP .      Follow Up  Future Appointments   Date Time Provider [address]   2023 11:45 AM DO GUANACO Jung   2023  8:15 AM DO GUANACO Jung BSN, RN   Care Transition Nurse  Mobile: [phone]

## 2023-07-18 ENCOUNTER — OFFICE VISIT (OUTPATIENT)
Dept: FAMILY MEDICINE CLINIC | Age: 58
End: 2023-07-18
Payer: COMMERCIAL

## 2023-07-18 VITALS
OXYGEN SATURATION: 97 % | HEIGHT: 65 IN | BODY MASS INDEX: 26.66 KG/M2 | DIASTOLIC BLOOD PRESSURE: 84 MMHG | WEIGHT: 160 LBS | HEART RATE: 91 BPM | SYSTOLIC BLOOD PRESSURE: 115 MMHG

## 2023-07-18 DIAGNOSIS — Z09 HOSPITAL DISCHARGE FOLLOW-UP: Primary | ICD-10-CM

## 2023-07-18 DIAGNOSIS — R53.1 WEAKNESS: ICD-10-CM

## 2023-07-18 DIAGNOSIS — R10.11 RIGHT UPPER QUADRANT PAIN: ICD-10-CM

## 2023-07-18 DIAGNOSIS — N17.9 AKI (ACUTE KIDNEY INJURY) (HCC): ICD-10-CM

## 2023-07-18 LAB
ALBUMIN SERPL-MCNC: 4.6 G/DL (ref 3.4–5)
ALBUMIN/GLOB SERPL: 1.9 {RATIO} (ref 1.1–2.2)
ALP SERPL-CCNC: 97 U/L (ref 40–129)
ALT SERPL-CCNC: 29 U/L (ref 10–40)
ANION GAP SERPL CALCULATED.3IONS-SCNC: 13 MMOL/L (ref 3–16)
AST SERPL-CCNC: 22 U/L (ref 15–37)
BASOPHILS # BLD: 0 K/UL (ref 0–0.2)
BASOPHILS NFR BLD: 0.6 %
BILIRUB SERPL-MCNC: 0.3 MG/DL (ref 0–1)
BUN SERPL-MCNC: 14 MG/DL (ref 7–20)
CALCIUM SERPL-MCNC: 9.9 MG/DL (ref 8.3–10.6)
CHLORIDE SERPL-SCNC: 103 MMOL/L (ref 99–110)
CO2 SERPL-SCNC: 24 MMOL/L (ref 21–32)
CREAT SERPL-MCNC: 0.7 MG/DL (ref 0.6–1.1)
DEPRECATED RDW RBC AUTO: 15.5 % (ref 12.4–15.4)
EOSINOPHIL # BLD: 0.1 K/UL (ref 0–0.6)
EOSINOPHIL NFR BLD: 1.2 %
GFR SERPLBLD CREATININE-BSD FMLA CKD-EPI: >60 ML/MIN/{1.73_M2}
GLUCOSE SERPL-MCNC: 200 MG/DL (ref 70–99)
HCT VFR BLD AUTO: 38 % (ref 36–48)
HGB BLD-MCNC: 12.8 G/DL (ref 12–16)
LYMPHOCYTES # BLD: 1.1 K/UL (ref 1–5.1)
LYMPHOCYTES NFR BLD: 14.5 %
MCH RBC QN AUTO: 28.7 PG (ref 26–34)
MCHC RBC AUTO-ENTMCNC: 33.6 G/DL (ref 31–36)
MCV RBC AUTO: 85.6 FL (ref 80–100)
MONOCYTES # BLD: 0.4 K/UL (ref 0–1.3)
MONOCYTES NFR BLD: 5 %
NEUTROPHILS # BLD: 6.2 K/UL (ref 1.7–7.7)
NEUTROPHILS NFR BLD: 78.7 %
PLATELET # BLD AUTO: 253 K/UL (ref 135–450)
PMV BLD AUTO: 10.4 FL (ref 5–10.5)
POTASSIUM SERPL-SCNC: 5.4 MMOL/L (ref 3.5–5.1)
PROT SERPL-MCNC: 7 G/DL (ref 6.4–8.2)
RBC # BLD AUTO: 4.44 M/UL (ref 4–5.2)
SODIUM SERPL-SCNC: 140 MMOL/L (ref 136–145)
WBC # BLD AUTO: 7.9 K/UL (ref 4–11)

## 2023-07-18 PROCEDURE — 99214 OFFICE O/P EST MOD 30 MIN: CPT | Performed by: FAMILY MEDICINE

## 2023-07-18 PROCEDURE — 3078F DIAST BP <80 MM HG: CPT | Performed by: FAMILY MEDICINE

## 2023-07-18 PROCEDURE — 3074F SYST BP LT 130 MM HG: CPT | Performed by: FAMILY MEDICINE

## 2023-07-20 ENCOUNTER — HOSPITAL ENCOUNTER (OUTPATIENT)
Dept: ULTRASOUND IMAGING | Age: 58
Discharge: HOME | End: 2023-07-20
Payer: COMMERCIAL

## 2023-07-20 ENCOUNTER — PATIENT MESSAGE (OUTPATIENT)
Dept: FAMILY MEDICINE CLINIC | Age: 58
End: 2023-07-20

## 2023-07-20 DIAGNOSIS — Z09 HOSPITAL DISCHARGE FOLLOW-UP: ICD-10-CM

## 2023-07-20 PROCEDURE — 76705 ECHO EXAM OF ABDOMEN: CPT

## 2023-07-20 NOTE — TELEPHONE ENCOUNTER
From: Irene Matters  To: Dr. Natali Booth  Sent: 7/20/2023 12:13 PM EDT  Subject: Question regarding US Gallbladder    Is this test results good, fair, or bad regarding the liver. I see the results of the other organs are on the good side.  Gallbladder looks good-normal

## 2023-07-28 NOTE — TELEPHONE ENCOUNTER
Please schedule patient in office visit tomorrow. Please document call and then close encounter.   thanks

## 2023-07-28 NOTE — TELEPHONE ENCOUNTER
Please try calling patient again if she would like to be seen today    Please document call and then close encounter.   thanks

## 2023-08-03 ENCOUNTER — PATIENT MESSAGE (OUTPATIENT)
Dept: FAMILY MEDICINE CLINIC | Age: 58
End: 2023-08-03

## 2023-08-03 ENCOUNTER — OFFICE VISIT (OUTPATIENT)
Dept: FAMILY MEDICINE CLINIC | Age: 58
End: 2023-08-03
Payer: COMMERCIAL

## 2023-08-03 VITALS
OXYGEN SATURATION: 97 % | SYSTOLIC BLOOD PRESSURE: 114 MMHG | BODY MASS INDEX: 26.49 KG/M2 | HEIGHT: 65 IN | DIASTOLIC BLOOD PRESSURE: 80 MMHG | WEIGHT: 159 LBS | HEART RATE: 91 BPM

## 2023-08-03 DIAGNOSIS — R39.9 UTI SYMPTOMS: ICD-10-CM

## 2023-08-03 DIAGNOSIS — L08.9 SKIN INFECTION: ICD-10-CM

## 2023-08-03 DIAGNOSIS — E11.00 TYPE 2 DIABETES MELLITUS WITH HYPEROSMOLARITY WITHOUT COMA, UNSPECIFIED WHETHER LONG TERM INSULIN USE (HCC): Primary | ICD-10-CM

## 2023-08-03 LAB
BILIRUBIN, POC: NEGATIVE
BLOOD URINE, POC: NEGATIVE
CLARITY, POC: NORMAL
COLOR, POC: YELLOW
GLUCOSE URINE, POC: 500
KETONES, POC: NORMAL
LEUKOCYTE EST, POC: NEGATIVE
NITRITE, POC: NEGATIVE
PH, POC: 5.5
PROTEIN, POC: NEGATIVE
SPECIFIC GRAVITY, POC: 1.02
UROBILINOGEN, POC: 0.2

## 2023-08-03 PROCEDURE — 3079F DIAST BP 80-89 MM HG: CPT | Performed by: FAMILY MEDICINE

## 2023-08-03 PROCEDURE — 81002 URINALYSIS NONAUTO W/O SCOPE: CPT | Performed by: FAMILY MEDICINE

## 2023-08-03 PROCEDURE — 3051F HG A1C>EQUAL 7.0%<8.0%: CPT | Performed by: FAMILY MEDICINE

## 2023-08-03 PROCEDURE — 99214 OFFICE O/P EST MOD 30 MIN: CPT | Performed by: FAMILY MEDICINE

## 2023-08-03 PROCEDURE — 3074F SYST BP LT 130 MM HG: CPT | Performed by: FAMILY MEDICINE

## 2023-08-03 RX ORDER — CLINDAMYCIN HYDROCHLORIDE 300 MG/1
300 CAPSULE ORAL 2 TIMES DAILY
Qty: 10 CAPSULE | Refills: 0 | Status: SHIPPED | OUTPATIENT
Start: 2023-08-03 | End: 2023-08-08

## 2023-08-03 RX ORDER — METFORMIN HYDROCHLORIDE 500 MG/1
TABLET, EXTENDED RELEASE ORAL
Qty: 270 TABLET | Refills: 3 | Status: SHIPPED | OUTPATIENT
Start: 2023-08-03

## 2023-08-03 RX ORDER — INSULIN GLARGINE 100 [IU]/ML
INJECTION, SOLUTION SUBCUTANEOUS
Qty: 5 ADJUSTABLE DOSE PRE-FILLED PEN SYRINGE | COMMUNITY
Start: 2023-08-03

## 2023-08-03 NOTE — PATIENT INSTRUCTIONS
Goal= 80 to 120 fasting, 180 an hour after eating, 140 two hours after eating    Please update me on your sugars on Monday

## 2023-08-21 ENCOUNTER — PATIENT MESSAGE (OUTPATIENT)
Dept: FAMILY MEDICINE CLINIC | Age: 58
End: 2023-08-21

## 2023-08-28 LAB
CATARACTS: POSITIVE
DIABETIC RETINOPATHY: NEGATIVE
GLAUCOMA: NEGATIVE
INTRAOCULAR PRESSURE EYE: 18
INTRAOCULAR PRESSURE EYE: 20
VISUAL ACUITY DISTANCE LEFT EYE: NORMAL
VISUAL ACUITY DISTANCE RIGHT EYE: NORMAL

## 2023-09-25 RX ORDER — DAPAGLIFLOZIN 5 MG/1
TABLET, FILM COATED ORAL
Qty: 90 TABLET | Refills: 3 | Status: SHIPPED | OUTPATIENT
Start: 2023-09-25

## 2023-09-25 NOTE — TELEPHONE ENCOUNTER
Medication:   Requested Prescriptions     Pending Prescriptions Disp Refills    FARXIGA 5 MG tablet [Pharmacy Med Name: Felecia Gutierrezum 5MG TABLETS] 30 tablet 0     Sig: TAKE 1 TABLET BY MOUTH EVERY MORNING IN PLACE OF METFORMIN        Last Filled:  09/08/2023    Patient Phone Number: [phone] (home)     Last appt: 8/3/2023   Next appt: 12/12/2023    Last OARRS:       3/11/2021    10:56 AM   RX Monitoring   Periodic Controlled Substance Monitoring Possible medication side effects, risk of tolerance/dependence & alternative treatments discussed. ;No signs of potential drug abuse or diversion identified.

## 2023-10-02 ENCOUNTER — TELEPHONE (OUTPATIENT)
Dept: FAMILY MEDICINE CLINIC | Age: 58
End: 2023-10-02

## 2023-10-02 RX ORDER — INSULIN GLARGINE 100 [IU]/ML
INJECTION, SOLUTION SUBCUTANEOUS
Qty: 5 ADJUSTABLE DOSE PRE-FILLED PEN SYRINGE | Refills: 0
Start: 2023-10-02

## 2023-10-02 NOTE — TELEPHONE ENCOUNTER
I have sent prescription for Farxiga 10 mg to the pharmacy with note to keep on file until she requested. I would also like her to increase her Lantus from 22 units to 27 units.

## 2023-10-02 NOTE — TELEPHONE ENCOUNTER
Please see if she was having itching on the 5 mg dose. If not, would like her to go back down to the 5 mg dose.   I also feel it would be best to have her evaluated at least 1 time by an endocrinologist.  Would she be okay with referral?

## 2023-10-02 NOTE — TELEPHONE ENCOUNTER
Pt called stating that she requested a refill of her dapagliflozin (FARXIGA) 5 MG tablet but she said that the dosage was upped by dr mallory and the pharmacy said that they had received it incorrectly. Pt said that it was upped to 10. Pt said that she has been doubling up on the refill she has but said that she will run out before the next refill.  Pt is reachable at [phone]

## 2023-10-06 ENCOUNTER — TELEPHONE (OUTPATIENT)
Dept: FAMILY MEDICINE CLINIC | Age: 58
End: 2023-10-06

## 2023-10-06 DIAGNOSIS — E11.00 TYPE 2 DIABETES MELLITUS WITH HYPEROSMOLARITY WITHOUT COMA, WITH LONG-TERM CURRENT USE OF INSULIN (HCC): Primary | ICD-10-CM

## 2023-10-06 DIAGNOSIS — Z79.4 TYPE 2 DIABETES MELLITUS WITH HYPEROSMOLARITY WITHOUT COMA, WITH LONG-TERM CURRENT USE OF INSULIN (HCC): Primary | ICD-10-CM

## 2023-10-06 NOTE — TELEPHONE ENCOUNTER
Please see if patient would like referral to Dr. Emanuel Preston office in endocrinology. Also, did the lower dose Farxiga cause any itching? If it did not, I would like her to take that dose. Has she seen a change with the 27 units of Lantus yet?

## 2023-10-06 NOTE — TELEPHONE ENCOUNTER
Called and informed pt of below. Pt states she does want 's office in endo. Pt states the farxiga isn't causing any itching. Pt states she thinks it was her Deoderant caused the itching because it had coconut oil in it and pt is highly allergic to coconut oil. Pt also states there was no changes. Pt states she checked her sugar levels and it was 266.

## 2023-10-06 NOTE — TELEPHONE ENCOUNTER
Pt called stating that she talked to dr shawnee friend about a referral for a diabetic specialist because the medication she is taking is not helping her sugars go down.  Pt is reachable at - [phone]

## 2023-10-09 NOTE — TELEPHONE ENCOUNTER
Called pt and informed of below. Gave pt number to schedule with Dr. Katia Logan. Pt had no further questions or concerns.

## 2023-10-09 NOTE — TELEPHONE ENCOUNTER
Referral placed, please give phone number to schedule, would like her to continue 10 mg of Raguel Venice, ok to send thru my chart, thank you

## 2023-10-25 ENCOUNTER — OFFICE VISIT (OUTPATIENT)
Dept: FAMILY MEDICINE CLINIC | Age: 58
End: 2023-10-25
Payer: COMMERCIAL

## 2023-10-25 VITALS
DIASTOLIC BLOOD PRESSURE: 84 MMHG | BODY MASS INDEX: 26.96 KG/M2 | HEIGHT: 65 IN | HEART RATE: 90 BPM | SYSTOLIC BLOOD PRESSURE: 128 MMHG | WEIGHT: 161.8 LBS | OXYGEN SATURATION: 97 %

## 2023-10-25 DIAGNOSIS — N30.00 ACUTE CYSTITIS WITHOUT HEMATURIA: ICD-10-CM

## 2023-10-25 DIAGNOSIS — R30.0 DYSURIA: Primary | ICD-10-CM

## 2023-10-25 LAB
BILIRUBIN, POC: NEGATIVE
BLOOD URINE, POC: NORMAL
CLARITY, POC: NORMAL
COLOR, POC: NORMAL
GLUCOSE URINE, POC: 500
KETONES, POC: NEGATIVE
LEUKOCYTE EST, POC: NEGATIVE
NITRITE, POC: NEGATIVE
PH, POC: 5.5
PROTEIN, POC: NEGATIVE
SPECIFIC GRAVITY, POC: 1.01
UROBILINOGEN, POC: 0.2

## 2023-10-25 PROCEDURE — 3074F SYST BP LT 130 MM HG: CPT | Performed by: NURSE PRACTITIONER

## 2023-10-25 PROCEDURE — 81002 URINALYSIS NONAUTO W/O SCOPE: CPT | Performed by: NURSE PRACTITIONER

## 2023-10-25 PROCEDURE — 99213 OFFICE O/P EST LOW 20 MIN: CPT | Performed by: NURSE PRACTITIONER

## 2023-10-25 PROCEDURE — 3079F DIAST BP 80-89 MM HG: CPT | Performed by: NURSE PRACTITIONER

## 2023-10-25 RX ORDER — NITROFURANTOIN 25; 75 MG/1; MG/1
100 CAPSULE ORAL 2 TIMES DAILY
Qty: 14 CAPSULE | Refills: 0 | Status: SHIPPED | OUTPATIENT
Start: 2023-10-25 | End: 2023-11-01

## 2023-10-25 ASSESSMENT — PATIENT HEALTH QUESTIONNAIRE - PHQ9
SUM OF ALL RESPONSES TO PHQ QUESTIONS 1-9: 0
1. LITTLE INTEREST OR PLEASURE IN DOING THINGS: 0
SUM OF ALL RESPONSES TO PHQ QUESTIONS 1-9: 0
2. FEELING DOWN, DEPRESSED OR HOPELESS: 0
SUM OF ALL RESPONSES TO PHQ QUESTIONS 1-9: 0
SUM OF ALL RESPONSES TO PHQ9 QUESTIONS 1 & 2: 0
SUM OF ALL RESPONSES TO PHQ QUESTIONS 1-9: 0

## 2023-10-25 NOTE — PROGRESS NOTES
Josiah Lockett   YOB: 1965    Date of Visit:  10/25/2023      CC: Dysuria    HPI:  Patient complains of a 4 day(s) history of dysuria, frequency, urgency, and vaginal itching/irritation. She denies any other symptoms. Symptoms have been worsening with time. Sexually active: Yes - . Relevant medical history: none. Treatment to date: none. Vitals:    10/25/23 1030   BP: 128/84   Pulse: 90   SpO2: 97%   Weight: 73.4 kg (161 lb 12.8 oz)   Height: 1.651 m (5' 5\")       Outpatient Medications Marked as Taking for the 10/25/23 encounter (Office Visit) with BETSY Fletcher CNP   Medication Sig Dispense Refill    nitrofurantoin, macrocrystal-monohydrate, (MACROBID) 100 MG capsule Take 1 capsule by mouth 2 times daily for 7 days 14 capsule 0    dapagliflozin (FARXIGA) 10 MG tablet Take 1 tablet by mouth every morning Dose increased 90 tablet 3    insulin glargine (LANTUS SOLOSTAR) 100 UNIT/ML injection pen Inject 27 units QHS 5 Adjustable Dose Pre-filled Pen Syringe 0    cetirizine (ZYRTEC) 10 MG tablet Take 1 tablet by mouth daily      turmeric 500 MG CAPS Take by mouth 2 times daily      Cinnamon 500 MG TABS Take by mouth 2 times daily      Coenzyme Q10 100 MG TABS Take by mouth daily      glipiZIDE (GLUCOTROL) 10 MG tablet TAKE 2 TABLETS BY MOUTH TWICE DAILY BEFORE breakfast and dinner, take with food 360 tablet 3    atorvastatin (LIPITOR) 20 MG tablet Take 1 tablet by mouth every evening 90 tablet 3    SITagliptin (JANUVIA) 100 MG tablet Take 1 tablet by mouth daily 90 tablet 3    Cranberry 1000 MG CAPS Take 1 tablet by mouth 2 times daily      Blood Glucose Monitoring Suppl (ONE TOUCH ULTRA MINI) w/Device KIT Use to check sugars 1 kit 0    glucose blood VI test strips (ASCENSIA AUTODISC VI;ONE TOUCH ULTRA TEST VI) strip 1 each by In Vitro route daily As needed.  100 each 3    ONE TOUCH LANCETS MISC 1 each by Does not apply route daily 100 each 3    aspirin 81 MG chewable tablet Take

## 2023-10-26 DIAGNOSIS — E11.00 TYPE 2 DIABETES MELLITUS WITH HYPEROSMOLARITY WITHOUT COMA, UNSPECIFIED WHETHER LONG TERM INSULIN USE (HCC): ICD-10-CM

## 2023-10-26 LAB
BACTERIA UR CULT: ABNORMAL
ORGANISM: ABNORMAL

## 2023-10-26 RX ORDER — ATORVASTATIN CALCIUM 20 MG/1
20 TABLET, FILM COATED ORAL EVERY EVENING
Qty: 90 TABLET | Refills: 3 | Status: SHIPPED | OUTPATIENT
Start: 2023-10-26

## 2023-10-26 NOTE — TELEPHONE ENCOUNTER
Medication:   Requested Prescriptions     Pending Prescriptions Disp Refills    atorvastatin (LIPITOR) 20 MG tablet [Pharmacy Med Name: ATORVASTATIN 20MG TABLETS] 90 tablet 3     Sig: TAKE 1 TABLET BY MOUTH EVERY EVENING        Last Filled:  12/08/2022    Patient Phone Number: [phone] (home)     Last appt: 10/25/2023   Next appt: 12/12/2023    Last OARRS:       3/11/2021    10:56 AM   RX Monitoring   Periodic Controlled Substance Monitoring Possible medication side effects, risk of tolerance/dependence & alternative treatments discussed. ;No signs of potential drug abuse or diversion identified.

## 2023-10-27 ENCOUNTER — OFFICE VISIT (OUTPATIENT)
Dept: ENDOCRINOLOGY | Age: 58
End: 2023-10-27
Payer: COMMERCIAL

## 2023-10-27 VITALS
BODY MASS INDEX: 26.96 KG/M2 | HEIGHT: 65 IN | HEART RATE: 102 BPM | DIASTOLIC BLOOD PRESSURE: 90 MMHG | SYSTOLIC BLOOD PRESSURE: 140 MMHG | RESPIRATION RATE: 16 BRPM | WEIGHT: 161.8 LBS

## 2023-10-27 DIAGNOSIS — E78.2 MIXED HYPERLIPIDEMIA: ICD-10-CM

## 2023-10-27 DIAGNOSIS — E11.65 UNCONTROLLED TYPE 2 DIABETES MELLITUS WITH HYPERGLYCEMIA, WITH LONG-TERM CURRENT USE OF INSULIN (HCC): Primary | ICD-10-CM

## 2023-10-27 DIAGNOSIS — Z79.4 UNCONTROLLED TYPE 2 DIABETES MELLITUS WITH HYPERGLYCEMIA, WITH LONG-TERM CURRENT USE OF INSULIN (HCC): Primary | ICD-10-CM

## 2023-10-27 DIAGNOSIS — I10 ESSENTIAL HYPERTENSION: ICD-10-CM

## 2023-10-27 LAB — HBA1C MFR BLD: 9.8 %

## 2023-10-27 PROCEDURE — 99204 OFFICE O/P NEW MOD 45 MIN: CPT | Performed by: INTERNAL MEDICINE

## 2023-10-27 PROCEDURE — 83036 HEMOGLOBIN GLYCOSYLATED A1C: CPT | Performed by: INTERNAL MEDICINE

## 2023-10-27 PROCEDURE — 3051F HG A1C>EQUAL 7.0%<8.0%: CPT | Performed by: INTERNAL MEDICINE

## 2023-10-27 PROCEDURE — 3077F SYST BP >= 140 MM HG: CPT | Performed by: INTERNAL MEDICINE

## 2023-10-27 PROCEDURE — 3080F DIAST BP >= 90 MM HG: CPT | Performed by: INTERNAL MEDICINE

## 2023-10-27 RX ORDER — TIRZEPATIDE 2.5 MG/.5ML
2.5 INJECTION, SOLUTION SUBCUTANEOUS WEEKLY
Qty: 4 EACH | Refills: 1 | Status: SHIPPED | OUTPATIENT
Start: 2023-10-27

## 2023-10-27 RX ORDER — GLIPIZIDE 10 MG/1
TABLET ORAL
Qty: 360 TABLET | Refills: 3 | Status: SHIPPED | COMMUNITY
Start: 2023-10-27

## 2023-10-27 RX ORDER — INSULIN GLARGINE 100 [IU]/ML
INJECTION, SOLUTION SUBCUTANEOUS
Qty: 5 ADJUSTABLE DOSE PRE-FILLED PEN SYRINGE | Refills: 0 | Status: SHIPPED
Start: 2023-10-27

## 2023-10-27 NOTE — PROGRESS NOTES
[facility]    Chief Complaint:     Chief Complaint   Patient presents with    New Patient     Type 2 DM          Subjective:   Michael Malagon is a pleasant 63 y.o. female who presents for an initial evaluation of Diabetes Mellitus type 2. Patient also has hypertension, hyperlipidemia and has a BMI of 26. Diagnosed: long standing   On insulin since: few years  Other diabetes meds tried in the past: Unable to tolerate Jardiance due to infections and GI upset. Metformin XR (stopped due to GI symptoms)  Hx of severe hypoglycemia or DKA: none   Hx of MI/stroke/CKD: no MI or stroke, had ROGER in  and hyperglycemia  Hx of pancreatitis: no   Family hx of thyroid cancer: no     Most recent HbA1c: 9.8% from 10/27/2023. Hemoglobin A1C   Date Value Ref Range Status   2023 7.2 See comment % Final     Comment:     Comment:  Diagnosis of Diabetes: > or = 6.5%  Increased risk of diabetes (Prediabetes): 5.7-6.4%  Glycemic Control: Nonpregnant Adults: <7.0%                    Pregnant: <6.0%          Current regimen:  Farxiga 10 mg daily (takes it 5 mg BID as she feels itchy if taken all together)  Glipizide 10 mg tablets, advised to take 2 tablets twice daily  Lantus 26 units at bedtime  Januvia 100 mg daily    Blood sugar ranges: checked in the morning while fasting. Fasting: 160-280     Hypoglycemic episodes: none recently   Hypoglycemic symptoms at blood sugar of:  Hypoglycemia treatment:    Meals: 3 meals, mainly cooked meals. Stopped drinking coke. She drinks water, Gatorade zero. HS snacks on popcorn  Exercise:     Last eye exam: due Dec 2023, had retinal hemorrhages in the past.   Foot care: no concerns, no neuropathy     Hyperlipidemia: Atorvastatin 20 mg daily  Hypertension: Not on any blood pressure medications. She was on lisinopril but stopped due to ROGER and hyperkalemia. She has history of type 2 diabetes and heart issues and  father.   Patient does not smoke and does

## 2023-10-27 NOTE — PATIENT INSTRUCTIONS
Increase lantus to 30 units at bedtime. Stop Januvia. Start [facility] weekly. Lower Glipizde to 10 mg twice daily. Check blood glucose twice daily, fasting and before dinner.

## 2023-10-28 DIAGNOSIS — E11.65 UNCONTROLLED TYPE 2 DIABETES MELLITUS WITH HYPERGLYCEMIA, WITH LONG-TERM CURRENT USE OF INSULIN (HCC): ICD-10-CM

## 2023-10-28 DIAGNOSIS — Z79.4 UNCONTROLLED TYPE 2 DIABETES MELLITUS WITH HYPERGLYCEMIA, WITH LONG-TERM CURRENT USE OF INSULIN (HCC): ICD-10-CM

## 2023-10-30 RX ORDER — INSULIN GLARGINE 100 [IU]/ML
INJECTION, SOLUTION SUBCUTANEOUS
Qty: 15 ML | Refills: 1 | OUTPATIENT
Start: 2023-10-30

## 2023-10-31 ENCOUNTER — TELEPHONE (OUTPATIENT)
Dept: ENDOCRINOLOGY | Age: 58
End: 2023-10-31

## 2023-10-31 DIAGNOSIS — Z79.4 UNCONTROLLED TYPE 2 DIABETES MELLITUS WITH HYPERGLYCEMIA, WITH LONG-TERM CURRENT USE OF INSULIN (HCC): ICD-10-CM

## 2023-10-31 DIAGNOSIS — E11.65 UNCONTROLLED TYPE 2 DIABETES MELLITUS WITH HYPERGLYCEMIA, WITH LONG-TERM CURRENT USE OF INSULIN (HCC): ICD-10-CM

## 2023-10-31 RX ORDER — INSULIN GLARGINE 100 [IU]/ML
INJECTION, SOLUTION SUBCUTANEOUS
Qty: 5 ADJUSTABLE DOSE PRE-FILLED PEN SYRINGE | Refills: 0 | Status: SHIPPED | OUTPATIENT
Start: 2023-10-31

## 2023-10-31 NOTE — TELEPHONE ENCOUNTER
Pt calling and states she went to pharmacy to  rx for her Lantus and pharmacy told her that it has not been sent    [facility] [address]    CB# [phone]

## 2023-11-03 ENCOUNTER — PATIENT MESSAGE (OUTPATIENT)
Dept: FAMILY MEDICINE CLINIC | Age: 58
End: 2023-11-03

## 2023-11-03 RX ORDER — FLUCONAZOLE 150 MG/1
150 TABLET ORAL ONCE
Qty: 1 TABLET | Refills: 0 | Status: SHIPPED | OUTPATIENT
Start: 2023-11-03 | End: 2023-11-03

## 2023-11-27 DIAGNOSIS — E78.2 MIXED HYPERLIPIDEMIA: ICD-10-CM

## 2023-11-27 DIAGNOSIS — Z79.4 UNCONTROLLED TYPE 2 DIABETES MELLITUS WITH HYPERGLYCEMIA, WITH LONG-TERM CURRENT USE OF INSULIN (HCC): ICD-10-CM

## 2023-11-27 DIAGNOSIS — E11.65 UNCONTROLLED TYPE 2 DIABETES MELLITUS WITH HYPERGLYCEMIA, WITH LONG-TERM CURRENT USE OF INSULIN (HCC): ICD-10-CM

## 2023-11-27 LAB
ALBUMIN SERPL-MCNC: 4.5 G/DL (ref 3.4–5)
ALBUMIN/GLOB SERPL: 2.1 {RATIO} (ref 1.1–2.2)
ALP SERPL-CCNC: 124 U/L (ref 40–129)
ALT SERPL-CCNC: 25 U/L (ref 10–40)
ANION GAP SERPL CALCULATED.3IONS-SCNC: 11 MMOL/L (ref 3–16)
AST SERPL-CCNC: 24 U/L (ref 15–37)
BILIRUB SERPL-MCNC: 0.6 MG/DL (ref 0–1)
BUN SERPL-MCNC: 12 MG/DL (ref 7–20)
CALCIUM SERPL-MCNC: 10.2 MG/DL (ref 8.3–10.6)
CHLORIDE SERPL-SCNC: 104 MMOL/L (ref 99–110)
CHOLEST SERPL-MCNC: 161 MG/DL (ref 0–199)
CO2 SERPL-SCNC: 26 MMOL/L (ref 21–32)
CREAT SERPL-MCNC: 0.7 MG/DL (ref 0.6–1.1)
CREAT UR-MCNC: 127.1 MG/DL (ref 28–259)
GFR SERPLBLD CREATININE-BSD FMLA CKD-EPI: >60 ML/MIN/{1.73_M2}
GLUCOSE SERPL-MCNC: 98 MG/DL (ref 70–99)
HDLC SERPL-MCNC: 42 MG/DL (ref 40–60)
LDLC SERPL CALC-MCNC: 105 MG/DL
MICROALBUMIN UR DL<=1MG/L-MCNC: <1.2 MG/DL
MICROALBUMIN/CREAT UR: NORMAL MG/G (ref 0–30)
POTASSIUM SERPL-SCNC: 4 MMOL/L (ref 3.5–5.1)
PROT SERPL-MCNC: 6.6 G/DL (ref 6.4–8.2)
SODIUM SERPL-SCNC: 141 MMOL/L (ref 136–145)
TRIGL SERPL-MCNC: 68 MG/DL (ref 0–150)
TSH SERPL DL<=0.005 MIU/L-ACNC: 1.2 UIU/ML (ref 0.27–4.2)
VLDLC SERPL CALC-MCNC: 14 MG/DL

## 2023-11-30 NOTE — PROGRESS NOTES
[facility]    Chief Complaint:     Chief Complaint   Patient presents with    Follow-up     DM      Subjective:   Betsy Pearce is a pleasant 63 y.o. female who presents for follow up of Diabetes Mellitus type 2. Patient also has hypertension, hyperlipidemia and has a BMI of 26. Diagnosed: long standing   On insulin since: few years  Other diabetes meds tried in the past: Unable to tolerate Jardiance due to infections and GI upset. Metformin XR (stopped due to GI symptoms)  Hx of severe hypoglycemia or DKA: none   Hx of MI/stroke/CKD: no MI or stroke, no CKD  Hx of pancreatitis: no   Family hx of thyroid cancer: no     Most recent HbA1c:   Hemoglobin A1C   Date Value Ref Range Status   10/27/2023 9.8 % Final      Current regimen:  Farxiga 10 mg daily (takes it 5 mg BID as she feels itchy if taken all together)  Glipizide 10 mg BID  Lantus 30 units at bedtime  Mounjaro 2.5 mg weekly, reports compliance and tolerance. She had noticed improvement in blood sugars over the past few weeks. Blood sugar ranges:   Fastin64-54-  Dinner: 108-145   She is now interested in CGM. Hypoglycemic episodes: none recently     Meals: 3 meals, mainly cooked meals. Stopped drinking coke. She drinks water, Gatorade zero. Has been trying to cut down on bedtime snacking. Exercise:     Last eye exam: due Dec 2023, no DR. Foot care: no concerns, no neuropathy     Hyperlipidemia: Atorvastatin 20 mg daily,  in 2023. Hypertension: Not on any blood pressure medications. She was on lisinopril but stopped due to ROGER and hyperkalemia. Blood pressure improved today. Over the past 2 months, she had to episodes of urine infections with itching and urgency. Treated x 2 with antibiotics. She continues to report symptoms. She has history of type 2 diabetes and heart issues and  father. Patient does not smoke and does not drink. No illicit drug use.   She lives with her

## 2023-12-01 ENCOUNTER — OFFICE VISIT (OUTPATIENT)
Dept: ENDOCRINOLOGY | Age: 58
End: 2023-12-01
Payer: COMMERCIAL

## 2023-12-01 VITALS
HEIGHT: 65 IN | SYSTOLIC BLOOD PRESSURE: 127 MMHG | WEIGHT: 160.8 LBS | HEART RATE: 81 BPM | DIASTOLIC BLOOD PRESSURE: 82 MMHG | BODY MASS INDEX: 26.79 KG/M2 | RESPIRATION RATE: 16 BRPM

## 2023-12-01 DIAGNOSIS — E78.2 MIXED HYPERLIPIDEMIA: Primary | ICD-10-CM

## 2023-12-01 DIAGNOSIS — Z79.4 UNCONTROLLED TYPE 2 DIABETES MELLITUS WITH HYPERGLYCEMIA, WITH LONG-TERM CURRENT USE OF INSULIN (HCC): ICD-10-CM

## 2023-12-01 DIAGNOSIS — E11.65 UNCONTROLLED TYPE 2 DIABETES MELLITUS WITH HYPERGLYCEMIA, WITH LONG-TERM CURRENT USE OF INSULIN (HCC): ICD-10-CM

## 2023-12-01 PROCEDURE — 3079F DIAST BP 80-89 MM HG: CPT | Performed by: INTERNAL MEDICINE

## 2023-12-01 PROCEDURE — 99214 OFFICE O/P EST MOD 30 MIN: CPT | Performed by: INTERNAL MEDICINE

## 2023-12-01 PROCEDURE — 3046F HEMOGLOBIN A1C LEVEL >9.0%: CPT | Performed by: INTERNAL MEDICINE

## 2023-12-01 PROCEDURE — 3074F SYST BP LT 130 MM HG: CPT | Performed by: INTERNAL MEDICINE

## 2023-12-01 RX ORDER — BLOOD-GLUCOSE SENSOR
EACH MISCELLANEOUS
Qty: 2 EACH | Refills: 5 | Status: SHIPPED | OUTPATIENT
Start: 2023-12-01

## 2023-12-01 RX ORDER — TIRZEPATIDE 5 MG/.5ML
5 INJECTION, SOLUTION SUBCUTANEOUS WEEKLY
Qty: 4 ADJUSTABLE DOSE PRE-FILLED PEN SYRINGE | Refills: 0 | Status: SHIPPED | OUTPATIENT
Start: 2023-12-01

## 2023-12-01 RX ORDER — INSULIN GLARGINE 100 [IU]/ML
INJECTION, SOLUTION SUBCUTANEOUS
Qty: 5 ADJUSTABLE DOSE PRE-FILLED PEN SYRINGE | Refills: 2 | Status: SHIPPED | OUTPATIENT
Start: 2023-12-01

## 2023-12-01 RX ORDER — ATORVASTATIN CALCIUM 20 MG/1
20 TABLET, FILM COATED ORAL EVERY EVENING
Qty: 90 TABLET | Refills: 3 | Status: SHIPPED | OUTPATIENT
Start: 2023-12-01 | End: 2023-12-01 | Stop reason: ALTCHOICE

## 2023-12-01 RX ORDER — ATORVASTATIN CALCIUM 40 MG/1
40 TABLET, FILM COATED ORAL DAILY
Qty: 90 TABLET | Refills: 1 | Status: SHIPPED | OUTPATIENT
Start: 2023-12-01

## 2023-12-01 RX ORDER — INSULIN GLARGINE 100 [IU]/ML
INJECTION, SOLUTION SUBCUTANEOUS
Qty: 5 ADJUSTABLE DOSE PRE-FILLED PEN SYRINGE | Refills: 0 | Status: SHIPPED | OUTPATIENT
Start: 2023-12-01 | End: 2023-12-01 | Stop reason: SDUPTHER

## 2023-12-01 RX ORDER — GLIPIZIDE 10 MG/1
TABLET ORAL
Qty: 360 TABLET | Refills: 3 | Status: SHIPPED | OUTPATIENT
Start: 2023-12-01

## 2023-12-14 ENCOUNTER — TELEPHONE (OUTPATIENT)
Dept: FAMILY MEDICINE CLINIC | Age: 58
End: 2023-12-14

## 2023-12-14 RX ORDER — ONDANSETRON 4 MG/1
4 TABLET, ORALLY DISINTEGRATING ORAL 2 TIMES DAILY PRN
Qty: 20 TABLET | Refills: 0 | Status: SHIPPED | OUTPATIENT
Start: 2023-12-14

## 2023-12-28 ENCOUNTER — TELEPHONE (OUTPATIENT)
Dept: FAMILY MEDICINE CLINIC | Age: 58
End: 2023-12-28

## 2023-12-28 RX ORDER — PROMETHAZINE HYDROCHLORIDE 25 MG/1
25 TABLET ORAL 2 TIMES DAILY PRN
Qty: 20 TABLET | Refills: 0 | Status: SHIPPED | OUTPATIENT
Start: 2023-12-28 | End: 2024-01-07

## 2023-12-28 NOTE — TELEPHONE ENCOUNTER
Rx was recently called in for zofran. Pt is requesting to have different med called in for nausea, not zofran. Please advise. Please call into [facility] in [address].  Please call pt back at [phone], or her daughter, Joshua Garcia, is reachable at [phone]

## 2024-01-03 ENCOUNTER — TELEPHONE (OUTPATIENT)
Dept: ENDOCRINOLOGY | Age: 59
End: 2024-01-03

## 2024-01-03 DIAGNOSIS — Z79.4 UNCONTROLLED TYPE 2 DIABETES MELLITUS WITH HYPERGLYCEMIA, WITH LONG-TERM CURRENT USE OF INSULIN (HCC): ICD-10-CM

## 2024-01-03 DIAGNOSIS — E11.65 UNCONTROLLED TYPE 2 DIABETES MELLITUS WITH HYPERGLYCEMIA, WITH LONG-TERM CURRENT USE OF INSULIN (HCC): ICD-10-CM

## 2024-01-03 RX ORDER — TIRZEPATIDE 5 MG/.5ML
INJECTION, SOLUTION SUBCUTANEOUS
Qty: 2 ML | OUTPATIENT
Start: 2024-01-03

## 2024-01-03 NOTE — TELEPHONE ENCOUNTER
LVM to see how patient is tolerating Mounjaro. Would patient like to increase to the next dose of 7.5 mg or stay on the 5 mg dose?

## 2024-01-04 ENCOUNTER — SCHEDULED TELEPHONE ENCOUNTER (OUTPATIENT)
Dept: FAMILY MEDICINE CLINIC | Age: 59
End: 2024-01-04
Payer: COMMERCIAL

## 2024-01-04 ENCOUNTER — TELEPHONE (OUTPATIENT)
Dept: FAMILY MEDICINE CLINIC | Age: 59
End: 2024-01-04

## 2024-01-04 DIAGNOSIS — U07.1 COVID-19: Primary | ICD-10-CM

## 2024-01-04 PROCEDURE — 99441 PR PHYS/QHP TELEPHONE EVALUATION 5-10 MIN: CPT | Performed by: FAMILY MEDICINE

## 2024-01-04 RX ORDER — AZITHROMYCIN 250 MG/1
TABLET, FILM COATED ORAL
Qty: 6 TABLET | Refills: 0 | Status: SHIPPED | OUTPATIENT
Start: 2024-01-04

## 2024-01-04 RX ORDER — TIRZEPATIDE 5 MG/.5ML
5 INJECTION, SOLUTION SUBCUTANEOUS WEEKLY
Qty: 2 ML | Refills: 0 | Status: SHIPPED | OUTPATIENT
Start: 2024-01-04

## 2024-01-04 ASSESSMENT — PATIENT HEALTH QUESTIONNAIRE - PHQ9
2. FEELING DOWN, DEPRESSED OR HOPELESS: 0
SUM OF ALL RESPONSES TO PHQ QUESTIONS 1-9: 0
SUM OF ALL RESPONSES TO PHQ QUESTIONS 1-9: 0
1. LITTLE INTEREST OR PLEASURE IN DOING THINGS: 0
SUM OF ALL RESPONSES TO PHQ9 QUESTIONS 1 & 2: 0
SUM OF ALL RESPONSES TO PHQ QUESTIONS 1-9: 0
SUM OF ALL RESPONSES TO PHQ QUESTIONS 1-9: 0

## 2024-01-04 NOTE — TELEPHONE ENCOUNTER
Please have pt schedule virtual visit for this morning. Need to take a look at her on video and discuss Covid, possible medication further with her risk factors

## 2024-01-04 NOTE — TELEPHONE ENCOUNTER
Pt called back, gave pt verbatim message.     Pt stated that she would like to continue with the Mounjaro 5 mg for another month     Please advise

## 2024-01-04 NOTE — TELEPHONE ENCOUNTER
Tested positive for covid last evening. Fevers, cough, congestion. Wanting to know if you will call something in for her. Please advise. Please call Leann back at [phone]

## 2024-01-10 NOTE — PROGRESS NOTES
Appointment was done over the telephone.  Patient gave consent for a telephone visit.  Patient was in their state of [facility], [place], at time of visit.  Parties involved in visit were myself, nurse, and patient.Over 11 minutes was spent in patient care including chart review, coordination of care.  Phone call approximately 8 minutes.            A/P:    Diagnosis Orders   1. COVID-19          With her risk factors, insulin-dependent diabetes especially, patient would like to proceed with Paxlovid, I spoke with patient. Patient would like to start Paxlovid. We discussed common side effects. Patient is aware med is emergency use only and not fda approved yet. Paxlovid prescription sent to pharmacy.  If medication not covered by insurance, or patient declines due to out-of-pocket price, I have sent azithromycin to the pharmacy to take instead.  She expresses understanding that she should take 1 and not both.  She is to hold her statin for 2 weeks.  She plans to contact her endocrinologist about sick day rules.  In the meantime, she will hold her glipizide.Patient to be evaluated if symptoms worsen or fail to resolve.      O: There were no vitals taken for this visit.   Gen- NAD, pleasant  Lungs- No increased work of breathing appreciated  Psych- Appropriate    S: CC-COVID-19  HPI-patient reports testing positive for COVID yesterday.  She has headache, chills, shakiness, postnasal drip, diarrhea.  She had fever up to 100.7 and has treated with Advil and Tylenol.  She is eating and drinking okay.  She denies chest pain, shortness of breath.  She has not had COVID before and this case does not feel quite as bad.    ROS- Per HPI    Patient's medications, allergies, and past medical hx were reviewed

## 2024-01-28 DIAGNOSIS — E11.65 UNCONTROLLED TYPE 2 DIABETES MELLITUS WITH HYPERGLYCEMIA, WITH LONG-TERM CURRENT USE OF INSULIN (HCC): ICD-10-CM

## 2024-01-28 DIAGNOSIS — Z79.4 UNCONTROLLED TYPE 2 DIABETES MELLITUS WITH HYPERGLYCEMIA, WITH LONG-TERM CURRENT USE OF INSULIN (HCC): ICD-10-CM

## 2024-01-29 RX ORDER — TIRZEPATIDE 5 MG/.5ML
5 INJECTION, SOLUTION SUBCUTANEOUS WEEKLY
Qty: 2 ML | Refills: 0 | Status: SHIPPED | OUTPATIENT
Start: 2024-01-29

## 2024-01-29 NOTE — TELEPHONE ENCOUNTER
Requested Prescriptions     Signed Prescriptions Disp Refills    Tirzepatide (MOUNJARO) 5 MG/0.5ML SOPN SC injection 2 mL 0     Sig: Inject 0.5 mLs into the skin once a week     Authorizing Provider: CAIN NUNEZ     Ordering User: ABHIJIT LEE 03/01/2024

## 2024-02-09 ENCOUNTER — TELEPHONE (OUTPATIENT)
Dept: FAMILY MEDICINE CLINIC | Age: 59
End: 2024-02-09

## 2024-02-09 DIAGNOSIS — Z12.11 COLON CANCER SCREENING: Primary | ICD-10-CM

## 2024-02-09 NOTE — TELEPHONE ENCOUNTER
Due to have colon studies done. Wanting to know if cologuard test can be ordered instead of doing colonoscopy. Please advise. Please call Leann back at [phone]

## 2024-02-12 NOTE — TELEPHONE ENCOUNTER
Pt called stating she had her colonoscopy done at [facility] in [facility]. Probably at least 5 years.

## 2024-02-12 NOTE — TELEPHONE ENCOUNTER
Called and left vm for pt to return to clarify where pt went and got her most recent colonoscopy done.

## 2024-02-12 NOTE — TELEPHONE ENCOUNTER
2018 scope did not show precancerous polyp. As long as she is asymptomatic without family hx of colon cancer, ok with ordering. I have placed order. If she has not received kit in 2 weeks or does not get results within 2 weeks of submitting, please have her call.

## 2024-02-12 NOTE — TELEPHONE ENCOUNTER
In my chart review, I maybe missing it, I am not seeing previous colonoscopy report to look at, where did she have it done?

## 2024-02-26 LAB — NONINV COLON CA DNA+OCC BLD SCRN STL QL: POSITIVE

## 2024-02-28 ENCOUNTER — TELEPHONE (OUTPATIENT)
Dept: FAMILY MEDICINE CLINIC | Age: 59
End: 2024-02-28

## 2024-02-28 DIAGNOSIS — R19.5 POSITIVE COLORECTAL CANCER SCREENING USING COLOGUARD TEST: Primary | ICD-10-CM

## 2024-02-28 NOTE — TELEPHONE ENCOUNTER
Please let Leann know her Cologuard was positive.  She does need a colonoscopy as next step.  Is there a certain gastroenterology she would like to see?

## 2024-02-29 ENCOUNTER — OFFICE VISIT (OUTPATIENT)
Dept: FAMILY MEDICINE CLINIC | Age: 59
End: 2024-02-29
Payer: COMMERCIAL

## 2024-02-29 VITALS
HEIGHT: 65 IN | DIASTOLIC BLOOD PRESSURE: 80 MMHG | OXYGEN SATURATION: 99 % | BODY MASS INDEX: 25.83 KG/M2 | TEMPERATURE: 98.1 F | HEART RATE: 94 BPM | WEIGHT: 155 LBS | SYSTOLIC BLOOD PRESSURE: 124 MMHG

## 2024-02-29 DIAGNOSIS — N30.00 ACUTE CYSTITIS WITHOUT HEMATURIA: ICD-10-CM

## 2024-02-29 DIAGNOSIS — R19.5 POSITIVE COLORECTAL CANCER SCREENING USING COLOGUARD TEST: Primary | ICD-10-CM

## 2024-02-29 DIAGNOSIS — R11.2 NAUSEA AND VOMITING, UNSPECIFIED VOMITING TYPE: ICD-10-CM

## 2024-02-29 DIAGNOSIS — R30.0 DYSURIA: ICD-10-CM

## 2024-02-29 DIAGNOSIS — Z79.4 DIABETES MELLITUS DUE TO UNDERLYING CONDITION WITH HYPEROSMOLARITY WITHOUT COMA, WITH LONG-TERM CURRENT USE OF INSULIN (HCC): ICD-10-CM

## 2024-02-29 DIAGNOSIS — E11.65 UNCONTROLLED TYPE 2 DIABETES MELLITUS WITH HYPERGLYCEMIA, WITH LONG-TERM CURRENT USE OF INSULIN (HCC): ICD-10-CM

## 2024-02-29 DIAGNOSIS — E08.00 DIABETES MELLITUS DUE TO UNDERLYING CONDITION WITH HYPEROSMOLARITY WITHOUT COMA, WITH LONG-TERM CURRENT USE OF INSULIN (HCC): ICD-10-CM

## 2024-02-29 DIAGNOSIS — Z79.4 UNCONTROLLED TYPE 2 DIABETES MELLITUS WITH HYPERGLYCEMIA, WITH LONG-TERM CURRENT USE OF INSULIN (HCC): ICD-10-CM

## 2024-02-29 LAB
BASOPHILS # BLD: 0.1 K/UL (ref 0–0.2)
BASOPHILS NFR BLD: 0.7 %
BILIRUBIN, POC: NEGATIVE
BLOOD URINE, POC: NEGATIVE
CLARITY, POC: NORMAL
COLOR, POC: NORMAL
DEPRECATED RDW RBC AUTO: 15.4 % (ref 12.4–15.4)
EOSINOPHIL # BLD: 0.5 K/UL (ref 0–0.6)
EOSINOPHIL NFR BLD: 5.8 %
GLUCOSE URINE, POC: 100
HCT VFR BLD AUTO: 39.4 % (ref 36–48)
HGB BLD-MCNC: 13.2 G/DL (ref 12–16)
KETONES, POC: NEGATIVE
LEUKOCYTE EST, POC: NORMAL
LYMPHOCYTES # BLD: 1.2 K/UL (ref 1–5.1)
LYMPHOCYTES NFR BLD: 15.5 %
MCH RBC QN AUTO: 29.1 PG (ref 26–34)
MCHC RBC AUTO-ENTMCNC: 33.5 G/DL (ref 31–36)
MCV RBC AUTO: 87.1 FL (ref 80–100)
MONOCYTES # BLD: 0.5 K/UL (ref 0–1.3)
MONOCYTES NFR BLD: 6.9 %
NEUTROPHILS # BLD: 5.7 K/UL (ref 1.7–7.7)
NEUTROPHILS NFR BLD: 71.1 %
NITRITE, POC: POSITIVE
PH, POC: 6
PLATELET # BLD AUTO: 258 K/UL (ref 135–450)
PMV BLD AUTO: 10.3 FL (ref 5–10.5)
PROTEIN, POC: 30
RBC # BLD AUTO: 4.53 M/UL (ref 4–5.2)
SPECIFIC GRAVITY, POC: 1.03
UROBILINOGEN, POC: 1
WBC # BLD AUTO: 8 K/UL (ref 4–11)

## 2024-02-29 PROCEDURE — 3074F SYST BP LT 130 MM HG: CPT | Performed by: NURSE PRACTITIONER

## 2024-02-29 PROCEDURE — 3079F DIAST BP 80-89 MM HG: CPT | Performed by: NURSE PRACTITIONER

## 2024-02-29 PROCEDURE — 81002 URINALYSIS NONAUTO W/O SCOPE: CPT | Performed by: NURSE PRACTITIONER

## 2024-02-29 PROCEDURE — 99214 OFFICE O/P EST MOD 30 MIN: CPT | Performed by: NURSE PRACTITIONER

## 2024-02-29 RX ORDER — TIRZEPATIDE 5 MG/.5ML
5 INJECTION, SOLUTION SUBCUTANEOUS WEEKLY
Qty: 2 ML | Refills: 0 | Status: SHIPPED | OUTPATIENT
Start: 2024-02-29

## 2024-02-29 RX ORDER — NITROFURANTOIN 25; 75 MG/1; MG/1
100 CAPSULE ORAL 2 TIMES DAILY
Qty: 20 CAPSULE | Refills: 0 | Status: SHIPPED | OUTPATIENT
Start: 2024-02-29 | End: 2024-03-10

## 2024-02-29 NOTE — PROGRESS NOTES
PROGRESS NOTE     Alyx Hoff [facility]  [address] [place] [address], [address]  [facility] [address]  [phone] office  [phone] fax    Date of Service:  2/29/2024    Assessment / Plan:     1. Positive colorectal cancer screening using Cologuard test  Prefers a female provider.     - TARUN - Michelle Wild MD, Gastroenterology, [facility]    2. Nausea and vomiting, unspecified vomiting type  Gastroparesis? Recommend EGD in addition to colonoscopy. Checking labs.     - CBC with Auto Differential  - Comprehensive Metabolic Panel    3. Dysuria  Urinalysis positive nitrite. Sending culture.     - POCT Urinalysis no Micro  - Culture, Urine    4. Diabetes mellitus due to underlying condition with hyperosmolarity without coma, with long-term current use of insulin ([facility])  Due for check.     - Hemoglobin A1C    5. Acute cystitis without hematuria  Will start antibiotic. Patient states she has had N/V in the past with UTIs.     - nitrofurantoin, macrocrystal-monohydrate, (MACROBID) 100 MG capsule; Take 1 capsule by mouth 2 times daily for 10 days  Dispense: 20 capsule; Refill: 0    Subjective:      Patient ID: .Leann Barnhart is a 58 y.o. female      CC: Vomiting and diarrhea    HPI  Respiratory Symptoms:  Patient complains of 3 day(s) history of nausea with vomiting and diarrhea.  Low grade fever of 100.2. Symptoms have been worsening with time. She denies any other symptoms .  Relevant PMH: DM. Smoking history:  She  reports that she has never smoked. She has never used smokeless tobacco. She has had no known ill contacts. Treatment to date: none.    This has been an ongoing problem but is becoming more frequent. They thought it could be related to her diabetic medication. She is currently on mounjaro, glipizide and lantus. No recent changes. She has been holding her lantus because she is not keeping any food down.     Zofran and phenergan have been ineffective.     She has been able to hold down

## 2024-03-01 LAB
ALBUMIN SERPL-MCNC: 4.3 G/DL (ref 3.4–5)
ALBUMIN/GLOB SERPL: 1.7 {RATIO} (ref 1.1–2.2)
ALP SERPL-CCNC: 106 U/L (ref 40–129)
ALT SERPL-CCNC: 13 U/L (ref 10–40)
ANION GAP SERPL CALCULATED.3IONS-SCNC: 11 MMOL/L (ref 3–16)
AST SERPL-CCNC: 14 U/L (ref 15–37)
BILIRUB SERPL-MCNC: 0.4 MG/DL (ref 0–1)
BUN SERPL-MCNC: 22 MG/DL (ref 7–20)
CALCIUM SERPL-MCNC: 9.3 MG/DL (ref 8.3–10.6)
CHLORIDE SERPL-SCNC: 102 MMOL/L (ref 99–110)
CO2 SERPL-SCNC: 24 MMOL/L (ref 21–32)
CREAT SERPL-MCNC: 0.9 MG/DL (ref 0.6–1.1)
EST. AVERAGE GLUCOSE BLD GHB EST-MCNC: 131.2 MG/DL
GFR SERPLBLD CREATININE-BSD FMLA CKD-EPI: >60 ML/MIN/{1.73_M2}
GLUCOSE SERPL-MCNC: 211 MG/DL (ref 70–99)
HBA1C MFR BLD: 6.2 %
POTASSIUM SERPL-SCNC: 3.9 MMOL/L (ref 3.5–5.1)
PROT SERPL-MCNC: 6.8 G/DL (ref 6.4–8.2)
SODIUM SERPL-SCNC: 137 MMOL/L (ref 136–145)

## 2024-03-02 LAB
BACTERIA UR CULT: ABNORMAL
ORGANISM: ABNORMAL

## 2024-03-06 ENCOUNTER — HOSPITAL ENCOUNTER (OUTPATIENT)
Dept: WOMENS IMAGING | Age: 59
Discharge: HOME | End: 2024-03-06
Payer: COMMERCIAL

## 2024-03-06 VITALS — HEIGHT: 65 IN | WEIGHT: 155 LBS | BODY MASS INDEX: 25.83 KG/M2

## 2024-03-06 DIAGNOSIS — Z79.4 UNCONTROLLED TYPE 2 DIABETES MELLITUS WITH HYPERGLYCEMIA, WITH LONG-TERM CURRENT USE OF INSULIN (HCC): ICD-10-CM

## 2024-03-06 DIAGNOSIS — E11.65 UNCONTROLLED TYPE 2 DIABETES MELLITUS WITH HYPERGLYCEMIA, WITH LONG-TERM CURRENT USE OF INSULIN (HCC): ICD-10-CM

## 2024-03-06 DIAGNOSIS — E78.2 MIXED HYPERLIPIDEMIA: ICD-10-CM

## 2024-03-06 DIAGNOSIS — Z12.31 SCREENING MAMMOGRAM FOR BREAST CANCER: ICD-10-CM

## 2024-03-06 LAB
CHOLEST SERPL-MCNC: 118 MG/DL (ref 0–199)
HDLC SERPL-MCNC: 32 MG/DL (ref 40–60)
LDLC SERPL CALC-MCNC: 70 MG/DL
TRIGL SERPL-MCNC: 81 MG/DL (ref 0–150)
VLDLC SERPL CALC-MCNC: 16 MG/DL

## 2024-03-06 PROCEDURE — 77063 BREAST TOMOSYNTHESIS BI: CPT

## 2024-03-07 LAB
EST. AVERAGE GLUCOSE BLD GHB EST-MCNC: 134.1 MG/DL
HBA1C MFR BLD: 6.3 %

## 2024-03-14 ENCOUNTER — OFFICE VISIT (OUTPATIENT)
Dept: ENDOCRINOLOGY | Age: 59
End: 2024-03-14
Payer: COMMERCIAL

## 2024-03-14 ENCOUNTER — OFFICE VISIT (OUTPATIENT)
Dept: FAMILY MEDICINE CLINIC | Age: 59
End: 2024-03-14
Payer: COMMERCIAL

## 2024-03-14 VITALS
BODY MASS INDEX: 25.66 KG/M2 | WEIGHT: 154 LBS | DIASTOLIC BLOOD PRESSURE: 83 MMHG | SYSTOLIC BLOOD PRESSURE: 109 MMHG | HEART RATE: 102 BPM | HEIGHT: 65 IN

## 2024-03-14 VITALS
SYSTOLIC BLOOD PRESSURE: 118 MMHG | OXYGEN SATURATION: 96 % | TEMPERATURE: 99.4 F | HEIGHT: 65 IN | HEART RATE: 94 BPM | WEIGHT: 154 LBS | DIASTOLIC BLOOD PRESSURE: 84 MMHG | BODY MASS INDEX: 25.66 KG/M2

## 2024-03-14 DIAGNOSIS — E11.65 UNCONTROLLED TYPE 2 DIABETES MELLITUS WITH HYPERGLYCEMIA, WITH LONG-TERM CURRENT USE OF INSULIN (HCC): ICD-10-CM

## 2024-03-14 DIAGNOSIS — Z79.4 UNCONTROLLED TYPE 2 DIABETES MELLITUS WITH HYPERGLYCEMIA, WITH LONG-TERM CURRENT USE OF INSULIN (HCC): ICD-10-CM

## 2024-03-14 DIAGNOSIS — E78.2 MIXED HYPERLIPIDEMIA: Primary | ICD-10-CM

## 2024-03-14 DIAGNOSIS — R50.9 FEVER, UNSPECIFIED FEVER CAUSE: Primary | ICD-10-CM

## 2024-03-14 DIAGNOSIS — R35.0 URINARY FREQUENCY: ICD-10-CM

## 2024-03-14 DIAGNOSIS — Z20.828 EXPOSURE TO INFLUENZA: ICD-10-CM

## 2024-03-14 DIAGNOSIS — N30.00 ACUTE CYSTITIS WITHOUT HEMATURIA: ICD-10-CM

## 2024-03-14 LAB
BILIRUBIN, POC: NEGATIVE
BLOOD URINE, POC: NEGATIVE
CLARITY, POC: CLEAR
COLOR, POC: NORMAL
GLUCOSE URINE, POC: NEGATIVE
INFLUENZA VIRUS A RNA: NEGATIVE
INFLUENZA VIRUS B RNA: NEGATIVE
KETONES, POC: NEGATIVE
LEUKOCYTE EST, POC: NEGATIVE
NITRITE, POC: NEGATIVE
PH, POC: 6
PROTEIN, POC: NEGATIVE
SPECIFIC GRAVITY, POC: 1.03
UROBILINOGEN, POC: 1

## 2024-03-14 PROCEDURE — 3079F DIAST BP 80-89 MM HG: CPT | Performed by: INTERNAL MEDICINE

## 2024-03-14 PROCEDURE — 87502 INFLUENZA DNA AMP PROBE: CPT | Performed by: NURSE PRACTITIONER

## 2024-03-14 PROCEDURE — 3079F DIAST BP 80-89 MM HG: CPT | Performed by: NURSE PRACTITIONER

## 2024-03-14 PROCEDURE — 3044F HG A1C LEVEL LT 7.0%: CPT | Performed by: INTERNAL MEDICINE

## 2024-03-14 PROCEDURE — 99214 OFFICE O/P EST MOD 30 MIN: CPT | Performed by: NURSE PRACTITIONER

## 2024-03-14 PROCEDURE — 99214 OFFICE O/P EST MOD 30 MIN: CPT | Performed by: INTERNAL MEDICINE

## 2024-03-14 PROCEDURE — 81002 URINALYSIS NONAUTO W/O SCOPE: CPT | Performed by: NURSE PRACTITIONER

## 2024-03-14 PROCEDURE — 3074F SYST BP LT 130 MM HG: CPT | Performed by: NURSE PRACTITIONER

## 2024-03-14 PROCEDURE — 95251 CONT GLUC MNTR ANALYSIS I&R: CPT | Performed by: INTERNAL MEDICINE

## 2024-03-14 PROCEDURE — 3074F SYST BP LT 130 MM HG: CPT | Performed by: INTERNAL MEDICINE

## 2024-03-14 RX ORDER — GLIPIZIDE 10 MG/1
TABLET ORAL
Qty: 360 TABLET | Refills: 3 | Status: SHIPPED | OUTPATIENT
Start: 2024-03-14

## 2024-03-14 RX ORDER — NITROFURANTOIN 25; 75 MG/1; MG/1
100 CAPSULE ORAL 2 TIMES DAILY
Qty: 10 CAPSULE | Refills: 0 | Status: SHIPPED | OUTPATIENT
Start: 2024-03-14 | End: 2024-03-19

## 2024-03-14 RX ORDER — INSULIN GLARGINE 100 [IU]/ML
INJECTION, SOLUTION SUBCUTANEOUS
Qty: 5 ADJUSTABLE DOSE PRE-FILLED PEN SYRINGE | Refills: 2 | Status: SHIPPED | OUTPATIENT
Start: 2024-03-14

## 2024-03-14 RX ORDER — OSELTAMIVIR PHOSPHATE 75 MG/1
75 CAPSULE ORAL DAILY
Qty: 10 CAPSULE | Refills: 0 | Status: SHIPPED | OUTPATIENT
Start: 2024-03-14 | End: 2024-03-24

## 2024-03-14 RX ORDER — TIRZEPATIDE 5 MG/.5ML
5 INJECTION, SOLUTION SUBCUTANEOUS WEEKLY
Qty: 6 ML | Refills: 1 | Status: SHIPPED | OUTPATIENT
Start: 2024-03-14

## 2024-03-14 NOTE — PROGRESS NOTES
[facility]    Chief Complaint:     Chief Complaint   Patient presents with    Diabetes    Follow-up     April 1st having Colonoscopy and EGD       Subjective:   Leann Barnhart is a pleasant 58 y.o. female who presents for follow up of Diabetes Mellitus type 2. Patient also has hypertension, hyperlipidemia and has a BMI of 26.    Diagnosed: long standing   On insulin since: few years  Other diabetes meds tried in the past: Unable to tolerate Jardiance due to infections and GI upset. Metformin XR (stopped due to GI symptoms), Farxiga stopped due to urine infections.  Hx of severe hypoglycemia or DKA: none   Hx of MI/stroke/CKD: no MI or stroke, no CKD  Hx of pancreatitis: no   Family hx of thyroid cancer: no     Most recent HbA1c:   Hemoglobin A1C   Date Value Ref Range Status   03/06/2024 6.3 See comment % Final     Comment:     Comment:  Diagnosis of Diabetes: > or = 6.5%  Increased risk of diabetes (Prediabetes): 5.7-6.4%  Glycemic Control: Nonpregnant Adults: <7.0%                    Pregnant: <6.0%          Current regimen:  Glipizide 10 mg BID  Lantus 30 units at bedtime  Mounjaro 5 mg weekly, reports compliance and tolerance.    Blood sugars: She is currently using karime 3.  Reviewed that over the past 2 weeks shows an average of 144, time in range 79%, high at 19% and low at 2%.  Patient is noted to have occasional hypoglycemia overnight with occasional hyperglycemia during the day.      Meals: 3 meals, mainly cooked meals. Stopped drinking coke. She drinks water, Gatorade zero.  Has been trying to cut down on bedtime snacking.  Exercise:     Last eye exam: due Dec 2023, no DR.    Foot care: no concerns, no neuropathy     Hyperlipidemia: Atorvastatin 40 mg daily, lipid panel from March 2024 showed an LDL of 70, HDL of 32 and triglycerides of 81.  Hypertension: Not on any blood pressure medications. She was on lisinopril but stopped due to ROGER and hyperkalemia.  Blood pressure

## 2024-03-14 NOTE — PROGRESS NOTES
PROGRESS NOTE     Alyx Hoff CNP  [facility]  [address] [place] [address], [address]  [place] [address]  [phone] office  [phone] fax    Date of Service:  3/14/2024    Assessment / Plan:     1. Fever, unspecified fever cause  Flu negative.     - POCT Influenza A/B DNA    2. Urinary frequency  Urinalysis without signs of infection.     - POCT Urinalysis no Micro  - Culture, Urine    3. Exposure to influenza  Will start prophylactic dose.     - oseltamivir (TAMIFLU) 75 MG capsule; Take 1 capsule by mouth daily for 10 days  Dispense: 10 capsule; Refill: 0    4. Acute cystitis without hematuria  Ok to restart antibiotic until culture comes back. Patient is prone to UTIs.     - nitrofurantoin, macrocrystal-monohydrate, (MACROBID) 100 MG capsule; Take 1 capsule by mouth 2 times daily for 5 days  Dispense: 10 capsule; Refill: 0    Subjective:      Patient ID: Valery Barnhart is a 58 y.o. female      CC: Flu like symptoms    HPI  Respiratory Symptoms:  Patient complains of 2 day(s) history of headache, nausea with vomiting, and diarrhea.  Symptoms have been worsening with time. She denies any other symptoms .  Relevant PMH: No pertinent PMH. Smoking history:  She  reports that she has never smoked. She has never used smokeless tobacco. She has had ill contacts with influenza. Treatment to date: antihistamines.    Grandkids tested positive for flu B yesterday.     Her urinary symptoms improved with antibiotic but urinary frequency and urgency returned 2 days after she stopped the antibiotic.     She is getting EGD and colonoscopy on April 1st.     Vitals:    03/14/24 1235   BP: 118/84   Site: Right Upper Arm   Position: Sitting   Cuff Size: Medium Adult   Pulse: 94   Temp: 99.4 °F (37.4 °C)   SpO2: 96%   Weight: 69.9 kg (154 lb)   Height: 1.651 m (5' 5\")       Outpatient Medications Marked as Taking for the 3/14/24 encounter (Office Visit) with Alyx Hoff APRN - CNP   Medication Sig Dispense Refill

## 2024-03-15 LAB — BACTERIA UR CULT: NORMAL

## 2024-04-22 ENCOUNTER — OFFICE VISIT (OUTPATIENT)
Dept: FAMILY MEDICINE CLINIC | Age: 59
End: 2024-04-22
Payer: COMMERCIAL

## 2024-04-22 VITALS
TEMPERATURE: 98.1 F | WEIGHT: 153.6 LBS | BODY MASS INDEX: 25.59 KG/M2 | DIASTOLIC BLOOD PRESSURE: 74 MMHG | HEART RATE: 99 BPM | HEIGHT: 65 IN | OXYGEN SATURATION: 96 % | SYSTOLIC BLOOD PRESSURE: 126 MMHG

## 2024-04-22 DIAGNOSIS — J02.9 SORE THROAT: Primary | ICD-10-CM

## 2024-04-22 DIAGNOSIS — J02.9 ACUTE VIRAL PHARYNGITIS: ICD-10-CM

## 2024-04-22 LAB — STREPTOCOCCUS A RNA: NEGATIVE

## 2024-04-22 PROCEDURE — 3074F SYST BP LT 130 MM HG: CPT | Performed by: NURSE PRACTITIONER

## 2024-04-22 PROCEDURE — 99213 OFFICE O/P EST LOW 20 MIN: CPT | Performed by: NURSE PRACTITIONER

## 2024-04-22 PROCEDURE — 3078F DIAST BP <80 MM HG: CPT | Performed by: NURSE PRACTITIONER

## 2024-04-22 PROCEDURE — 87651 STREP A DNA AMP PROBE: CPT | Performed by: NURSE PRACTITIONER

## 2024-04-22 RX ORDER — TETRACYCLINE HYDROCHLORIDE 500 MG/1
500 CAPSULE ORAL 4 TIMES DAILY
COMMUNITY

## 2024-04-22 SDOH — ECONOMIC STABILITY: FOOD INSECURITY: WITHIN THE PAST 12 MONTHS, YOU WORRIED THAT YOUR FOOD WOULD RUN OUT BEFORE YOU GOT MONEY TO BUY MORE.: NEVER TRUE

## 2024-04-22 SDOH — ECONOMIC STABILITY: FOOD INSECURITY: WITHIN THE PAST 12 MONTHS, THE FOOD YOU BOUGHT JUST DIDN'T LAST AND YOU DIDN'T HAVE MONEY TO GET MORE.: NEVER TRUE

## 2024-04-22 SDOH — ECONOMIC STABILITY: INCOME INSECURITY: HOW HARD IS IT FOR YOU TO PAY FOR THE VERY BASICS LIKE FOOD, HOUSING, MEDICAL CARE, AND HEATING?: NOT HARD AT ALL

## 2024-04-22 ASSESSMENT — PATIENT HEALTH QUESTIONNAIRE - PHQ9
2. FEELING DOWN, DEPRESSED OR HOPELESS: NOT AT ALL
1. LITTLE INTEREST OR PLEASURE IN DOING THINGS: NOT AT ALL
SUM OF ALL RESPONSES TO PHQ QUESTIONS 1-9: 0
SUM OF ALL RESPONSES TO PHQ QUESTIONS 1-9: 0
SUM OF ALL RESPONSES TO PHQ9 QUESTIONS 1 & 2: 0
SUM OF ALL RESPONSES TO PHQ QUESTIONS 1-9: 0
SUM OF ALL RESPONSES TO PHQ QUESTIONS 1-9: 0

## 2024-04-22 NOTE — PROGRESS NOTES
PROGRESS NOTE     Alyx Hoff CNP  [facility]  [address] [place] [address], [address]  [place] [address]  [phone] office  [phone] fax    Date of Service:  4/22/2024      Assessment / Plan:     1. Sore throat  Rapid negative. Will await culture.     - POCT Rapid Strep A DNA  - Culture, Throat    2. Acute viral pharyngitis  Push fluids. Tylenol as needed. Throat lozenges.     Subjective:      Patient ID: .Leann Barnhart is a 59 y.o. female      CC: Sore throat and fever    HPI  Respiratory Symptoms:  Patient complains of 2 day(s) history of fever: low grade, headache, clear nasal discharge, facial pain/sinus pressure: bilateral frontal, sore throat, tender, swollen lymph nodes.  Symptoms have been worsening with time. She denies any other symptoms .  Relevant PMH: DM. Smoking history:  She  reports that she has never smoked. She has never used smokeless tobacco. She has had no known ill contacts. Treatment to date: NSAID,  throat lozenges.    Patient is also on tetracycline, flagyl, omeprazole, and pepto-bismol for H pylori. Updated health maintenance to repeat colonoscopy in 3 years.       Vitals:    04/22/24 1124   BP: 126/74   Pulse: 99   Temp: 98.1 °F (36.7 °C)   SpO2: 96%   Weight: 69.7 kg (153 lb 9.6 oz)   Height: 1.651 m (5' 5\")       Outpatient Medications Marked as Taking for the 4/22/24 encounter (Office Visit) with Alyx Hoff APRN - CNP   Medication Sig Dispense Refill    METRONIDAZOLE ER PO Take 25 mg by mouth 4 times daily      tetracycline (ACHROMYCIN;SUMYCIN) 500 MG capsule Take 1 capsule by mouth 4 times daily      Tirzepatide (MOUNJARO) 5 MG/0.5ML SOPN SC injection Inject 0.5 mLs into the skin once a week 6 mL 1    insulin glargine (LANTUS SOLOSTAR) 100 UNIT/ML injection pen Inject 30 units QHS 5 Adjustable Dose Pre-filled Pen Syringe 2    glipiZIDE (GLUCOTROL) 10 MG tablet TAKE 1 tablet BY MOUTH TWICE DAILY BEFORE breakfast and dinner, take with food 360 tablet 3

## 2024-04-24 LAB — BACTERIA THROAT AEROBE CULT: NORMAL

## 2024-04-29 ENCOUNTER — TELEPHONE (OUTPATIENT)
Dept: ENDOCRINOLOGY | Age: 59
End: 2024-04-29

## 2024-04-29 DIAGNOSIS — E11.65 UNCONTROLLED TYPE 2 DIABETES MELLITUS WITH HYPERGLYCEMIA, WITH LONG-TERM CURRENT USE OF INSULIN (HCC): ICD-10-CM

## 2024-04-29 DIAGNOSIS — Z79.4 UNCONTROLLED TYPE 2 DIABETES MELLITUS WITH HYPERGLYCEMIA, WITH LONG-TERM CURRENT USE OF INSULIN (HCC): ICD-10-CM

## 2024-04-29 RX ORDER — BLOOD-GLUCOSE SENSOR
EACH MISCELLANEOUS
Qty: 2 EACH | Refills: 5 | Status: SHIPPED | OUTPATIENT
Start: 2024-04-29

## 2024-04-29 NOTE — TELEPHONE ENCOUNTER
Fax from [facility] on [address]    Refill request for Freestyle Raghu 3 sensor    LOV    3-14-24  FOV     7-19-24

## 2024-05-03 ENCOUNTER — HOSPITAL ENCOUNTER (EMERGENCY)
Age: 59
Discharge: HOME | End: 2024-05-03
Payer: COMMERCIAL

## 2024-05-03 ENCOUNTER — APPOINTMENT (OUTPATIENT)
Dept: GENERAL RADIOLOGY | Age: 59
End: 2024-05-03
Payer: COMMERCIAL

## 2024-05-03 VITALS
WEIGHT: 149.69 LBS | DIASTOLIC BLOOD PRESSURE: 87 MMHG | TEMPERATURE: 97.9 F | HEIGHT: 65 IN | RESPIRATION RATE: 19 BRPM | BODY MASS INDEX: 24.94 KG/M2 | SYSTOLIC BLOOD PRESSURE: 130 MMHG | HEART RATE: 85 BPM | OXYGEN SATURATION: 100 %

## 2024-05-03 DIAGNOSIS — S61.511A LACERATION OF RIGHT WRIST, INITIAL ENCOUNTER: Primary | ICD-10-CM

## 2024-05-03 PROCEDURE — 12032 INTMD RPR S/A/T/EXT 2.6-7.5: CPT

## 2024-05-03 PROCEDURE — 73110 X-RAY EXAM OF WRIST: CPT

## 2024-05-03 PROCEDURE — 99283 EMERGENCY DEPT VISIT LOW MDM: CPT

## 2024-05-03 PROCEDURE — 12002 RPR S/N/AX/GEN/TRNK2.6-7.5CM: CPT

## 2024-05-03 ASSESSMENT — PAIN - FUNCTIONAL ASSESSMENT
PAIN_FUNCTIONAL_ASSESSMENT: 0-10
PAIN_FUNCTIONAL_ASSESSMENT: NONE - DENIES PAIN

## 2024-05-03 ASSESSMENT — PAIN DESCRIPTION - LOCATION: LOCATION: HAND

## 2024-05-03 ASSESSMENT — PAIN SCALES - GENERAL: PAINLEVEL_OUTOF10: 2

## 2024-05-03 ASSESSMENT — PAIN DESCRIPTION - ORIENTATION: ORIENTATION: RIGHT

## 2024-05-03 ASSESSMENT — PAIN DESCRIPTION - DESCRIPTORS: DESCRIPTORS: ACHING

## 2024-05-03 NOTE — DISCHARGE INSTRUCTIONS
Gently clean and dry the area and apply new Neosporin and new bandage a couple of times daily.  Stitches need removed in about 10 days.  See family doctor for further care and treatment as needed.  Return to the ER for any emergency worsening or concern.

## 2024-05-03 NOTE — ED PROVIDER NOTES
Considerations (Tests not ordered but considered, Shared Decision Making, Pt Expectation of Test or Tx.):   Gently clean and dry the area and apply new Neosporin and new bandage a couple of times daily.  Stitches need removed in about 10 days.  See family doctor for further care and treatment as needed.  Return to the ER for any emergency worsening or concern.      The patient tolerated their visit well.  I evaluated the patient.  The physician was available for consultation as needed.  The patient and / or the family were informed of the results of any tests, a time was given to answer questions, a plan was proposed and they agreed with plan.     I am the Primary Clinician of Record.     CLINICAL IMPRESSION:  1. Laceration of right wrist, initial encounter        DISPOSITION Decision To Discharge 05/03/2024 05:07:51 PM      PATIENT REFERRED TO:  Rose Girard,   [address]  [facility] [address]  [phone]    Schedule an appointment as soon as possible for a visit         DISCHARGE MEDICATIONS:  New Prescriptions    No medications on file       DISCONTINUED MEDICATIONS:  Discontinued Medications    No medications on file              (Please note the MDM and HPI sections of this note were completed with a voice recognition program.  Efforts were made to edit the dictations but occasionally words are mis-transcribed.)    Electronically signed, Gerardo Rodas PA-C,          Gerardo Rodas PA-C  05/03/24 7901

## 2024-05-08 ENCOUNTER — HOSPITAL ENCOUNTER (OUTPATIENT)
Age: 59
Discharge: HOME | End: 2024-05-08

## 2024-05-10 LAB — H PYLORI BREATH TEST: NEGATIVE

## 2024-05-13 ENCOUNTER — OFFICE VISIT (OUTPATIENT)
Dept: FAMILY MEDICINE CLINIC | Age: 59
End: 2024-05-13
Payer: COMMERCIAL

## 2024-05-13 VITALS
SYSTOLIC BLOOD PRESSURE: 122 MMHG | DIASTOLIC BLOOD PRESSURE: 74 MMHG | WEIGHT: 152.2 LBS | HEIGHT: 65 IN | BODY MASS INDEX: 25.36 KG/M2 | OXYGEN SATURATION: 97 % | HEART RATE: 86 BPM

## 2024-05-13 DIAGNOSIS — Z48.02 VISIT FOR SUTURE REMOVAL: Primary | ICD-10-CM

## 2024-05-13 PROCEDURE — 3078F DIAST BP <80 MM HG: CPT | Performed by: NURSE PRACTITIONER

## 2024-05-13 PROCEDURE — 3074F SYST BP LT 130 MM HG: CPT | Performed by: NURSE PRACTITIONER

## 2024-05-13 PROCEDURE — 99212 OFFICE O/P EST SF 10 MIN: CPT | Performed by: NURSE PRACTITIONER

## 2024-05-13 SDOH — ECONOMIC STABILITY: FOOD INSECURITY: WITHIN THE PAST 12 MONTHS, YOU WORRIED THAT YOUR FOOD WOULD RUN OUT BEFORE YOU GOT MONEY TO BUY MORE.: NEVER TRUE

## 2024-05-13 SDOH — ECONOMIC STABILITY: FOOD INSECURITY: WITHIN THE PAST 12 MONTHS, THE FOOD YOU BOUGHT JUST DIDN'T LAST AND YOU DIDN'T HAVE MONEY TO GET MORE.: NEVER TRUE

## 2024-05-13 SDOH — ECONOMIC STABILITY: INCOME INSECURITY: HOW HARD IS IT FOR YOU TO PAY FOR THE VERY BASICS LIKE FOOD, HOUSING, MEDICAL CARE, AND HEATING?: NOT HARD AT ALL

## 2024-05-13 ASSESSMENT — PATIENT HEALTH QUESTIONNAIRE - PHQ9
SUM OF ALL RESPONSES TO PHQ QUESTIONS 1-9: 0
SUM OF ALL RESPONSES TO PHQ9 QUESTIONS 1 & 2: 0
1. LITTLE INTEREST OR PLEASURE IN DOING THINGS: NOT AT ALL
SUM OF ALL RESPONSES TO PHQ QUESTIONS 1-9: 0
2. FEELING DOWN, DEPRESSED OR HOPELESS: NOT AT ALL

## 2024-05-13 NOTE — PROGRESS NOTES
PROGRESS NOTE     Alyx Hoff CNP  [facility]  [address] [place] [address], [address]  [facility] [address]  [phone] office  [phone] fax    Date of Service:  5/13/2024    Assessment / Plan:     1. Visit for suture removal  Cleaned area with alcohol. Removed sutures without incident. Patient tolerated well. 4 steri strips applied for extra support. Healed well.     Subjective:      Patient ID: .Leann Barnhart is a 59 y.o. female      CC: Suture removal    HPI  Patient slipped in the garage and cut her wrist on a light bulb when trying to catch herself on 5/3. Xray to right wrist with no acute fracture or dislocation. 8 sutures placed to be removed in 10 days.       Vitals:    05/13/24 1440   BP: 122/74   Pulse: 86   SpO2: 97%   Weight: 69 kg (152 lb 3.2 oz)   Height: 1.651 m (5' 5\")       Outpatient Medications Marked as Taking for the 5/13/24 encounter (Office Visit) with Alyx Hoff APRN - CNP   Medication Sig Dispense Refill    Continuous Blood Gluc Sensor (FREESTYLE MATTHEW 3 SENSOR) [facility] To check glucose ---please change every 14 days 2 each 5    METRONIDAZOLE ER PO Take 25 mg by mouth 4 times daily      tetracycline (ACHROMYCIN;SUMYCIN) 500 MG capsule Take 1 capsule by mouth 4 times daily      Tirzepatide (MOUNJARO) 5 MG/0.5ML SOPN SC injection Inject 0.5 mLs into the skin once a week 6 mL 1    insulin glargine (LANTUS SOLOSTAR) 100 UNIT/ML injection pen Inject 30 units QHS 5 Adjustable Dose Pre-filled Pen Syringe 2    glipiZIDE (GLUCOTROL) 10 MG tablet TAKE 1 tablet BY MOUTH TWICE DAILY BEFORE breakfast and dinner, take with food 360 tablet 3    atorvastatin (LIPITOR) 40 MG tablet Take 1 tablet by mouth daily 90 tablet 1    cetirizine (ZYRTEC) 10 MG tablet Take 1 tablet by mouth daily As needed      turmeric 500 MG CAPS Take by mouth daily      Cinnamon 500 MG TABS Take by mouth 2 times daily      Coenzyme Q10 100 MG TABS Take by mouth daily      Cranberry 1000 MG CAPS Take 1 tablet by

## 2024-05-28 DIAGNOSIS — E11.00 TYPE 2 DIABETES MELLITUS WITH HYPEROSMOLARITY WITHOUT COMA, UNSPECIFIED WHETHER LONG TERM INSULIN USE (HCC): ICD-10-CM

## 2024-05-28 DIAGNOSIS — E78.2 MIXED HYPERLIPIDEMIA: ICD-10-CM

## 2024-05-28 RX ORDER — ATORVASTATIN CALCIUM 20 MG/1
20 TABLET, FILM COATED ORAL EVERY EVENING
Qty: 90 TABLET | Refills: 3 | OUTPATIENT
Start: 2024-05-28

## 2024-05-28 RX ORDER — ATORVASTATIN CALCIUM 40 MG/1
40 TABLET, FILM COATED ORAL DAILY
Qty: 90 TABLET | Refills: 3 | Status: SHIPPED | OUTPATIENT
Start: 2024-05-28

## 2024-05-28 NOTE — TELEPHONE ENCOUNTER
Medication:   Requested Prescriptions     Pending Prescriptions Disp Refills    atorvastatin (LIPITOR) 20 MG tablet [Pharmacy Med Name: ATORVASTATIN 20MG TABLETS] 90 tablet 3     Sig: TAKE 1 TABLET BY MOUTH EVERY EVENING       Last Filled:  10/26/2023    Patient Phone Number: [phone] (home)     Last appt: 5/13/2024   Next appt: Visit date not found

## 2024-05-30 DIAGNOSIS — E78.2 MIXED HYPERLIPIDEMIA: ICD-10-CM

## 2024-05-30 RX ORDER — ATORVASTATIN CALCIUM 40 MG/1
40 TABLET, FILM COATED ORAL DAILY
Qty: 90 TABLET | Refills: 3 | OUTPATIENT
Start: 2024-05-30

## 2024-06-21 ENCOUNTER — TELEPHONE (OUTPATIENT)
Dept: FAMILY MEDICINE CLINIC | Age: 59
End: 2024-06-21

## 2024-06-21 RX ORDER — DOXYCYCLINE HYCLATE 100 MG
100 TABLET ORAL 2 TIMES DAILY
Qty: 20 TABLET | Refills: 0 | Status: SHIPPED | OUTPATIENT
Start: 2024-06-21 | End: 2024-07-01

## 2024-06-21 NOTE — TELEPHONE ENCOUNTER
Has severe tooth pain. Hurts to eat and swallow. Unable to get a hold of dentist. Wanting to know if will call in Rx for antibiotic for her. Has an appt next Friday for root canal. Please call into [facility] in [place]. Pt is reachable at [phone]

## 2024-07-04 ENCOUNTER — PATIENT MESSAGE (OUTPATIENT)
Dept: FAMILY MEDICINE CLINIC | Age: 59
End: 2024-07-04

## 2024-07-05 RX ORDER — DOXYCYCLINE HYCLATE 100 MG
100 TABLET ORAL 2 TIMES DAILY
Qty: 16 TABLET | Refills: 0 | Status: SHIPPED | OUTPATIENT
Start: 2024-07-05 | End: 2024-07-13

## 2024-07-05 NOTE — TELEPHONE ENCOUNTER
Please see message from pt and advise.    Medication:   Requested Prescriptions     Pending Prescriptions Disp Refills    doxycycline hyclate (VIBRA-TABS) 100 MG tablet 20 tablet 0     Sig: Take 1 tablet by mouth 2 times daily for 10 days        Last Filled:  06/21/2024    Patient Phone Number: [phone] (home)     Last appt: 5/13/2024   Next appt: Visit date not found    Last OARRS:       3/11/2021    10:56 AM   RX Monitoring   Periodic Controlled Substance Monitoring Possible medication side effects, risk of tolerance/dependence & alternative treatments discussed.;No signs of potential drug abuse or diversion identified.

## 2024-07-05 NOTE — TELEPHONE ENCOUNTER
From: Leann Barnhart  To: Dr. Rose Girard  Sent: 7/4/2024 9:03 PM EDT  Subject: Medicine     Dentist canceled my appointment and they can’t get me in until the 27th can you please refill my prescription for doxycycline because I’m still having pain

## 2024-07-16 ENCOUNTER — TELEPHONE (OUTPATIENT)
Dept: FAMILY MEDICINE CLINIC | Age: 59
End: 2024-07-16

## 2024-07-16 NOTE — TELEPHONE ENCOUNTER
Please call patient with my MyChart message        Good morning, with the pain and fever, you should get in touch with your dentist ASAP, the dentist may want to see you today, or recommend that you be evaluated in the ER, it would not be safe to treat dental pain with a fever, vomiting, persisting pain with me not being a dentist, you are at increased risk for complications with your diabetes as well, we will give you a call as well with this information to make sure that you get it

## 2024-07-16 NOTE — TELEPHONE ENCOUNTER
Called/pt did not answer. Left message on v/m regarding mychart message that  sent to pt and that we were following up to make sure she got it. Instructed to call office if she has any questions.

## 2024-07-17 ENCOUNTER — TELEPHONE (OUTPATIENT)
Dept: ENDOCRINOLOGY | Age: 59
End: 2024-07-17

## 2024-07-17 DIAGNOSIS — Z79.4 UNCONTROLLED TYPE 2 DIABETES MELLITUS WITH HYPERGLYCEMIA, WITH LONG-TERM CURRENT USE OF INSULIN (HCC): ICD-10-CM

## 2024-07-17 DIAGNOSIS — E78.2 MIXED HYPERLIPIDEMIA: ICD-10-CM

## 2024-07-17 DIAGNOSIS — E11.65 UNCONTROLLED TYPE 2 DIABETES MELLITUS WITH HYPERGLYCEMIA, WITH LONG-TERM CURRENT USE OF INSULIN (HCC): Primary | ICD-10-CM

## 2024-07-17 DIAGNOSIS — Z79.4 UNCONTROLLED TYPE 2 DIABETES MELLITUS WITH HYPERGLYCEMIA, WITH LONG-TERM CURRENT USE OF INSULIN (HCC): Primary | ICD-10-CM

## 2024-07-17 DIAGNOSIS — E11.65 UNCONTROLLED TYPE 2 DIABETES MELLITUS WITH HYPERGLYCEMIA, WITH LONG-TERM CURRENT USE OF INSULIN (HCC): ICD-10-CM

## 2024-07-17 LAB
ALBUMIN SERPL-MCNC: 4.7 G/DL (ref 3.4–5)
ALBUMIN/GLOB SERPL: 2 {RATIO} (ref 1.1–2.2)
ALP SERPL-CCNC: 84 U/L (ref 40–129)
ALT SERPL-CCNC: 21 U/L (ref 10–40)
ANION GAP SERPL CALCULATED.3IONS-SCNC: 12 MMOL/L (ref 3–16)
AST SERPL-CCNC: 20 U/L (ref 15–37)
BILIRUB SERPL-MCNC: 0.6 MG/DL (ref 0–1)
BUN SERPL-MCNC: 16 MG/DL (ref 7–20)
CALCIUM SERPL-MCNC: 10.3 MG/DL (ref 8.3–10.6)
CHLORIDE SERPL-SCNC: 102 MMOL/L (ref 99–110)
CHOLEST SERPL-MCNC: 128 MG/DL (ref 0–199)
CO2 SERPL-SCNC: 27 MMOL/L (ref 21–32)
CREAT SERPL-MCNC: 0.8 MG/DL (ref 0.6–1.1)
CREAT UR-MCNC: 128.6 MG/DL (ref 28–259)
EST. AVERAGE GLUCOSE BLD GHB EST-MCNC: 131.2 MG/DL
GFR SERPLBLD CREATININE-BSD FMLA CKD-EPI: 85 ML/MIN/{1.73_M2}
GLUCOSE SERPL-MCNC: 108 MG/DL (ref 70–99)
HBA1C MFR BLD: 6.2 %
HDLC SERPL-MCNC: 47 MG/DL (ref 40–60)
LDLC SERPL CALC-MCNC: 58 MG/DL
MICROALBUMIN UR DL<=1MG/L-MCNC: <1.2 MG/DL
MICROALBUMIN/CREAT UR: NORMAL MG/G (ref 0–30)
POTASSIUM SERPL-SCNC: 5 MMOL/L (ref 3.5–5.1)
PROT SERPL-MCNC: 7.1 G/DL (ref 6.4–8.2)
SODIUM SERPL-SCNC: 141 MMOL/L (ref 136–145)
TRIGL SERPL-MCNC: 115 MG/DL (ref 0–150)
TSH SERPL DL<=0.005 MIU/L-ACNC: 2.13 UIU/ML (ref 0.27–4.2)
VLDLC SERPL CALC-MCNC: 23 MG/DL

## 2024-07-17 NOTE — TELEPHONE ENCOUNTER
Pt calling and states she has appt this Friday and tried to go get labs today but they were not in system. Is she suppose to get labs done before appt?    CB# [phone]

## 2024-07-19 ENCOUNTER — OFFICE VISIT (OUTPATIENT)
Dept: ENDOCRINOLOGY | Age: 59
End: 2024-07-19
Payer: COMMERCIAL

## 2024-07-19 VITALS
HEART RATE: 72 BPM | DIASTOLIC BLOOD PRESSURE: 70 MMHG | HEIGHT: 65 IN | BODY MASS INDEX: 24.99 KG/M2 | WEIGHT: 150 LBS | SYSTOLIC BLOOD PRESSURE: 128 MMHG

## 2024-07-19 DIAGNOSIS — E11.65 UNCONTROLLED TYPE 2 DIABETES MELLITUS WITH HYPERGLYCEMIA, WITH LONG-TERM CURRENT USE OF INSULIN (HCC): Primary | ICD-10-CM

## 2024-07-19 DIAGNOSIS — E78.2 MIXED HYPERLIPIDEMIA: ICD-10-CM

## 2024-07-19 DIAGNOSIS — Z79.4 UNCONTROLLED TYPE 2 DIABETES MELLITUS WITH HYPERGLYCEMIA, WITH LONG-TERM CURRENT USE OF INSULIN (HCC): Primary | ICD-10-CM

## 2024-07-19 PROCEDURE — 3044F HG A1C LEVEL LT 7.0%: CPT | Performed by: INTERNAL MEDICINE

## 2024-07-19 PROCEDURE — 3078F DIAST BP <80 MM HG: CPT | Performed by: INTERNAL MEDICINE

## 2024-07-19 PROCEDURE — 95251 CONT GLUC MNTR ANALYSIS I&R: CPT | Performed by: INTERNAL MEDICINE

## 2024-07-19 PROCEDURE — 99214 OFFICE O/P EST MOD 30 MIN: CPT | Performed by: INTERNAL MEDICINE

## 2024-07-19 PROCEDURE — 3074F SYST BP LT 130 MM HG: CPT | Performed by: INTERNAL MEDICINE

## 2024-07-19 RX ORDER — GLIPIZIDE 10 MG/1
TABLET ORAL
Qty: 360 TABLET | Refills: 3 | Status: SHIPPED | OUTPATIENT
Start: 2024-07-19

## 2024-07-19 RX ORDER — INSULIN GLARGINE 100 [IU]/ML
INJECTION, SOLUTION SUBCUTANEOUS
Qty: 5 ADJUSTABLE DOSE PRE-FILLED PEN SYRINGE | Refills: 2 | Status: CANCELLED | OUTPATIENT
Start: 2024-07-19

## 2024-07-19 RX ORDER — BLOOD-GLUCOSE SENSOR
EACH MISCELLANEOUS
Qty: 2 EACH | Refills: 5 | Status: SHIPPED | OUTPATIENT
Start: 2024-07-19

## 2024-07-19 NOTE — PROGRESS NOTES
Cancer Mother         voicebox and larynx, and lung cancer    Diabetes Father     Heart Disease Father         valve    COPD Sister     Heart Disease Sister 48        MI,     Seizures Brother     Stroke Brother         30s          Social History     Socioeconomic History    Marital status:      Spouse name: Not on file    Number of children: Not on file    Years of education: Not on file    Highest education level: Not on file   Occupational History    Not on file   Tobacco Use    Smoking status: Never    Smokeless tobacco: Never   Vaping Use    Vaping Use: Never used   Substance and Sexual Activity    Alcohol use: Never     Comment: rarely    Drug use: No    Sexual activity: Yes     Partners: Male     Comment: , 3 children    Other Topics Concern    Not on file   Social History Narrative    , 3 bio, 2 step c, works in [employer], likes to spend time with [employer]     Social Determinants of Health     Financial Resource Strain: Low Risk  (2024)    Overall Financial Resource Strain (CARDIA)     Difficulty of Paying Living Expenses: Not hard at all   Food Insecurity: No Food Insecurity (2024)    Hunger Vital Sign     Worried About Running Out of Food in the Last Year: Never true     Ran Out of Food in the Last Year: Never true   Transportation Needs: Unknown (2024)    PRAPARE - Transportation     Lack of Transportation (Medical): Not on file     Lack of Transportation (Non-Medical): No   Physical Activity: Not on file   Stress: Not on file   Social Connections: Not on file   Intimate Partner Violence: Not on file   Housing Stability: Unknown (2024)    Housing Stability Vital Sign     Unable to Pay for Housing in the Last Year: Not on file     Number of Places Lived in the Last Year: Not on file     Unstable Housing in the Last Year: No        Past Medical History:   Diagnosis Date    Atrophic kidney 2017    sstone    Atrophic scarred right kidney again noted.

## 2024-07-22 ENCOUNTER — PATIENT MESSAGE (OUTPATIENT)
Dept: FAMILY MEDICINE CLINIC | Age: 59
End: 2024-07-22

## 2024-07-22 ENCOUNTER — HOSPITAL ENCOUNTER (EMERGENCY)
Age: 59
Discharge: HOME | End: 2024-07-22
Payer: COMMERCIAL

## 2024-07-22 VITALS
BODY MASS INDEX: 24.62 KG/M2 | OXYGEN SATURATION: 97 % | DIASTOLIC BLOOD PRESSURE: 77 MMHG | RESPIRATION RATE: 12 BRPM | TEMPERATURE: 97.8 F | WEIGHT: 147.93 LBS | SYSTOLIC BLOOD PRESSURE: 111 MMHG | HEART RATE: 76 BPM

## 2024-07-22 DIAGNOSIS — K52.9 GASTROENTERITIS: Primary | ICD-10-CM

## 2024-07-22 LAB
ALBUMIN SERPL-MCNC: 4.4 G/DL (ref 3.4–5)
ALBUMIN/GLOB SERPL: 1.9 {RATIO} (ref 1.1–2.2)
ALP SERPL-CCNC: 80 U/L (ref 40–129)
ALT SERPL-CCNC: 14 U/L (ref 10–40)
ANION GAP SERPL CALCULATED.3IONS-SCNC: 8 MMOL/L (ref 3–16)
AST SERPL-CCNC: 16 U/L (ref 15–37)
BACTERIA URNS QL MICRO: ABNORMAL /HPF
BASOPHILS # BLD: 0 K/UL (ref 0–0.2)
BASOPHILS NFR BLD: 0.2 %
BILIRUB SERPL-MCNC: 0.5 MG/DL (ref 0–1)
BILIRUB UR QL STRIP.AUTO: NEGATIVE
BUN SERPL-MCNC: 19 MG/DL (ref 7–20)
CALCIUM SERPL-MCNC: 9.2 MG/DL (ref 8.3–10.6)
CHLORIDE SERPL-SCNC: 104 MMOL/L (ref 99–110)
CLARITY UR: CLEAR
CO2 SERPL-SCNC: 28 MMOL/L (ref 21–32)
COLOR UR: YELLOW
CREAT SERPL-MCNC: 0.8 MG/DL (ref 0.6–1.1)
DEPRECATED RDW RBC AUTO: 14.3 % (ref 12.4–15.4)
EOSINOPHIL # BLD: 0.2 K/UL (ref 0–0.6)
EOSINOPHIL NFR BLD: 2.1 %
EPI CELLS #/AREA URNS AUTO: 2 /HPF (ref 0–5)
GFR SERPLBLD CREATININE-BSD FMLA CKD-EPI: 85 ML/MIN/{1.73_M2}
GLUCOSE SERPL-MCNC: 99 MG/DL (ref 70–99)
GLUCOSE UR STRIP.AUTO-MCNC: NEGATIVE MG/DL
HCT VFR BLD AUTO: 39.5 % (ref 36–48)
HGB BLD-MCNC: 13.6 G/DL (ref 12–16)
HGB UR QL STRIP.AUTO: NEGATIVE
HYALINE CASTS #/AREA URNS AUTO: 0 /LPF (ref 0–8)
KETONES UR STRIP.AUTO-MCNC: 15 MG/DL
LEUKOCYTE ESTERASE UR QL STRIP.AUTO: ABNORMAL
LYMPHOCYTES # BLD: 1.2 K/UL (ref 1–5.1)
LYMPHOCYTES NFR BLD: 16.8 %
MCH RBC QN AUTO: 29.9 PG (ref 26–34)
MCHC RBC AUTO-ENTMCNC: 34.4 G/DL (ref 31–36)
MCV RBC AUTO: 87 FL (ref 80–100)
MONOCYTES # BLD: 0.5 K/UL (ref 0–1.3)
MONOCYTES NFR BLD: 6.6 %
NEUTROPHILS # BLD: 5.3 K/UL (ref 1.7–7.7)
NEUTROPHILS NFR BLD: 74.3 %
NITRITE UR QL STRIP.AUTO: NEGATIVE
PH UR STRIP.AUTO: 7.5 [PH] (ref 5–8)
PLATELET # BLD AUTO: 211 K/UL (ref 135–450)
PMV BLD AUTO: 9.3 FL (ref 5–10.5)
POTASSIUM SERPL-SCNC: 4 MMOL/L (ref 3.5–5.1)
PROT SERPL-MCNC: 6.7 G/DL (ref 6.4–8.2)
PROT UR STRIP.AUTO-MCNC: ABNORMAL MG/DL
RBC # BLD AUTO: 4.54 M/UL (ref 4–5.2)
RBC CLUMPS #/AREA URNS AUTO: 1 /HPF (ref 0–4)
SODIUM SERPL-SCNC: 140 MMOL/L (ref 136–145)
SP GR UR STRIP.AUTO: 1.02 (ref 1–1.03)
UA COMPLETE W REFLEX CULTURE PNL UR: YES
UA DIPSTICK W REFLEX MICRO PNL UR: YES
URN SPEC COLLECT METH UR: ABNORMAL
UROBILINOGEN UR STRIP-ACNC: 1 E.U./DL
WBC # BLD AUTO: 7.1 K/UL (ref 4–11)
WBC #/AREA URNS AUTO: 10 /HPF (ref 0–5)

## 2024-07-22 PROCEDURE — 96374 THER/PROPH/DIAG INJ IV PUSH: CPT

## 2024-07-22 PROCEDURE — 80053 COMPREHEN METABOLIC PANEL: CPT

## 2024-07-22 PROCEDURE — 85025 COMPLETE CBC W/AUTO DIFF WBC: CPT

## 2024-07-22 PROCEDURE — 81001 URINALYSIS AUTO W/SCOPE: CPT

## 2024-07-22 PROCEDURE — 6360000002 HC RX W HCPCS

## 2024-07-22 PROCEDURE — 87086 URINE CULTURE/COLONY COUNT: CPT

## 2024-07-22 PROCEDURE — 99284 EMERGENCY DEPT VISIT MOD MDM: CPT

## 2024-07-22 PROCEDURE — 2580000003 HC RX 258

## 2024-07-22 PROCEDURE — 87077 CULTURE AEROBIC IDENTIFY: CPT

## 2024-07-22 RX ORDER — DICYCLOMINE HYDROCHLORIDE 10 MG/1
10 CAPSULE ORAL
Qty: 120 CAPSULE | Refills: 0 | Status: SHIPPED | OUTPATIENT
Start: 2024-07-22

## 2024-07-22 RX ORDER — 0.9 % SODIUM CHLORIDE 0.9 %
1000 INTRAVENOUS SOLUTION INTRAVENOUS ONCE
Status: COMPLETED | OUTPATIENT
Start: 2024-07-22 | End: 2024-07-22

## 2024-07-22 RX ORDER — ONDANSETRON 4 MG/1
4 TABLET, ORALLY DISINTEGRATING ORAL 3 TIMES DAILY PRN
Qty: 21 TABLET | Refills: 0 | Status: SHIPPED | OUTPATIENT
Start: 2024-07-22

## 2024-07-22 RX ORDER — ONDANSETRON 2 MG/ML
4 INJECTION INTRAMUSCULAR; INTRAVENOUS ONCE
Status: COMPLETED | OUTPATIENT
Start: 2024-07-22 | End: 2024-07-22

## 2024-07-22 RX ADMIN — SODIUM CHLORIDE 1000 ML: 9 INJECTION, SOLUTION INTRAVENOUS at 20:50

## 2024-07-22 RX ADMIN — ONDANSETRON 4 MG: 2 INJECTION INTRAMUSCULAR; INTRAVENOUS at 20:49

## 2024-07-22 NOTE — TELEPHONE ENCOUNTER
Pt called office, advised pt to be evaluated in ED, per Dr. CAMPBELL,      -----------------------------------------------------  From: Leann Barnhart  To: Dr. Rose Girard  Sent: 7/22/2024  8:06 AM EDT  Subject: Appointment Request    Appointment Request From: Leann Barnhart    With Provider: Rose Dockery DO [[facility]]    Preferred Date Range: 7/22/2024 - 7/22/2024    Preferred Times: Monday Afternoon    Reason for visit: Request an Appointment    Comments:  I have been throwing up and have diarrhea scent Maximus morning .    Eye

## 2024-07-23 ASSESSMENT — ENCOUNTER SYMPTOMS
ABDOMINAL PAIN: 0
VOMITING: 1
SORE THROAT: 0
COUGH: 0
WHEEZING: 0
SINUS PRESSURE: 0
SINUS PAIN: 0
NAUSEA: 1
SHORTNESS OF BREATH: 0
CHEST TIGHTNESS: 0
TROUBLE SWALLOWING: 0
DIARRHEA: 1
RHINORRHEA: 0

## 2024-07-23 NOTE — ED PROVIDER NOTES
[facility] EMERGENCY DEPARTMENT  EMERGENCY DEPARTMENT ENCOUNTER        Pt Name: Leann Barnhart  MRN: [id number]  Birthdate 1965  Date of evaluation: 7/22/2024  Provider: BETSY Elias - CNP  PCP: Rose Girard DO  Note Started: 12:14 AM EDT 7/23/24      ABDIRASHID. I have evaluated this patient.        CHIEF COMPLAINT       Chief Complaint   Patient presents with    Emesis     N/V/D for two days. Pt denies fever. History of H. Pylori.  Pt states she had tooth removed and since has been having GI  symptoms       HISTORY OF PRESENT ILLNESS: 1 or more Elements     History From: Patient, supplemental from  at bedside    Chief Complaint: Nausea, vomiting, diarrhea    Leann Barnhart is a 59 y.o. female who presents to the emergency department for evaluation of nausea, vomiting, diarrhea over the past 2 days.  She describes nonbloody and nonbilious emesis.  Patient denies any recent travel.  States that her daughter and son-in-law are experiencing similar symptoms after they ate a steak that was potentially undercooked preceding onset of symptoms.  Denies any abdominal pain, dysuria, melena, hematochezia.  Has not appreciated any subjective or objective fevers.  States that she has been able to tolerate drinking Pedialyte today there is concern for potential dehydration.  No recent antibiotic therapy.  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 Difficile.     Nursing Notes were all reviewed and agreed with or any disagreements were addressed in the HPI.    REVIEW OF SYSTEMS :      Review of Systems   Constitutional:  Negative for chills, fatigue and fever.   HENT:  Negative for congestion, postnasal drip, rhinorrhea, sinus pressure, sinus pain, sneezing, sore throat and trouble swallowing.    Respiratory:  Negative for cough, chest tightness, shortness of breath and wheezing.    Cardiovascular:  Negative for

## 2024-07-24 LAB — BACTERIA UR CULT: NORMAL

## 2024-07-25 ENCOUNTER — TELEPHONE (OUTPATIENT)
Dept: FAMILY MEDICINE CLINIC | Age: 59
End: 2024-07-25

## 2024-07-25 NOTE — TELEPHONE ENCOUNTER
Called to schedule pt for hospital follow up. Next open was August 5th, pt said that it was too far out and  was told it needed to be within a few days. Pt wanted to see [facility]  if dr mallory not available . Pt can be reached at [phone]

## 2024-07-28 ENCOUNTER — PATIENT MESSAGE (OUTPATIENT)
Dept: ENDOCRINOLOGY | Age: 59
End: 2024-07-28

## 2024-07-28 DIAGNOSIS — Z79.4 UNCONTROLLED TYPE 2 DIABETES MELLITUS WITH HYPERGLYCEMIA, WITH LONG-TERM CURRENT USE OF INSULIN (HCC): Primary | ICD-10-CM

## 2024-07-28 DIAGNOSIS — E11.65 UNCONTROLLED TYPE 2 DIABETES MELLITUS WITH HYPERGLYCEMIA, WITH LONG-TERM CURRENT USE OF INSULIN (HCC): Primary | ICD-10-CM

## 2024-07-29 RX ORDER — TIRZEPATIDE 7.5 MG/.5ML
7.5 INJECTION, SOLUTION SUBCUTANEOUS WEEKLY
Qty: 4 ADJUSTABLE DOSE PRE-FILLED PEN SYRINGE | Refills: 1 | Status: SHIPPED | OUTPATIENT
Start: 2024-07-29

## 2024-07-29 NOTE — TELEPHONE ENCOUNTER
From: Leann Barnhart  To: Dr. Nimo Santiago  Sent: 7/28/2024 7:56 AM EDT  Subject: Medicine     I only have one more shot of my 5 mg Mounjaro can you refill or start me on 7 mg like we discussed.Thank you

## 2024-08-13 ENCOUNTER — OFFICE VISIT (OUTPATIENT)
Dept: FAMILY MEDICINE CLINIC | Age: 59
End: 2024-08-13
Payer: COMMERCIAL

## 2024-08-13 ENCOUNTER — TELEPHONE (OUTPATIENT)
Dept: FAMILY MEDICINE CLINIC | Age: 59
End: 2024-08-13

## 2024-08-13 VITALS
HEIGHT: 65 IN | OXYGEN SATURATION: 97 % | BODY MASS INDEX: 24.83 KG/M2 | DIASTOLIC BLOOD PRESSURE: 80 MMHG | HEART RATE: 87 BPM | SYSTOLIC BLOOD PRESSURE: 114 MMHG | TEMPERATURE: 97.9 F | WEIGHT: 149 LBS

## 2024-08-13 DIAGNOSIS — J06.9 ACUTE URI: Primary | ICD-10-CM

## 2024-08-13 PROCEDURE — 3079F DIAST BP 80-89 MM HG: CPT | Performed by: FAMILY MEDICINE

## 2024-08-13 PROCEDURE — 99213 OFFICE O/P EST LOW 20 MIN: CPT | Performed by: FAMILY MEDICINE

## 2024-08-13 PROCEDURE — 3074F SYST BP LT 130 MM HG: CPT | Performed by: FAMILY MEDICINE

## 2024-08-13 RX ORDER — AZITHROMYCIN 250 MG/1
TABLET, FILM COATED ORAL
Qty: 6 TABLET | Refills: 0 | Status: SHIPPED | OUTPATIENT
Start: 2024-08-13

## 2024-08-13 RX ORDER — PREDNISONE 20 MG/1
40 TABLET ORAL DAILY
Qty: 4 TABLET | Refills: 0 | Status: SHIPPED | OUTPATIENT
Start: 2024-08-13 | End: 2024-08-15

## 2024-08-13 SDOH — ECONOMIC STABILITY: INCOME INSECURITY: HOW HARD IS IT FOR YOU TO PAY FOR THE VERY BASICS LIKE FOOD, HOUSING, MEDICAL CARE, AND HEATING?: NOT HARD AT ALL

## 2024-08-13 SDOH — ECONOMIC STABILITY: FOOD INSECURITY: WITHIN THE PAST 12 MONTHS, YOU WORRIED THAT YOUR FOOD WOULD RUN OUT BEFORE YOU GOT MONEY TO BUY MORE.: NEVER TRUE

## 2024-08-13 SDOH — ECONOMIC STABILITY: FOOD INSECURITY: WITHIN THE PAST 12 MONTHS, THE FOOD YOU BOUGHT JUST DIDN'T LAST AND YOU DIDN'T HAVE MONEY TO GET MORE.: NEVER TRUE

## 2024-08-13 ASSESSMENT — ANXIETY QUESTIONNAIRES
7. FEELING AFRAID AS IF SOMETHING AWFUL MIGHT HAPPEN: NOT AT ALL
1. FEELING NERVOUS, ANXIOUS, OR ON EDGE: NOT AT ALL
GAD7 TOTAL SCORE: 0
6. BECOMING EASILY ANNOYED OR IRRITABLE: NOT AT ALL
4. TROUBLE RELAXING: NOT AT ALL
5. BEING SO RESTLESS THAT IT IS HARD TO SIT STILL: NOT AT ALL
2. NOT BEING ABLE TO STOP OR CONTROL WORRYING: NOT AT ALL
3. WORRYING TOO MUCH ABOUT DIFFERENT THINGS: NOT AT ALL

## 2024-08-13 ASSESSMENT — PATIENT HEALTH QUESTIONNAIRE - PHQ9
SUM OF ALL RESPONSES TO PHQ9 QUESTIONS 1 & 2: 0
SUM OF ALL RESPONSES TO PHQ QUESTIONS 1-9: 0
1. LITTLE INTEREST OR PLEASURE IN DOING THINGS: NOT AT ALL
SUM OF ALL RESPONSES TO PHQ QUESTIONS 1-9: 0
2. FEELING DOWN, DEPRESSED OR HOPELESS: NOT AT ALL
SUM OF ALL RESPONSES TO PHQ QUESTIONS 1-9: 0
SUM OF ALL RESPONSES TO PHQ QUESTIONS 1-9: 0

## 2024-08-13 NOTE — TELEPHONE ENCOUNTER
Leann called in stating that her job told her that she needs a FIT TO DUTY LETTER for her to return back to work.    Leann can be reached at [phone]

## 2024-08-13 NOTE — TELEPHONE ENCOUNTER
She was to be out of work through Friday.  If she feels like she is okay to work, COVID test negative, can provide letter at that time

## 2024-08-13 NOTE — PROGRESS NOTES
Diagnosis Orders   1. Acute URI  COVID-19        Azithromycin x 5 days with prednisone 40 mg daily x 2 days prescribed, supportive care measures recommended as well, she is to call or be reevaluated with worsening symptoms, she will watch her glucose levels closely      O: /80 (Site: Right Upper Arm, Position: Sitting, Cuff Size: Small Adult)   Pulse 87   Temp 97.9 °F (36.6 °C)   Ht 1.651 m (5' 5\")   Wt 67.6 kg (149 lb)   SpO2 97%   BMI 24.79 kg/m²   Gen- NAD, she looks like she does not feel well  Neck-mildly full anterior cervical lymph nodes  HEENT- Eyes without icterus or injection, mild pharyngitis, throat and TMs unremarkable  Lungs- CTAB  Heart- RRR  Abdomen- Soft, non tender  Ext- No edema    S: CC-URI symptoms    HPI-Leann reports starting with cough, chest congestion, nasal congestion, postnasal drip, increased temperature, malaise 2 days ago.  She has mild dyspnea.  She denies chest pain.  She reports her granddaughter has been sick with similar symptoms.  She feels a bit better today compared to yesterday.    ROS- Per HPI    Patient's medications, allergies, and past medical hx were reviewed     Rose Dockery DO

## 2024-08-14 LAB — SARS-COV-2 RNA RESP QL NAA+PROBE: NOT DETECTED

## 2024-08-16 NOTE — TELEPHONE ENCOUNTER
Pt returned call and states she would feel better about staying out until Monday. Needs note for work stating such.

## 2024-08-16 NOTE — TELEPHONE ENCOUNTER
Pt calling office back said that she is feeling a little better, said that there is still a little pressure in chest, coughing up phlegm, no fever but still tired

## 2024-10-05 DIAGNOSIS — E11.65 UNCONTROLLED TYPE 2 DIABETES MELLITUS WITH HYPERGLYCEMIA, WITH LONG-TERM CURRENT USE OF INSULIN (HCC): ICD-10-CM

## 2024-10-05 DIAGNOSIS — Z79.4 UNCONTROLLED TYPE 2 DIABETES MELLITUS WITH HYPERGLYCEMIA, WITH LONG-TERM CURRENT USE OF INSULIN (HCC): ICD-10-CM

## 2024-10-06 ENCOUNTER — PATIENT MESSAGE (OUTPATIENT)
Dept: FAMILY MEDICINE CLINIC | Age: 59
End: 2024-10-06

## 2024-10-07 RX ORDER — MECLIZINE HYDROCHLORIDE 25 MG/1
25 TABLET ORAL 3 TIMES DAILY PRN
Qty: 90 TABLET | Refills: 2 | Status: SHIPPED | OUTPATIENT
Start: 2024-10-07

## 2024-10-07 RX ORDER — TIRZEPATIDE 7.5 MG/.5ML
INJECTION, SOLUTION SUBCUTANEOUS
Qty: 2 ML | Refills: 1 | Status: SHIPPED | OUTPATIENT
Start: 2024-10-07

## 2024-10-18 ENCOUNTER — TELEPHONE (OUTPATIENT)
Dept: ENDOCRINOLOGY | Age: 59
End: 2024-10-18

## 2024-10-18 DIAGNOSIS — D50.9 IRON DEFICIENCY ANEMIA, UNSPECIFIED IRON DEFICIENCY ANEMIA TYPE: ICD-10-CM

## 2024-10-18 DIAGNOSIS — Z79.4 UNCONTROLLED TYPE 2 DIABETES MELLITUS WITH HYPERGLYCEMIA, WITH LONG-TERM CURRENT USE OF INSULIN (HCC): Primary | ICD-10-CM

## 2024-10-18 DIAGNOSIS — I10 ESSENTIAL HYPERTENSION: ICD-10-CM

## 2024-10-18 DIAGNOSIS — Z79.4 UNCONTROLLED TYPE 2 DIABETES MELLITUS WITH HYPERGLYCEMIA, WITH LONG-TERM CURRENT USE OF INSULIN (HCC): ICD-10-CM

## 2024-10-18 DIAGNOSIS — E11.65 UNCONTROLLED TYPE 2 DIABETES MELLITUS WITH HYPERGLYCEMIA, WITH LONG-TERM CURRENT USE OF INSULIN (HCC): Primary | ICD-10-CM

## 2024-10-18 DIAGNOSIS — E11.65 UNCONTROLLED TYPE 2 DIABETES MELLITUS WITH HYPERGLYCEMIA, WITH LONG-TERM CURRENT USE OF INSULIN (HCC): ICD-10-CM

## 2024-10-18 DIAGNOSIS — E78.2 MIXED HYPERLIPIDEMIA: ICD-10-CM

## 2024-10-18 LAB
ALBUMIN SERPL-MCNC: 4.8 G/DL (ref 3.4–5)
ALBUMIN/GLOB SERPL: 2.2 {RATIO} (ref 1.1–2.2)
ALP SERPL-CCNC: 108 U/L (ref 40–129)
ALT SERPL-CCNC: 45 U/L (ref 10–40)
ANION GAP SERPL CALCULATED.3IONS-SCNC: 10 MMOL/L (ref 3–16)
AST SERPL-CCNC: 26 U/L (ref 15–37)
BILIRUB SERPL-MCNC: 0.6 MG/DL (ref 0–1)
BUN SERPL-MCNC: 16 MG/DL (ref 7–20)
CALCIUM SERPL-MCNC: 10.6 MG/DL (ref 8.3–10.6)
CHLORIDE SERPL-SCNC: 103 MMOL/L (ref 99–110)
CHOLEST SERPL-MCNC: 163 MG/DL (ref 0–199)
CO2 SERPL-SCNC: 28 MMOL/L (ref 21–32)
CREAT SERPL-MCNC: 0.8 MG/DL (ref 0.6–1.1)
GFR SERPLBLD CREATININE-BSD FMLA CKD-EPI: 85 ML/MIN/{1.73_M2}
GLUCOSE SERPL-MCNC: 166 MG/DL (ref 70–99)
HDLC SERPL-MCNC: 48 MG/DL (ref 40–60)
LDLC SERPL CALC-MCNC: 86 MG/DL
POTASSIUM SERPL-SCNC: 5.2 MMOL/L (ref 3.5–5.1)
PROT SERPL-MCNC: 7 G/DL (ref 6.4–8.2)
SODIUM SERPL-SCNC: 141 MMOL/L (ref 136–145)
TRIGL SERPL-MCNC: 145 MG/DL (ref 0–150)
TSH SERPL DL<=0.005 MIU/L-ACNC: 1.98 UIU/ML (ref 0.27–4.2)
VLDLC SERPL CALC-MCNC: 29 MG/DL

## 2024-10-19 LAB
EST. AVERAGE GLUCOSE BLD GHB EST-MCNC: 151.3 MG/DL
HBA1C MFR BLD: 6.9 %

## 2024-10-25 ENCOUNTER — OFFICE VISIT (OUTPATIENT)
Dept: ENDOCRINOLOGY | Age: 59
End: 2024-10-25

## 2024-10-25 ENCOUNTER — PATIENT MESSAGE (OUTPATIENT)
Dept: ENDOCRINOLOGY | Age: 59
End: 2024-10-25

## 2024-10-25 VITALS
HEART RATE: 85 BPM | DIASTOLIC BLOOD PRESSURE: 89 MMHG | WEIGHT: 150 LBS | RESPIRATION RATE: 14 BRPM | HEIGHT: 65 IN | SYSTOLIC BLOOD PRESSURE: 130 MMHG | BODY MASS INDEX: 24.99 KG/M2 | TEMPERATURE: 98 F

## 2024-10-25 DIAGNOSIS — Z79.4 UNCONTROLLED TYPE 2 DIABETES MELLITUS WITH HYPERGLYCEMIA, WITH LONG-TERM CURRENT USE OF INSULIN (HCC): Primary | ICD-10-CM

## 2024-10-25 DIAGNOSIS — E78.2 MIXED HYPERLIPIDEMIA: ICD-10-CM

## 2024-10-25 DIAGNOSIS — E11.65 UNCONTROLLED TYPE 2 DIABETES MELLITUS WITH HYPERGLYCEMIA, WITH LONG-TERM CURRENT USE OF INSULIN (HCC): Primary | ICD-10-CM

## 2024-10-25 RX ORDER — BLOOD-GLUCOSE SENSOR
EACH MISCELLANEOUS
Qty: 2 EACH | Refills: 5 | Status: SHIPPED | OUTPATIENT
Start: 2024-10-25

## 2024-10-25 RX ORDER — TIRZEPATIDE 7.5 MG/.5ML
7.5 INJECTION, SOLUTION SUBCUTANEOUS WEEKLY
Qty: 2 ML | Refills: 1 | Status: CANCELLED | OUTPATIENT
Start: 2024-10-25

## 2024-10-25 RX ORDER — GLIPIZIDE 10 MG/1
TABLET ORAL
Qty: 360 TABLET | Refills: 3 | Status: SHIPPED | OUTPATIENT
Start: 2024-10-25

## 2024-10-25 RX ORDER — TIRZEPATIDE 10 MG/.5ML
10 INJECTION, SOLUTION SUBCUTANEOUS WEEKLY
Qty: 2 ML | Refills: 1 | Status: SHIPPED | OUTPATIENT
Start: 2024-10-25

## 2024-10-25 NOTE — PROGRESS NOTES
[facility] Endocrinology    Chief Complaint:     Chief Complaint   Patient presents with    Diabetes      Subjective:   Leann Barnhart is a pleasant 59 y.o. female who presents for follow up of Diabetes Mellitus type 2. Patient also has hypertension, hyperlipidemia and has a BMI of 25.    Diagnosed: long standing   On insulin since: few years  Other diabetes meds tried in the past: Unable to tolerate Jardiance/Farxiga due to infections and GI upset. Metformin XR (stopped due to GI symptoms), Lantus stopped in 2024 given improvement in blood sugars.  Hx of severe hypoglycemia or DKA: none   Hx of MI/stroke/CKD: no MI or stroke, no CKD  Hx of pancreatitis: no   Family hx of thyroid cancer: no     Most recent HbA1c:   Hemoglobin A1C   Date Value Ref Range Status   10/18/2024 6.9 See comment % Final     Comment:     Comment:  Diagnosis of Diabetes: > or = 6.5%  Increased risk of diabetes (Prediabetes): 5.7-6.4%  Glycemic Control: Nonpregnant Adults: <7.0%                    Pregnant: <6.0%          Current regimen:  Glipizide 10 mg BID  Mounjaro 7.5 mg weekly, reports compliance and tolerance.  She has some upset stomach and diarrhea last Monday and she attributes that to gastroenteritis.    Blood sugars: Review of [facility] view report from the past 2 weeks shows average glucose of 162 with GMI of 7.2%.  Time in range is 73%, high at 20% and very high at 7%.  Low at 0%.  Patient is noted to have a pattern of hyperglycemia into the 200 and occasionally 300 after lunch and dinner.    Meals: 3 meals, mainly cooked meals. Stopped drinking coke. She drinks water with pedialyte.  Has been trying to cut down on bedtime snacking.  Exercise:     Last eye exam: Dec 2023, no DR.    Foot care: no concerns, no neuropathy     Hyperlipidemia: Atorvastatin 40 mg daily.  Lipid panel from October 2024 showed an LDL of 86, triglycerides 145.  Hypertension: Not on any blood pressure medications. She was on lisinopril but stopped

## 2024-11-20 NOTE — PROGRESS NOTES
No       Review of Systems:  A comprehensive review of systems was negative except for what was noted in the HPI.     Physical Exam:   Vitals:    11/21/24 1302   BP: 115/80   Site: Left Upper Arm   Position: Sitting   Cuff Size: Medium Adult   Pulse: 94   SpO2: 100%   Weight: 66.2 kg (146 lb)   Height: 1.651 m (5' 5\")     Body mass index is 24.3 kg/m².   Constitutional: She is oriented to person, place, and time. She appears well-developed and well-nourished. No distress.   HEENT:   Head: Normocephalic and atraumatic.   Right Ear: Tympanic membrane, external ear and ear canal normal.   Left Ear: Tympanic membrane, external ear and ear canal normal.   Nose: Nose normal.   Mouth/Throat: Oropharynx is clear and moist, and mucous membranes are normal.  There is no cervical adenopathy.  Eyes: Conjunctivae and extraocular motions are normal. Pupils are equal, round, and reactive to light.   Neck: Neck supple. No JVD present. Carotid bruit is not present. No mass and no thyromegaly present.   Cardiovascular: Normal rate, regular rhythm, normal heart sounds and intact distal pulses.  Exam reveals no gallop and no friction rub.  No murmur heard.  Pulmonary/Chest: Effort normal and breath sounds normal. No respiratory distress. She has no wheezes, rhonchi or rales.   Abdominal: Soft, non-tender. Bowel sounds and aorta are normal. She exhibits no organomegaly, mass or bruit.   Genitourinary: examination not indicated.  Breast exam:  not examined.  Musculoskeletal: Normal range of motion, no synovitis. She exhibits no edema.   Neurological: She is alert and oriented to person, place, and time. She has normal reflexes. No cranial nerve deficit. Coordination normal.   Skin: Skin is warm and dry. There is no rash or erythema.  No suspicious lesions noted.   Psychiatric: She has a normal mood and affect. Her speech is normal and behavior is normal. Judgment, cognition and memory are normal.     Assessment/Plan:    Leann was seen

## 2024-11-21 ENCOUNTER — OFFICE VISIT (OUTPATIENT)
Dept: FAMILY MEDICINE CLINIC | Age: 59
End: 2024-11-21

## 2024-11-21 VITALS
DIASTOLIC BLOOD PRESSURE: 80 MMHG | OXYGEN SATURATION: 100 % | SYSTOLIC BLOOD PRESSURE: 115 MMHG | WEIGHT: 146 LBS | HEART RATE: 94 BPM | HEIGHT: 65 IN | BODY MASS INDEX: 24.32 KG/M2

## 2024-11-21 DIAGNOSIS — Z79.4 UNCONTROLLED TYPE 2 DIABETES MELLITUS WITH HYPERGLYCEMIA, WITH LONG-TERM CURRENT USE OF INSULIN (HCC): ICD-10-CM

## 2024-11-21 DIAGNOSIS — Z00.00 WELL ADULT EXAM: ICD-10-CM

## 2024-11-21 DIAGNOSIS — E11.65 UNCONTROLLED TYPE 2 DIABETES MELLITUS WITH HYPERGLYCEMIA, WITH LONG-TERM CURRENT USE OF INSULIN (HCC): ICD-10-CM

## 2024-11-21 DIAGNOSIS — R30.0 DYSURIA: Primary | ICD-10-CM

## 2024-11-21 DIAGNOSIS — E78.2 MIXED HYPERLIPIDEMIA: ICD-10-CM

## 2024-11-21 DIAGNOSIS — Z23 NEED FOR PNEUMOCOCCAL 20-VALENT CONJUGATE VACCINATION: ICD-10-CM

## 2024-11-21 DIAGNOSIS — I10 ESSENTIAL HYPERTENSION: ICD-10-CM

## 2024-11-21 LAB
BILIRUBIN, POC: NORMAL
BLOOD URINE, POC: NORMAL
CLARITY, POC: CLEAR
COLOR, POC: YELLOW
GLUCOSE URINE, POC: NORMAL MG/DL
KETONES, POC: NORMAL MG/DL
LEUKOCYTE EST, POC: NORMAL
NITRITE, POC: NORMAL
PH, POC: 8
PROTEIN, POC: NORMAL MG/DL
SPECIFIC GRAVITY, POC: 1.02
UROBILINOGEN, POC: 0.2 MG/DL

## 2024-11-21 RX ORDER — NITROFURANTOIN 25; 75 MG/1; MG/1
100 CAPSULE ORAL 2 TIMES DAILY
Qty: 10 CAPSULE | Refills: 0 | Status: SHIPPED | OUTPATIENT
Start: 2024-11-21 | End: 2024-11-26

## 2024-11-22 LAB — BACTERIA UR CULT: NORMAL

## 2024-12-28 DIAGNOSIS — E11.65 UNCONTROLLED TYPE 2 DIABETES MELLITUS WITH HYPERGLYCEMIA, WITH LONG-TERM CURRENT USE OF INSULIN (HCC): ICD-10-CM

## 2024-12-28 DIAGNOSIS — Z79.4 UNCONTROLLED TYPE 2 DIABETES MELLITUS WITH HYPERGLYCEMIA, WITH LONG-TERM CURRENT USE OF INSULIN (HCC): ICD-10-CM

## 2024-12-30 RX ORDER — TIRZEPATIDE 10 MG/.5ML
10 INJECTION, SOLUTION SUBCUTANEOUS WEEKLY
Qty: 2 ML | Refills: 1 | OUTPATIENT
Start: 2024-12-30

## 2024-12-30 RX ORDER — TIRZEPATIDE 10 MG/.5ML
10 INJECTION, SOLUTION SUBCUTANEOUS WEEKLY
Qty: 2 ML | Refills: 1 | Status: SHIPPED | OUTPATIENT
Start: 2024-12-30

## 2024-12-30 RX ORDER — TIRZEPATIDE 10 MG/.5ML
INJECTION, SOLUTION SUBCUTANEOUS
Qty: 2 ML | Refills: 1 | OUTPATIENT
Start: 2024-12-30

## 2024-12-30 NOTE — TELEPHONE ENCOUNTER
Patient called requesting a refill, pt stated that she is completely out of her script and pharmacy stated that she doesn't have any refills left      Rx- Mounjaro 10 mg     Pharmacy- [facility]     LOV-  NOV- 1/31/25    Please advise

## 2025-01-27 DIAGNOSIS — Z79.4 UNCONTROLLED TYPE 2 DIABETES MELLITUS WITH HYPERGLYCEMIA, WITH LONG-TERM CURRENT USE OF INSULIN (HCC): ICD-10-CM

## 2025-01-27 DIAGNOSIS — E11.65 UNCONTROLLED TYPE 2 DIABETES MELLITUS WITH HYPERGLYCEMIA, WITH LONG-TERM CURRENT USE OF INSULIN (HCC): ICD-10-CM

## 2025-01-27 RX ORDER — TIRZEPATIDE 10 MG/.5ML
INJECTION, SOLUTION SUBCUTANEOUS
Qty: 2 ML | Refills: 1 | Status: SHIPPED | OUTPATIENT
Start: 2025-01-27 | End: 2025-01-31 | Stop reason: ALTCHOICE

## 2025-01-31 ENCOUNTER — OFFICE VISIT (OUTPATIENT)
Dept: ENDOCRINOLOGY | Age: 60
End: 2025-01-31
Payer: COMMERCIAL

## 2025-01-31 VITALS — BODY MASS INDEX: 24.99 KG/M2 | HEIGHT: 65 IN | WEIGHT: 150 LBS

## 2025-01-31 DIAGNOSIS — Z79.4 UNCONTROLLED TYPE 2 DIABETES MELLITUS WITH HYPERGLYCEMIA, WITH LONG-TERM CURRENT USE OF INSULIN (HCC): Primary | ICD-10-CM

## 2025-01-31 DIAGNOSIS — E11.65 UNCONTROLLED TYPE 2 DIABETES MELLITUS WITH HYPERGLYCEMIA, WITH LONG-TERM CURRENT USE OF INSULIN (HCC): Primary | ICD-10-CM

## 2025-01-31 DIAGNOSIS — E78.2 MIXED HYPERLIPIDEMIA: ICD-10-CM

## 2025-01-31 LAB — HBA1C MFR BLD: 6.8 %

## 2025-01-31 PROCEDURE — 3044F HG A1C LEVEL LT 7.0%: CPT | Performed by: INTERNAL MEDICINE

## 2025-01-31 PROCEDURE — 83036 HEMOGLOBIN GLYCOSYLATED A1C: CPT | Performed by: INTERNAL MEDICINE

## 2025-01-31 PROCEDURE — 95251 CONT GLUC MNTR ANALYSIS I&R: CPT | Performed by: INTERNAL MEDICINE

## 2025-01-31 PROCEDURE — 99214 OFFICE O/P EST MOD 30 MIN: CPT | Performed by: INTERNAL MEDICINE

## 2025-01-31 RX ORDER — TIRZEPATIDE 12.5 MG/.5ML
12.5 INJECTION, SOLUTION SUBCUTANEOUS WEEKLY
Qty: 2 ML | Refills: 2 | Status: SHIPPED | OUTPATIENT
Start: 2025-01-31

## 2025-01-31 RX ORDER — GLIPIZIDE 10 MG/1
TABLET ORAL
Qty: 360 TABLET | Refills: 3 | Status: SHIPPED | OUTPATIENT
Start: 2025-01-31

## 2025-01-31 RX ORDER — ATORVASTATIN CALCIUM 40 MG/1
40 TABLET, FILM COATED ORAL DAILY
Qty: 90 TABLET | Refills: 3 | Status: SHIPPED | OUTPATIENT
Start: 2025-01-31

## 2025-01-31 NOTE — PROGRESS NOTES
Name: Leann Barnhart  YOB: 1965  Report Period: 01/18/2025 - 01/31/2025 (14 days)  Generated: 01/31/2025  Time CGM Active: 95%      Glucose Statistics and Targets  Average Glucose: 206 mg/dL  Glucose Management Indicator (GMI): 8.2%  Glucose Variability (%CV): 32.7%  Target Range: 70 - 180 mg/dL      Time in Ranges  Very High: >250 mg/dL --- 23%  High: 181 - 250 mg/dL --- 35%  Target Range: 70 - 180 mg/dL --- 42%  Low: 54 - 69 mg/dL --- 0%  Very Low: <54 mg/dL --- 0%    
software-based speech recognition technology (voice-to-text process), where occasional errors in transcription can occur.

## 2025-02-06 NOTE — TELEPHONE ENCOUNTER
Please advise if ok and can write note, VV yesterday Wood's Lamp Text: A Wood's Lamp was used to illuminate areas of the skin with a black light. The light was held over the suspected areas in a darkened room. Response To Light: positive for hypopigmentation Detail Level: Zone

## 2025-02-11 ENCOUNTER — CARE COORDINATION (OUTPATIENT)
Dept: CARE COORDINATION | Age: 60
End: 2025-02-11

## 2025-02-11 NOTE — CARE COORDINATION
Ambulatory Care Coordination Note     2/11/2025 8:07 AM     Patient outreach attempt by this ACM today to perform care management follow up  and offer care management services. ACM was unable to reach the patient by telephone today;   left voice message requesting a return phone call to this ACM.     ACM: Rachell Morris RN     Care Summary Note: CC fu referral other     PCP/Specialist follow up:   Future Appointments         Provider Specialty Dept Phone    5/2/2025 9:00 AM Nimo Santiago MD Endocrinology [phone]            Follow Up:   Plan for next ACM outreach in approximately 1-2 days  to complete:  - outreach attempt to offer care management services.

## 2025-02-11 NOTE — CARE COORDINATION
Ambulatory Care Coordination Note     2025 2:57 PM     Patient Current Location:  Home: [address] [place] [id number]     Patient contacted the ACM by telephone. Verified name and  with patient as identifiers.     Patient closed (patient declined) from the High Risk Care Management program on 2025.  No further Ambulatory Care Manager follow up scheduled.      ACM: Rachell Morris RN       Method of communication with provider: phone.    PCP/Specialist follow up:   Future Appointments         Provider Specialty Dept Phone    2025 9:00 AM Nimo Santiago MD Endocrinology [phone]            Follow Up:   No further Ambulatory Care Management follow-up scheduled at this time.  Patient  has Ambulatory Care Manager's contact information for any further questions, concerns or needs.

## 2025-02-26 NOTE — PROGRESS NOTES
PROGRESS NOTE     Alyx Hoff CNP  [facility]  [address] [place] [address], [address]  [facility] [id number] [phone] office  [phone] fax    Date of Service:  2/27/2025    Assessment / Plan:     1. Suprapubic pain  Urinalysis negative for infection but sending culture to confirm.     - POCT Urinalysis no Micro  - Culture, Urine    2. Acute cystitis without hematuria  Will start macrobid and await culture.     - nitrofurantoin, macrocrystal-monohydrate, (MACROBID) 100 MG capsule; Take 1 capsule by mouth 2 times daily for 7 days  Dispense: 14 capsule; Refill: 0    Subjective:      Patient ID: .Leann Barnhart is a 59 y.o. female      CC: Stomach pains and possible UTI    HPI  Patient reports right flank pain, urinary frequency for 5 days. Accompanied by nausea. She has to get up 3-4 times during the night to use the restroom. History of UTI sepsis.       Vitals:    02/27/25 0859   BP: 126/86   Site: Right Upper Arm   Position: Sitting   Cuff Size: Medium Adult   Pulse: 93   Temp: 97.8 °F (36.6 °C)   SpO2: 97%   Weight: 68.5 kg (151 lb)   Height: 1.651 m (5' 5\")       Outpatient Medications Marked as Taking for the 2/27/25 encounter (Office Visit) with Alyx Hoff APRN - CNP   Medication Sig Dispense Refill    nitrofurantoin, macrocrystal-monohydrate, (MACROBID) 100 MG capsule Take 1 capsule by mouth 2 times daily for 7 days 14 capsule 0    Tirzepatide (MOUNJARO) 12.5 MG/0.5ML SOAJ Inject 12.5 mg into the skin once a week 2 mL 2    atorvastatin (LIPITOR) 40 MG tablet Take 1 tablet by mouth daily 90 tablet 3    glipiZIDE (GLUCOTROL) 10 MG tablet TAKE 1 tablet BY MOUTH TWICE DAILY BEFORE breakfast and dinner, take with food 360 tablet 3    Continuous Glucose Sensor (FREESTYLE MATTHEW 3 SENSOR) MISC To check glucose ---please change every 14 days 2 each 5    Continuous Glucose Sensor (FREESTYLE MATTHEW 3 PLUS SENSOR) MISC Change every 15 days 2 each 5    meclizine (ANTIVERT) 25 MG tablet Take 1 tablet by

## 2025-02-27 ENCOUNTER — OFFICE VISIT (OUTPATIENT)
Dept: FAMILY MEDICINE CLINIC | Age: 60
End: 2025-02-27

## 2025-02-27 VITALS
BODY MASS INDEX: 25.16 KG/M2 | DIASTOLIC BLOOD PRESSURE: 86 MMHG | HEART RATE: 93 BPM | OXYGEN SATURATION: 97 % | HEIGHT: 65 IN | SYSTOLIC BLOOD PRESSURE: 126 MMHG | TEMPERATURE: 97.8 F | WEIGHT: 151 LBS

## 2025-02-27 DIAGNOSIS — N30.00 ACUTE CYSTITIS WITHOUT HEMATURIA: ICD-10-CM

## 2025-02-27 DIAGNOSIS — R10.2 SUPRAPUBIC PAIN: Primary | ICD-10-CM

## 2025-02-27 LAB
BILIRUBIN, POC: NORMAL
BLOOD URINE, POC: NORMAL
CLARITY, POC: CLEAR
COLOR, POC: YELLOW
GLUCOSE URINE, POC: NORMAL MG/DL
KETONES, POC: NORMAL MG/DL
LEUKOCYTE EST, POC: NORMAL
NITRITE, POC: NORMAL
PH, POC: 7
PROTEIN, POC: NORMAL MG/DL
SPECIFIC GRAVITY, POC: 1.01
UROBILINOGEN, POC: 0.2 MG/DL

## 2025-02-27 RX ORDER — NITROFURANTOIN 25; 75 MG/1; MG/1
100 CAPSULE ORAL 2 TIMES DAILY
Qty: 14 CAPSULE | Refills: 0 | Status: SHIPPED | OUTPATIENT
Start: 2025-02-27 | End: 2025-03-06

## 2025-02-27 SDOH — ECONOMIC STABILITY: FOOD INSECURITY: WITHIN THE PAST 12 MONTHS, YOU WORRIED THAT YOUR FOOD WOULD RUN OUT BEFORE YOU GOT MONEY TO BUY MORE.: NEVER TRUE

## 2025-02-27 SDOH — ECONOMIC STABILITY: FOOD INSECURITY: WITHIN THE PAST 12 MONTHS, THE FOOD YOU BOUGHT JUST DIDN'T LAST AND YOU DIDN'T HAVE MONEY TO GET MORE.: NEVER TRUE

## 2025-02-27 ASSESSMENT — PATIENT HEALTH QUESTIONNAIRE - PHQ9
SUM OF ALL RESPONSES TO PHQ QUESTIONS 1-9: 0
1. LITTLE INTEREST OR PLEASURE IN DOING THINGS: NOT AT ALL
SUM OF ALL RESPONSES TO PHQ QUESTIONS 1-9: 0
SUM OF ALL RESPONSES TO PHQ QUESTIONS 1-9: 0
2. FEELING DOWN, DEPRESSED OR HOPELESS: NOT AT ALL
SUM OF ALL RESPONSES TO PHQ9 QUESTIONS 1 & 2: 0
SUM OF ALL RESPONSES TO PHQ QUESTIONS 1-9: 0

## 2025-02-28 LAB — BACTERIA UR CULT: NORMAL

## 2025-03-06 ENCOUNTER — OFFICE VISIT (OUTPATIENT)
Dept: FAMILY MEDICINE CLINIC | Age: 60
End: 2025-03-06
Payer: COMMERCIAL

## 2025-03-06 VITALS
SYSTOLIC BLOOD PRESSURE: 114 MMHG | DIASTOLIC BLOOD PRESSURE: 70 MMHG | WEIGHT: 151 LBS | HEIGHT: 65 IN | BODY MASS INDEX: 25.16 KG/M2 | OXYGEN SATURATION: 98 % | HEART RATE: 111 BPM | TEMPERATURE: 98.1 F

## 2025-03-06 DIAGNOSIS — R50.9 FEVER, UNSPECIFIED FEVER CAUSE: Primary | ICD-10-CM

## 2025-03-06 DIAGNOSIS — J10.1 INFLUENZA A: ICD-10-CM

## 2025-03-06 LAB
INFLUENZA VIRUS A RNA: NORMAL
INFLUENZA VIRUS B RNA: NORMAL

## 2025-03-06 PROCEDURE — 3078F DIAST BP <80 MM HG: CPT | Performed by: NURSE PRACTITIONER

## 2025-03-06 PROCEDURE — 3074F SYST BP LT 130 MM HG: CPT | Performed by: NURSE PRACTITIONER

## 2025-03-06 PROCEDURE — 99213 OFFICE O/P EST LOW 20 MIN: CPT | Performed by: NURSE PRACTITIONER

## 2025-03-06 PROCEDURE — 87502 INFLUENZA DNA AMP PROBE: CPT | Performed by: NURSE PRACTITIONER

## 2025-03-06 RX ORDER — OSELTAMIVIR PHOSPHATE 75 MG/1
75 CAPSULE ORAL 2 TIMES DAILY
Qty: 10 CAPSULE | Refills: 0 | Status: SHIPPED | OUTPATIENT
Start: 2025-03-06 | End: 2025-03-11

## 2025-03-06 NOTE — PROGRESS NOTES
PROGRESS NOTE     Alyx Hoff CNP  [facility]  [address] [place] [address], [address]  [facility] [address]  [phone] office  [phone] fax    Date of Service:  3/6/2025    Assessment / Plan:     1. Fever, unspecified fever cause  Flu negative but symptoms just started.     - POCT Influenza A/B DNA    2. Influenza A  Ok to treat given her daughter lives with her and is positive.     - oseltamivir (TAMIFLU) 75 MG capsule; Take 1 capsule by mouth 2 times daily for 5 days  Dispense: 10 capsule; Refill: 0    Daughter positive for flu A in office today.     Subjective:      Patient ID: .Leann Barnhart is a 59 y.o. female      CC: Cough    HPI  Respiratory Symptoms:  Patient complains of 2 day(s) history of fever: 100.5-101.9, headache, wheezing/chest tightness, and non-productive cough.  Symptoms have been worsening with time. She denies any other symptoms .  Relevant PMH: No pertinent PMH. Smoking history:  She  reports that she has never smoked. She has never used smokeless tobacco. She has had ill contacts with influenza. Treatment to date: Acetaminophen, NSAID, honey, throat drops.    Daughter positive for flu A     Vitals:    03/06/25 1054   BP: 114/70   Site: Left Upper Arm   Position: Sitting   Cuff Size: Medium Adult   Pulse: (!) 111   Temp: 98.1 °F (36.7 °C)   SpO2: 98%   Weight: 68.5 kg (151 lb)   Height: 1.651 m (5' 5\")       Outpatient Medications Marked as Taking for the 3/6/25 encounter (Office Visit) with Alyx Hoff APRN - CNP   Medication Sig Dispense Refill    oseltamivir (TAMIFLU) 75 MG capsule Take 1 capsule by mouth 2 times daily for 5 days 10 capsule 0    nitrofurantoin, macrocrystal-monohydrate, (MACROBID) 100 MG capsule Take 1 capsule by mouth 2 times daily for 7 days 14 capsule 0    Tirzepatide (MOUNJARO) 12.5 MG/0.5ML SOAJ Inject 12.5 mg into the skin once a week 2 mL 2    atorvastatin (LIPITOR) 40 MG tablet Take 1 tablet by mouth daily 90 tablet 3    glipiZIDE (GLUCOTROL) 10

## 2025-03-17 ENCOUNTER — OFFICE VISIT (OUTPATIENT)
Dept: FAMILY MEDICINE CLINIC | Age: 60
End: 2025-03-17
Payer: COMMERCIAL

## 2025-03-17 VITALS
TEMPERATURE: 98.5 F | WEIGHT: 151 LBS | DIASTOLIC BLOOD PRESSURE: 80 MMHG | BODY MASS INDEX: 25.16 KG/M2 | HEIGHT: 65 IN | OXYGEN SATURATION: 98 % | HEART RATE: 100 BPM | SYSTOLIC BLOOD PRESSURE: 122 MMHG

## 2025-03-17 DIAGNOSIS — J40 BRONCHITIS: ICD-10-CM

## 2025-03-17 DIAGNOSIS — R10.9 RIGHT FLANK PAIN: Primary | ICD-10-CM

## 2025-03-17 PROCEDURE — 99213 OFFICE O/P EST LOW 20 MIN: CPT | Performed by: NURSE PRACTITIONER

## 2025-03-17 PROCEDURE — 3074F SYST BP LT 130 MM HG: CPT | Performed by: NURSE PRACTITIONER

## 2025-03-17 PROCEDURE — 3079F DIAST BP 80-89 MM HG: CPT | Performed by: NURSE PRACTITIONER

## 2025-03-17 PROCEDURE — 81002 URINALYSIS NONAUTO W/O SCOPE: CPT | Performed by: NURSE PRACTITIONER

## 2025-03-17 RX ORDER — PREDNISONE 10 MG/1
10 TABLET ORAL 2 TIMES DAILY
Qty: 10 TABLET | Refills: 0 | Status: SHIPPED | OUTPATIENT
Start: 2025-03-17 | End: 2025-03-22

## 2025-03-17 RX ORDER — AZITHROMYCIN 250 MG/1
TABLET, FILM COATED ORAL
Qty: 6 TABLET | Refills: 0 | Status: SHIPPED | OUTPATIENT
Start: 2025-03-17 | End: 2025-03-27

## 2025-03-17 NOTE — PROGRESS NOTES
Review of Systems  Constitutional:  Negative for activity or appetite change, fever or fatigue  HENT:  Negative for congestion,sinus pressure, or rhinorrhea  Eyes:  Negative for eye pain or visual changes  Resp:  Negative for SOB, chest tightness. + cough  Cardiovascular: Negative for CP, palpitations, LU, orthopnea, PND, LE edema  Gastrointestinal: Negative for abd pain, melena, BRBPR, N/V/D  Endocrine:  Negative for polydipsia and polyuria  :  Negative for dysuria, flank pain or urinary frequency  Musculoskeletal:  Negative for back pain or myalgias  Neuro:  Negative for dizziness or lightheadedness  Psych: negative for depression or anxiety      Objective:   Constitutional:   Reviewed vitals above  Well Nourished, well developed, no distress       HENT:  Normal external nose without lesions  Bilateral TMs translucent with normal light reflex and bony landmarks  Normal oropharynx without erythema or exudate  Normal nasal mucosa without swelling or erythema  Neck:  Symmetric and without masses  Resp:  Normal effort  Clear to auscultation bilaterally without rhonchi, wheezing or crackles  + dry wheezy cough  Cardiovascular:  On auscultation, normal S1 and S2 without murmurs, rubs or gallops  Skin:  No rashes on inspection  No areas of increased heat or induration on palpation  Psych:  Normal mood and affect  Normal insight and judgement

## 2025-03-19 ENCOUNTER — RESULTS FOLLOW-UP (OUTPATIENT)
Dept: FAMILY MEDICINE CLINIC | Age: 60
End: 2025-03-19

## 2025-03-19 DIAGNOSIS — N30.00 ACUTE CYSTITIS WITHOUT HEMATURIA: Primary | ICD-10-CM

## 2025-03-19 LAB
BACTERIA UR CULT: ABNORMAL
ORGANISM: ABNORMAL

## 2025-03-19 RX ORDER — NITROFURANTOIN 25; 75 MG/1; MG/1
100 CAPSULE ORAL 2 TIMES DAILY
Qty: 10 CAPSULE | Refills: 0 | Status: SHIPPED | OUTPATIENT
Start: 2025-03-19 | End: 2025-03-24

## 2025-03-19 NOTE — TELEPHONE ENCOUNTER
Please let patient know her urine culture did come back positive for Ecoli however at a very low colony count. Given her history I am still going to treat. Macrobid was sent into her pharmacy.     Please document call and then close encounter.  thanks

## 2025-03-25 ENCOUNTER — TELEPHONE (OUTPATIENT)
Dept: ENDOCRINOLOGY | Age: 60
End: 2025-03-25

## 2025-03-25 NOTE — TELEPHONE ENCOUNTER
Submitted PA for Mounjaro  [facility]ia [facility] Key: [id number] STATUS: PENDING.    Follow up done daily; if no decision with in three days we will refax.  If another three days goes by with no decision will call the insurance for status.

## 2025-03-26 RX ORDER — NITROFURANTOIN 25; 75 MG/1; MG/1
100 CAPSULE ORAL 2 TIMES DAILY
Qty: 14 CAPSULE | Refills: 0 | Status: SHIPPED | OUTPATIENT
Start: 2025-03-26 | End: 2025-04-02

## 2025-03-31 ENCOUNTER — RESULTS FOLLOW-UP (OUTPATIENT)
Dept: FAMILY MEDICINE CLINIC | Age: 60
End: 2025-03-31

## 2025-03-31 ENCOUNTER — HOSPITAL ENCOUNTER (OUTPATIENT)
Dept: WOMENS IMAGING | Age: 60
Discharge: HOME | End: 2025-03-31
Payer: COMMERCIAL

## 2025-03-31 VITALS — WEIGHT: 142 LBS | BODY MASS INDEX: 23.66 KG/M2 | HEIGHT: 65 IN

## 2025-03-31 DIAGNOSIS — Z12.31 BREAST CANCER SCREENING BY MAMMOGRAM: ICD-10-CM

## 2025-03-31 PROCEDURE — 77067 SCR MAMMO BI INCL CAD: CPT

## 2025-03-31 NOTE — TELEPHONE ENCOUNTER
CaseId:[id number];Status:Approved;Review Type:Prior Auth;Coverage Start Date:03/25/2025;Coverage End Date:03/28/2026;. Authorization Expiration Date: March 28, 2026     If this requires a response please respond to the pool ( P [facility] MEDICATION PRE-AUTH).      Thank you please advise patient.

## 2025-04-07 ENCOUNTER — OFFICE VISIT (OUTPATIENT)
Dept: FAMILY MEDICINE CLINIC | Age: 60
End: 2025-04-07
Payer: COMMERCIAL

## 2025-04-07 ENCOUNTER — TELEPHONE (OUTPATIENT)
Dept: FAMILY MEDICINE CLINIC | Age: 60
End: 2025-04-07

## 2025-04-07 VITALS
HEART RATE: 90 BPM | HEIGHT: 65 IN | SYSTOLIC BLOOD PRESSURE: 126 MMHG | TEMPERATURE: 97.8 F | DIASTOLIC BLOOD PRESSURE: 84 MMHG | BODY MASS INDEX: 23.66 KG/M2 | OXYGEN SATURATION: 99 % | WEIGHT: 142 LBS

## 2025-04-07 DIAGNOSIS — N30.00 ACUTE CYSTITIS WITHOUT HEMATURIA: Primary | ICD-10-CM

## 2025-04-07 LAB
BILIRUBIN, POC: NORMAL
BLOOD URINE, POC: NORMAL
CLARITY, POC: CLEAR
COLOR, POC: YELLOW
GLUCOSE URINE, POC: NORMAL MG/DL
KETONES, POC: NORMAL MG/DL
LEUKOCYTE EST, POC: NORMAL
NITRITE, POC: NORMAL
PH, POC: 6.5
PROTEIN, POC: NORMAL MG/DL
SPECIFIC GRAVITY, POC: 1.02
UROBILINOGEN, POC: 0.2 MG/DL

## 2025-04-07 PROCEDURE — 99213 OFFICE O/P EST LOW 20 MIN: CPT | Performed by: NURSE PRACTITIONER

## 2025-04-07 PROCEDURE — 81002 URINALYSIS NONAUTO W/O SCOPE: CPT | Performed by: NURSE PRACTITIONER

## 2025-04-07 PROCEDURE — 3079F DIAST BP 80-89 MM HG: CPT | Performed by: NURSE PRACTITIONER

## 2025-04-07 PROCEDURE — 3074F SYST BP LT 130 MM HG: CPT | Performed by: NURSE PRACTITIONER

## 2025-04-07 RX ORDER — NITROFURANTOIN 25; 75 MG/1; MG/1
100 CAPSULE ORAL 2 TIMES DAILY
Qty: 14 CAPSULE | Refills: 0 | Status: SHIPPED | OUTPATIENT
Start: 2025-04-07 | End: 2025-04-14

## 2025-04-07 NOTE — TELEPHONE ENCOUNTER
CEASAR- called [facility]/ [facility] [phone]. Spoke with Jayce. He verified pt is eligible for the insurance, and it has been effective since 1.1.25 with not termination date. REF# [id number]

## 2025-04-07 NOTE — PROGRESS NOTES
alert and oriented to person, place, and time.   Psychiatric:  Behavior, judgment and thought content are normal. Cognition and memory are grossly normal.     No results found for this visit on 04/07/25.     ASSESSMENT/PLAN:  1. Acute cystitis without hematuria  Last urine culture showed low colony count of Ecoli. She has had 2 rounds of macrobid but symptoms return soon after finishing. She has multiple allergies to antibiotics. Cannot take bactrim, cipro, and pcn. If patient continues to have symptoms will refer to urology.     - nitrofurantoin, macrocrystal-monohydrate, (MACROBID) 100 MG capsule; Take 1 capsule by mouth 2 times daily for 7 days  Dispense: 14 capsule; Refill: 0  - POCT Urinalysis no Micro  - Culture, Urine      Urine culture pending  Patient was encouraged to increase fluid intake    Patient was advised to call if her symptoms do not respond to treatment within 1-2 days, or sooner if her condition worsens.

## 2025-04-08 LAB — BACTERIA UR CULT: NORMAL

## 2025-04-09 ENCOUNTER — RESULTS FOLLOW-UP (OUTPATIENT)
Dept: FAMILY MEDICINE CLINIC | Age: 60
End: 2025-04-09

## 2025-04-21 ENCOUNTER — TELEPHONE (OUTPATIENT)
Dept: ENDOCRINOLOGY | Age: 60
End: 2025-04-21

## 2025-04-21 NOTE — TELEPHONE ENCOUNTER
Submitted PA for Mounjaro  [facility] Key: [id number]   STATUS: An active PA is already on file with expiration date of 03/28/2026. Please wait to resubmit request within 60 days of that expiration date to obtain a PA renewal.     If this requires a response please respond to the pool ( P MHCX [facility] MEDICATION PRE-AUTH).      Thank you please advise patient.

## 2025-04-22 ENCOUNTER — OFFICE VISIT (OUTPATIENT)
Dept: FAMILY MEDICINE CLINIC | Age: 60
End: 2025-04-22

## 2025-04-22 VITALS
RESPIRATION RATE: 18 BRPM | DIASTOLIC BLOOD PRESSURE: 76 MMHG | HEART RATE: 98 BPM | SYSTOLIC BLOOD PRESSURE: 118 MMHG | HEIGHT: 65 IN | WEIGHT: 145 LBS | BODY MASS INDEX: 24.16 KG/M2 | OXYGEN SATURATION: 99 %

## 2025-04-22 DIAGNOSIS — N39.0 CHRONIC URINARY TRACT INFECTION: Primary | ICD-10-CM

## 2025-04-22 LAB
BILIRUBIN, POC: NORMAL
BLOOD URINE, POC: NORMAL
CLARITY, POC: NORMAL
COLOR, POC: NORMAL
GLUCOSE URINE, POC: 250 MG/DL
KETONES, POC: NORMAL MG/DL
LEUKOCYTE EST, POC: NORMAL
NITRITE, POC: NORMAL
PH, POC: 5
PROTEIN, POC: NORMAL MG/DL
SPECIFIC GRAVITY, POC: 1.01
UROBILINOGEN, POC: 1 MG/DL

## 2025-04-22 RX ORDER — NITROFURANTOIN 25; 75 MG/1; MG/1
100 CAPSULE ORAL 2 TIMES DAILY
Qty: 14 CAPSULE | Refills: 0 | Status: SHIPPED | OUTPATIENT
Start: 2025-04-22 | End: 2025-04-29

## 2025-04-22 RX ORDER — PHENAZOPYRIDINE HYDROCHLORIDE 200 MG/1
200 TABLET, FILM COATED ORAL 3 TIMES DAILY PRN
Qty: 21 TABLET | Refills: 0 | Status: SHIPPED | OUTPATIENT
Start: 2025-04-22 | End: 2025-04-29

## 2025-04-22 ASSESSMENT — PATIENT HEALTH QUESTIONNAIRE - PHQ9
SUM OF ALL RESPONSES TO PHQ QUESTIONS 1-9: 0
2. FEELING DOWN, DEPRESSED OR HOPELESS: NOT AT ALL
SUM OF ALL RESPONSES TO PHQ QUESTIONS 1-9: 0
1. LITTLE INTEREST OR PLEASURE IN DOING THINGS: NOT AT ALL
SUM OF ALL RESPONSES TO PHQ QUESTIONS 1-9: 0
SUM OF ALL RESPONSES TO PHQ QUESTIONS 1-9: 0

## 2025-04-22 NOTE — PROGRESS NOTES
Leann Barnhart (:  1965) is a 60 y.o. female,Established patient, here for evaluation of the following chief complaint(s):  Urinary Tract Infection (Has had multiple UTI like symptoms positive once atb each time does help. Started with N/V over the weekend and really bad pain in right side mid back sharp pain, worst when bending over. )         Assessment & Plan  Chronic urinary tract infection   - urine dip showed blood, lueks, nitrites, glucose and ketones   -will refer to urology and send ct scan of bladder for further workup  - adding pyridium for bladder pain and macrobid for infection reasons  - will follow up with imaging results and culture results  - if sx worsen advised ER  Results for orders placed or performed in visit on 25   POCT Urinalysis no Micro   Result Value Ref Range    Color, UA      Clarity, UA      Glucose, UA  mg/dL    Bilirubin, UA N     Ketones, UA Tr mg/dL    Spec Grav, UA 1.010     Blood, UA POC N     pH, UA 5.0     Protein, UA POC Tr mg/dL    Urobilinogen, UA 1.0 mg/dL    Leukocytes, UA N     Nitrite, UA Pos      Orders:    POCT Urinalysis no Micro    Culture, Urine    TARUN - Terrie Sierra MD, Urology, [facility]    CT UROGRAM; Future    phenazopyridine (PYRIDIUM) 200 MG tablet; Take 1 tablet by mouth 3 times daily as needed for Pain    nitrofurantoin, macrocrystal-monohydrate, (MACROBID) 100 MG capsule; Take 1 capsule by mouth 2 times daily for 7 days      Return if symptoms worsen or fail to improve.       Subjective   HPI    Patient presents today for urinary frequency, lower back pain, nausea and vomiting. States she though the n/v was from her mounro. States she also had a temp - felt hot. States yesterday continued to frequently having to go to the bathroom. States today no n/v. Did take Azo this morning. States headaches but denies lower abdominal pain. Denies blood in her urine. States she has been pushing fluids including pedialyte.

## 2025-04-24 ENCOUNTER — HOSPITAL ENCOUNTER (OUTPATIENT)
Dept: CT IMAGING | Age: 60
Discharge: HOME | End: 2025-04-24
Payer: COMMERCIAL

## 2025-04-24 DIAGNOSIS — N39.0 CHRONIC URINARY TRACT INFECTION: ICD-10-CM

## 2025-04-24 LAB
BACTERIA UR CULT: NORMAL
PERFORMED ON: ABNORMAL
POC CREATININE: 1.1 MG/DL (ref 0.6–1.1)
POC SAMPLE TYPE: ABNORMAL

## 2025-04-24 PROCEDURE — 6360000004 HC RX CONTRAST MEDICATION: Performed by: NURSE PRACTITIONER

## 2025-04-24 PROCEDURE — 82565 ASSAY OF CREATININE: CPT

## 2025-04-24 PROCEDURE — 74178 CT ABD&PLV WO CNTR FLWD CNTR: CPT | Performed by: NURSE PRACTITIONER

## 2025-04-24 RX ORDER — IOPAMIDOL 755 MG/ML
75 INJECTION, SOLUTION INTRAVASCULAR
Status: COMPLETED | OUTPATIENT
Start: 2025-04-24 | End: 2025-04-24

## 2025-04-24 RX ADMIN — IOPAMIDOL 75 ML: 755 INJECTION, SOLUTION INTRAVENOUS at 14:34

## 2025-04-25 ENCOUNTER — RESULTS FOLLOW-UP (OUTPATIENT)
Dept: FAMILY MEDICINE CLINIC | Age: 60
End: 2025-04-25

## 2025-05-02 ENCOUNTER — OFFICE VISIT (OUTPATIENT)
Dept: ENDOCRINOLOGY | Age: 60
End: 2025-05-02

## 2025-05-02 VITALS
DIASTOLIC BLOOD PRESSURE: 87 MMHG | HEART RATE: 86 BPM | HEIGHT: 65 IN | BODY MASS INDEX: 24.32 KG/M2 | SYSTOLIC BLOOD PRESSURE: 128 MMHG | WEIGHT: 146 LBS

## 2025-05-02 DIAGNOSIS — E11.65 UNCONTROLLED TYPE 2 DIABETES MELLITUS WITH HYPERGLYCEMIA, WITHOUT LONG-TERM CURRENT USE OF INSULIN (HCC): Primary | ICD-10-CM

## 2025-05-02 DIAGNOSIS — E78.2 MIXED HYPERLIPIDEMIA: ICD-10-CM

## 2025-05-02 DIAGNOSIS — Z13.29 SCREENING FOR THYROID DISORDER: ICD-10-CM

## 2025-05-02 DIAGNOSIS — E55.9 HYPOVITAMINOSIS D: ICD-10-CM

## 2025-05-02 LAB — HBA1C MFR BLD: 7 %

## 2025-05-02 RX ORDER — TIRZEPATIDE 12.5 MG/.5ML
12.5 INJECTION, SOLUTION SUBCUTANEOUS WEEKLY
Qty: 2 ML | Refills: 2 | Status: SHIPPED | OUTPATIENT
Start: 2025-05-02

## 2025-05-02 RX ORDER — GLIPIZIDE 10 MG/1
TABLET ORAL
Qty: 360 TABLET | Refills: 3 | Status: SHIPPED | OUTPATIENT
Start: 2025-05-02

## 2025-05-02 NOTE — PROGRESS NOTES
Name: Leann Barnhart  YOB: 1965  Report Period: 04/19/2025 - 05/02/2025 (14 days)  Generated: 05/02/2025  Time CGM Active: 96%      Glucose Statistics and Targets  Average Glucose: 157 mg/dL  Glucose Management Indicator (GMI): 7.1%  Glucose Variability (%CV): 26.7%  Target Range: 70 - 180 mg/dL      Time in Ranges  Very High: >250 mg/dL --- 2%  High: 181 - 250 mg/dL --- 25%  Target Range: 70 - 180 mg/dL --- 73%  Low: 54 - 69 mg/dL --- 0%  Very Low: <54 mg/dL --- 0%

## 2025-05-02 NOTE — PROGRESS NOTES
[facility]    Chief Complaint:     Chief Complaint   Patient presents with    Diabetes    Follow-up      Subjective:   Leann Barnhart is a pleasant 60 y.o. female who presents for follow up of Diabetes Mellitus type 2. Patient also has hypertension, hyperlipidemia and has a BMI of 24.     Diagnosed: long standing   On insulin since: few years  Other diabetes meds tried in the past: Unable to tolerate Jardiance/Farxiga due to infections and GI upset. Metformin XR (stopped due to GI symptoms), Lantus stopped in 2024 given improvement in blood sugars.  Hx of severe hypoglycemia or DKA: none   Hx of MI/stroke/CKD: no MI or stroke, no CKD  Hx of pancreatitis: no   Family hx of thyroid cancer: no     Most recent HbA1c:   Hemoglobin A1C   Date Value Ref Range Status   05/02/2025 7.0 % Final      Current regimen:  Glipizide 10 mg BID  Mounjaro 12.5 mg weekly, reports compliance and tolerance.    Name: Leann Barnhart  YOB: 1965  Report Period: 04/19/2025 - 05/02/2025 (14 days)  Generated: 05/02/2025  Time CGM Active: 96%        Glucose Statistics and Targets  Average Glucose: 157 mg/dL  Glucose Management Indicator (GMI): 7.1%  Glucose Variability (%CV): 26.7%  Target Range: 70 - 180 mg/dL        Time in Ranges  Very High: >250 mg/dL --- 2%  High: 181 - 250 mg/dL --- 25%  Target Range: 70 - 180 mg/dL --- 73%  Low: 54 - 69 mg/dL --- 0%  Very Low: <54 mg/dL --- 0%     Meals: 3 meals, mainly cooked meals. Stopped drinking coke. She drinks water with pedialyte.  Has been trying to cut down on bedtime snacking.  Exercise:     Last eye exam: Dec 2024, no DR.    Foot care: no concerns, no neuropathy     Hyperlipidemia: Atorvastatin 40 mg daily. Lipid panel from October 2024 showed an LDL of 86, triglycerides 145.  Hypertension: Not on any blood pressure medications. She was on lisinopril but stopped due to ROGER and hyperkalemia.   Continues to have frequent UTIs. She is on AZO.  Scheduled to

## 2025-05-05 ENCOUNTER — TELEPHONE (OUTPATIENT)
Dept: FAMILY MEDICINE CLINIC | Age: 60
End: 2025-05-05

## 2025-05-06 ENCOUNTER — PATIENT MESSAGE (OUTPATIENT)
Dept: FAMILY MEDICINE CLINIC | Age: 60
End: 2025-05-06

## 2025-05-06 ENCOUNTER — OFFICE VISIT (OUTPATIENT)
Dept: FAMILY MEDICINE CLINIC | Age: 60
End: 2025-05-06

## 2025-05-06 VITALS
OXYGEN SATURATION: 97 % | SYSTOLIC BLOOD PRESSURE: 122 MMHG | HEIGHT: 65 IN | WEIGHT: 143 LBS | BODY MASS INDEX: 23.82 KG/M2 | HEART RATE: 96 BPM | DIASTOLIC BLOOD PRESSURE: 84 MMHG

## 2025-05-06 DIAGNOSIS — R11.2 NAUSEA AND VOMITING, UNSPECIFIED VOMITING TYPE: Primary | ICD-10-CM

## 2025-05-06 DIAGNOSIS — Z79.4 TYPE 2 DIABETES MELLITUS WITH HYPEROSMOLARITY WITHOUT COMA, WITH LONG-TERM CURRENT USE OF INSULIN (HCC): ICD-10-CM

## 2025-05-06 DIAGNOSIS — E11.00 TYPE 2 DIABETES MELLITUS WITH HYPEROSMOLARITY WITHOUT COMA, WITH LONG-TERM CURRENT USE OF INSULIN (HCC): ICD-10-CM

## 2025-05-06 RX ORDER — PROMETHAZINE HYDROCHLORIDE 25 MG/1
25 TABLET ORAL 3 TIMES DAILY PRN
Qty: 12 TABLET | Refills: 0 | Status: SHIPPED | OUTPATIENT
Start: 2025-05-06 | End: 2025-05-13

## 2025-05-06 NOTE — PROGRESS NOTES
Leann Barnhart (:  1965) is a 60 y.o. female,Established patient, here for evaluation of the following chief complaint(s):  Nausea & Vomiting (For 3 days  - may be from  Mounjaro)         Assessment & Plan  Nausea and vomiting, unspecified vomiting type   - advised to take medication and add liquids in slowly and to add solids in later tonight or tomorrow - bland diet    Orders:    promethazine (PHENERGAN) 25 MG tablet; Take 1 tablet by mouth 3 times daily as needed for Nausea    Type 2 diabetes mellitus with hyperosmolarity without coma, with long-term current use of insulin ([facility])   - was on the 12.5 and decreased 10 mg because off the side effects  - needs to follow up with carmen    Orders:    Tirzepatide (MOUNJARO) 10 MG/0.5ML SOAJ pen; Inject 10 mg into the skin once a week      Return if symptoms worsen or fail to improve.       Subjective   HPI    Patient presents today for n/v for about 3 days. States fever, diarrhea. States she increased her mounjaro and took it Saturday and her sx started Saturday night. States she has not been able to keep any food down until yesterday. States she had a little gatorade with some spaghetti, crackers and states it stayed down. States this morning she woke up nauseated feeling like she was going to throw up. States she also has been having some belching which is side effect of the mounjaro. States she also has not been able to take her medications since Saturday because of the nausea and vomiting.     Review of Systems   See HPI    Objective   Physical Exam  Vitals and nursing note reviewed.   Constitutional:       Appearance: Normal appearance.   Cardiovascular:      Rate and Rhythm: Normal rate and regular rhythm.   Pulmonary:      Effort: Pulmonary effort is normal.      Breath sounds: Normal breath sounds.   Abdominal:      General: Abdomen is flat. Bowel sounds are normal.      Palpations: Abdomen is soft.      Tenderness: There is no abdominal tenderness.

## 2025-05-07 RX ORDER — ONDANSETRON 4 MG/1
4 TABLET, ORALLY DISINTEGRATING ORAL 3 TIMES DAILY PRN
Qty: 21 TABLET | Refills: 1 | Status: SHIPPED | OUTPATIENT
Start: 2025-05-07

## 2025-05-07 NOTE — TELEPHONE ENCOUNTER
Medication:   Requested Prescriptions     Pending Prescriptions Disp Refills    ondansetron (ZOFRAN-ODT) 4 MG disintegrating tablet 21 tablet 1     Sig: Take 1 tablet by mouth 3 times daily as needed for Nausea or Vomiting        Last Filled:      Patient Phone Number: [phone] (home)     Last appt: 5/6/2025   Next appt: Visit date not found    Last OARRS:       3/11/2021    10:56 AM   RX Monitoring   Periodic Controlled Substance Monitoring Possible medication side effects, risk of tolerance/dependence & alternative treatments discussed.;No signs of potential drug abuse or diversion identified.

## 2025-05-08 ENCOUNTER — TELEPHONE (OUTPATIENT)
Dept: FAMILY MEDICINE CLINIC | Age: 60
End: 2025-05-08

## 2025-05-08 NOTE — TELEPHONE ENCOUNTER
Payer Waiting for Response  PA Detail   Deadline to reply: May 6, 2026 11:51 AM (In 12 months)  Payer: [employer] - Commercial Case ID: [id number]    [phone]  [employer]ign[employer] Commercial Electronic PA Form (2017 NCPDP)   [employer]ign[employer] Prior Authorizations  View History  Pharmacy Benefits   Open Encounter SEPIDEH MAYORGA  -  [employer] Payer Advantage [id number] ([employer])    Covered: Retail, Mail Order    Unknown: Specialty, Long-Term Care  Member ID: [id number] BIN: [id number] : 1965   Group ID: [id number] PCN: [id number] Legal sex: F   Group name: [id number]   Address: [address] [place], [place] [id number]    Medication Being Authorized    Tirzepatide (MOUNJARO) 10 MG/0.5ML SOAJ pen  Inject 10 mg into the skin once a week  Dispense: 3 mL Refills: 0   Start: 2025   Class: Normal Diagnoses: Type 2 diabetes mellitus with hyperosmolarity without coma, with long-term current use of insulin ([facility])   This order has been released to its destination.  To be filled at: [id number] - [place] - [address] - P [phone] - F [phone]

## 2025-05-08 NOTE — TELEPHONE ENCOUNTER
Called pt and spoke to her, pt stated she already has an appt with Urology for May 19th. 1:30. 5-8-2025

## 2025-05-09 ENCOUNTER — TELEPHONE (OUTPATIENT)
Dept: ENDOCRINOLOGY | Age: 60
End: 2025-05-09

## 2025-05-09 DIAGNOSIS — E11.65 UNCONTROLLED TYPE 2 DIABETES MELLITUS WITH HYPERGLYCEMIA, WITHOUT LONG-TERM CURRENT USE OF INSULIN (HCC): Primary | ICD-10-CM

## 2025-05-09 NOTE — TELEPHONE ENCOUNTER
Submitted PA for Mounjaro 10MG/0.5ML auto-injectors  Via CMM key [id number]  STATUS: Per CMM clinical override not needed. So I called Renetta for clarification. Spoke to Virginie. She ran a test claim and said there is already a paid claim for this medication. So the CMM answer is no PA needed at this time.     If this requires a response please respond to the pool. (P MHCX [facility] MEDICINE Pre-Auth).    Please advise patient thank you.

## 2025-05-09 NOTE — TELEPHONE ENCOUNTER
Pt calling and states she took her Mounjaro 12.5 dose last Saturday and then she had nausea and vomiting on Saturday and Sunday. She had appt w/ her family  5/6 and family  told her she should lower her dose to 10mg. Pt wants to know if that's what she should do? Pt states she also had a fever and didn't think it was related to Mounjaro    # [phone]

## 2025-05-12 ENCOUNTER — TELEPHONE (OUTPATIENT)
Dept: ENDOCRINOLOGY | Age: 60
End: 2025-05-12

## 2025-05-12 DIAGNOSIS — E11.65 UNCONTROLLED TYPE 2 DIABETES MELLITUS WITH HYPERGLYCEMIA, WITHOUT LONG-TERM CURRENT USE OF INSULIN (HCC): Primary | ICD-10-CM

## 2025-05-12 RX ORDER — TIRZEPATIDE 12.5 MG/.5ML
12.5 INJECTION, SOLUTION SUBCUTANEOUS WEEKLY
Qty: 2 ML | Refills: 2 | Status: SHIPPED | OUTPATIENT
Start: 2025-05-12

## 2025-05-12 NOTE — TELEPHONE ENCOUNTER
Requested Prescriptions     Signed Prescriptions Disp Refills    tirzepatide (MOUNJARO) 12.5 MG/0.5ML SOAJ injection 2 mL 2     Sig: Inject 12.5 mg into the skin once a week     Authorizing Provider: CAIN NUNEZ     Ordering User: ABHIJIT LEE

## 2025-05-13 RX ORDER — TIRZEPATIDE 10 MG/.5ML
10 INJECTION, SOLUTION SUBCUTANEOUS WEEKLY
Qty: 2 ML | Refills: 2 | Status: SHIPPED | OUTPATIENT
Start: 2025-05-13 | End: 2025-06-02

## 2025-05-13 NOTE — TELEPHONE ENCOUNTER
Pt calling back and states she was nauseous all weekend and will need to go on the lower dose of Mounjaro    Pt also states the Mounjaro will need Prior Auth     CB# [phone]

## 2025-05-13 NOTE — TELEPHONE ENCOUNTER
If nausea improves then she may take another dose of 12.5 mg for this upcoming dose and monitor symptoms.  If nausea and vomiting reoccurs and is likely related to Mounjaro and I would recommend to lower the dose then.

## 2025-05-15 NOTE — TELEPHONE ENCOUNTER
Submitted PA for Mounjaro  Via [facility] Key: [id number]   STATUS:An active PA is already on file with expiration date of 03/28/2026. Please wait to resubmit request within 60 days of that expiration date to obtain a PA renewal.     If this requires a response please respond to the pool ( P MHCX [facility] MEDICATION PRE-AUTH).      Thank you please advise patient.

## 2025-05-19 ENCOUNTER — TELEPHONE (OUTPATIENT)
Dept: FAMILY MEDICINE CLINIC | Age: 60
End: 2025-05-19

## 2025-05-19 NOTE — TELEPHONE ENCOUNTER
Pt  called in stating that yesterday pt fell down stairs going to the garage. He said that it was pt right foot and they think it is the bone on top of pt foot going up to pinkie toe. He said that they were icing it to help with the swelling but did not know if they needed to come in or just get x ray ordered. Pt  can be reached at [phone]

## 2025-05-20 ENCOUNTER — OFFICE VISIT (OUTPATIENT)
Dept: FAMILY MEDICINE CLINIC | Age: 60
End: 2025-05-20

## 2025-05-20 ENCOUNTER — HOSPITAL ENCOUNTER (OUTPATIENT)
Dept: GENERAL RADIOLOGY | Age: 60
Discharge: HOME | End: 2025-05-20
Payer: COMMERCIAL

## 2025-05-20 ENCOUNTER — RESULTS FOLLOW-UP (OUTPATIENT)
Dept: FAMILY MEDICINE CLINIC | Age: 60
End: 2025-05-20

## 2025-05-20 ENCOUNTER — HOSPITAL ENCOUNTER (OUTPATIENT)
Age: 60
Discharge: HOME | End: 2025-05-20
Payer: COMMERCIAL

## 2025-05-20 VITALS
DIASTOLIC BLOOD PRESSURE: 76 MMHG | BODY MASS INDEX: 23.82 KG/M2 | HEIGHT: 65 IN | OXYGEN SATURATION: 98 % | WEIGHT: 143 LBS | SYSTOLIC BLOOD PRESSURE: 122 MMHG | HEART RATE: 94 BPM

## 2025-05-20 DIAGNOSIS — S99.921A INJURY OF RIGHT FOOT, INITIAL ENCOUNTER: Primary | ICD-10-CM

## 2025-05-20 DIAGNOSIS — S99.921A INJURY OF RIGHT FOOT, INITIAL ENCOUNTER: ICD-10-CM

## 2025-05-20 PROCEDURE — 73630 X-RAY EXAM OF FOOT: CPT

## 2025-05-20 PROCEDURE — 73610 X-RAY EXAM OF ANKLE: CPT

## 2025-05-20 NOTE — PROGRESS NOTES
Leann Barnhart (:  1965) is a 60 y.o. female,Established patient, here for evaluation of the following chief complaint(s):  Fall (Foot pain - right foot)         Assessment & Plan  Injury of right foot, initial encounter     - advised to  continue to ice and compress  - continue to take motrin for the pain and swelling  -will follow up with the xray results  Orders:    XR FOOT RIGHT (MIN 3 VIEWS); Future    XR ANKLE RIGHT (MIN 3 VIEWS); Future      Return if symptoms worsen or fail to improve.       Subjective   HPI    Patient presents today for right foot injury. Patient was going binu the stairs in her garage, tripped and fell. States she has been icing the area to help with inflammation and swelling. States pain with bending and dorsi flexing her right foot. States swelling along the lateral side of her outer ankle. States able to ambulate. Denies numbness in her right foot.     Review of Systems   See HPI    Objective   Physical Exam  Vitals and nursing note reviewed.   Constitutional:       Appearance: Normal appearance.   Musculoskeletal:         General: Swelling, tenderness and signs of injury present.      Right ankle: Swelling and ecchymosis present.      Right Achilles Tendon: Normal.      Right foot: Decreased range of motion. Normal capillary refill. Swelling, tenderness and bony tenderness present. No crepitus.        Legs:    Skin:     General: Skin is warm and dry.      Findings: Bruising present.   Neurological:      General: No focal deficit present.      Mental Status: She is alert and oriented to person, place, and time.            On this date 2025 I have spent 30 minutes reviewing previous notes, test results and face to face with the patient discussing the diagnosis and importance of compliance with the treatment plan as well as documenting on the day of the visit.      An electronic signature was used to authenticate this note.    --Allison Mills, APRN - CNP

## 2025-06-01 PROBLEM — Z13.29 SCREENING FOR THYROID DISORDER: Status: RESOLVED | Noted: 2025-05-02 | Resolved: 2025-06-01

## 2025-06-02 ENCOUNTER — TELEPHONE (OUTPATIENT)
Dept: ENDOCRINOLOGY | Age: 60
End: 2025-06-02

## 2025-06-02 RX ORDER — TIRZEPATIDE 10 MG/.5ML
10 INJECTION, SOLUTION SUBCUTANEOUS WEEKLY
Qty: 2 ML | Refills: 2 | Status: SHIPPED | OUTPATIENT
Start: 2025-06-02

## 2025-06-02 NOTE — TELEPHONE ENCOUNTER
Pt stated the pharm of [facility] in [place] on [address] stated they need a prior auth for Afua please advise.

## 2025-06-02 NOTE — TELEPHONE ENCOUNTER
Submitted PA for Mounjaro  Via CMM Key: [id number]   STATUS: An active PA is already on file with expiration date of [id number]. Please wait to resubmit request within 60 days of that expiration date to obtain a PA renewal.     If this requires a response please respond to the pool ( P MHCX [facility] MEDICATION PRE-AUTH).      Thank you please advise patient.

## 2025-06-19 ENCOUNTER — OFFICE VISIT (OUTPATIENT)
Dept: FAMILY MEDICINE CLINIC | Age: 60
End: 2025-06-19
Payer: COMMERCIAL

## 2025-06-19 VITALS
DIASTOLIC BLOOD PRESSURE: 76 MMHG | WEIGHT: 143 LBS | SYSTOLIC BLOOD PRESSURE: 124 MMHG | BODY MASS INDEX: 23.82 KG/M2 | HEART RATE: 76 BPM | HEIGHT: 65 IN | OXYGEN SATURATION: 98 %

## 2025-06-19 DIAGNOSIS — S46.811A STRAIN OF RIGHT TRAPEZIUS MUSCLE, INITIAL ENCOUNTER: Primary | ICD-10-CM

## 2025-06-19 PROCEDURE — 99213 OFFICE O/P EST LOW 20 MIN: CPT | Performed by: NURSE PRACTITIONER

## 2025-06-19 PROCEDURE — 3078F DIAST BP <80 MM HG: CPT | Performed by: NURSE PRACTITIONER

## 2025-06-19 PROCEDURE — 3074F SYST BP LT 130 MM HG: CPT | Performed by: NURSE PRACTITIONER

## 2025-06-19 NOTE — PROGRESS NOTES
PROGRESS NOTE     Alyx Hoff CNP  [facility]  [address] [place] [address], [address]  [facility] [address]  [phone] office  [phone] fax    Date of Service:  6/19/2025      Assessment / Plan:     1. Strain of right trapezius muscle, initial encounter  Patient would like to try PT again prior to seeing specialist.     - [facility]  - [facility]    Subjective:      Patient ID: .Leann Barnhart is a 60 y.o. female      CC: Shoulder problem    HPI  Joint Symptoms:  Patient complains of a several month history of pain and swelling in right shoulder.  Pain is persistent, aching in nature, and is moderate in intensity.  Radiation: none.  Associated symptoms:  none.  She denies any other symptoms.  Symptoms are exacerbated by lifting.  Precipitating factors: no known injury.  Symptoms are worse at night.  Prior history of similar symptoms: none.  Previous treatment: ice, heat, acetaminophen- tylenol and motrin.  Symptoms are worsening over time.    Patient was seen by Dr. Damon in 2021 for left shoulder bicep strain, left shoulder rotator cuff tendinitis, and left shoulder AC joint arthritis. Had injections done and went to physical therapy. Improved with PT.     Vitals:    06/19/25 1056   BP: 124/76   Pulse: 76   SpO2: 98%   Weight: 64.9 kg (143 lb)   Height: 1.651 m (5' 5\")       Outpatient Medications Marked as Taking for the 6/19/25 encounter (Office Visit) with Alyx Hoff APRN - CNP   Medication Sig Dispense Refill    Tirzepatide (MOUNJARO) 10 MG/0.5ML SOAJ pen Inject 10 mg into the skin once a week 2 mL 2    tirzepatide (MOUNJARO) 12.5 MG/0.5ML SOAJ injection Inject 12.5 mg into the skin once a week 2 mL 2    ondansetron (ZOFRAN-ODT) 4 MG disintegrating tablet Take 1 tablet by mouth 3 times daily as needed for Nausea or Vomiting 21 tablet 1    glipiZIDE (GLUCOTROL) 10 MG tablet TAKE 1 tablet BY MOUTH TWICE DAILY BEFORE breakfast and dinner, take with food 360

## 2025-06-20 ENCOUNTER — TELEPHONE (OUTPATIENT)
Dept: FAMILY MEDICINE CLINIC | Age: 60
End: 2025-06-20

## 2025-06-20 RX ORDER — ONDANSETRON 4 MG/1
4 TABLET, ORALLY DISINTEGRATING ORAL 3 TIMES DAILY PRN
Qty: 21 TABLET | Refills: 5 | Status: SHIPPED | OUTPATIENT
Start: 2025-06-20

## 2025-06-20 RX ORDER — PROMETHAZINE HYDROCHLORIDE 25 MG/1
25 TABLET ORAL 4 TIMES DAILY PRN
Qty: 20 TABLET | Refills: 0 | Status: SHIPPED | OUTPATIENT
Start: 2025-06-20 | End: 2025-06-27

## 2025-06-20 NOTE — TELEPHONE ENCOUNTER
Medication:   Requested Prescriptions     Pending Prescriptions Disp Refills    ondansetron (ZOFRAN-ODT) 4 MG disintegrating tablet 21 tablet 5     Sig: Take 1 tablet by mouth 3 times daily as needed for Nausea or Vomiting        Last Filled:      Patient Phone Number: [phone] (home)     Last appt: 6/19/2025   Next appt: Visit date not found    Last OARRS:       3/11/2021    10:56 AM   RX Monitoring   Periodic Controlled Substance Monitoring Possible medication side effects, risk of tolerance/dependence & alternative treatments discussed.;No signs of potential drug abuse or diversion identified.

## 2025-06-20 NOTE — TELEPHONE ENCOUNTER
Requesting refill on anti nausea medication- promethazine. Please call into [facility] in [place]. Pt is reachable at [phone].     Pt is completely out of med

## 2025-07-11 ENCOUNTER — TELEPHONE (OUTPATIENT)
Dept: FAMILY MEDICINE CLINIC | Age: 60
End: 2025-07-11

## 2025-07-11 NOTE — TELEPHONE ENCOUNTER
I am not seeing exercises in her chart - advise patient I sent her a my chart message for exercises for her to try prior to getting into PT.

## 2025-07-11 NOTE — TELEPHONE ENCOUNTER
Was seen 6/19 for strain of right trapezius muscle. States she was given exercises she could do at home for this, but has misplaced them. Unable to find them on my chart. Calling to see if can have another copy printed for her. Please advise.   Please address today.

## 2025-08-20 ENCOUNTER — PATIENT MESSAGE (OUTPATIENT)
Dept: FAMILY MEDICINE CLINIC | Age: 60
End: 2025-08-20

## 2025-08-20 RX ORDER — PROMETHAZINE HYDROCHLORIDE 25 MG/1
25 TABLET ORAL 4 TIMES DAILY PRN
Qty: 20 TABLET | Refills: 0 | Status: SHIPPED | OUTPATIENT
Start: 2025-08-20 | End: 2025-08-27

## 2025-08-27 DIAGNOSIS — Z13.29 SCREENING FOR THYROID DISORDER: ICD-10-CM

## 2025-08-27 DIAGNOSIS — E78.2 MIXED HYPERLIPIDEMIA: ICD-10-CM

## 2025-08-27 DIAGNOSIS — E55.9 HYPOVITAMINOSIS D: ICD-10-CM

## 2025-08-27 DIAGNOSIS — E11.65 UNCONTROLLED TYPE 2 DIABETES MELLITUS WITH HYPERGLYCEMIA, WITHOUT LONG-TERM CURRENT USE OF INSULIN (HCC): ICD-10-CM

## 2025-08-27 LAB
25(OH)D3 SERPL-MCNC: 46.6 NG/ML
ALBUMIN SERPL-MCNC: 4.8 G/DL (ref 3.4–5)
ALBUMIN/GLOB SERPL: 2.1 {RATIO} (ref 1.1–2.2)
ALP SERPL-CCNC: 125 U/L (ref 40–129)
ALT SERPL-CCNC: 68 U/L (ref 10–40)
ANION GAP SERPL CALCULATED.3IONS-SCNC: 13 MMOL/L (ref 3–16)
AST SERPL-CCNC: 55 U/L (ref 15–37)
BILIRUB SERPL-MCNC: 0.7 MG/DL (ref 0–1)
BUN SERPL-MCNC: 11 MG/DL (ref 7–20)
CALCIUM SERPL-MCNC: 9.4 MG/DL (ref 8.3–10.6)
CHLORIDE SERPL-SCNC: 103 MMOL/L (ref 99–110)
CHOLEST SERPL-MCNC: 145 MG/DL (ref 0–199)
CO2 SERPL-SCNC: 23 MMOL/L (ref 21–32)
CREAT SERPL-MCNC: 0.9 MG/DL (ref 0.6–1.1)
CREAT UR-MCNC: 95.8 MG/DL (ref 28–259)
EST. AVERAGE GLUCOSE BLD GHB EST-MCNC: 157.1 MG/DL
GFR SERPLBLD CREATININE-BSD FMLA CKD-EPI: 73 ML/MIN/{1.73_M2}
GLUCOSE SERPL-MCNC: 191 MG/DL (ref 70–99)
HBA1C MFR BLD: 7.1 %
HDLC SERPL-MCNC: 46 MG/DL (ref 40–60)
LDLC SERPL CALC-MCNC: 79 MG/DL
MICROALBUMIN UR DL<=1MG/L-MCNC: <1.2 MG/DL
MICROALBUMIN/CREAT UR: NORMAL MG/G (ref 0–30)
POTASSIUM SERPL-SCNC: 4.7 MMOL/L (ref 3.5–5.1)
PROT SERPL-MCNC: 7.1 G/DL (ref 6.4–8.2)
SODIUM SERPL-SCNC: 139 MMOL/L (ref 136–145)
TRIGL SERPL-MCNC: 101 MG/DL (ref 0–150)
TSH SERPL DL<=0.005 MIU/L-ACNC: 1.6 UIU/ML (ref 0.27–4.2)
VLDLC SERPL CALC-MCNC: 20 MG/DL

## 2025-08-29 ENCOUNTER — OFFICE VISIT (OUTPATIENT)
Dept: ENDOCRINOLOGY | Age: 60
End: 2025-08-29

## 2025-08-29 VITALS
HEART RATE: 79 BPM | WEIGHT: 144 LBS | DIASTOLIC BLOOD PRESSURE: 80 MMHG | HEIGHT: 65 IN | SYSTOLIC BLOOD PRESSURE: 140 MMHG | BODY MASS INDEX: 23.99 KG/M2

## 2025-08-29 DIAGNOSIS — Z79.4 UNCONTROLLED TYPE 2 DIABETES MELLITUS WITH HYPERGLYCEMIA, WITH LONG-TERM CURRENT USE OF INSULIN (HCC): ICD-10-CM

## 2025-08-29 DIAGNOSIS — E78.2 MIXED HYPERLIPIDEMIA: ICD-10-CM

## 2025-08-29 DIAGNOSIS — E11.65 UNCONTROLLED TYPE 2 DIABETES MELLITUS WITH HYPERGLYCEMIA, WITH LONG-TERM CURRENT USE OF INSULIN (HCC): ICD-10-CM

## 2025-08-29 DIAGNOSIS — E11.65 UNCONTROLLED TYPE 2 DIABETES MELLITUS WITH HYPERGLYCEMIA, WITHOUT LONG-TERM CURRENT USE OF INSULIN (HCC): Primary | ICD-10-CM

## 2025-08-29 RX ORDER — GLIPIZIDE 10 MG/1
TABLET ORAL
Qty: 360 TABLET | Refills: 3 | Status: SHIPPED | OUTPATIENT
Start: 2025-08-29

## 2025-08-29 RX ORDER — PIOGLITAZONE 30 MG/1
30 TABLET ORAL DAILY
Qty: 90 TABLET | Refills: 3 | Status: SHIPPED | OUTPATIENT
Start: 2025-08-29

## 2025-08-29 RX ORDER — HYDROCHLOROTHIAZIDE 12.5 MG/1
CAPSULE ORAL
Qty: 2 EACH | Refills: 5 | Status: SHIPPED | OUTPATIENT
Start: 2025-08-29

## 2025-08-29 RX ORDER — NITROFURANTOIN 25; 75 MG/1; MG/1
100 CAPSULE ORAL 2 TIMES DAILY
COMMUNITY
Start: 2025-06-24 | End: 2025-08-29 | Stop reason: ALTCHOICE

## 2025-08-29 RX ORDER — TIRZEPATIDE 10 MG/.5ML
10 INJECTION, SOLUTION SUBCUTANEOUS WEEKLY
Qty: 6 ML | Refills: 2 | Status: SHIPPED | OUTPATIENT
Start: 2025-08-29